# Patient Record
Sex: MALE | URBAN - METROPOLITAN AREA
[De-identification: names, ages, dates, MRNs, and addresses within clinical notes are randomized per-mention and may not be internally consistent; named-entity substitution may affect disease eponyms.]

---

## 2019-03-08 ENCOUNTER — APPOINTMENT (RX ONLY)
Dept: URBAN - METROPOLITAN AREA CLINIC 31 | Facility: CLINIC | Age: 69
Setting detail: DERMATOLOGY
End: 2019-03-08

## 2019-03-08 DIAGNOSIS — L87.2 ELASTOSIS PERFORANS SERPIGINOSA: ICD-10-CM

## 2019-03-08 PROBLEM — I10 ESSENTIAL (PRIMARY) HYPERTENSION: Status: ACTIVE | Noted: 2019-03-08

## 2019-03-08 PROBLEM — L30.9 DERMATITIS, UNSPECIFIED: Status: ACTIVE | Noted: 2019-03-08

## 2019-03-08 PROBLEM — E78.5 HYPERLIPIDEMIA, UNSPECIFIED: Status: ACTIVE | Noted: 2019-03-08

## 2019-03-08 PROBLEM — L20.84 INTRINSIC (ALLERGIC) ECZEMA: Status: ACTIVE | Noted: 2019-03-08

## 2019-03-08 PROCEDURE — 11103 TANGNTL BX SKIN EA SEP/ADDL: CPT

## 2019-03-08 PROCEDURE — ? BIOPSY BY SHAVE METHOD

## 2019-03-08 PROCEDURE — 11102 TANGNTL BX SKIN SINGLE LES: CPT

## 2019-03-08 PROCEDURE — ? TREATMENT REGIMEN

## 2019-03-08 ASSESSMENT — LOCATION DETAILED DESCRIPTION DERM
LOCATION DETAILED: PERIUMBILICAL SKIN
LOCATION DETAILED: LEFT PROXIMAL PRETIBIAL REGION

## 2019-03-08 ASSESSMENT — LOCATION SIMPLE DESCRIPTION DERM
LOCATION SIMPLE: LEFT PRETIBIAL REGION
LOCATION SIMPLE: ABDOMEN

## 2019-03-08 ASSESSMENT — LOCATION ZONE DERM
LOCATION ZONE: LEG
LOCATION ZONE: TRUNK

## 2019-03-08 NOTE — PROCEDURE: BIOPSY BY SHAVE METHOD
Biopsy Type: H and E
X Size Of Lesion In Cm: 0
Depth Of Biopsy: dermis
Electrodesiccation Text: The wound bed was treated with electrodesiccation after the biopsy was performed.
Render Post-Care Instructions In Note?: no
Wound Care: Vaseline
Lab Facility: 3
Cryotherapy Text: The wound bed was treated with cryotherapy after the biopsy was performed.
Anesthesia Type: 1% lidocaine with epinephrine
Consent: Written consent was obtained and risks were reviewed including but not limited to scarring, infection, bleeding, scabbing, incomplete removal, nerve damage and allergy to anesthesia.
Lab: -20
Curettage Text: The wound bed was treated with curettage after the biopsy was performed.
Detail Level: Detailed
Biopsy Method: Dermablade
Notification Instructions: Patient will be notified of biopsy results. However, patient instructed to call the office if not contacted within 2 weeks.
Was A Bandage Applied: Yes
Hemostasis: Aluminum Chloride
Silver Nitrate Text: The wound bed was treated with silver nitrate after the biopsy was performed.
Post-Care Instructions: I reviewed with the patient in detail post-care instructions. Patient is to keep the biopsy site dry overnight, and then apply bacitracin twice daily until healed. Patient may apply hydrogen peroxide soaks to remove any crusting.
Dressing: bandage
Electrodesiccation And Curettage Text: The wound bed was treated with electrodesiccation and curettage after the biopsy was performed.
Billing Type: Third-Party Bill
Anesthesia Volume In Cc (Will Not Render If 0): 0.5
Type Of Destruction Used: Curettage

## 2019-03-08 NOTE — PROCEDURE: TREATMENT REGIMEN
Detail Level: Zone
Continue Regimen: May continue one of his topical steroid creams bid x 1-2 weeks in the meantime until results come in

## 2019-03-08 NOTE — HPI: RASH
What Type Of Note Output Would You Prefer (Optional)?: Bullet Format
Is This A New Presentation, Or A Follow-Up?: Rash
Additional History: Pt states he has seen multiple dermatologists for this rash. One biopsy was done on the back but he was told it didnt show anything. He has been given multiple topicals and orals but the rash keeps returning and no one has been able to tell him what the diagnosis is so far

## 2019-03-26 ENCOUNTER — APPOINTMENT (RX ONLY)
Dept: URBAN - METROPOLITAN AREA CLINIC 31 | Facility: CLINIC | Age: 69
Setting detail: DERMATOLOGY
End: 2019-03-26

## 2019-03-26 ENCOUNTER — APPOINTMENT (RX ONLY)
Dept: URBAN - METROPOLITAN AREA CLINIC 26 | Facility: CLINIC | Age: 69
Setting detail: DERMATOLOGY
End: 2019-03-26

## 2019-03-26 DIAGNOSIS — L87.2 ELASTOSIS PERFORANS SERPIGINOSA: ICD-10-CM

## 2019-03-26 DIAGNOSIS — L259 CONTACT DERMATITIS AND OTHER ECZEMA, UNSPECIFIED CAUSE: ICD-10-CM

## 2019-03-26 DIAGNOSIS — L82.1 OTHER SEBORRHEIC KERATOSIS: ICD-10-CM

## 2019-03-26 PROBLEM — D48.5 NEOPLASM OF UNCERTAIN BEHAVIOR OF SKIN: Status: ACTIVE | Noted: 2019-03-26

## 2019-03-26 PROBLEM — L30.9 DERMATITIS, UNSPECIFIED: Status: ACTIVE | Noted: 2019-03-26

## 2019-03-26 PROCEDURE — ? BIOPSY BY SHAVE METHOD

## 2019-03-26 PROCEDURE — ? COUNSELING

## 2019-03-26 PROCEDURE — 11104 PUNCH BX SKIN SINGLE LESION: CPT

## 2019-03-26 PROCEDURE — 99213 OFFICE O/P EST LOW 20 MIN: CPT | Mod: 25

## 2019-03-26 PROCEDURE — 11103 TANGNTL BX SKIN EA SEP/ADDL: CPT

## 2019-03-26 PROCEDURE — ? ADDITIONAL NOTES

## 2019-03-26 PROCEDURE — ? PRESCRIPTION

## 2019-03-26 PROCEDURE — ? BIOPSY BY PUNCH METHOD

## 2019-03-26 PROCEDURE — ? ORDER TESTS

## 2019-03-26 RX ORDER — CLOBETASOL PROPIONATE 0.5 MG/G
CREAM TOPICAL
Qty: 1 | Refills: 2 | Status: ERX

## 2019-03-26 RX ORDER — TRETINOIN 1 MG/G
CREAM TOPICAL
Qty: 1 | Refills: 6 | Status: ERX | COMMUNITY
Start: 2019-03-26

## 2019-03-26 RX ADMIN — TRETINOIN: 1 CREAM TOPICAL at 19:03

## 2019-03-26 ASSESSMENT — LOCATION SIMPLE DESCRIPTION DERM
LOCATION SIMPLE: CHEST
LOCATION SIMPLE: ABDOMEN

## 2019-03-26 ASSESSMENT — LOCATION ZONE DERM: LOCATION ZONE: TRUNK

## 2019-03-26 ASSESSMENT — LOCATION DETAILED DESCRIPTION DERM
LOCATION DETAILED: XIPHOID
LOCATION DETAILED: RIGHT LATERAL SUPERIOR CHEST
LOCATION DETAILED: RIGHT LATERAL ABDOMEN

## 2019-03-26 NOTE — PROCEDURE: ADDITIONAL NOTES
Additional Notes: Discussed treatment options to include retinoids, TCS, uvb, LN2, acetretin. Recommended UVB
Detail Level: Simple
Additional Notes: Labs entered into a non visit order

## 2019-03-26 NOTE — PROCEDURE: BIOPSY BY SHAVE METHOD
Additional Anesthesia Volume In Cc (Will Not Render If 0): 0
Destruction After The Procedure: No
Lab: -17
Biopsy Type: H and E
Wound Care: Vaseline
Billing Type: Third-Party Bill
Detail Level: Detailed
Silver Nitrate Text: The wound bed was treated with silver nitrate after the biopsy was performed.
Post-Care Instructions: I reviewed with the patient in detail post-care instructions. Patient is to keep the biopsy site dry overnight, and then apply bacitracin twice daily until healed. Patient may apply hydrogen peroxide soaks to remove any crusting.
Lab Facility: 3
Was A Bandage Applied: Yes
Notification Instructions: Patient will be notified of biopsy results. However, patient instructed to call the office if not contacted within 2 weeks.
Cryotherapy Text: The wound bed was treated with cryotherapy after the biopsy was performed.
Biopsy Method: Dermablade
Consent: Written consent was obtained and risks were reviewed including but not limited to scarring, infection, bleeding, scabbing, incomplete removal, nerve damage and allergy to anesthesia.
Anesthesia Volume In Cc (Will Not Render If 0): 0.5
Dressing: bandage
Electrodesiccation And Curettage Text: The wound bed was treated with electrodesiccation and curettage after the biopsy was performed.
Hemostasis: Aluminum Chloride
Electrodesiccation Text: The wound bed was treated with electrodesiccation after the biopsy was performed.
Type Of Destruction Used: Curettage
Depth Of Biopsy: dermis
Curettage Text: The wound bed was treated with curettage after the biopsy was performed.
Anesthesia Type: 1% lidocaine with epinephrine

## 2019-03-26 NOTE — PROCEDURE: BIOPSY BY PUNCH METHOD
Post-Care Instructions: I reviewed with the patient in detail post-care instructions. Patient is to keep the biopsy site dry overnight, and then apply bacitracin twice daily until healed. Patient may apply hydrogen peroxide soaks to remove any crusting.
Hemostasis: None
Was A Bandage Applied: Yes
X Depth Of Punch In Cm (Optional): 0
Epidermal Sutures: 5-0 Ethilon
Anesthesia Type: 1% lidocaine with epinephrine
Consent: Written consent was obtained and risks were reviewed including but not limited to scarring, infection, bleeding, scabbing, incomplete removal, nerve damage and allergy to anesthesia.
Detail Level: Detailed
Patient Will Remove Sutures At Home?: No
Lab: -17
Biopsy Type: H and E
Dressing: bandage
Home Suture Removal Text: Patient was provided a home suture removal kit and will remove their sutures at home.  If they have any questions or difficulties they will call the office.
Punch Size In Mm: 3
Notification Instructions: Patient will be notified of biopsy results. However, patient instructed to call the office if not contacted within 2 weeks.
Billing Type: Third-Party Bill
Wound Care: Petrolatum
Anesthesia Volume In Cc (Will Not Render If 0): 0.5
Path Notes (To The Dermatopathologist): Only  to read
Suture Removal: 10 days

## 2019-04-08 ENCOUNTER — RX ONLY (OUTPATIENT)
Age: 69
Setting detail: RX ONLY
End: 2019-04-08

## 2019-04-08 RX ORDER — PREDNISONE 10 MG/1
TABLET ORAL
Qty: 30 | Refills: 0 | Status: ERX

## 2019-04-16 ENCOUNTER — APPOINTMENT (RX ONLY)
Dept: URBAN - METROPOLITAN AREA CLINIC 26 | Facility: CLINIC | Age: 69
Setting detail: DERMATOLOGY
End: 2019-04-16

## 2019-04-16 DIAGNOSIS — L259 CONTACT DERMATITIS AND OTHER ECZEMA, UNSPECIFIED CAUSE: ICD-10-CM

## 2019-04-16 PROBLEM — L23.9 ALLERGIC CONTACT DERMATITIS, UNSPECIFIED CAUSE: Status: ACTIVE | Noted: 2019-04-16

## 2019-04-16 PROCEDURE — 95044 PATCH/APPLICATION TESTS: CPT

## 2019-04-16 PROCEDURE — ? PATCH TESTING

## 2019-04-16 ASSESSMENT — LOCATION ZONE DERM: LOCATION ZONE: TRUNK

## 2019-04-16 ASSESSMENT — LOCATION DETAILED DESCRIPTION DERM: LOCATION DETAILED: INFERIOR THORACIC SPINE

## 2019-04-16 ASSESSMENT — LOCATION SIMPLE DESCRIPTION DERM: LOCATION SIMPLE: UPPER BACK

## 2019-04-16 NOTE — PROCEDURE: PATCH TESTING
Post-Care Instructions: I reviewed with the patient in detail post-care instructions. Patient should not sweat, pick at, or get the patches wet for 48 hours.
Number Of Patches (Maximum Allowable Per Dos By Cms Is 90): 3
Detail Level: Simple
Consent: Written consent obtained, risks reviewed including but not limited to rash, itching, allergic reaction, systemic rash, remote possiblity of anaphylaxis to allergen.

## 2019-04-22 ENCOUNTER — APPOINTMENT (RX ONLY)
Dept: URBAN - METROPOLITAN AREA CLINIC 31 | Facility: CLINIC | Age: 69
Setting detail: DERMATOLOGY
End: 2019-04-22

## 2019-04-22 DIAGNOSIS — L259 CONTACT DERMATITIS AND OTHER ECZEMA, UNSPECIFIED CAUSE: ICD-10-CM

## 2019-04-22 PROBLEM — L23.9 ALLERGIC CONTACT DERMATITIS, UNSPECIFIED CAUSE: Status: ACTIVE | Noted: 2019-04-22

## 2019-04-22 PROCEDURE — ? ADDITIONAL NOTES

## 2019-04-22 NOTE — PROCEDURE: ADDITIONAL NOTES
Detail Level: Simple
Additional Notes: 3 TRUE test patches were applied to the back. Pt understands to avoid getting patches wet for 48 hours. He will return in 48 hours

## 2019-04-24 ENCOUNTER — APPOINTMENT (RX ONLY)
Dept: URBAN - METROPOLITAN AREA CLINIC 26 | Facility: CLINIC | Age: 69
Setting detail: DERMATOLOGY
End: 2019-04-24

## 2019-04-24 DIAGNOSIS — L259 CONTACT DERMATITIS AND OTHER ECZEMA, UNSPECIFIED CAUSE: ICD-10-CM

## 2019-04-24 PROBLEM — L23.9 ALLERGIC CONTACT DERMATITIS, UNSPECIFIED CAUSE: Status: ACTIVE | Noted: 2019-04-24

## 2019-04-24 PROCEDURE — ? PATCH TEST REMOVAL

## 2019-04-24 PROCEDURE — ? TRUE TEST READING

## 2019-04-24 ASSESSMENT — LOCATION ZONE DERM: LOCATION ZONE: TRUNK

## 2019-04-24 ASSESSMENT — LOCATION SIMPLE DESCRIPTION DERM: LOCATION SIMPLE: UPPER BACK

## 2019-04-24 ASSESSMENT — LOCATION DETAILED DESCRIPTION DERM: LOCATION DETAILED: INFERIOR THORACIC SPINE

## 2019-04-24 NOTE — PROCEDURE: PATCH TEST REMOVAL
Detail Level: Simple
Patch Test Removal Text: The patch test material was removed from the back today.

## 2019-04-24 NOTE — PROCEDURE: TRUE TEST READING
Imidazolidinyl Urea: no reaction
Show Negative Results In The Note?: No
Number Of Patches Read: 3
Detail Level: Zone
What Reading Time Point?: 48 hour

## 2019-04-26 ENCOUNTER — APPOINTMENT (RX ONLY)
Dept: URBAN - METROPOLITAN AREA CLINIC 31 | Facility: CLINIC | Age: 69
Setting detail: DERMATOLOGY
End: 2019-04-26

## 2019-04-26 DIAGNOSIS — L259 CONTACT DERMATITIS AND OTHER ECZEMA, UNSPECIFIED CAUSE: ICD-10-CM

## 2019-04-26 PROBLEM — L23.9 ALLERGIC CONTACT DERMATITIS, UNSPECIFIED CAUSE: Status: ACTIVE | Noted: 2019-04-26

## 2019-04-26 PROCEDURE — ? TRUE TEST READING

## 2019-04-26 PROCEDURE — 99212 OFFICE O/P EST SF 10 MIN: CPT

## 2019-04-26 PROCEDURE — ? OTHER

## 2019-04-26 ASSESSMENT — LOCATION ZONE DERM: LOCATION ZONE: TRUNK

## 2019-04-26 ASSESSMENT — LOCATION DETAILED DESCRIPTION DERM: LOCATION DETAILED: INFERIOR THORACIC SPINE

## 2019-04-26 ASSESSMENT — LOCATION SIMPLE DESCRIPTION DERM: LOCATION SIMPLE: UPPER BACK

## 2019-04-26 NOTE — PROCEDURE: OTHER
Other (Free Text): Gave pt a hand out of #36
Note Text (......Xxx Chief Complaint.): This diagnosis correlates with the
Detail Level: Zone

## 2019-04-26 NOTE — PROCEDURE: TRUE TEST READING
Colophony: no reaction
Number Of Patches Read: 3
Detail Level: Zone
What Reading Time Point?: 96 hour
Show Allergen Counseling In The Note?: Yes
2-Bromo-2-Nitropropane-1,3-Diol (Bronopol): 1+
Show Negative Results In The Note?: No

## 2020-04-20 DIAGNOSIS — E11.9 DIABETES MELLITUS WITHOUT COMPLICATION (HCC): Primary | ICD-10-CM

## 2020-04-20 RX ORDER — GLIPIZIDE 5 MG/1
TABLET, FILM COATED, EXTENDED RELEASE ORAL
Qty: 90 TABLET | Refills: 2 | Status: SHIPPED | OUTPATIENT
Start: 2020-04-20 | End: 2020-08-26 | Stop reason: SDUPTHER

## 2020-05-08 DIAGNOSIS — E10.9 TYPE 1 DIABETES MELLITUS WITHOUT COMPLICATION (HCC): ICD-10-CM

## 2020-05-08 DIAGNOSIS — I10 HYPERTENSION, UNSPECIFIED TYPE: Primary | ICD-10-CM

## 2020-05-08 DIAGNOSIS — E78.00 PURE HYPERCHOLESTEROLEMIA: ICD-10-CM

## 2020-05-08 PROBLEM — E11.9 DIABETES MELLITUS WITHOUT COMPLICATION (HCC): Status: ACTIVE | Noted: 2020-05-08

## 2020-05-08 PROBLEM — K63.5 COLON POLYPS: Status: ACTIVE | Noted: 2020-05-08

## 2020-05-08 PROBLEM — N52.9 ERECTILE DYSFUNCTION: Status: ACTIVE | Noted: 2020-05-08

## 2020-05-08 PROBLEM — K21.9 GERD (GASTROESOPHAGEAL REFLUX DISEASE): Status: ACTIVE | Noted: 2020-05-08

## 2020-05-08 RX ORDER — ATORVASTATIN CALCIUM 40 MG/1
TABLET, FILM COATED ORAL
COMMUNITY
Start: 2020-03-13 | End: 2020-05-08 | Stop reason: SDUPTHER

## 2020-05-08 RX ORDER — ATORVASTATIN CALCIUM 40 MG/1
40 TABLET, FILM COATED ORAL DAILY
Qty: 90 TABLET | Refills: 2 | Status: SHIPPED | OUTPATIENT
Start: 2020-05-08 | End: 2020-08-26 | Stop reason: SDUPTHER

## 2020-05-08 RX ORDER — FENOFIBRIC ACID 135 MG/1
135 CAPSULE, DELAYED RELEASE ORAL DAILY
Qty: 90 CAPSULE | Refills: 2 | Status: SHIPPED | OUTPATIENT
Start: 2020-05-08 | End: 2020-08-26 | Stop reason: SDUPTHER

## 2020-05-08 RX ORDER — SITAGLIPTIN 100 MG/1
TABLET, FILM COATED ORAL
COMMUNITY
Start: 2020-03-13 | End: 2020-05-08 | Stop reason: SDUPTHER

## 2020-05-08 RX ORDER — FENOFIBRIC ACID 135 MG/1
CAPSULE, DELAYED RELEASE ORAL
COMMUNITY
Start: 2020-03-13 | End: 2020-05-08 | Stop reason: SDUPTHER

## 2020-05-08 RX ORDER — LISINOPRIL 10 MG/1
10 TABLET ORAL DAILY
Qty: 90 TABLET | Refills: 2 | Status: SHIPPED | OUTPATIENT
Start: 2020-05-08 | End: 2020-08-26 | Stop reason: SDUPTHER

## 2020-05-08 RX ORDER — SITAGLIPTIN 100 MG/1
100 TABLET, FILM COATED ORAL DAILY
Qty: 90 TABLET | Refills: 2 | Status: SHIPPED | OUTPATIENT
Start: 2020-05-08 | End: 2020-08-26 | Stop reason: SDUPTHER

## 2020-05-08 RX ORDER — LISINOPRIL 10 MG/1
TABLET ORAL
COMMUNITY
Start: 2020-03-13 | End: 2020-05-08 | Stop reason: SDUPTHER

## 2020-05-15 DIAGNOSIS — E11.9 DIABETES MELLITUS WITHOUT COMPLICATION (HCC): Primary | ICD-10-CM

## 2020-05-15 RX ORDER — LANCETS 33 GAUGE
EACH MISCELLANEOUS 3 TIMES WEEKLY
Qty: 100 EACH | Refills: 3 | Status: SHIPPED | OUTPATIENT
Start: 2020-05-15

## 2020-05-15 RX ORDER — LANCETS 33 GAUGE
EACH MISCELLANEOUS
COMMUNITY
End: 2020-05-15 | Stop reason: SDUPTHER

## 2020-07-13 ENCOUNTER — TELEPHONE (OUTPATIENT)
Dept: FAMILY MEDICINE CLINIC | Facility: CLINIC | Age: 70
End: 2020-07-13

## 2020-07-13 RX ORDER — AMLODIPINE BESYLATE 5 MG/1
5 TABLET ORAL DAILY
COMMUNITY
End: 2020-07-20 | Stop reason: SDUPTHER

## 2020-07-20 DIAGNOSIS — I10 ESSENTIAL HYPERTENSION: ICD-10-CM

## 2020-07-20 DIAGNOSIS — I10 ESSENTIAL HYPERTENSION: Primary | ICD-10-CM

## 2020-07-20 RX ORDER — AMLODIPINE BESYLATE 5 MG/1
5 TABLET ORAL DAILY
Qty: 30 TABLET | Refills: 0 | Status: SHIPPED | OUTPATIENT
Start: 2020-07-20 | End: 2020-07-20

## 2020-07-20 RX ORDER — AMLODIPINE BESYLATE 5 MG/1
TABLET ORAL
Qty: 90 TABLET | Refills: 0 | Status: SHIPPED | OUTPATIENT
Start: 2020-07-20 | End: 2020-08-26 | Stop reason: SDUPTHER

## 2020-07-31 LAB
BUN SERPL-MCNC: 18 MG/DL (ref 7–25)
BUN/CREAT SERPL: 13 (CALC) (ref 6–22)
CALCIUM SERPL-MCNC: 9.7 MG/DL (ref 8.6–10.3)
CHLORIDE SERPL-SCNC: 104 MMOL/L (ref 98–110)
CO2 SERPL-SCNC: 24 MMOL/L (ref 20–32)
CREAT SERPL-MCNC: 1.36 MG/DL (ref 0.7–1.25)
GLUCOSE SERPL-MCNC: 69 MG/DL (ref 65–99)
HBA1C MFR BLD: 6 % OF TOTAL HGB
POTASSIUM SERPL-SCNC: 4.8 MMOL/L (ref 3.5–5.3)
SL AMB EGFR AFRICAN AMERICAN: 62 ML/MIN/1.73M2
SL AMB EGFR NON AFRICAN AMERICAN: 53 ML/MIN/1.73M2
SODIUM SERPL-SCNC: 140 MMOL/L (ref 135–146)

## 2020-07-31 PROCEDURE — 3044F HG A1C LEVEL LT 7.0%: CPT | Performed by: FAMILY MEDICINE

## 2020-08-12 DIAGNOSIS — E11.9 TYPE 2 DIABETES MELLITUS WITHOUT COMPLICATION, WITHOUT LONG-TERM CURRENT USE OF INSULIN (HCC): Primary | ICD-10-CM

## 2020-08-12 DIAGNOSIS — E11.9 TYPE 2 DIABETES MELLITUS WITHOUT COMPLICATION, WITHOUT LONG-TERM CURRENT USE OF INSULIN (HCC): ICD-10-CM

## 2020-08-12 NOTE — TELEPHONE ENCOUNTER
Needs a refill for his Metformin 500mg, 3x's a day with meals (90 day supply)    [id number] [place]    Patients ph # [phone]    Patient said that Dr Hunter Regalado increased his metformin to 3'x a day? ???

## 2020-08-26 ENCOUNTER — OFFICE VISIT (OUTPATIENT)
Dept: FAMILY MEDICINE CLINIC | Facility: CLINIC | Age: 70
End: 2020-08-26
Payer: COMMERCIAL

## 2020-08-26 VITALS
HEIGHT: 67 IN | OXYGEN SATURATION: 98 % | DIASTOLIC BLOOD PRESSURE: 76 MMHG | WEIGHT: 185 LBS | SYSTOLIC BLOOD PRESSURE: 120 MMHG | HEART RATE: 64 BPM | TEMPERATURE: 96.6 F | BODY MASS INDEX: 29.03 KG/M2

## 2020-08-26 DIAGNOSIS — K21.9 GASTROESOPHAGEAL REFLUX DISEASE, ESOPHAGITIS PRESENCE NOT SPECIFIED: ICD-10-CM

## 2020-08-26 DIAGNOSIS — I10 HYPERTENSION, UNSPECIFIED TYPE: ICD-10-CM

## 2020-08-26 DIAGNOSIS — K63.5 POLYP OF COLON, UNSPECIFIED PART OF COLON, UNSPECIFIED TYPE: ICD-10-CM

## 2020-08-26 DIAGNOSIS — E11.9 TYPE 2 DIABETES MELLITUS WITHOUT COMPLICATION, WITHOUT LONG-TERM CURRENT USE OF INSULIN (HCC): ICD-10-CM

## 2020-08-26 DIAGNOSIS — E78.00 PURE HYPERCHOLESTEROLEMIA: ICD-10-CM

## 2020-08-26 DIAGNOSIS — E10.9 TYPE 1 DIABETES MELLITUS WITHOUT COMPLICATION (HCC): ICD-10-CM

## 2020-08-26 DIAGNOSIS — Z23 ENCOUNTER FOR IMMUNIZATION: ICD-10-CM

## 2020-08-26 DIAGNOSIS — E78.00 HYPERCHOLESTEROLEMIA: ICD-10-CM

## 2020-08-26 DIAGNOSIS — I10 ESSENTIAL HYPERTENSION: ICD-10-CM

## 2020-08-26 DIAGNOSIS — M65.332 TRIGGER MIDDLE FINGER OF LEFT HAND: ICD-10-CM

## 2020-08-26 DIAGNOSIS — E11.9 DIABETES MELLITUS WITHOUT COMPLICATION (HCC): Primary | ICD-10-CM

## 2020-08-26 PROCEDURE — 3074F SYST BP LT 130 MM HG: CPT | Performed by: FAMILY MEDICINE

## 2020-08-26 PROCEDURE — 3078F DIAST BP <80 MM HG: CPT | Performed by: FAMILY MEDICINE

## 2020-08-26 PROCEDURE — 4010F ACE/ARB THERAPY RXD/TAKEN: CPT | Performed by: FAMILY MEDICINE

## 2020-08-26 PROCEDURE — 1101F PT FALLS ASSESS-DOCD LE1/YR: CPT | Performed by: FAMILY MEDICINE

## 2020-08-26 PROCEDURE — 1160F RVW MEDS BY RX/DR IN RCRD: CPT | Performed by: FAMILY MEDICINE

## 2020-08-26 PROCEDURE — 3725F SCREEN DEPRESSION PERFORMED: CPT | Performed by: FAMILY MEDICINE

## 2020-08-26 PROCEDURE — 1036F TOBACCO NON-USER: CPT | Performed by: FAMILY MEDICINE

## 2020-08-26 PROCEDURE — 90471 IMMUNIZATION ADMIN: CPT | Performed by: FAMILY MEDICINE

## 2020-08-26 PROCEDURE — 99214 OFFICE O/P EST MOD 30 MIN: CPT | Performed by: FAMILY MEDICINE

## 2020-08-26 PROCEDURE — 3008F BODY MASS INDEX DOCD: CPT | Performed by: FAMILY MEDICINE

## 2020-08-26 PROCEDURE — 3288F FALL RISK ASSESSMENT DOCD: CPT | Performed by: FAMILY MEDICINE

## 2020-08-26 PROCEDURE — 90732 PPSV23 VACC 2 YRS+ SUBQ/IM: CPT | Performed by: FAMILY MEDICINE

## 2020-08-26 PROCEDURE — 4040F PNEUMOC VAC/ADMIN/RCVD: CPT | Performed by: FAMILY MEDICINE

## 2020-08-26 RX ORDER — GLIPIZIDE 5 MG/1
5 TABLET, FILM COATED, EXTENDED RELEASE ORAL DAILY
Qty: 90 TABLET | Refills: 2 | Status: SHIPPED | OUTPATIENT
Start: 2020-08-26 | End: 2021-05-11 | Stop reason: SDUPTHER

## 2020-08-26 RX ORDER — OMEPRAZOLE 40 MG/1
40 CAPSULE, DELAYED RELEASE ORAL
Qty: 90 CAPSULE | Refills: 2 | Status: SHIPPED | OUTPATIENT
Start: 2020-08-26 | End: 2021-05-11 | Stop reason: SDUPTHER

## 2020-08-26 RX ORDER — FENOFIBRIC ACID 135 MG/1
135 CAPSULE, DELAYED RELEASE ORAL DAILY
Qty: 90 CAPSULE | Refills: 2 | Status: SHIPPED | OUTPATIENT
Start: 2020-08-26 | End: 2021-03-28

## 2020-08-26 RX ORDER — AMLODIPINE BESYLATE 5 MG/1
5 TABLET ORAL DAILY
Qty: 30 TABLET | Refills: 0 | Status: SHIPPED | OUTPATIENT
Start: 2020-08-26 | End: 2020-08-26 | Stop reason: SDUPTHER

## 2020-08-26 RX ORDER — ATORVASTATIN CALCIUM 40 MG/1
40 TABLET, FILM COATED ORAL DAILY
Qty: 90 TABLET | Refills: 2 | Status: SHIPPED | OUTPATIENT
Start: 2020-08-26 | End: 2021-03-28

## 2020-08-26 RX ORDER — SITAGLIPTIN 100 MG/1
100 TABLET, FILM COATED ORAL DAILY
Qty: 90 TABLET | Refills: 2 | Status: SHIPPED | OUTPATIENT
Start: 2020-08-26 | End: 2021-03-28

## 2020-08-26 RX ORDER — AMLODIPINE BESYLATE 5 MG/1
TABLET ORAL
Qty: 30 TABLET | Refills: 0 | Status: SHIPPED | OUTPATIENT
Start: 2020-08-26 | End: 2020-10-18

## 2020-08-26 RX ORDER — LISINOPRIL 10 MG/1
10 TABLET ORAL DAILY
Qty: 90 TABLET | Refills: 2 | Status: SHIPPED | OUTPATIENT
Start: 2020-08-26 | End: 2021-03-28

## 2020-08-26 RX ORDER — AMLODIPINE BESYLATE 5 MG/1
5 TABLET ORAL DAILY
Qty: 90 TABLET | Refills: 2 | Status: SHIPPED | OUTPATIENT
Start: 2020-08-26 | End: 2020-08-26

## 2020-08-26 NOTE — ASSESSMENT & PLAN NOTE
A1C down to 6 0 in July 2020  Cont current meds  Had eye exam in Feb 2020  Foot exam done today       Lab Results   Component Value Date    HGBA1C 6 0 (H) 07/30/2020

## 2020-08-26 NOTE — PROGRESS NOTES
BMI Counseling: Body mass index is 28 98 kg/m²  The BMI is above normal  Nutrition recommendations include decreasing portion sizes, encouraging healthy choices of fruits and vegetables, consuming healthier snacks and moderation in carbohydrate intake  Exercise recommendations include exercising 3-5 times per week  No pharmacotherapy was ordered  Assessment/Plan:         Problem List Items Addressed This Visit        Digestive    GERD (gastroesophageal reflux disease)     No recent GERD  Relevant Medications    omeprazole (PriLOSEC) 40 MG capsule    Colon polyps     Pt had colo in Feb 2020 and had 9 polyps  Recheck in 3 yrs  Endocrine    Diabetes mellitus without complication ([facility] [address] ) - Primary     A1C down to 6 0 in July 2020  Cont current meds  Had eye exam in Feb 2020  Foot exam done today  Lab Results   Component Value Date    HGBA1C 6 0 (H) 07/30/2020            Relevant Medications    glipiZIDE (GLUCOTROL XL) 5 mg 24 hr tablet    Januvia 100 MG tablet    metFORMIN (GLUCOPHAGE) 500 mg tablet    Other Relevant Orders    Hemoglobin Z2X    Basic metabolic panel       Cardiovascular and Mediastinum    Essential hypertension     BP great  Cont meds  Relevant Medications    amLODIPine (NORVASC) 5 mg tablet    lisinopril (ZESTRIL) 10 mg tablet       Musculoskeletal and Integument    Trigger middle finger of left hand     Refer to Ortho  Relevant Orders    Ambulatory referral to Orthopedic Surgery       Other    Hypercholesterolemia     LDL 78 in Feb 2020  Cont atorvastatin 40 mg qhs           Relevant Medications    atorvastatin (LIPITOR) 40 mg tablet    Choline Fenofibrate (Fenofibric Acid) 135 MG CPDR    Other Relevant Orders    Lipid panel    Hepatic function panel      Other Visit Diagnoses     Pure hypercholesterolemia        Relevant Medications    atorvastatin (LIPITOR) 40 mg tablet    Choline Fenofibrate (Fenofibric Acid) 135 MG CPDR    Type 1 diabetes mellitus without complication (HCC)        Relevant Medications    glipiZIDE (GLUCOTROL XL) 5 mg 24 hr tablet    Januvia 100 MG tablet    metFORMIN (GLUCOPHAGE) 500 mg tablet    Hypertension, unspecified type        Relevant Medications    amLODIPine (NORVASC) 5 mg tablet    lisinopril (ZESTRIL) 10 mg tablet    Type 2 diabetes mellitus without complication, without long-term current use of insulin (HCC)        Relevant Medications    glipiZIDE (GLUCOTROL XL) 5 mg 24 hr tablet    Januvia 100 MG tablet    metFORMIN (GLUCOPHAGE) 500 mg tablet    Encounter for immunization        Relevant Orders    PNEUMOCOCCAL POLYSACCHARIDE VACCINE 23-VALENT =>3YO SQ IM            Subjective:      Patient ID: Chau Gerard is a 78 y o  male  Pt here for f/u DM, HTN, HL, GERD, Colon Polyps  Doing well  No cp/sob  No headaches  No abd pain  No GERD  Takes omeprazole 40 3 days a week  BP good  Exercising more  Sugars much better  Had eye exam in Feb 2020  No numbness of feet  Had colo in Feb 2020 and had 9 polyps  No other c/o  The following portions of the patient's history were reviewed and updated as appropriate:   He has no past medical history on file  ,  does not have any pertinent problems on file  ,   has a past surgical history that includes Colonoscopy (08/01/2016)  ,  family history includes Asthma in his father  ,   reports that he has never smoked  He has never used smokeless tobacco  He reports that he does not drink alcohol or use drugs  ,  is allergic to compazine [prochlorperazine] and zithromax [azithromycin]     Current Outpatient Medications   Medication Sig Dispense Refill    amLODIPine (NORVASC) 5 mg tablet Take 1 tablet (5 mg total) by mouth daily 90 tablet 2    atorvastatin (LIPITOR) 40 mg tablet Take 1 tablet (40 mg total) by mouth daily 90 tablet 2    Choline Fenofibrate (Fenofibric Acid) 135 MG CPDR Take 1 capsule (135 mg total) by mouth daily 90 capsule 2    glipiZIDE (GLUCOTROL XL) 5 mg 24 hr tablet Take 1 tablet (5 mg total) by mouth daily 90 tablet 2    glucose blood test strip 1 each by Other route 3 (three) times a week OneTouch test strips 3 times per week 100 each 3    Januvia 100 MG tablet Take 1 tablet (100 mg total) by mouth daily 90 tablet 2    lisinopril (ZESTRIL) 10 mg tablet Take 1 tablet (10 mg total) by mouth daily 90 tablet 2    metFORMIN (GLUCOPHAGE) 500 mg tablet Take 1 tablet (500 mg total) by mouth 3 (three) times a day with meals 270 tablet 2    OneTouch Delica Lancets 35W MISC by Does not apply route 3 (three) times a week onetouch delica lancets 71D test 3 times per week 100 each 3    omeprazole (PriLOSEC) 40 MG capsule Take 1 capsule (40 mg total) by mouth daily before breakfast 90 capsule 2     No current facility-administered medications for this visit  Review of Systems   Constitutional: Negative for fatigue and unexpected weight change  Eyes: Negative for visual disturbance  Respiratory: Negative for chest tightness and shortness of breath  Cardiovascular: Negative for chest pain and palpitations  Gastrointestinal: Negative for abdominal pain, constipation, diarrhea, nausea and vomiting  Endocrine: Negative for polydipsia, polyphagia and polyuria  Genitourinary: Negative for frequency  Musculoskeletal: Negative for arthralgias  Skin: Negative for rash and wound  Neurological: Negative for numbness  Objective:  Vitals:    08/26/20 0901   BP: 120/76   Pulse: 64   Temp: (!) 96 6 °F (35 9 °C)   SpO2: 98%   Weight: 83 9 kg (185 lb)   Height: 5' 7" (1 702 m)     Body mass index is 28 98 kg/m²  Physical Exam  Vitals signs and nursing note reviewed  Constitutional:       Appearance: He is well-developed  Neck:      Musculoskeletal: Normal range of motion and neck supple  Thyroid: No thyromegaly  Cardiovascular:      Rate and Rhythm: Normal rate and regular rhythm  Pulses: no weak pulses     Heart sounds: Normal heart sounds  No murmur  Pulmonary:      Effort: Pulmonary effort is normal  No respiratory distress  Breath sounds: Normal breath sounds  No wheezing  Feet:      Right foot:      Skin integrity: No ulcer, skin breakdown, erythema, warmth, callus or dry skin  Left foot:      Skin integrity: No ulcer, skin breakdown, erythema, warmth, callus or dry skin  Lymphadenopathy:      Cervical: No cervical adenopathy  Neurological:      Mental Status: He is alert and oriented to person, place, and time  Cranial Nerves: No cranial nerve deficit  Psychiatric:         Behavior: Behavior normal          Thought Content: Thought content normal          Judgment: Judgment normal        Patient's shoes and socks removed  Right Foot/Ankle   Right Foot Inspection  Skin Exam: skin normal and skin intact no dry skin, no warmth, no callus, no erythema, no maceration, no abnormal color, no pre-ulcer, no ulcer and no callus                            Sensory     Proprioception: intact     Vascular  Capillary refills: < 3 seconds      Left Foot/Ankle  Left Foot Inspection  Skin Exam: skin normal and skin intactno dry skin, no warmth, no erythema, no maceration, normal color, no pre-ulcer, no ulcer and no callus                                         Sensory     Proprioception: intact    Vascular  Capillary refills: < 3 seconds    Assign Risk Category:  No deformity present; No loss of protective sensation;  No weak pulses       Risk: 0

## 2020-08-28 DIAGNOSIS — E11.9 DIABETES MELLITUS WITHOUT COMPLICATION (HCC): Primary | ICD-10-CM

## 2020-08-28 RX ORDER — METFORMIN HYDROCHLORIDE 500 MG/1
1000 TABLET, EXTENDED RELEASE ORAL
Qty: 180 TABLET | Refills: 3 | Status: SHIPPED | OUTPATIENT
Start: 2020-08-28 | End: 2020-11-08 | Stop reason: SDUPTHER

## 2020-08-28 RX ORDER — METFORMIN HYDROCHLORIDE 500 MG/1
1000 TABLET, EXTENDED RELEASE ORAL
COMMUNITY
End: 2020-08-28 | Stop reason: SDUPTHER

## 2020-10-18 DIAGNOSIS — I10 ESSENTIAL HYPERTENSION: ICD-10-CM

## 2020-10-18 RX ORDER — AMLODIPINE BESYLATE 5 MG/1
TABLET ORAL
Qty: 30 TABLET | Refills: 0 | Status: SHIPPED | OUTPATIENT
Start: 2020-10-18 | End: 2021-04-28

## 2020-11-07 DIAGNOSIS — E11.9 TYPE 2 DIABETES MELLITUS WITHOUT COMPLICATION, WITHOUT LONG-TERM CURRENT USE OF INSULIN (HCC): ICD-10-CM

## 2020-12-26 LAB — HBA1C MFR BLD HPLC: 6.1 %

## 2020-12-29 ENCOUNTER — TELEPHONE (OUTPATIENT)
Dept: FAMILY MEDICINE CLINIC | Facility: CLINIC | Age: 70
End: 2020-12-29

## 2020-12-30 ENCOUNTER — OFFICE VISIT (OUTPATIENT)
Dept: FAMILY MEDICINE CLINIC | Facility: CLINIC | Age: 70
End: 2020-12-30
Payer: COMMERCIAL

## 2020-12-30 VITALS
OXYGEN SATURATION: 99 % | BODY MASS INDEX: 30.45 KG/M2 | DIASTOLIC BLOOD PRESSURE: 70 MMHG | TEMPERATURE: 97 F | WEIGHT: 194 LBS | HEIGHT: 67 IN | HEART RATE: 72 BPM | SYSTOLIC BLOOD PRESSURE: 126 MMHG

## 2020-12-30 DIAGNOSIS — E11.9 DIABETES MELLITUS WITHOUT COMPLICATION (HCC): ICD-10-CM

## 2020-12-30 DIAGNOSIS — Z12.5 PROSTATE CANCER SCREENING: ICD-10-CM

## 2020-12-30 DIAGNOSIS — K21.9 GASTROESOPHAGEAL REFLUX DISEASE, UNSPECIFIED WHETHER ESOPHAGITIS PRESENT: ICD-10-CM

## 2020-12-30 DIAGNOSIS — K63.5 POLYP OF COLON, UNSPECIFIED PART OF COLON, UNSPECIFIED TYPE: ICD-10-CM

## 2020-12-30 DIAGNOSIS — E78.00 HYPERCHOLESTEROLEMIA: ICD-10-CM

## 2020-12-30 DIAGNOSIS — I10 ESSENTIAL HYPERTENSION: Primary | ICD-10-CM

## 2020-12-30 DIAGNOSIS — Z23 ENCOUNTER FOR IMMUNIZATION: ICD-10-CM

## 2020-12-30 PROCEDURE — 99214 OFFICE O/P EST MOD 30 MIN: CPT | Performed by: FAMILY MEDICINE

## 2020-12-30 PROCEDURE — G0008 ADMIN INFLUENZA VIRUS VAC: HCPCS | Performed by: FAMILY MEDICINE

## 2020-12-30 PROCEDURE — 90662 IIV NO PRSV INCREASED AG IM: CPT | Performed by: FAMILY MEDICINE

## 2020-12-30 RX ORDER — ASPIRIN 81 MG/1
81 TABLET, CHEWABLE ORAL DAILY
COMMUNITY

## 2021-02-07 DIAGNOSIS — E11.9 TYPE 2 DIABETES MELLITUS WITHOUT COMPLICATION, WITHOUT LONG-TERM CURRENT USE OF INSULIN (HCC): ICD-10-CM

## 2021-03-19 LAB
LEFT EYE DIABETIC RETINOPATHY: NORMAL
RIGHT EYE DIABETIC RETINOPATHY: NORMAL

## 2021-03-23 ENCOUNTER — TELEPHONE (OUTPATIENT)
Dept: FAMILY MEDICINE CLINIC | Facility: CLINIC | Age: 71
End: 2021-03-23

## 2021-03-23 DIAGNOSIS — E11.9 TYPE 2 DIABETES MELLITUS WITHOUT COMPLICATION, WITHOUT LONG-TERM CURRENT USE OF INSULIN (HCC): ICD-10-CM

## 2021-03-23 DIAGNOSIS — N52.9 ERECTILE DYSFUNCTION, UNSPECIFIED ERECTILE DYSFUNCTION TYPE: Primary | ICD-10-CM

## 2021-03-23 NOTE — TELEPHONE ENCOUNTER
New script requested for Cialis  Tab add strength, and Metformin tab with  Directions, Qty, refills send to [facility]   ND

## 2021-03-24 RX ORDER — TADALAFIL 20 MG/1
20 TABLET ORAL DAILY PRN
Qty: 30 TABLET | Refills: 2 | Status: SHIPPED | OUTPATIENT
Start: 2021-03-24 | End: 2021-05-11 | Stop reason: SDUPTHER

## 2021-03-24 NOTE — TELEPHONE ENCOUNTER
Charlotte Ortega for metformin 500 mg bid #180 R2 and cialis 20 mg to take as needed   Do not take more than 1 pill in 48 hrs #30 R2

## 2021-03-26 ENCOUNTER — TELEPHONE (OUTPATIENT)
Dept: FAMILY MEDICINE CLINIC | Facility: CLINIC | Age: 71
End: 2021-03-26

## 2021-03-27 DIAGNOSIS — E78.00 PURE HYPERCHOLESTEROLEMIA: ICD-10-CM

## 2021-03-27 DIAGNOSIS — I10 HYPERTENSION, UNSPECIFIED TYPE: ICD-10-CM

## 2021-03-27 DIAGNOSIS — E10.9 TYPE 1 DIABETES MELLITUS WITHOUT COMPLICATION (HCC): ICD-10-CM

## 2021-03-28 RX ORDER — LISINOPRIL 10 MG/1
TABLET ORAL
Qty: 90 TABLET | Refills: 3 | Status: SHIPPED | OUTPATIENT
Start: 2021-03-28 | End: 2021-05-11 | Stop reason: SDUPTHER

## 2021-03-28 RX ORDER — FENOFIBRIC ACID 135 MG/1
CAPSULE, DELAYED RELEASE ORAL
Qty: 90 CAPSULE | Refills: 3 | Status: SHIPPED | OUTPATIENT
Start: 2021-03-28 | End: 2021-05-11 | Stop reason: SDUPTHER

## 2021-03-28 RX ORDER — ATORVASTATIN CALCIUM 40 MG/1
TABLET, FILM COATED ORAL
Qty: 90 TABLET | Refills: 3 | Status: SHIPPED | OUTPATIENT
Start: 2021-03-28 | End: 2021-05-11 | Stop reason: SDUPTHER

## 2021-03-28 RX ORDER — SITAGLIPTIN 100 MG/1
TABLET, FILM COATED ORAL
Qty: 90 TABLET | Refills: 3 | Status: SHIPPED | OUTPATIENT
Start: 2021-03-28 | End: 2021-05-11 | Stop reason: SDUPTHER

## 2021-04-28 DIAGNOSIS — I10 ESSENTIAL HYPERTENSION: ICD-10-CM

## 2021-04-28 RX ORDER — AMLODIPINE BESYLATE 5 MG/1
TABLET ORAL
Qty: 90 TABLET | Refills: 3 | Status: SHIPPED | OUTPATIENT
Start: 2021-04-28 | End: 2021-05-11 | Stop reason: SDUPTHER

## 2021-05-03 LAB — HBA1C MFR BLD HPLC: 6.8 %

## 2021-05-05 ENCOUNTER — RA CDI HCC (OUTPATIENT)
Dept: OTHER | Facility: HOSPITAL | Age: 71
End: 2021-05-05

## 2021-05-05 NOTE — PROGRESS NOTES
[place] [id number]  coding opportunities        DX not used     Chart reviewed, (number of) suggestions sent to provider: 1                  Patients insurance company: [address]  (Medicare Advantage and Commercial)     Visit status: Patient arrived for their scheduled appointment     Provider never responded to [place] [id number]  coding request     [place] [address]  coding opportunities        DX: N18 31 chronic kidney disease, stage 3a --GFR 54       Chart reviewed, (number of) suggestions sent to provider: 1           Patients insurance company: [address]  (Medicare Advantage and Commercial)

## 2021-05-09 PROBLEM — Z00.00 MEDICARE ANNUAL WELLNESS VISIT, SUBSEQUENT: Status: ACTIVE | Noted: 2021-05-09

## 2021-05-09 NOTE — ASSESSMENT & PLAN NOTE
BP good  Continue amlodipine 5 mg qd and lisinopril 10 mg qd  Pt advised to continue low Na diet and to exercise on a regular basis

## 2021-05-09 NOTE — ASSESSMENT & PLAN NOTE
LDL 83 and LFTs normal in Dec 2020  Continue atorvastatin 40 mg qhs  Advised pt to follow a low cholesterol diet and to exercise on a regular basis

## 2021-05-09 NOTE — ASSESSMENT & PLAN NOTE
Wellness exam done  Had flu shot in Dec 2020  Last Tdap was in 2017 and pt had pneumovacc  Recommend shingrix and COVID vaccine  Labs are UTD  Pt had colonoscopy in Feb 2020 and next one is due in Feb 2023  No recent falls  Mood is good  5 Wishes Handout given

## 2021-05-09 NOTE — ASSESSMENT & PLAN NOTE
A1C good at 6 8 in May 2021  Continue glipizide ER 5 mg qd, januvia 100 mg qd, and metformin 500 mg bid  Advised pt to follow a low carb diet and to exercise on a regular basis  Had eye exam in spring 2021  Foot exam done today  Had flu shot in Dec 2020       Lab Results   Component Value Date    HGBA1C 6 8 05/03/2021

## 2021-05-11 ENCOUNTER — OFFICE VISIT (OUTPATIENT)
Dept: FAMILY MEDICINE CLINIC | Facility: CLINIC | Age: 71
End: 2021-05-11
Payer: COMMERCIAL

## 2021-05-11 VITALS
HEART RATE: 84 BPM | BODY MASS INDEX: 29.79 KG/M2 | RESPIRATION RATE: 16 BRPM | DIASTOLIC BLOOD PRESSURE: 68 MMHG | OXYGEN SATURATION: 98 % | TEMPERATURE: 97.3 F | HEIGHT: 67 IN | WEIGHT: 189.8 LBS | SYSTOLIC BLOOD PRESSURE: 110 MMHG

## 2021-05-11 DIAGNOSIS — E11.9 TYPE 2 DIABETES MELLITUS WITHOUT COMPLICATION, WITHOUT LONG-TERM CURRENT USE OF INSULIN (HCC): ICD-10-CM

## 2021-05-11 DIAGNOSIS — E78.00 HYPERCHOLESTEROLEMIA: ICD-10-CM

## 2021-05-11 DIAGNOSIS — K21.9 GASTROESOPHAGEAL REFLUX DISEASE: ICD-10-CM

## 2021-05-11 DIAGNOSIS — E11.9 DIABETES MELLITUS WITHOUT COMPLICATION (HCC): ICD-10-CM

## 2021-05-11 DIAGNOSIS — B35.6 TINEA CRURIS: ICD-10-CM

## 2021-05-11 DIAGNOSIS — Z00.00 MEDICARE ANNUAL WELLNESS VISIT, SUBSEQUENT: ICD-10-CM

## 2021-05-11 DIAGNOSIS — I10 HYPERTENSION, UNSPECIFIED TYPE: ICD-10-CM

## 2021-05-11 DIAGNOSIS — K21.9 GASTROESOPHAGEAL REFLUX DISEASE, UNSPECIFIED WHETHER ESOPHAGITIS PRESENT: ICD-10-CM

## 2021-05-11 DIAGNOSIS — N52.9 ERECTILE DYSFUNCTION, UNSPECIFIED ERECTILE DYSFUNCTION TYPE: ICD-10-CM

## 2021-05-11 DIAGNOSIS — E10.9 TYPE 1 DIABETES MELLITUS WITHOUT COMPLICATION (HCC): ICD-10-CM

## 2021-05-11 DIAGNOSIS — I10 ESSENTIAL HYPERTENSION: Primary | ICD-10-CM

## 2021-05-11 DIAGNOSIS — E78.00 PURE HYPERCHOLESTEROLEMIA: ICD-10-CM

## 2021-05-11 PROCEDURE — 3008F BODY MASS INDEX DOCD: CPT | Performed by: FAMILY MEDICINE

## 2021-05-11 PROCEDURE — 4010F ACE/ARB THERAPY RXD/TAKEN: CPT | Performed by: FAMILY MEDICINE

## 2021-05-11 PROCEDURE — 3078F DIAST BP <80 MM HG: CPT | Performed by: FAMILY MEDICINE

## 2021-05-11 PROCEDURE — 99214 OFFICE O/P EST MOD 30 MIN: CPT | Performed by: FAMILY MEDICINE

## 2021-05-11 PROCEDURE — 3288F FALL RISK ASSESSMENT DOCD: CPT | Performed by: FAMILY MEDICINE

## 2021-05-11 PROCEDURE — 1160F RVW MEDS BY RX/DR IN RCRD: CPT | Performed by: FAMILY MEDICINE

## 2021-05-11 PROCEDURE — 1125F AMNT PAIN NOTED PAIN PRSNT: CPT | Performed by: FAMILY MEDICINE

## 2021-05-11 PROCEDURE — 3074F SYST BP LT 130 MM HG: CPT | Performed by: FAMILY MEDICINE

## 2021-05-11 PROCEDURE — 1170F FXNL STATUS ASSESSED: CPT | Performed by: FAMILY MEDICINE

## 2021-05-11 PROCEDURE — G0439 PPPS, SUBSEQ VISIT: HCPCS | Performed by: FAMILY MEDICINE

## 2021-05-11 PROCEDURE — 1036F TOBACCO NON-USER: CPT | Performed by: FAMILY MEDICINE

## 2021-05-11 RX ORDER — TADALAFIL 20 MG/1
20 TABLET ORAL DAILY PRN
Qty: 30 TABLET | Refills: 2 | Status: SHIPPED | OUTPATIENT
Start: 2021-05-11 | End: 2022-05-11 | Stop reason: SDUPTHER

## 2021-05-11 RX ORDER — CLOTRIMAZOLE AND BETAMETHASONE DIPROPIONATE 10; .64 MG/G; MG/G
CREAM TOPICAL 2 TIMES DAILY
Qty: 45 G | Refills: 1 | Status: SHIPPED | OUTPATIENT
Start: 2021-05-11

## 2021-05-11 RX ORDER — SILDENAFIL 100 MG/1
100 TABLET, FILM COATED ORAL AS NEEDED
COMMUNITY
Start: 2021-03-15 | End: 2021-05-11 | Stop reason: ALTCHOICE

## 2021-05-11 RX ORDER — ATORVASTATIN CALCIUM 40 MG/1
40 TABLET, FILM COATED ORAL DAILY
Qty: 90 TABLET | Refills: 3 | Status: SHIPPED | OUTPATIENT
Start: 2021-05-11 | End: 2022-05-20

## 2021-05-11 RX ORDER — FENOFIBRIC ACID 135 MG/1
1 CAPSULE, DELAYED RELEASE ORAL DAILY
Qty: 90 CAPSULE | Refills: 3 | Status: SHIPPED | OUTPATIENT
Start: 2021-05-11 | End: 2022-05-20

## 2021-05-11 RX ORDER — GLIPIZIDE 5 MG/1
5 TABLET, FILM COATED, EXTENDED RELEASE ORAL DAILY
Qty: 90 TABLET | Refills: 3 | Status: SHIPPED | OUTPATIENT
Start: 2021-05-11 | End: 2022-05-12

## 2021-05-11 RX ORDER — AMLODIPINE BESYLATE 5 MG/1
5 TABLET ORAL DAILY
Qty: 90 TABLET | Refills: 3 | Status: SHIPPED | OUTPATIENT
Start: 2021-05-11 | End: 2022-03-31

## 2021-05-11 RX ORDER — LISINOPRIL 10 MG/1
10 TABLET ORAL DAILY
Qty: 90 TABLET | Refills: 3 | Status: SHIPPED | OUTPATIENT
Start: 2021-05-11 | End: 2022-05-20

## 2021-05-11 RX ORDER — OMEPRAZOLE 40 MG/1
40 CAPSULE, DELAYED RELEASE ORAL
Qty: 90 CAPSULE | Refills: 3 | Status: SHIPPED | OUTPATIENT
Start: 2021-05-11

## 2021-05-11 RX ORDER — SITAGLIPTIN 100 MG/1
100 TABLET, FILM COATED ORAL DAILY
Qty: 90 TABLET | Refills: 3 | Status: SHIPPED | OUTPATIENT
Start: 2021-05-11 | End: 2022-05-20

## 2021-05-11 NOTE — PROGRESS NOTES
Assessment and Plan:     Problem List Items Addressed This Visit        Other    Medicare annual wellness visit, subsequent - Primary     Wellness exam done  Had flu shot in Dec 2020  Last Tdap was in 2017 and pt had pneumovacc  Recommend shingrix and COVID vaccine  Labs are UTD  Pt had colonoscopy in Feb 2020 and next one is due in Feb 2023  No recent falls  Mood is good  5 Wishes Handout given  BMI Counseling: There is no height or weight on file to calculate BMI  The BMI is above normal  Nutrition recommendations include decreasing portion sizes, encouraging healthy choices of fruits and vegetables, consuming healthier snacks and moderation in carbohydrate intake  Exercise recommendations include exercising 3-5 times per week  No pharmacotherapy was ordered  Preventive health issues were discussed with patient, and age appropriate screening tests were ordered as noted in patient's After Visit Summary  Personalized health advice and appropriate referrals for health education or preventive services given if needed, as noted in patient's After Visit Summary  History of Present Illness:     Patient presents for Medicare Annual Wellness visit    Patient Care Team:  Ashley Borges MD as PCP - General (Family Medicine)     Problem List:     Patient Active Problem List   Diagnosis    Essential hypertension    Diabetes mellitus without complication ([address] )    Colon polyps    Hypercholesterolemia    GERD (gastroesophageal reflux disease)    Erectile dysfunction    Trigger middle finger of left hand    Medicare annual wellness visit, subsequent      Past Medical and Surgical History:     No past medical history on file    Past Surgical History:   Procedure Laterality Date    COLONOSCOPY  08/01/2016      Family History:     Family History   Problem Relation Age of Onset    Asthma Father       Social History:        Social History     Socioeconomic History    Marital status: /Civil Union     Spouse name: Not on file    Number of children: Not on file    Years of education: Not on file    Highest education level: Not on file   Occupational History    Not on file   Social Needs    Financial resource strain: Not on file    Food insecurity     Worry: Not on file     Inability: Not on file    Transportation needs     Medical: Not on file     Non-medical: Not on file   Tobacco Use    Smoking status: Never Smoker    Smokeless tobacco: Never Used   Substance and Sexual Activity    Alcohol use: Never     Frequency: Never    Drug use: Never    Sexual activity: Not on file   Lifestyle    Physical activity     Days per week: Not on file     Minutes per session: Not on file    Stress: Not on file   Relationships    Social connections     Talks on phone: Not on file     Gets together: Not on file     Attends [employer] service: Not on file     Active member of club or organization: Not on file     Attends meetings of clubs or organizations: Not on file     Relationship status: Not on file    Intimate partner violence     Fear of current or ex partner: Not on file     Emotionally abused: Not on file     Physically abused: Not on file     Forced sexual activity: Not on file   Other Topics Concern    Not on file   Social History Narrative    Not on file      Medications and Allergies:     Current Outpatient Medications   Medication Sig Dispense Refill    amLODIPine (NORVASC) 5 mg tablet TAKE 1 TABLET BY MOUTH  DAILY 90 tablet 3    Ascorbic Acid (VITAMIN C PO) Take by mouth      aspirin 81 mg chewable tablet Chew 81 mg daily      atorvastatin (LIPITOR) 40 mg tablet TAKE 1 TABLET BY MOUTH  DAILY 90 tablet 3    Choline Fenofibrate (Fenofibric Acid) 135 MG CPDR TAKE 1 CAPSULE BY MOUTH  DAILY 90 capsule 3    Cyanocobalamin (VITAMIN B12 PO) Take by mouth      glipiZIDE (GLUCOTROL XL) 5 mg 24 hr tablet Take 1 tablet (5 mg total) by mouth daily 90 tablet 2    glucose blood test strip 1 each by Other route 3 (three) times a week OneTouch test strips 3 times per week 100 each 3    Januvia 100 MG tablet TAKE 1 TABLET BY MOUTH  DAILY 90 tablet 3    lisinopril (ZESTRIL) 10 mg tablet TAKE 1 TABLET BY MOUTH  DAILY 90 tablet 3    metFORMIN (GLUCOPHAGE) 500 mg tablet Take 1 tablet (500 mg total) by mouth 2 (two) times a day with meals 180 tablet 2    omeprazole (PriLOSEC) 40 MG capsule Take 1 capsule (40 mg total) by mouth daily before breakfast 90 capsule 2    OneTouch Delica Lancets 31K MISC by Does not apply route 3 (three) times a week onetouch delica lancets 40K test 3 times per week 100 each 3    sildenafil (VIAGRA) 100 mg tablet Take 100 mg by mouth as needed      tadalafil (CIALIS) 20 MG tablet Take 1 tablet (20 mg total) by mouth daily as needed for erectile dysfunction Do not take more than 1 pill in a 48 hour period  30 tablet 2     No current facility-administered medications for this visit  Allergies   Allergen Reactions    Compazine [Prochlorperazine] Tongue Swelling    Zithromax [Azithromycin]       Immunizations:     Immunization History   Administered Date(s) Administered    Influenza, high dose seasonal 0 7 mL 12/30/2020    Pneumococcal Conjugate 13-Valent 10/03/2017    Pneumococcal Polysaccharide PPV23 08/26/2020    Tdap 06/08/2017    influenza, trivalent, adjuvanted 10/22/2019      Health Maintenance:         Topic Date Due    Hepatitis C Screening  Never done    Colonoscopy Surveillance  02/21/2023    Colorectal Cancer Screening  02/21/2030         Topic Date Due    COVID-19 Vaccine (1) Never done      Medicare Health Risk Assessment:     /68 (BP Location: Left arm, Patient Position: Sitting, Cuff Size: Adult)   Pulse 84   Temp (!) 97 3 °F (36 3 °C) (Temporal)   Resp 16   Ht 5' 7" (1 702 m)   Wt 86 1 kg (189 lb 12 8 oz)   SpO2 98%   BMI 29 73 kg/m²      Delphine is here for his Subsequent Wellness visit   Last Medicare Wellness visit information reviewed, patient interviewed, no change since last AWV  Health Risk Assessment:   Patient rates overall health as very good  Patient feels that their physical health rating is same  Patient is very satisfied with their life  Eyesight was rated as same  Hearing was rated as same  Patient feels that their emotional and mental health rating is same  Patients states they are never, rarely angry  Patient states they are never, rarely unusually tired/fatigued  Pain experienced in the last 7 days has been none  Patient states that he has experienced no weight loss or gain in last 6 months  Fall Risk Screening: In the past year, patient has experienced: no history of falling in past year      Home Safety:  Patient does not have trouble with stairs inside or outside of their home  Patient has working smoke alarms and has working carbon monoxide detector  Home safety hazards include: none  Nutrition:   Current diet is Diabetic  Medications:   Patient is not currently taking any over-the-counter supplements  Patient is able to manage medications  Activities of Daily Living (ADLs)/Instrumental Activities of Daily Living (IADLs):   Walk and transfer into and out of bed and chair?: Yes  Dress and groom yourself?: Yes    Bathe or shower yourself?: Yes    Feed yourself?  Yes  Do your laundry/housekeeping?: Yes  Manage your money, pay your bills and track your expenses?: Yes  Make your own meals?: Yes    Do your own shopping?: Yes    Previous Hospitalizations:   Any hospitalizations or ED visits within the last 12 months?: No      Advance Care Planning:   Living will: No    Durable POA for healthcare: No    Advanced directive: No    Advanced directive counseling given: Yes    Five wishes given: Yes      Cognitive Screening:   Provider or family/friend/caregiver concerned regarding cognition?: No    PREVENTIVE SCREENINGS      Cardiovascular Screening:    General: Screening Not Indicated and History Lipid Disorder Diabetes Screening:     General: Screening Not Indicated and History Diabetes      Colorectal Cancer Screening:     General: Screening Current      Prostate Cancer Screening:    General: Screening Current      Osteoporosis Screening:    General: Screening Not Indicated      Abdominal Aortic Aneurysm (AAA) Screening:    Risk factors include: age between 73-67 yo        General: Screening Not Indicated      Lung Cancer Screening:     General: Screening Not Indicated      Hepatitis C Screening:    General: Screening Current    Screening, Brief Intervention, and Referral to Treatment (SBIRT)    Screening  Typical number of drinks in a day: 0  Typical number of drinks in a week: 0  Interpretation: Low risk drinking behavior  AUDIT-C Screenin) How often did you have a drink containing alcohol in the past year? never  2) How many drinks did you have on a typical day when you were drinking in the past year? never  3) How often did you have 6 or more drinks on one occasion in the past year? never    AUDIT-C Score: 0  Interpretation: Score 0-3 (male): Negative screen for alcohol misuse    Single Item Drug Screening:  How often have you used an illegal drug (including marijuana) or a prescription medication for non-medical reasons in the past year? never    Single Item Drug Screen Score: 0  Interpretation: Negative screen for possible drug use disorder    Brief Intervention  Alcohol & drug use screenings were reviewed  No concerns regarding substance use disorder identified         Eyal Arnold MD

## 2021-05-11 NOTE — PATIENT INSTRUCTIONS

## 2021-05-11 NOTE — PROGRESS NOTES
BMI Counseling: Body mass index is 29 73 kg/m²  The BMI is above normal  Nutrition recommendations include decreasing portion sizes, encouraging healthy choices of fruits and vegetables, consuming healthier snacks and moderation in carbohydrate intake  Exercise recommendations include exercising 3-5 times per week  No pharmacotherapy was ordered  Assessment/Plan:         Problem List Items Addressed This Visit        Digestive    GERD (gastroesophageal reflux disease)     No recent symptoms  Continue omeprazole 40 mg for 3 days a week and GERD diet  Relevant Medications    omeprazole (PriLOSEC) 40 MG capsule       Endocrine    Diabetes mellitus without complication ([facility] [address] )     L8E good at 6 8 in May 2021  Continue glipizide ER 5 mg qd, januvia 100 mg qd, and metformin 500 mg bid  Advised pt to follow a low carb diet and to exercise on a regular basis  Had eye exam in spring 2021  Foot exam done today  Had flu shot in Dec 2020  Lab Results   Component Value Date    HGBA1C 6 8 05/03/2021            Relevant Medications    metFORMIN (GLUCOPHAGE) 500 mg tablet    glipiZIDE (GLUCOTROL XL) 5 mg 24 hr tablet    Januvia 100 MG tablet    Other Relevant Orders    Hemoglobin A1C    Comprehensive metabolic panel       Cardiovascular and Mediastinum    Essential hypertension - Primary     BP good  Continue amlodipine 5 mg qd and lisinopril 10 mg qd  Pt advised to continue low Na diet and to exercise on a regular basis  Relevant Medications    amLODIPine (NORVASC) 5 mg tablet    lisinopril (ZESTRIL) 10 mg tablet       Musculoskeletal and Integument    Tinea cruris    Relevant Medications    clotrimazole-betamethasone (LOTRISONE) 1-0 05 % cream       Other    Medicare annual wellness visit, subsequent     Wellness exam done  Had flu shot in Dec 2020  Last Tdap was in 2017 and pt had pneumovacc  Recommend shingrix and COVID vaccine  Labs are UTD   Pt had colonoscopy in Feb 2020 and next one is due in Feb 2023  No recent falls  Mood is good  5 Wishes Handout given  Hypercholesterolemia     LDL 83 and LFTs normal in Dec 2020  Continue atorvastatin 40 mg qhs  Advised pt to follow a low cholesterol diet and to exercise on a regular basis  Relevant Medications    atorvastatin (LIPITOR) 40 mg tablet    Choline Fenofibrate (Fenofibric Acid) 135 MG CPDR    Other Relevant Orders    Lipid panel    Comprehensive metabolic panel    Erectile dysfunction    Relevant Medications    tadalafil (CIALIS) 20 MG tablet      Other Visit Diagnoses     Type 2 diabetes mellitus without complication, without long-term current use of insulin (HCC)        Relevant Medications    metFORMIN (GLUCOPHAGE) 500 mg tablet    glipiZIDE (GLUCOTROL XL) 5 mg 24 hr tablet    Januvia 100 MG tablet    Pure hypercholesterolemia        Relevant Medications    atorvastatin (LIPITOR) 40 mg tablet    Choline Fenofibrate (Fenofibric Acid) 135 MG CPDR    Type 1 diabetes mellitus without complication (HCC)        Relevant Medications    metFORMIN (GLUCOPHAGE) 500 mg tablet    glipiZIDE (GLUCOTROL XL) 5 mg 24 hr tablet    Januvia 100 MG tablet    Hypertension, unspecified type        Relevant Medications    amLODIPine (NORVASC) 5 mg tablet    lisinopril (ZESTRIL) 10 mg tablet    Gastroesophageal reflux disease        Relevant Medications    omeprazole (PriLOSEC) 40 MG capsule            Subjective:      Patient ID: Austen Hamlin is a 78 y o  male  Pt here for f/u HTN, HL, DM, GERD  Doing well  No cp/sob  No headaches  No abd pain  BP has been good  Sugars are good at home  Pt exercises and follows low carb diet  Had eye exam in spring  No numbness in feet  No recent GERD  Going for COVID vaccine tomorrow  Due for wellness exam done         The following portions of the patient's history were reviewed and updated as appropriate:   Past Medical History:  He has no past medical history on file ,  _______________________________________________________________________  Medical Problems:  does not have any pertinent problems on file ,  _______________________________________________________________________  Past Surgical History:   has a past surgical history that includes Colonoscopy (08/01/2016)  ,  _______________________________________________________________________  Family History:  family history includes Asthma in his father ,  _______________________________________________________________________  Social History:   reports that he has never smoked  He has never used smokeless tobacco  He reports that he does not drink alcohol or use drugs  ,  _______________________________________________________________________  Allergies:  is allergic to compazine [prochlorperazine] and zithromax [azithromycin]     _______________________________________________________________________  Current Outpatient Medications   Medication Sig Dispense Refill    amLODIPine (NORVASC) 5 mg tablet Take 1 tablet (5 mg total) by mouth daily 90 tablet 3    Ascorbic Acid (VITAMIN C PO) Take by mouth      aspirin 81 mg chewable tablet Chew 81 mg daily      atorvastatin (LIPITOR) 40 mg tablet Take 1 tablet (40 mg total) by mouth daily 90 tablet 3    Choline Fenofibrate (Fenofibric Acid) 135 MG CPDR Take 1 capsule (135 mg total) by mouth daily 90 capsule 3    Cyanocobalamin (VITAMIN B12 PO) Take by mouth      glipiZIDE (GLUCOTROL XL) 5 mg 24 hr tablet Take 1 tablet (5 mg total) by mouth daily 90 tablet 3    glucose blood test strip 1 each by Other route 3 (three) times a week OneTouch test strips 3 times per week 100 each 3    Januvia 100 MG tablet Take 1 tablet (100 mg total) by mouth daily 90 tablet 3    lisinopril (ZESTRIL) 10 mg tablet Take 1 tablet (10 mg total) by mouth daily 90 tablet 3    metFORMIN (GLUCOPHAGE) 500 mg tablet Takes 2 tabs in AM and 1 tab with dinner   270 tablet 3    omeprazole (PriLOSEC) 40 MG capsule Take 1 capsule (40 mg total) by mouth daily before breakfast 90 capsule 3    OneTouch Delica Lancets 82N MISC by Does not apply route 3 (three) times a week onetouch delica lancets 44N test 3 times per week 100 each 3    tadalafil (CIALIS) 20 MG tablet Take 1 tablet (20 mg total) by mouth daily as needed for erectile dysfunction Do not take more than 1 pill in a 48 hour period  30 tablet 2    clotrimazole-betamethasone (LOTRISONE) 1-0 05 % cream Apply topically 2 (two) times a day 45 g 1     No current facility-administered medications for this visit       _______________________________________________________________________  Review of Systems   Constitutional: Negative for fatigue and unexpected weight change  Eyes: Negative for visual disturbance  Respiratory: Negative for chest tightness and shortness of breath  Cardiovascular: Negative for chest pain and palpitations  Gastrointestinal: Negative for abdominal pain, constipation, diarrhea, nausea and vomiting  Endocrine: Negative for polydipsia, polyphagia and polyuria  Genitourinary: Negative for frequency  Musculoskeletal: Negative for arthralgias  Skin: Negative for rash and wound  Neurological: Negative for numbness  Objective:  Vitals:    05/11/21 0840   BP: 110/68   BP Location: Left arm   Patient Position: Sitting   Cuff Size: Adult   Pulse: 84   Resp: 16   Temp: (!) 97 3 °F (36 3 °C)   TempSrc: Temporal   SpO2: 98%   Weight: 86 1 kg (189 lb 12 8 oz)   Height: 5' 7" (1 702 m)     Body mass index is 29 73 kg/m²  Physical Exam  Vitals signs and nursing note reviewed  Constitutional:       Appearance: Normal appearance  He is well-developed and normal weight  Neck:      Musculoskeletal: Normal range of motion and neck supple  Thyroid: No thyromegaly  Cardiovascular:      Rate and Rhythm: Normal rate and regular rhythm  Pulses: no weak pulses     Heart sounds: Normal heart sounds  No murmur  Pulmonary:      Effort: Pulmonary effort is normal  No respiratory distress  Breath sounds: Normal breath sounds  No wheezing  Musculoskeletal:      Right lower leg: No edema  Left lower leg: No edema  Feet:      Right foot:      Skin integrity: Dry skin present  No ulcer, skin breakdown, erythema, warmth or callus  Left foot:      Skin integrity: Dry skin present  No ulcer, skin breakdown, erythema, warmth or callus  Lymphadenopathy:      Cervical: No cervical adenopathy  Neurological:      Mental Status: He is alert and oriented to person, place, and time  Cranial Nerves: No cranial nerve deficit  Psychiatric:         Mood and Affect: Mood normal          Behavior: Behavior normal          Thought Content: Thought content normal          Judgment: Judgment normal        Patient's shoes and socks removed  Right Foot/Ankle   Right Foot Inspection  Skin Exam: skin normal, skin intact and dry skin no warmth, no callus, no erythema, no maceration, no abnormal color, no pre-ulcer, no ulcer and no callus                            Sensory     Proprioception: intact     Vascular  Capillary refills: < 3 seconds      Left Foot/Ankle  Left Foot Inspection  Skin Exam: skin normal, skin intact and dry skinno warmth, no erythema, no maceration, normal color, no pre-ulcer, no ulcer and no callus                                         Sensory     Proprioception: intact    Vascular  Capillary refills: < 3 seconds    Assign Risk Category:  No deformity present; No loss of protective sensation;  No weak pulses       Risk: 0

## 2021-05-12 ENCOUNTER — IMMUNIZATIONS (OUTPATIENT)
Dept: FAMILY MEDICINE CLINIC | Facility: HOSPITAL | Age: 71
End: 2021-05-12

## 2021-05-12 ENCOUNTER — TELEPHONE (OUTPATIENT)
Dept: ADMINISTRATIVE | Facility: OTHER | Age: 71
End: 2021-05-12

## 2021-05-12 DIAGNOSIS — Z23 ENCOUNTER FOR IMMUNIZATION: Primary | ICD-10-CM

## 2021-05-12 PROCEDURE — 0001A SARS-COV-2 / COVID-19 MRNA VACCINE (PFIZER-BIONTECH) 30 MCG: CPT

## 2021-05-12 PROCEDURE — 91300 SARS-COV-2 / COVID-19 MRNA VACCINE (PFIZER-BIONTECH) 30 MCG: CPT

## 2021-05-12 NOTE — TELEPHONE ENCOUNTER
Upon review of the In Basket request and the patient's chart, initial outreach has been made via fax, please see Contacts section for details       Thank you  Colletta Sequin

## 2021-05-12 NOTE — TELEPHONE ENCOUNTER
----- Message from Mark Anthony Hunter sent at 5/11/2021  1:20 PM EDT -----  Regarding: care gap request  05/11/21 1:20 PM    Hello, our patient attached above has had Diabetic Eye Exam completed/performed  Please assist in updating the patient chart by making an External outreach to Dr Jessica Joshi facility located in [place], [place]  The date of service is 1-2 months ago      Thank you,  Mark Anthony Hunter  [facility]

## 2021-05-12 NOTE — LETTER
Diabetic Eye Exam Form    Date Requested: 21  Patient: Yohannes Saba  Patient : 1950   Referring Provider: Buck Forrester MD    Dilated Retinal Exam, Optomap-Iris Exam, or Fundus Photography Done         Yes ([place] one above)         No     Date of Diabetic Eye Exam ______________________________  Left Eye      Exam did show retinopathy    Exam did not show retinopathy         Mild       Moderate       None       Proliferative       Severe     Right Eye     Exam did show retinopathy    Exam did not show retinopathy         Mild       Moderate       None       Proliferative       Severe     Comments __________________________________________________________    Practice Providing Exam ______________________________________________    Exam Performed By (print name) _______________________________________      Provider Signature ___________________________________________________      These reports are needed for  compliance    Please fax this completed form and a copy of the Diabetic Eye Exam report to our office located at [place] [address] as soon as possible to [phone] attention Vianca Lyon: Phone [phone]    We thank you for your assistance in treating our mutual patient

## 2021-05-17 NOTE — TELEPHONE ENCOUNTER
As a follow-up, a second attempt has been made for outreach via fax, please see Contacts section for details      Thank you  Pam Briggs

## 2021-05-24 NOTE — TELEPHONE ENCOUNTER
Upon review of the In Basket request we were able to locate, review, and update the patient chart as requested for Diabetic Eye Exam     Any additional questions or concerns should be emailed to the Practice Liaisons via [email or web address] email, please do not reply via In Basket      Thank you  Pam Briggs

## 2021-05-24 NOTE — TELEPHONE ENCOUNTER
As a final attempt, a third outreach has been made via fax  Please see Contacts section for details  This encounter will be closed and completed by end of day  Should we receive the requested information because of previous outreach attempts, the requested patient's chart will be updated appropriately       Thank you  Josh Downing

## 2021-06-05 ENCOUNTER — IMMUNIZATIONS (OUTPATIENT)
Dept: FAMILY MEDICINE CLINIC | Facility: HOSPITAL | Age: 71
End: 2021-06-05

## 2021-06-05 DIAGNOSIS — Z23 ENCOUNTER FOR IMMUNIZATION: Primary | ICD-10-CM

## 2021-06-05 PROCEDURE — 0002A SARS-COV-2 / COVID-19 MRNA VACCINE (PFIZER-BIONTECH) 30 MCG: CPT

## 2021-06-05 PROCEDURE — 91300 SARS-COV-2 / COVID-19 MRNA VACCINE (PFIZER-BIONTECH) 30 MCG: CPT

## 2021-06-11 ENCOUNTER — VBI (OUTPATIENT)
Dept: ADMINISTRATIVE | Facility: OTHER | Age: 71
End: 2021-06-11

## 2021-10-15 ENCOUNTER — OFFICE VISIT (OUTPATIENT)
Dept: FAMILY MEDICINE CLINIC | Facility: CLINIC | Age: 71
End: 2021-10-15
Payer: COMMERCIAL

## 2021-10-15 VITALS
WEIGHT: 190 LBS | HEART RATE: 72 BPM | TEMPERATURE: 97.6 F | SYSTOLIC BLOOD PRESSURE: 130 MMHG | DIASTOLIC BLOOD PRESSURE: 72 MMHG | RESPIRATION RATE: 98 BRPM | OXYGEN SATURATION: 98 % | BODY MASS INDEX: 29.82 KG/M2 | HEIGHT: 67 IN

## 2021-10-15 DIAGNOSIS — E78.00 HYPERCHOLESTEROLEMIA: ICD-10-CM

## 2021-10-15 DIAGNOSIS — I10 ESSENTIAL HYPERTENSION: Primary | ICD-10-CM

## 2021-10-15 DIAGNOSIS — Z12.5 PROSTATE CANCER SCREENING: ICD-10-CM

## 2021-10-15 DIAGNOSIS — Z23 ENCOUNTER FOR IMMUNIZATION: ICD-10-CM

## 2021-10-15 DIAGNOSIS — E11.9 DIABETES MELLITUS WITHOUT COMPLICATION (HCC): ICD-10-CM

## 2021-10-15 DIAGNOSIS — K21.9 GASTROESOPHAGEAL REFLUX DISEASE, UNSPECIFIED WHETHER ESOPHAGITIS PRESENT: ICD-10-CM

## 2021-10-15 PROCEDURE — 3075F SYST BP GE 130 - 139MM HG: CPT | Performed by: FAMILY MEDICINE

## 2021-10-15 PROCEDURE — 3008F BODY MASS INDEX DOCD: CPT | Performed by: FAMILY MEDICINE

## 2021-10-15 PROCEDURE — G0008 ADMIN INFLUENZA VIRUS VAC: HCPCS | Performed by: FAMILY MEDICINE

## 2021-10-15 PROCEDURE — 3725F SCREEN DEPRESSION PERFORMED: CPT | Performed by: FAMILY MEDICINE

## 2021-10-15 PROCEDURE — 3078F DIAST BP <80 MM HG: CPT | Performed by: FAMILY MEDICINE

## 2021-10-15 PROCEDURE — 1036F TOBACCO NON-USER: CPT | Performed by: FAMILY MEDICINE

## 2021-10-15 PROCEDURE — 99214 OFFICE O/P EST MOD 30 MIN: CPT | Performed by: FAMILY MEDICINE

## 2021-10-15 PROCEDURE — 1160F RVW MEDS BY RX/DR IN RCRD: CPT | Performed by: FAMILY MEDICINE

## 2021-10-15 PROCEDURE — 90662 IIV NO PRSV INCREASED AG IM: CPT | Performed by: FAMILY MEDICINE

## 2021-11-21 DIAGNOSIS — N52.9 ERECTILE DYSFUNCTION, UNSPECIFIED ERECTILE DYSFUNCTION TYPE: Primary | ICD-10-CM

## 2021-11-22 RX ORDER — SILDENAFIL 100 MG/1
TABLET, FILM COATED ORAL
Qty: 30 TABLET | Refills: 3 | Status: SHIPPED | OUTPATIENT
Start: 2021-11-22

## 2022-01-09 ENCOUNTER — APPOINTMENT (OUTPATIENT)
Dept: FAMILY MEDICINE CLINIC | Facility: HOSPITAL | Age: 72
End: 2022-01-09

## 2022-03-30 DIAGNOSIS — I10 ESSENTIAL HYPERTENSION: ICD-10-CM

## 2022-03-31 RX ORDER — AMLODIPINE BESYLATE 5 MG/1
TABLET ORAL
Qty: 90 TABLET | Refills: 3 | Status: SHIPPED | OUTPATIENT
Start: 2022-03-31

## 2022-04-05 DIAGNOSIS — E11.9 DIABETES MELLITUS WITHOUT COMPLICATION (HCC): ICD-10-CM

## 2022-04-06 RX ORDER — BLOOD SUGAR DIAGNOSTIC
1 STRIP MISCELLANEOUS 3 TIMES DAILY
Qty: 100 STRIP | Refills: 1 | Status: SHIPPED | OUTPATIENT
Start: 2022-04-06 | End: 2022-05-11 | Stop reason: SDUPTHER

## 2022-04-29 ENCOUNTER — APPOINTMENT (OUTPATIENT)
Dept: LAB | Facility: AMBULARY SURGERY CENTER | Age: 72
End: 2022-04-29

## 2022-04-29 DIAGNOSIS — I10 ESSENTIAL HYPERTENSION: ICD-10-CM

## 2022-04-29 DIAGNOSIS — Z12.5 PROSTATE CANCER SCREENING: ICD-10-CM

## 2022-04-29 DIAGNOSIS — E11.9 DIABETES MELLITUS WITHOUT COMPLICATION (HCC): ICD-10-CM

## 2022-04-29 DIAGNOSIS — E78.00 HYPERCHOLESTEROLEMIA: ICD-10-CM

## 2022-04-29 LAB
ALBUMIN SERPL BCP-MCNC: 4.3 G/DL (ref 3.5–5)
ALP SERPL-CCNC: 35 U/L (ref 46–116)
ALT SERPL W P-5'-P-CCNC: 36 U/L (ref 12–78)
ANION GAP SERPL CALCULATED.3IONS-SCNC: 1 MMOL/L (ref 4–13)
AST SERPL W P-5'-P-CCNC: 20 U/L (ref 5–45)
BILIRUB SERPL-MCNC: 0.55 MG/DL (ref 0.2–1)
BUN SERPL-MCNC: 12 MG/DL (ref 5–25)
CALCIUM SERPL-MCNC: 9.9 MG/DL (ref 8.3–10.1)
CHLORIDE SERPL-SCNC: 107 MMOL/L (ref 100–108)
CHOLEST SERPL-MCNC: 136 MG/DL
CO2 SERPL-SCNC: 29 MMOL/L (ref 21–32)
CREAT SERPL-MCNC: 1.42 MG/DL (ref 0.6–1.3)
EST. AVERAGE GLUCOSE BLD GHB EST-MCNC: 148 MG/DL
GFR SERPL CREATININE-BSD FRML MDRD: 50 ML/MIN/1.73SQ M
GLUCOSE P FAST SERPL-MCNC: 91 MG/DL (ref 65–99)
HBA1C MFR BLD: 6.8 %
HDLC SERPL-MCNC: 39 MG/DL
LDLC SERPL CALC-MCNC: 78 MG/DL (ref 0–100)
NONHDLC SERPL-MCNC: 97 MG/DL
POTASSIUM SERPL-SCNC: 4.3 MMOL/L (ref 3.5–5.3)
PROT SERPL-MCNC: 7.6 G/DL (ref 6.4–8.2)
PSA SERPL-MCNC: 0.8 NG/ML (ref 0–4)
SODIUM SERPL-SCNC: 137 MMOL/L (ref 136–145)
TRIGL SERPL-MCNC: 96 MG/DL

## 2022-04-29 PROCEDURE — 83036 HEMOGLOBIN GLYCOSYLATED A1C: CPT

## 2022-04-29 PROCEDURE — 80053 COMPREHEN METABOLIC PANEL: CPT

## 2022-04-29 PROCEDURE — 80061 LIPID PANEL: CPT

## 2022-04-29 PROCEDURE — G0103 PSA SCREENING: HCPCS

## 2022-04-29 PROCEDURE — 36415 COLL VENOUS BLD VENIPUNCTURE: CPT

## 2022-05-11 ENCOUNTER — OFFICE VISIT (OUTPATIENT)
Dept: FAMILY MEDICINE CLINIC | Facility: CLINIC | Age: 72
End: 2022-05-11
Payer: COMMERCIAL

## 2022-05-11 VITALS
OXYGEN SATURATION: 99 % | HEART RATE: 75 BPM | SYSTOLIC BLOOD PRESSURE: 122 MMHG | BODY MASS INDEX: 30.13 KG/M2 | RESPIRATION RATE: 16 BRPM | HEIGHT: 67 IN | WEIGHT: 192 LBS | TEMPERATURE: 97.2 F | DIASTOLIC BLOOD PRESSURE: 72 MMHG

## 2022-05-11 DIAGNOSIS — E11.9 DIABETES MELLITUS WITHOUT COMPLICATION (HCC): ICD-10-CM

## 2022-05-11 DIAGNOSIS — I10 ESSENTIAL HYPERTENSION: Primary | ICD-10-CM

## 2022-05-11 DIAGNOSIS — N52.9 ERECTILE DYSFUNCTION, UNSPECIFIED ERECTILE DYSFUNCTION TYPE: ICD-10-CM

## 2022-05-11 DIAGNOSIS — K21.9 GASTROESOPHAGEAL REFLUX DISEASE, UNSPECIFIED WHETHER ESOPHAGITIS PRESENT: ICD-10-CM

## 2022-05-11 DIAGNOSIS — E78.00 HYPERCHOLESTEROLEMIA: ICD-10-CM

## 2022-05-11 PROBLEM — B35.6 TINEA CRURIS: Status: RESOLVED | Noted: 2021-05-11 | Resolved: 2022-05-11

## 2022-05-11 PROCEDURE — 99214 OFFICE O/P EST MOD 30 MIN: CPT | Performed by: FAMILY MEDICINE

## 2022-05-11 PROCEDURE — 3288F FALL RISK ASSESSMENT DOCD: CPT | Performed by: FAMILY MEDICINE

## 2022-05-11 PROCEDURE — 1036F TOBACCO NON-USER: CPT | Performed by: FAMILY MEDICINE

## 2022-05-11 PROCEDURE — 3078F DIAST BP <80 MM HG: CPT | Performed by: FAMILY MEDICINE

## 2022-05-11 PROCEDURE — 3008F BODY MASS INDEX DOCD: CPT | Performed by: FAMILY MEDICINE

## 2022-05-11 PROCEDURE — 3074F SYST BP LT 130 MM HG: CPT | Performed by: FAMILY MEDICINE

## 2022-05-11 PROCEDURE — 1160F RVW MEDS BY RX/DR IN RCRD: CPT | Performed by: FAMILY MEDICINE

## 2022-05-11 PROCEDURE — 1003F LEVEL OF ACTIVITY ASSESS: CPT | Performed by: FAMILY MEDICINE

## 2022-05-11 PROCEDURE — 3725F SCREEN DEPRESSION PERFORMED: CPT | Performed by: FAMILY MEDICINE

## 2022-05-11 PROCEDURE — 1101F PT FALLS ASSESS-DOCD LE1/YR: CPT | Performed by: FAMILY MEDICINE

## 2022-05-11 RX ORDER — BLOOD SUGAR DIAGNOSTIC
1 STRIP MISCELLANEOUS 3 TIMES DAILY
Qty: 100 STRIP | Refills: 1 | Status: SHIPPED | OUTPATIENT
Start: 2022-05-11

## 2022-05-11 RX ORDER — TADALAFIL 20 MG/1
20 TABLET ORAL DAILY PRN
Qty: 30 TABLET | Refills: 2 | Status: SHIPPED | OUTPATIENT
Start: 2022-05-11 | End: 2022-05-12 | Stop reason: SDUPTHER

## 2022-05-11 NOTE — PROGRESS NOTES
BMI Counseling: Body mass index is 30 07 kg/m²  The BMI is above normal  Nutrition recommendations include decreasing portion sizes, encouraging healthy choices of fruits and vegetables, consuming healthier snacks and moderation in carbohydrate intake  Exercise recommendations include exercising 3-5 times per week  No pharmacotherapy was ordered  Rationale for BMI follow-up plan is due to patient being overweight or obese  Depression Screening and Follow-up Plan: Patient was screened for depression during today's encounter  They screened negative with a PHQ-2 score of 0  Assessment/Plan:         Problem List Items Addressed This Visit        Digestive    GERD (gastroesophageal reflux disease)     Pt has not had any recent symptoms  Continue omeprazole 40 mg 3 days a week and GERD diet  Endocrine    Diabetes mellitus without complication ([facility] )     P3J 6 8 in April 2022  Continue glipizide ER 5 mg qd, januvia 100 mg qd, and metformin 500 mg bid  Advised pt to follow a low carb diet and to exercise on a regular basis  Had eye exam in April 2022  Foot exam done today  Had COVID booster in Jan 2022 and had flu shot in Oct 2021  Lab Results   Component Value Date    HGBA1C 6 8 (H) 04/29/2022              Relevant Medications    metFORMIN (GLUCOPHAGE) 500 mg tablet    glucose blood (OneTouch Ultra) test strip       Cardiovascular and Mediastinum    Essential hypertension - Primary     BP has been good  Continue amlodipine 5 mg qd and lisinopril 10 mg qd  Pt advised to continue low Na diet and to exercise on a regular basis  Other    Hypercholesterolemia     LDL 78 and LFTs normal in April 2022  Continue atorvastatin 40 mg qhs  Advised pt to follow a low cholesterol diet and to exercise on a regular basis  Erectile dysfunction    Relevant Medications    tadalafil (CIALIS) 20 MG tablet            Subjective:      Patient ID: Sam Graves is a 73 y o  male      Pt here for f/u HTN, HL, DM  Pt doing well  No cp/sob  No headaches  Pt exercises and follows low carb diet  BP has been good  Had eye exam in April 2022 and was ok  Sugars are good at home  Had COVID booster and flu shot  The following portions of the patient's history were reviewed and updated as appropriate:   Past Medical History:  He has a past medical history of Diabetes mellitus ([address] ), ED (erectile dysfunction), GERD (gastroesophageal reflux disease), and Hypertension  ,  _______________________________________________________________________  Medical Problems:  does not have any pertinent problems on file ,  _______________________________________________________________________  Past Surgical History:   has a past surgical history that includes Colonoscopy (08/01/2016) and No past surgeries  ,  _______________________________________________________________________  Family History:  family history includes Asthma in his father ,  _______________________________________________________________________  Social History:   reports that he has never smoked  He has never used smokeless tobacco  He reports that he does not drink alcohol and does not use drugs  ,  _______________________________________________________________________  Allergies:  is allergic to compazine [prochlorperazine] and zithromax [azithromycin]     _______________________________________________________________________  Current Outpatient Medications   Medication Sig Dispense Refill    amLODIPine (NORVASC) 5 mg tablet TAKE 1 TABLET BY MOUTH  DAILY 90 tablet 3    Ascorbic Acid (VITAMIN C PO) Take by mouth      aspirin 81 mg chewable tablet Chew 81 mg daily      atorvastatin (LIPITOR) 40 mg tablet Take 1 tablet (40 mg total) by mouth daily 90 tablet 3    Choline Fenofibrate (Fenofibric Acid) 135 MG CPDR Take 1 capsule (135 mg total) by mouth daily 90 capsule 3    clotrimazole-betamethasone (LOTRISONE) 1-0 05 % cream Apply topically 2 (two) times a day 45 g 1    Cyanocobalamin (VITAMIN B12 PO) Take by mouth      glipiZIDE (GLUCOTROL XL) 5 mg 24 hr tablet Take 1 tablet (5 mg total) by mouth daily 90 tablet 3    glucose blood (OneTouch Ultra) test strip Use 1 each  in the morning and 1 each in the evening and 1 each before bedtime  100 strip 1    Januvia 100 MG tablet Take 1 tablet (100 mg total) by mouth daily 90 tablet 3    lisinopril (ZESTRIL) 10 mg tablet Take 1 tablet (10 mg total) by mouth daily 90 tablet 3    metFORMIN (GLUCOPHAGE) 500 mg tablet Takes 2 tabs in AM and 1 tab with dinner  270 tablet 3    omeprazole (PriLOSEC) 40 MG capsule Take 1 capsule (40 mg total) by mouth daily before breakfast 90 capsule 3    OneTouch Delica Lancets 49W MISC by Does not apply route 3 (three) times a week onetouch delica lancets 70S test 3 times per week 100 each 3    tadalafil (CIALIS) 20 MG tablet Take 1 tablet (20 mg total) by mouth as needed in the morning for erectile dysfunction  Do not take more than 1 pill in a 48 hour period    30 tablet 2    sildenafil (VIAGRA) 100 mg tablet TAKE 1 TABLET BY MOUTH AS NEEDED  (Patient not taking: Reported on 5/11/2022) 30 tablet 3     No current facility-administered medications for this visit      _______________________________________________________________________  Review of Systems   Constitutional: Negative for fatigue and unexpected weight change  Eyes: Negative for visual disturbance  Respiratory: Negative for cough, chest tightness and shortness of breath  Cardiovascular: Negative for chest pain and palpitations  Gastrointestinal: Negative for abdominal pain, constipation, diarrhea, nausea and vomiting  Endocrine: Negative for polydipsia, polyphagia and polyuria  Genitourinary: Negative for frequency  Musculoskeletal: Negative for arthralgias  Skin: Negative for rash and wound  Neurological: Negative for dizziness, numbness and headaches     Psychiatric/Behavioral: Negative for dysphoric mood  The patient is not nervous/anxious  Objective:  Vitals:    05/11/22 0930   BP: 122/72   BP Location: Left arm   Patient Position: Sitting   Cuff Size: Standard   Pulse: 75   Resp: 16   Temp: (!) 97 2 °F (36 2 °C)   TempSrc: Temporal   SpO2: 99%   Weight: 87 1 kg (192 lb)   Height: 5' 7" (1 702 m)     Body mass index is 30 07 kg/m²  Physical Exam  Vitals and nursing note reviewed  Constitutional:       Appearance: Normal appearance  He is well-developed and normal weight  Neck:      Thyroid: No thyromegaly  Cardiovascular:      Rate and Rhythm: Normal rate and regular rhythm  Pulses: no weak pulses     Heart sounds: Normal heart sounds  No murmur heard  Pulmonary:      Effort: Pulmonary effort is normal  No respiratory distress  Breath sounds: Normal breath sounds  No wheezing  Musculoskeletal:      Cervical back: Normal range of motion and neck supple  Right lower leg: No edema  Left lower leg: No edema  Feet:      Right foot:      Skin integrity: Callus and dry skin present  No ulcer, skin breakdown, erythema or warmth  Left foot:      Skin integrity: Callus and dry skin present  No ulcer, skin breakdown, erythema or warmth  Lymphadenopathy:      Cervical: No cervical adenopathy  Neurological:      Mental Status: He is alert and oriented to person, place, and time  Cranial Nerves: No cranial nerve deficit  Psychiatric:         Mood and Affect: Mood normal          Behavior: Behavior normal          Thought Content: Thought content normal          Judgment: Judgment normal        Patient's shoes and socks removed  Right Foot/Ankle   Right Foot Inspection  Skin Exam: skin normal, skin intact, dry skin, callus and callus  No warmth, no erythema, no maceration, no abnormal color, no pre-ulcer and no ulcer       Sensory   Proprioception: intact      Vascular  Capillary refills: < 3 seconds          Left Foot/Ankle  Left Foot Inspection  Skin Exam: skin normal, skin intact, dry skin and callus  No warmth, no erythema, no maceration, normal color, no pre-ulcer and no ulcer       Sensory   Proprioception: intact      Vascular  Capillary refills: < 3 seconds        Assign Risk Category  No deformity present  No loss of protective sensation  No weak pulses  Risk: 0

## 2022-05-11 NOTE — ASSESSMENT & PLAN NOTE
BP has been good  Continue amlodipine 5 mg qd and lisinopril 10 mg qd  Pt advised to continue low Na diet and to exercise on a regular basis

## 2022-05-11 NOTE — ASSESSMENT & PLAN NOTE
LDL 78 and LFTs normal in April 2022  Continue atorvastatin 40 mg qhs  Advised pt to follow a low cholesterol diet and to exercise on a regular basis

## 2022-05-11 NOTE — ASSESSMENT & PLAN NOTE
A1C 6 8 in April 2022  Continue glipizide ER 5 mg qd, januvia 100 mg qd, and metformin 500 mg bid  Advised pt to follow a low carb diet and to exercise on a regular basis  Had eye exam in April 2022  Foot exam done today  Had COVID booster in Jan 2022 and had flu shot in Oct 2021        Lab Results   Component Value Date    HGBA1C 6 8 (H) 04/29/2022

## 2022-05-12 DIAGNOSIS — N52.9 ERECTILE DYSFUNCTION, UNSPECIFIED ERECTILE DYSFUNCTION TYPE: ICD-10-CM

## 2022-05-12 RX ORDER — GLIPIZIDE 5 MG/1
TABLET, FILM COATED, EXTENDED RELEASE ORAL
Qty: 90 TABLET | Refills: 3 | Status: SHIPPED | OUTPATIENT
Start: 2022-05-12

## 2022-05-12 RX ORDER — TADALAFIL 20 MG/1
20 TABLET ORAL DAILY PRN
Qty: 30 TABLET | Refills: 2 | Status: SHIPPED | OUTPATIENT
Start: 2022-05-12

## 2022-05-19 DIAGNOSIS — E78.00 PURE HYPERCHOLESTEROLEMIA: ICD-10-CM

## 2022-05-19 DIAGNOSIS — E10.9 TYPE 1 DIABETES MELLITUS WITHOUT COMPLICATION (HCC): ICD-10-CM

## 2022-05-19 DIAGNOSIS — I10 HYPERTENSION, UNSPECIFIED TYPE: ICD-10-CM

## 2022-05-20 PROCEDURE — 4010F ACE/ARB THERAPY RXD/TAKEN: CPT | Performed by: FAMILY MEDICINE

## 2022-05-20 RX ORDER — SITAGLIPTIN 100 MG/1
TABLET, FILM COATED ORAL
Qty: 90 TABLET | Refills: 3 | Status: SHIPPED | OUTPATIENT
Start: 2022-05-20

## 2022-05-20 RX ORDER — ATORVASTATIN CALCIUM 40 MG/1
TABLET, FILM COATED ORAL
Qty: 90 TABLET | Refills: 3 | Status: SHIPPED | OUTPATIENT
Start: 2022-05-20

## 2022-05-20 RX ORDER — LISINOPRIL 10 MG/1
TABLET ORAL
Qty: 90 TABLET | Refills: 3 | Status: SHIPPED | OUTPATIENT
Start: 2022-05-20

## 2022-05-20 RX ORDER — FENOFIBRIC ACID 135 MG/1
CAPSULE, DELAYED RELEASE ORAL
Qty: 90 CAPSULE | Refills: 3 | Status: SHIPPED | OUTPATIENT
Start: 2022-05-20

## 2022-09-09 ENCOUNTER — RA CDI HCC (OUTPATIENT)
Dept: OTHER | Facility: HOSPITAL | Age: 72
End: 2022-09-09

## 2022-09-09 DIAGNOSIS — E11.9 DIABETES MELLITUS WITHOUT COMPLICATION (HCC): ICD-10-CM

## 2022-09-09 NOTE — PROGRESS NOTES
[facility]  coding opportunities          Chart Reviewed number of suggestions sent to Provider: 1   [id number], [id number]      Patients Insurance     Medicare Insurance: [employer]

## 2022-09-14 ENCOUNTER — OFFICE VISIT (OUTPATIENT)
Dept: FAMILY MEDICINE CLINIC | Facility: CLINIC | Age: 72
End: 2022-09-14
Payer: COMMERCIAL

## 2022-09-14 VITALS
SYSTOLIC BLOOD PRESSURE: 130 MMHG | OXYGEN SATURATION: 97 % | HEART RATE: 86 BPM | TEMPERATURE: 97.2 F | WEIGHT: 199 LBS | BODY MASS INDEX: 31.23 KG/M2 | RESPIRATION RATE: 16 BRPM | DIASTOLIC BLOOD PRESSURE: 60 MMHG | HEIGHT: 67 IN

## 2022-09-14 DIAGNOSIS — E78.00 HYPERCHOLESTEROLEMIA: ICD-10-CM

## 2022-09-14 DIAGNOSIS — K21.9 GASTROESOPHAGEAL REFLUX DISEASE, UNSPECIFIED WHETHER ESOPHAGITIS PRESENT: ICD-10-CM

## 2022-09-14 DIAGNOSIS — I10 ESSENTIAL HYPERTENSION: ICD-10-CM

## 2022-09-14 DIAGNOSIS — B35.6 TINEA CRURIS: ICD-10-CM

## 2022-09-14 DIAGNOSIS — Z23 ENCOUNTER FOR IMMUNIZATION: ICD-10-CM

## 2022-09-14 DIAGNOSIS — K63.5 POLYP OF COLON, UNSPECIFIED PART OF COLON, UNSPECIFIED TYPE: ICD-10-CM

## 2022-09-14 DIAGNOSIS — E11.9 DIABETES MELLITUS WITHOUT COMPLICATION (HCC): ICD-10-CM

## 2022-09-14 DIAGNOSIS — N18.30 STAGE 3 CHRONIC KIDNEY DISEASE, UNSPECIFIED WHETHER STAGE 3A OR 3B CKD (HCC): ICD-10-CM

## 2022-09-14 DIAGNOSIS — Z00.00 MEDICARE ANNUAL WELLNESS VISIT, SUBSEQUENT: Primary | ICD-10-CM

## 2022-09-14 LAB — SL AMB POCT HEMOGLOBIN AIC: 6.4 (ref ?–6.5)

## 2022-09-14 PROCEDURE — 1160F RVW MEDS BY RX/DR IN RCRD: CPT | Performed by: FAMILY MEDICINE

## 2022-09-14 PROCEDURE — 3288F FALL RISK ASSESSMENT DOCD: CPT | Performed by: FAMILY MEDICINE

## 2022-09-14 PROCEDURE — 3044F HG A1C LEVEL LT 7.0%: CPT | Performed by: FAMILY MEDICINE

## 2022-09-14 PROCEDURE — 3078F DIAST BP <80 MM HG: CPT | Performed by: FAMILY MEDICINE

## 2022-09-14 PROCEDURE — G0008 ADMIN INFLUENZA VIRUS VAC: HCPCS | Performed by: FAMILY MEDICINE

## 2022-09-14 PROCEDURE — 1125F AMNT PAIN NOTED PAIN PRSNT: CPT | Performed by: FAMILY MEDICINE

## 2022-09-14 PROCEDURE — 0054A PR IMM ADMN SARSCOV2 30MCG/0.3ML TRIS-SUCROSE BST: CPT

## 2022-09-14 PROCEDURE — 3725F SCREEN DEPRESSION PERFORMED: CPT | Performed by: FAMILY MEDICINE

## 2022-09-14 PROCEDURE — 3075F SYST BP GE 130 - 139MM HG: CPT | Performed by: FAMILY MEDICINE

## 2022-09-14 PROCEDURE — G0439 PPPS, SUBSEQ VISIT: HCPCS | Performed by: FAMILY MEDICINE

## 2022-09-14 PROCEDURE — 83036 HEMOGLOBIN GLYCOSYLATED A1C: CPT | Performed by: FAMILY MEDICINE

## 2022-09-14 PROCEDURE — 91312 SARSCOV2 VAC BVL 30MCG/0.3ML: CPT

## 2022-09-14 PROCEDURE — 1170F FXNL STATUS ASSESSED: CPT | Performed by: FAMILY MEDICINE

## 2022-09-14 PROCEDURE — 99214 OFFICE O/P EST MOD 30 MIN: CPT | Performed by: FAMILY MEDICINE

## 2022-09-14 PROCEDURE — 90662 IIV NO PRSV INCREASED AG IM: CPT | Performed by: FAMILY MEDICINE

## 2022-09-14 RX ORDER — BLOOD SUGAR DIAGNOSTIC
1 STRIP MISCELLANEOUS 3 TIMES DAILY
Qty: 100 STRIP | Refills: 3 | Status: SHIPPED | OUTPATIENT
Start: 2022-09-14

## 2022-09-14 RX ORDER — CLOTRIMAZOLE AND BETAMETHASONE DIPROPIONATE 10; .64 MG/G; MG/G
CREAM TOPICAL 2 TIMES DAILY
Qty: 45 G | Refills: 5 | Status: SHIPPED | OUTPATIENT
Start: 2022-09-14

## 2022-09-14 NOTE — ASSESSMENT & PLAN NOTE
A1C done today and was 6 4  Continue glipizide ER 5 mg qd, januvia 100 mg qd, and metformin 500 mg bid  Advised pt to follow a low carb diet and to exercise on a regular basis  Had eye exam in April 2022  Foot exam done in May 2022       Lab Results   Component Value Date    HGBA1C 6 8 (H) 04/29/2022

## 2022-09-14 NOTE — ASSESSMENT & PLAN NOTE
Wellness exam done  Flu shot given  Last Tdap was in 2017  Pt had pneumovacc 23, prevnar 13, shingrix, and COVID vaccines  Labs are UTD  Pt had colonoscopy in Feb 2020 and next one is due in Feb 2023  No recent falls  Mood is good

## 2022-09-14 NOTE — PATIENT INSTRUCTIONS
Medicare Preventive Visit Patient Instructions  Thank you for completing your Welcome to Medicare Visit or Medicare Annual Wellness Visit today  Your next wellness visit will be due in one year (9/15/2023)  The screening/preventive services that you may require over the next 5-10 years are detailed below  Some tests may not apply to you based off risk factors and/or age  Screening tests ordered at today's visit but not completed yet may show as past due  Also, please note that scanned in results may not display below  Preventive Screenings:  Service Recommendations Previous Testing/Comments   Colorectal Cancer Screening  · Colonoscopy    · Fecal Occult Blood Test (FOBT)/Fecal Immunochemical Test (FIT)  · Fecal DNA/Cologuard Test  · Flexible Sigmoidoscopy Age: 39-70 years old   Colonoscopy: every 10 years (May be performed more frequently if at higher risk)  OR  FOBT/FIT: every 1 year  OR  Cologuard: every 3 years  OR  Sigmoidoscopy: every 5 years  Screening may be recommended earlier than age 39 if at higher risk for colorectal cancer  Also, an individualized decision between you and your healthcare provider will decide whether screening between the ages of 74-80 would be appropriate   Colonoscopy: 02/21/2020  FOBT/FIT: Not on file  Cologuard: Not on file  Sigmoidoscopy: Not on file    Screening Current     Prostate Cancer Screening Individualized decision between patient and health care provider in men between ages of 53-78   Medicare will cover every 12 months beginning on the day after your 50th birthday PSA: 0 8 ng/mL     Screening Current     Hepatitis C Screening Once for adults born between 1945 and 1965  More frequently in patients at high risk for Hepatitis C Hep C Antibody: Not on file        Diabetes Screening 1-2 times per year if you're at risk for diabetes or have pre-diabetes Fasting glucose: 91 mg/dL (4/29/2022)  A1C: 6 8 % (4/29/2022)  Screening Not Indicated  History Diabetes   Cholesterol Screening Once every 5 years if you don't have a lipid disorder  May order more often based on risk factors  Lipid panel: 04/29/2022  Screening Not Indicated  History Lipid Disorder      Other Preventive Screenings Covered by Medicare:  1  Abdominal Aortic Aneurysm (AAA) Screening: covered once if your at risk  You're considered to be at risk if you have a family history of AAA or a male between the age of 73-68 who smoking at least 100 cigarettes in your lifetime  2  Lung Cancer Screening: covers low dose CT scan once per year if you meet all of the following conditions: (1) Age 50-69; (2) No signs or symptoms of lung cancer; (3) Current smoker or have quit smoking within the last 15 years; (4) You have a tobacco smoking history of at least 20 pack years (packs per day x number of years you smoked); (5) You get a written order from a healthcare provider  3  Glaucoma Screening: covered annually if you're considered high risk: (1) You have diabetes OR (2) Family history of glaucoma OR (3)  aged 48 and older OR (3)  American aged 72 and older  3  Osteoporosis Screening: covered every 2 years if you meet one of the following conditions: (1) Have a vertebral abnormality; (2) On glucocorticoid therapy for more than 3 months; (3) Have primary hyperparathyroidism; (4) On osteoporosis medications and need to assess response to drug therapy  5  HIV Screening: covered annually if you're between the age of 12-76  Also covered annually if you are younger than 13 and older than 72 with risk factors for HIV infection  For pregnant patients, it is covered up to 3 times per pregnancy      Immunizations:  Immunization Recommendations   Influenza Vaccine Annual influenza vaccination during flu season is recommended for all persons aged >= 6 months who do not have contraindications   Pneumococcal Vaccine   * Pneumococcal conjugate vaccine = PCV13 (Prevnar 13), PCV15 (Vaxneuvance), PCV20 (Prevnar 20)  * Pneumococcal polysaccharide vaccine = PPSV23 (Pneumovax) Adults 2364 years old: 1-3 doses may be recommended based on certain risk factors  Adults 72 years old: 1-2 doses may be recommended based off what pneumonia vaccine you previously received   Hepatitis B Vaccine 3 dose series if at intermediate or high risk (ex: diabetes, end stage renal disease, liver disease)   Tetanus (Td) Vaccine - COST NOT COVERED BY MEDICARE PART B Following completion of primary series, a booster dose should be given every 10 years to maintain immunity against tetanus  Td may also be given as tetanus wound prophylaxis  Tdap Vaccine - COST NOT COVERED BY MEDICARE PART B Recommended at least once for all adults  For pregnant patients, recommended with each pregnancy  Shingles Vaccine (Shingrix) - COST NOT COVERED BY MEDICARE PART B  2 shot series recommended in those aged 48 and above     Health Maintenance Due:      Topic Date Due    Hepatitis C Screening  Never done    Colorectal Cancer Screening  02/21/2023     Immunizations Due:      Topic Date Due    COVID-19 Vaccine (4 - Booster for Pfizer series) 05/09/2022    Influenza Vaccine (1) 09/01/2022     Advance Directives   What are advance directives? Advance directives are legal documents that state your wishes and plans for medical care  These plans are made ahead of time in case you lose your ability to make decisions for yourself  Advance directives can apply to any medical decision, such as the treatments you want, and if you want to donate organs  What are the types of advance directives? There are many types of advance directives, and each state has rules about how to use them  You may choose a combination of any of the following:  · Living will: This is a written record of the treatment you want  You can also choose which treatments you do not want, which to limit, and which to stop at a certain time  This includes surgery, medicine, IV fluid, and tube feedings  · Durable power of  for healthcare [facility]): This is a written record that states who you want to make healthcare choices for you when you are unable to make them for yourself  This person, called a proxy, is usually a family member or a friend  You may choose more than 1 proxy  · Do not resuscitate (DNR) order:  A DNR order is used in case your heart stops beating or you stop breathing  It is a request not to have certain forms of treatment, such as CPR  A DNR order may be included in other types of advance directives  · Medical directive: This covers the care that you want if you are in a coma, near death, or unable to make decisions for yourself  You can list the treatments you want for each condition  Treatment may include pain medicine, surgery, blood transfusions, dialysis, IV or tube feedings, and a ventilator (breathing machine)  · Values history: This document has questions about your views, beliefs, and how you feel and think about life  This information can help others choose the care that you would choose  Why are advance directives important? An advance directive helps you control your care  Although spoken wishes may be used, it is better to have your wishes written down  Spoken wishes can be misunderstood, or not followed  Treatments may be given even if you do not want them  An advance directive may make it easier for your family to make difficult choices about your care  Fall Prevention    Fall prevention  includes ways to make your home and other areas safer  It also includes ways you can move more carefully to prevent a fall  Health conditions that cause changes in your blood pressure, vision, or muscle strength and coordination may increase your risk for falls  Medicines may also increase your risk for falls if they make you dizzy, weak, or sleepy  Fall prevention tips:   · Stand or sit up slowly  · Use assistive devices as directed      · Wear shoes that fit well and have soles that   · Wear a personal alarm  · Stay active  · Manage your medical conditions  Home Safety Tips:  · Add items to prevent falls in the bathroom  · Keep paths clear  · Install bright lights in your home  · Keep items you use often on shelves within reach  · Paint or place reflective tape on the edges of your stairs  Weight Management   Why it is important to manage your weight:  Being overweight increases your risk of health conditions such as heart disease, high blood pressure, type 2 diabetes, and certain types of cancer  It can also increase your risk for osteoarthritis, sleep apnea, and other respiratory problems  Aim for a slow, steady weight loss  Even a small amount of weight loss can lower your risk of health problems  How to lose weight safely:  A safe and healthy way to lose weight is to eat fewer calories and get regular exercise  You can lose up about 1 pound a week by decreasing the number of calories you eat by 500 calories each day  Healthy meal plan for weight management:  A healthy meal plan includes a variety of foods, contains fewer calories, and helps you stay healthy  A healthy meal plan includes the following:  · Eat whole-grain foods more often  A healthy meal plan should contain fiber  Fiber is the part of grains, fruits, and vegetables that is not broken down by your body  Whole-grain foods are healthy and provide extra fiber in your diet  Some examples of whole-grain foods are whole-wheat breads and pastas, oatmeal, brown rice, and bulgur  · Eat a variety of vegetables every day  Include dark, leafy greens such as spinach, kale, ambrosio greens, and mustard greens  Eat yellow and orange vegetables such as carrots, sweet potatoes, and winter squash  · Eat a variety of fruits every day  Choose fresh or canned fruit (canned in its own juice or light syrup) instead of juice  Fruit juice has very little or no fiber  · Eat low-fat dairy foods    Drink fat-free (skim) milk or 1% milk  Eat fat-free yogurt and low-fat cottage cheese  Try low-fat cheeses such as mozzarella and other reduced-fat cheeses  · Choose meat and other protein foods that are low in fat  Choose beans or other legumes such as split peas or lentils  Choose fish, skinless poultry (chicken or turkey), or lean cuts of red meat (beef or pork)  Before you cook meat or poultry, cut off any visible fat  · Use less fat and oil  Try baking foods instead of frying them  Add less fat, such as margarine, sour cream, regular salad dressing and mayonnaise to foods  Eat fewer high-fat foods  Some examples of high-fat foods include french fries, doughnuts, ice cream, and cakes  · Eat fewer sweets  Limit foods and drinks that are high in sugar  This includes candy, cookies, regular soda, and sweetened drinks  Exercise:  Exercise at least 30 minutes per day on most days of the week  Some examples of exercise include walking, biking, dancing, and swimming  You can also fit in more physical activity by taking the stairs instead of the elevator or parking farther away from stores  Ask your healthcare provider about the best exercise plan for you  © Copyright [employer] 2018 Information is for End User's use only and may not be sold, redistributed or otherwise used for commercial purposes   All illustrations and images included in CareNotes® are the copyrighted property of [employer]  or [address] [place] [address]

## 2022-09-14 NOTE — PROGRESS NOTES
Assessment and Plan:     Problem List Items Addressed This Visit        Digestive    GERD (gastroesophageal reflux disease)     No recent symptoms  Continue omeprazole 40 mg 3 days a week and GERD diet  Colon polyps     Had colonoscopy in Feb 2020 by Dr Marcos Nieves and pt had 9 polyps  Next one is due in Feb 2023  Endocrine    Diabetes mellitus without complication (HCC)     Z9O done today and was 6 4  Continue glipizide ER 5 mg qd, januvia 100 mg qd, and metformin 500 mg bid  Advised pt to follow a low carb diet and to exercise on a regular basis  Had eye exam in April 2022  Foot exam done in May 2022  Lab Results   Component Value Date    HGBA1C 6 8 (H) 04/29/2022            Relevant Medications    glucose blood (OneTouch Ultra) test strip    Other Relevant Orders    POCT hemoglobin A1c    Comprehensive metabolic panel    Hemoglobin A1C       Cardiovascular and Mediastinum    Essential hypertension     BP has been good  Continue amlodipine 5 mg qd and lisinopril 10 mg qd  Pt advised to continue low Na diet and to exercise on a regular basis  Relevant Orders    Comprehensive metabolic panel    CBC and differential    UA (URINE) with reflex to Scope       Genitourinary    Stage 3 chronic kidney disease, unspecified whether stage 3a or 3b CKD ([facility] [address] )       Other    Medicare annual wellness visit, subsequent - Primary     Wellness exam done  Flu shot given  Last Tdap was in 2017  Pt had pneumovacc 23, prevnar 13, shingrix, and COVID vaccines  Labs are UTD  Pt had colonoscopy in Feb 2020 and next one is due in Feb 2023  No recent falls  Mood is good  Hypercholesterolemia     LDL 78 and LFTs normal in April 2022  Continue atorvastatin 40 mg qhs  Advised pt to follow a low cholesterol diet and to exercise on a regular basis            Relevant Orders    Comprehensive metabolic panel    Lipid panel      Other Visit Diagnoses     Tinea cruris        Relevant Medications clotrimazole-betamethasone (LOTRISONE) 1-0 05 % cream    Encounter for immunization        Relevant Orders    influenza vaccine, high-dose, PF 0 7 mL (FLUZONE HIGH-DOSE)    Age 15 y+, BOOSTER (BIVALENT): [employer] Colón vac bivalent so-sucr          Depression Screening and Follow-up Plan: Patient was screened for depression during today's encounter  They screened negative with a PHQ-2 score of 0  Preventive health issues were discussed with patient, and age appropriate screening tests were ordered as noted in patient's After Visit Summary  Personalized health advice and appropriate referrals for health education or preventive services given if needed, as noted in patient's After Visit Summary  History of Present Illness:     Patient presents for a Medicare Wellness Visit    Pt here for f/u HTN, HL, DM, GERD, Colon Polyps  Doing well  No cp/sob  No headaches  No recent GERD  Pt exercises and follows low carb diet  Wants flu shot  Also due for wellness exam  BP has been good  No new c/o  Patient Care Team:  Catrina Liang MD as PCP - General (Family Medicine)     Review of Systems:     Review of Systems   Constitutional: Negative for fatigue and unexpected weight change  Eyes: Negative for visual disturbance  Respiratory: Negative for chest tightness and shortness of breath  Cardiovascular: Negative for chest pain and palpitations  Gastrointestinal: Negative for abdominal pain, constipation, diarrhea, nausea and vomiting  Endocrine: Negative for polydipsia, polyphagia and polyuria  Genitourinary: Negative for frequency  Musculoskeletal: Negative for arthralgias  Skin: Negative for rash and wound  Neurological: Negative for numbness          Problem List:     Patient Active Problem List   Diagnosis    Essential hypertension    Diabetes mellitus without complication ([facility] [address] )    Colon polyps    Hypercholesterolemia    GERD (gastroesophageal reflux disease)    Erectile dysfunction    Trigger middle finger of left hand    Medicare annual wellness visit, subsequent    Stage 3 chronic kidney disease, unspecified whether stage 3a or 3b CKD ([place] [address] )      Past Medical and Surgical History:     Past Medical History:   Diagnosis Date    Diabetes mellitus ([place] [address] )     ED (erectile dysfunction)     GERD (gastroesophageal reflux disease)     Hypertension      Past Surgical History:   Procedure Laterality Date    COLONOSCOPY  08/01/2016    NO PAST SURGERIES        Family History:     Family History   Problem Relation Age of Onset    Asthma Father       Social History:     Social History     Socioeconomic History    Marital status: /Civil Union     Spouse name: None    Number of children: None    Years of education: None    Highest education level: None   Occupational History    None   Tobacco Use    Smoking status: Never Smoker    Smokeless tobacco: Never Used   Vaping Use    Vaping Use: Never used   Substance and Sexual Activity    Alcohol use: Never    Drug use: Never    Sexual activity: Not Currently   Other Topics Concern    None   Social History Narrative    None     Social Determinants of Health     Financial Resource Strain: Low Risk     Difficulty of Paying Living Expenses: Not hard at all   Food Insecurity: Not on file   Transportation Needs: No Transportation Needs    Lack of Transportation (Medical): No    Lack of Transportation (Non-Medical):  No   Physical Activity: Not on file   Stress: Not on file   Social Connections: Not on file   Intimate Partner Violence: Not on file   Housing Stability: Not on file      Medications and Allergies:     Current Outpatient Medications   Medication Sig Dispense Refill    amLODIPine (NORVASC) 5 mg tablet TAKE 1 TABLET BY MOUTH  DAILY 90 tablet 3    Ascorbic Acid (VITAMIN C PO) Take by mouth      aspirin 81 mg chewable tablet Chew 81 mg daily      atorvastatin (LIPITOR) 40 mg tablet TAKE 1 TABLET BY MOUTH  DAILY 90 tablet 3    Choline Fenofibrate (Fenofibric Acid) 135 MG CPDR TAKE 1 CAPSULE BY MOUTH  DAILY 90 capsule 3    clotrimazole-betamethasone (LOTRISONE) 1-0 05 % cream Apply topically 2 (two) times a day 45 g 5    Cyanocobalamin (VITAMIN B12 PO) Take by mouth      glipiZIDE (GLUCOTROL XL) 5 mg 24 hr tablet TAKE 1 TABLET BY MOUTH  DAILY 90 tablet 3    glucose blood (OneTouch Ultra) test strip Use 1 each 3 (three) times a day 100 strip 3    Januvia 100 MG tablet TAKE 1 TABLET BY MOUTH  DAILY 90 tablet 3    lisinopril (ZESTRIL) 10 mg tablet TAKE 1 TABLET BY MOUTH  DAILY 90 tablet 3    metFORMIN (GLUCOPHAGE) 500 mg tablet Takes 2 tabs in AM and 1 tab with dinner  270 tablet 3    omeprazole (PriLOSEC) 40 MG capsule Take 1 capsule (40 mg total) by mouth daily before breakfast 90 capsule 3    OneTouch Delica Lancets 14V MISC by Does not apply route 3 (three) times a week onetouch delica lancets 12H test 3 times per week 100 each 3    sildenafil (VIAGRA) 100 mg tablet TAKE 1 TABLET BY MOUTH AS NEEDED  30 tablet 3    tadalafil (CIALIS) 20 MG tablet Take 1 tablet (20 mg total) by mouth as needed in the morning for erectile dysfunction  Do not take more than 1 pill in a 48 hour period    (Patient not taking: Reported on 9/14/2022) 30 tablet 2     No current facility-administered medications for this visit  Allergies   Allergen Reactions    Compazine [Prochlorperazine] Tongue Swelling    Zithromax [Azithromycin] Other (See Comments)             Immunizations:     Immunization History   Administered Date(s) Administered    COVID-19 PFIZER VACCINE 0 3 ML IM 05/12/2021, 06/05/2021, 01/09/2022    Influenza, high dose seasonal 0 7 mL 12/30/2020, 10/15/2021    Pneumococcal Conjugate 13-Valent 10/03/2017    Pneumococcal Polysaccharide PPV23 08/26/2020    Tdap 06/08/2017    influenza, trivalent, adjuvanted 10/22/2019      Health Maintenance:         Topic Date Due    Hepatitis C Screening  Never done    Colorectal Cancer Screening  02/21/2023         Topic Date Due    COVID-19 Vaccine (4 - Booster for Pfizer series) 05/09/2022    Influenza Vaccine (1) 09/01/2022      Medicare Screening Tests and Risk Assessments:     Nitin Kirk is here for his Subsequent Wellness visit  Health Risk Assessment:   Patient rates overall health as very good  Patient feels that their physical health rating is same  Patient is satisfied with their life  Eyesight was rated as same  Hearing was rated as same  Patient feels that their emotional and mental health rating is same  Patients states they are never, rarely angry  Patient states they are never, rarely unusually tired/fatigued  Pain experienced in the last 7 days has been some  Patient's pain rating has been 3/10  Patient states that he has experienced no weight loss or gain in last 6 months  Depression Screening:   PHQ-2 Score: 0      Fall Risk Screening: In the past year, patient has experienced: no history of falling in past year      Home Safety:  Patient does not have trouble with stairs inside or outside of their home  Patient has working smoke alarms and has working carbon monoxide detector  Home safety hazards include: none  Nutrition:   Current diet is Regular  Medications:   Patient is currently taking over-the-counter supplements  OTC medications include: see medication list  Patient is able to manage medications  Activities of Daily Living (ADLs)/Instrumental Activities of Daily Living (IADLs):   Walk and transfer into and out of bed and chair?: Yes  Dress and groom yourself?: Yes    Bathe or shower yourself?: Yes    Feed yourself?  Yes  Do your laundry/housekeeping?: Yes  Manage your money, pay your bills and track your expenses?: Yes  Make your own meals?: Yes    Do your own shopping?: Yes    Previous Hospitalizations:   Any hospitalizations or ED visits within the last 12 months?: No      Advance Care Planning:   Living will: No      Cognitive Screening:   Provider or family/friend/caregiver concerned regarding cognition?: No    PREVENTIVE SCREENINGS      Cardiovascular Screening:    General: Screening Not Indicated and History Lipid Disorder      Diabetes Screening:     General: Screening Not Indicated and History Diabetes      Colorectal Cancer Screening:     General: Screening Current      Prostate Cancer Screening:    General: Screening Current      Osteoporosis Screening:    General: Screening Not Indicated      Abdominal Aortic Aneurysm (AAA) Screening:    Risk factors include: age between 73-67 yo        General: Screening Not Indicated      Lung Cancer Screening:     General: Screening Not Indicated    Screening, Brief Intervention, and Referral to Treatment (SBIRT)    Screening  Typical number of drinks in a day: 0  Typical number of drinks in a week: 0  Interpretation: Low risk drinking behavior  Single Item Drug Screening:  How often have you used an illegal drug (including marijuana) or a prescription medication for non-medical reasons in the past year? never    Single Item Drug Screen Score: 0  Interpretation: Negative screen for possible drug use disorder    Brief Intervention  Alcohol & drug use screenings were reviewed  No concerns regarding substance use disorder identified  No exam data present     Physical Exam:     /60 (BP Location: Left arm, Patient Position: Sitting, Cuff Size: Standard)   Pulse 86   Temp (!) 97 2 °F (36 2 °C) (Temporal)   Resp 16   Ht 5' 7" (1 702 m)   Wt 90 3 kg (199 lb)   SpO2 97%   BMI 31 17 kg/m²     Physical Exam  Vitals and nursing note reviewed  Constitutional:       Appearance: Normal appearance  Neck:      Vascular: No carotid bruit  Cardiovascular:      Rate and Rhythm: Normal rate and regular rhythm  Heart sounds: Normal heart sounds  No murmur heard  Pulmonary:      Effort: Pulmonary effort is normal       Breath sounds: Normal breath sounds  No wheezing     Musculoskeletal:      Cervical back: Normal range of motion and neck supple  No muscular tenderness  Right lower leg: No edema  Left lower leg: No edema  Lymphadenopathy:      Cervical: No cervical adenopathy  Neurological:      Mental Status: He is alert  Psychiatric:         Mood and Affect: Mood normal          Behavior: Behavior normal          Thought Content:  Thought content normal          Judgment: Judgment normal           Simona Floyd MD

## 2022-10-12 PROBLEM — Z00.00 MEDICARE ANNUAL WELLNESS VISIT, SUBSEQUENT: Status: RESOLVED | Noted: 2021-05-09 | Resolved: 2022-10-12

## 2022-10-31 ENCOUNTER — VBI (OUTPATIENT)
Dept: ADMINISTRATIVE | Facility: OTHER | Age: 72
End: 2022-10-31

## 2022-11-14 ENCOUNTER — TELEPHONE (OUTPATIENT)
Dept: FAMILY MEDICINE CLINIC | Facility: CLINIC | Age: 72
End: 2022-11-14

## 2023-01-06 ENCOUNTER — RA CDI HCC (OUTPATIENT)
Dept: OTHER | Facility: HOSPITAL | Age: 73
End: 2023-01-06

## 2023-01-06 NOTE — PROGRESS NOTES
[facility]  coding opportunities       Chart reviewed, no opportunity found:   [address]        Patients Insurance     Medicare Insurance: [employer] Advantage

## 2023-03-04 ENCOUNTER — APPOINTMENT (OUTPATIENT)
Dept: LAB | Facility: CLINIC | Age: 73
End: 2023-03-04

## 2023-03-04 DIAGNOSIS — E11.9 DIABETES MELLITUS WITHOUT COMPLICATION (HCC): ICD-10-CM

## 2023-03-04 DIAGNOSIS — E78.00 HYPERCHOLESTEROLEMIA: ICD-10-CM

## 2023-03-04 DIAGNOSIS — I10 ESSENTIAL HYPERTENSION: ICD-10-CM

## 2023-03-04 LAB
ALBUMIN SERPL BCP-MCNC: 4.4 G/DL (ref 3.5–5)
ALP SERPL-CCNC: 33 U/L (ref 34–104)
ALT SERPL W P-5'-P-CCNC: 18 U/L (ref 7–52)
ANION GAP SERPL CALCULATED.3IONS-SCNC: 8 MMOL/L (ref 4–13)
AST SERPL W P-5'-P-CCNC: 17 U/L (ref 13–39)
BACTERIA UR QL AUTO: NORMAL /HPF
BASOPHILS # BLD AUTO: 0.11 THOUSANDS/ÂΜL (ref 0–0.1)
BASOPHILS NFR BLD AUTO: 2 % (ref 0–1)
BILIRUB SERPL-MCNC: 0.65 MG/DL (ref 0.2–1)
BILIRUB UR QL STRIP: NEGATIVE
BUN SERPL-MCNC: 13 MG/DL (ref 5–25)
CALCIUM SERPL-MCNC: 9.7 MG/DL (ref 8.4–10.2)
CHLORIDE SERPL-SCNC: 102 MMOL/L (ref 96–108)
CHOLEST SERPL-MCNC: 153 MG/DL
CLARITY UR: CLEAR
CO2 SERPL-SCNC: 29 MMOL/L (ref 21–32)
COLOR UR: YELLOW
CREAT SERPL-MCNC: 1.38 MG/DL (ref 0.6–1.3)
EOSINOPHIL # BLD AUTO: 0.24 THOUSAND/ÂΜL (ref 0–0.61)
EOSINOPHIL NFR BLD AUTO: 4 % (ref 0–6)
ERYTHROCYTE [DISTWIDTH] IN BLOOD BY AUTOMATED COUNT: 14 % (ref 11.6–15.1)
EST. AVERAGE GLUCOSE BLD GHB EST-MCNC: 140 MG/DL
GFR SERPL CREATININE-BSD FRML MDRD: 51 ML/MIN/1.73SQ M
GLUCOSE P FAST SERPL-MCNC: 93 MG/DL (ref 65–99)
GLUCOSE UR STRIP-MCNC: NEGATIVE MG/DL
HBA1C MFR BLD: 6.5 %
HCT VFR BLD AUTO: 46 % (ref 36.5–49.3)
HDLC SERPL-MCNC: 47 MG/DL
HGB BLD-MCNC: 14.3 G/DL (ref 12–17)
HGB UR QL STRIP.AUTO: NEGATIVE
IMM GRANULOCYTES # BLD AUTO: 0.02 THOUSAND/UL (ref 0–0.2)
IMM GRANULOCYTES NFR BLD AUTO: 0 % (ref 0–2)
KETONES UR STRIP-MCNC: NEGATIVE MG/DL
LDLC SERPL CALC-MCNC: 89 MG/DL (ref 0–100)
LEUKOCYTE ESTERASE UR QL STRIP: NEGATIVE
LYMPHOCYTES # BLD AUTO: 1.48 THOUSANDS/ÂΜL (ref 0.6–4.47)
LYMPHOCYTES NFR BLD AUTO: 25 % (ref 14–44)
MCH RBC QN AUTO: 27.4 PG (ref 26.8–34.3)
MCHC RBC AUTO-ENTMCNC: 31.1 G/DL (ref 31.4–37.4)
MCV RBC AUTO: 88 FL (ref 82–98)
MONOCYTES # BLD AUTO: 0.47 THOUSAND/ÂΜL (ref 0.17–1.22)
MONOCYTES NFR BLD AUTO: 8 % (ref 4–12)
NEUTROPHILS # BLD AUTO: 3.66 THOUSANDS/ÂΜL (ref 1.85–7.62)
NEUTS SEG NFR BLD AUTO: 61 % (ref 43–75)
NITRITE UR QL STRIP: NEGATIVE
NON-SQ EPI CELLS URNS QL MICRO: NORMAL /HPF
NONHDLC SERPL-MCNC: 106 MG/DL
NRBC BLD AUTO-RTO: 0 /100 WBCS
PH UR STRIP.AUTO: 6 [PH]
PLATELET # BLD AUTO: 337 THOUSANDS/UL (ref 149–390)
PMV BLD AUTO: 9.8 FL (ref 8.9–12.7)
POTASSIUM SERPL-SCNC: 4.6 MMOL/L (ref 3.5–5.3)
PROT SERPL-MCNC: 6.9 G/DL (ref 6.4–8.4)
PROT UR STRIP-MCNC: ABNORMAL MG/DL
RBC # BLD AUTO: 5.21 MILLION/UL (ref 3.88–5.62)
RBC #/AREA URNS AUTO: NORMAL /HPF
SODIUM SERPL-SCNC: 139 MMOL/L (ref 135–147)
SP GR UR STRIP.AUTO: 1.02 (ref 1–1.03)
TRIGL SERPL-MCNC: 86 MG/DL
UROBILINOGEN UR STRIP-ACNC: <2 MG/DL
WBC # BLD AUTO: 5.98 THOUSAND/UL (ref 4.31–10.16)
WBC #/AREA URNS AUTO: NORMAL /HPF

## 2023-03-07 ENCOUNTER — TELEPHONE (OUTPATIENT)
Dept: GASTROENTEROLOGY | Facility: CLINIC | Age: 73
End: 2023-03-07

## 2023-03-08 ENCOUNTER — OFFICE VISIT (OUTPATIENT)
Dept: FAMILY MEDICINE CLINIC | Facility: CLINIC | Age: 73
End: 2023-03-08

## 2023-03-08 VITALS
WEIGHT: 198 LBS | SYSTOLIC BLOOD PRESSURE: 112 MMHG | HEART RATE: 82 BPM | OXYGEN SATURATION: 98 % | HEIGHT: 67 IN | TEMPERATURE: 97.5 F | BODY MASS INDEX: 31.08 KG/M2 | DIASTOLIC BLOOD PRESSURE: 70 MMHG | RESPIRATION RATE: 16 BRPM

## 2023-03-08 DIAGNOSIS — K63.5 POLYP OF COLON, UNSPECIFIED PART OF COLON, UNSPECIFIED TYPE: ICD-10-CM

## 2023-03-08 DIAGNOSIS — E11.9 DIABETES MELLITUS WITHOUT COMPLICATION (HCC): ICD-10-CM

## 2023-03-08 DIAGNOSIS — Z12.11 SCREENING FOR COLON CANCER: ICD-10-CM

## 2023-03-08 DIAGNOSIS — N18.31 STAGE 3A CHRONIC KIDNEY DISEASE (HCC): ICD-10-CM

## 2023-03-08 DIAGNOSIS — E11.22 TYPE 2 DIABETES MELLITUS WITH STAGE 3A CHRONIC KIDNEY DISEASE, WITHOUT LONG-TERM CURRENT USE OF INSULIN (HCC): ICD-10-CM

## 2023-03-08 DIAGNOSIS — I10 ESSENTIAL HYPERTENSION: Primary | ICD-10-CM

## 2023-03-08 DIAGNOSIS — E78.00 HYPERCHOLESTEROLEMIA: ICD-10-CM

## 2023-03-08 DIAGNOSIS — K21.9 GASTROESOPHAGEAL REFLUX DISEASE, UNSPECIFIED WHETHER ESOPHAGITIS PRESENT: ICD-10-CM

## 2023-03-08 DIAGNOSIS — Z12.5 PROSTATE CANCER SCREENING: ICD-10-CM

## 2023-03-08 DIAGNOSIS — N18.31 TYPE 2 DIABETES MELLITUS WITH STAGE 3A CHRONIC KIDNEY DISEASE, WITHOUT LONG-TERM CURRENT USE OF INSULIN (HCC): ICD-10-CM

## 2023-03-08 PROBLEM — N18.9 CKD (CHRONIC KIDNEY DISEASE): Status: ACTIVE | Noted: 2022-09-14

## 2023-03-08 PROBLEM — M65.332 TRIGGER MIDDLE FINGER OF LEFT HAND: Status: RESOLVED | Noted: 2020-08-26 | Resolved: 2023-03-08

## 2023-03-08 LAB
CREAT UR-MCNC: 187 MG/DL
MICROALBUMIN UR-MCNC: 30.9 MG/L (ref 0–20)
MICROALBUMIN/CREAT 24H UR: 17 MG/G CREATININE (ref 0–30)

## 2023-03-08 NOTE — ASSESSMENT & PLAN NOTE
Had colonoscopy in Feb 2020 by Dr Arianne Briscoe and pt had 9 polyps  Next one is due now  Pt advised to schedule and order placed

## 2023-03-08 NOTE — ASSESSMENT & PLAN NOTE
LDL 89 and LFTs normal in March 2023  Continue atorvastatin 40 mg qhs  Advised pt to follow a low cholesterol diet and to exercise on a regular basis

## 2023-03-08 NOTE — PROGRESS NOTES
BMI Counseling: There is no height or weight on file to calculate BMI  The BMI is above normal  Nutrition recommendations include decreasing portion sizes, encouraging healthy choices of fruits and vegetables, consuming healthier snacks and moderation in carbohydrate intake  Exercise recommendations include exercising 3-5 times per week  No pharmacotherapy was ordered  Rationale for BMI follow-up plan is due to patient being overweight or obese  Depression Screening and Follow-up Plan: Patient was screened for depression during today's encounter  They screened negative with a PHQ-2 score of 0  Assessment/Plan:         Problem List Items Addressed This Visit        Digestive    GERD (gastroesophageal reflux disease)     Stable  Continue omeprazole 40 mg 3 days a week and GERD diet  Colon polyps     Had colonoscopy in Feb 2020 by Dr Reta Silva and pt had 9 polyps  Next one is due now  Pt advised to schedule and order placed  Relevant Orders    Ambulatory referral for colonoscopy       Endocrine    Type 2 diabetes mellitus with stage 3a chronic kidney disease, without long-term current use of insulin (HCC)     Stable  Lab Results   Component Value Date    HGBA1C 6 5 (H) 03/04/2023            Diabetes mellitus without complication (HCC)     S7U stable at 6 5 in March 2023  Continue glipizide ER 5 mg qd, januvia 100 mg qd, and metformin 500 mg bid  Advised pt to follow a low carb diet and to exercise on a regular basis  Had eye exam in April 2022  Foot exam done today  Lab Results   Component Value Date    HGBA1C 6 5 (H) 03/04/2023            Relevant Orders    Microalbumin / creatinine urine ratio    Comprehensive metabolic panel    Hemoglobin A1C       Cardiovascular and Mediastinum    Essential hypertension - Primary     BP good  Continue amlodipine 5 mg qd and lisinopril 10 mg qd  Pt advised to continue low Na diet and to exercise on a regular basis            Relevant Orders Comprehensive metabolic panel       Genitourinary    CKD (chronic kidney disease)     Lab Results   Component Value Date    EGFR 51 03/04/2023    EGFR 50 04/29/2022    CREATININE 1 38 (H) 03/04/2023    CREATININE 1 42 (H) 04/29/2022    CREATININE 1 36 (H) 07/30/2020     GFR stable at 51 in March 2023  Other    Hypercholesterolemia     LDL 89 and LFTs normal in March 2023  Continue atorvastatin 40 mg qhs  Advised pt to follow a low cholesterol diet and to exercise on a regular basis  Relevant Orders    Lipid panel    Comprehensive metabolic panel   Other Visit Diagnoses     Screening for colon cancer        Prostate cancer screening        Relevant Orders    PSA, Total Screen            Subjective:      Patient ID: Carmen Montenegro is a 81 y o  male  Pt here for f/u HTN, HL, DM, CAD, Colon Polyps, GERD  Doing well  No cp/sob  No HA  Pt exercises  Follows low Na and low carb diet  Had labs done recently  BP ok at home  No recent GERD  Needs to schedule colonoscopy  No new c/o  The following portions of the patient's history were reviewed and updated as appropriate:   Past Medical History:  He has a past medical history of Diabetes mellitus ([address] ), ED (erectile dysfunction), GERD (gastroesophageal reflux disease), and Hypertension  ,  _______________________________________________________________________  Medical Problems:  does not have any pertinent problems on file ,  _______________________________________________________________________  Past Surgical History:   has a past surgical history that includes Colonoscopy (08/01/2016) and No past surgeries  ,  _______________________________________________________________________  Family History:  family history includes Asthma in his father ,  _______________________________________________________________________  Social History:   reports that he has never smoked   He has never used smokeless tobacco  He reports that he does not drink alcohol and does not use drugs  ,  _______________________________________________________________________  Allergies:  is allergic to compazine [prochlorperazine] and zithromax [azithromycin]     _______________________________________________________________________  Current Outpatient Medications   Medication Sig Dispense Refill   • amLODIPine (NORVASC) 5 mg tablet TAKE 1 TABLET BY MOUTH  DAILY 90 tablet 3   • Ascorbic Acid (VITAMIN C PO) Take by mouth     • aspirin 81 mg chewable tablet Chew 81 mg daily     • atorvastatin (LIPITOR) 40 mg tablet TAKE 1 TABLET BY MOUTH  DAILY 90 tablet 3   • Choline Fenofibrate (Fenofibric Acid) 135 MG CPDR TAKE 1 CAPSULE BY MOUTH  DAILY 90 capsule 3   • clotrimazole-betamethasone (LOTRISONE) 1-0 05 % cream Apply topically 2 (two) times a day 45 g 5   • Cyanocobalamin (VITAMIN B12 PO) Take by mouth     • glipiZIDE (GLUCOTROL XL) 5 mg 24 hr tablet TAKE 1 TABLET BY MOUTH  DAILY 90 tablet 3   • glucose blood (OneTouch Ultra) test strip Use 1 each 3 (three) times a day 100 strip 3   • Januvia 100 MG tablet TAKE 1 TABLET BY MOUTH  DAILY 90 tablet 3   • lisinopril (ZESTRIL) 10 mg tablet TAKE 1 TABLET BY MOUTH  DAILY 90 tablet 3   • metFORMIN (GLUCOPHAGE) 500 mg tablet Takes 2 tabs in AM and 1 tab with dinner  270 tablet 3   • omeprazole (PriLOSEC) 40 MG capsule Take 1 capsule (40 mg total) by mouth daily before breakfast 90 capsule 3   • OneTouch Delica Lancets 39J MISC by Does not apply route 3 (three) times a week onetouch delica lancets 52J test 3 times per week 100 each 3   • sildenafil (VIAGRA) 100 mg tablet TAKE 1 TABLET BY MOUTH AS NEEDED  30 tablet 3   • tadalafil (CIALIS) 20 MG tablet Take 1 tablet (20 mg total) by mouth as needed in the morning for erectile dysfunction  Do not take more than 1 pill in a 48 hour period    (Patient not taking: Reported on 9/14/2022) 30 tablet 2     No current facility-administered medications for this visit  _______________________________________________________________________  Review of Systems   Constitutional: Negative for fatigue and unexpected weight change  Respiratory: Negative for cough and shortness of breath  Cardiovascular: Negative for chest pain  Gastrointestinal: Negative for abdominal pain, constipation, diarrhea and vomiting  Musculoskeletal: Negative for arthralgias  Neurological: Negative for dizziness and headaches  Psychiatric/Behavioral: Negative for dysphoric mood  The patient is not nervous/anxious  Objective:  Vitals:    03/08/23 0831   BP: 112/70   BP Location: Left arm   Patient Position: Sitting   Cuff Size: Standard   Pulse: 82   Resp: 16   Temp: 97 5 °F (36 4 °C)   TempSrc: Tympanic   SpO2: 98%   Weight: 89 8 kg (198 lb)   Height: 5' 7" (1 702 m)     Body mass index is 31 01 kg/m²  Physical Exam  Vitals and nursing note reviewed  Constitutional:       Appearance: Normal appearance  He is well-developed  Neck:      Thyroid: No thyromegaly  Cardiovascular:      Rate and Rhythm: Normal rate and regular rhythm  Pulses: no weak pulses     Heart sounds: Normal heart sounds  No murmur heard  Pulmonary:      Effort: Pulmonary effort is normal  No respiratory distress  Breath sounds: Normal breath sounds  No wheezing  Musculoskeletal:      Cervical back: Normal range of motion and neck supple  Right lower leg: No edema  Left lower leg: No edema  Feet:      Right foot:      Skin integrity: Dry skin present  No ulcer, skin breakdown, erythema, warmth or callus  Left foot:      Skin integrity: Dry skin present  No ulcer, skin breakdown, erythema, warmth or callus  Lymphadenopathy:      Cervical: No cervical adenopathy  Neurological:      Mental Status: He is alert and oriented to person, place, and time  Cranial Nerves: No cranial nerve deficit     Psychiatric:         Mood and Affect: Mood normal          Behavior: Behavior normal          Thought Content: Thought content normal          Judgment: Judgment normal        Patient's shoes and socks removed  Right Foot/Ankle   Right Foot Inspection  Skin Exam: skin normal, skin intact and dry skin  No warmth, no callus, no erythema, no maceration, no abnormal color, no pre-ulcer, no ulcer and no callus  Sensory   Proprioception: intact      Vascular  Capillary refills: < 3 seconds          Left Foot/Ankle  Left Foot Inspection  Skin Exam: skin normal, skin intact and dry skin  No warmth, no erythema, no maceration, normal color, no pre-ulcer, no ulcer and no callus       Sensory   Proprioception: intact      Vascular  Capillary refills: < 3 seconds        Assign Risk Category  No deformity present  No loss of protective sensation  No weak pulses  Risk: 0

## 2023-03-08 NOTE — ASSESSMENT & PLAN NOTE
A1C stable at 6 5 in March 2023  Continue glipizide ER 5 mg qd, januvia 100 mg qd, and metformin 500 mg bid  Advised pt to follow a low carb diet and to exercise on a regular basis  Had eye exam in April 2022  Foot exam done today       Lab Results   Component Value Date    HGBA1C 6 5 (H) 03/04/2023

## 2023-03-08 NOTE — ASSESSMENT & PLAN NOTE
Lab Results   Component Value Date    EGFR 51 03/04/2023    EGFR 50 04/29/2022    CREATININE 1 38 (H) 03/04/2023    CREATININE 1 42 (H) 04/29/2022    CREATININE 1 36 (H) 07/30/2020     GFR stable at 51 in March 2023

## 2023-04-25 ENCOUNTER — TELEPHONE (OUTPATIENT)
Dept: FAMILY MEDICINE CLINIC | Facility: CLINIC | Age: 73
End: 2023-04-25

## 2023-04-25 DIAGNOSIS — I10 ESSENTIAL HYPERTENSION: ICD-10-CM

## 2023-04-25 RX ORDER — AMLODIPINE BESYLATE 5 MG/1
5 TABLET ORAL DAILY
Qty: 90 TABLET | Refills: 3 | Status: SHIPPED | OUTPATIENT
Start: 2023-04-25

## 2023-04-25 NOTE — TELEPHONE ENCOUNTER
amLODIPine (NORVASC) 5 mg tablet TAKE 1 TABLET BY MOUTH DAILY     [facility]

## 2023-06-21 ENCOUNTER — TELEPHONE (OUTPATIENT)
Dept: FAMILY MEDICINE CLINIC | Facility: CLINIC | Age: 73
End: 2023-06-21

## 2023-06-21 DIAGNOSIS — E10.9 TYPE 1 DIABETES MELLITUS WITHOUT COMPLICATION (HCC): ICD-10-CM

## 2023-06-21 DIAGNOSIS — E11.9 DIABETES MELLITUS WITHOUT COMPLICATION (HCC): ICD-10-CM

## 2023-06-21 RX ORDER — SITAGLIPTIN 100 MG/1
100 TABLET, FILM COATED ORAL DAILY
Qty: 90 TABLET | Refills: 3 | Status: SHIPPED | OUTPATIENT
Start: 2023-06-21 | End: 2023-06-26

## 2023-06-21 NOTE — TELEPHONE ENCOUNTER
metFORMIN (GLUCOPHAGE) 500 mg tablet Takes 2 tabs in AM and 1 tab with dinner       Januvia 100 MG tablet TAKE 1 TABLET BY MOUTH DAILY     [facility] [address]

## 2023-06-26 ENCOUNTER — TELEPHONE (OUTPATIENT)
Dept: FAMILY MEDICINE CLINIC | Facility: CLINIC | Age: 73
End: 2023-06-26

## 2023-06-26 DIAGNOSIS — E11.9 DIABETES MELLITUS WITHOUT COMPLICATION (HCC): ICD-10-CM

## 2023-06-26 RX ORDER — GLIPIZIDE 5 MG/1
5 TABLET, FILM COATED, EXTENDED RELEASE ORAL DAILY
Qty: 90 TABLET | Refills: 3 | Status: SHIPPED | OUTPATIENT
Start: 2023-06-26 | End: 2023-06-26 | Stop reason: SDUPTHER

## 2023-06-26 RX ORDER — GLIPIZIDE 5 MG/1
5 TABLET, FILM COATED, EXTENDED RELEASE ORAL DAILY
Qty: 90 TABLET | Refills: 3 | Status: SHIPPED | OUTPATIENT
Start: 2023-06-26

## 2023-06-26 NOTE — TELEPHONE ENCOUNTER
Mailbox not set up could not leave a message   Will keep trying to get on touch with him to give medication instructions

## 2023-06-26 NOTE — TELEPHONE ENCOUNTER
Patient can stop it and increase metformin to 2 tabs twice a day  Will recheck at next office visit in Sept 2023

## 2023-06-26 NOTE — TELEPHONE ENCOUNTER
Chema Dia walked in the office this am, and wants to know what to do about medication  The Nicholasuvia 100mg #90 is $141  Said he can't afford this, is there an alternative?    [facility]

## 2023-07-17 ENCOUNTER — TELEPHONE (OUTPATIENT)
Dept: FAMILY MEDICINE CLINIC | Facility: CLINIC | Age: 73
End: 2023-07-17

## 2023-07-17 DIAGNOSIS — E78.00 PURE HYPERCHOLESTEROLEMIA: ICD-10-CM

## 2023-07-17 DIAGNOSIS — I10 HYPERTENSION, UNSPECIFIED TYPE: ICD-10-CM

## 2023-07-17 DIAGNOSIS — E10.9 TYPE 1 DIABETES MELLITUS WITHOUT COMPLICATION (HCC): Primary | ICD-10-CM

## 2023-07-17 RX ORDER — LISINOPRIL 10 MG/1
10 TABLET ORAL DAILY
Qty: 90 TABLET | Refills: 1 | Status: SHIPPED | OUTPATIENT
Start: 2023-07-17

## 2023-07-17 RX ORDER — FENOFIBRIC ACID 135 MG/1
1 CAPSULE, DELAYED RELEASE ORAL DAILY
Qty: 90 CAPSULE | Refills: 1 | Status: SHIPPED | OUTPATIENT
Start: 2023-07-17

## 2023-07-17 RX ORDER — ATORVASTATIN CALCIUM 40 MG/1
40 TABLET, FILM COATED ORAL DAILY
Qty: 90 TABLET | Refills: 1 | Status: SHIPPED | OUTPATIENT
Start: 2023-07-17

## 2023-07-17 NOTE — TELEPHONE ENCOUNTER
Patient needs refills on   Choline Fenofibrate (Fenofibric Acid) 135 MG CPDR  atorvastatin (LIPITOR) 40 mg tablet  lisinopril (ZESTRIL) 10 mg tablet  Januvia 100 mg    (he brought bottles in)    [address]

## 2023-08-28 LAB
LEFT EYE DIABETIC RETINOPATHY: NORMAL
RIGHT EYE DIABETIC RETINOPATHY: NORMAL

## 2023-09-07 ENCOUNTER — APPOINTMENT (OUTPATIENT)
Dept: LAB | Facility: CLINIC | Age: 73
End: 2023-09-07
Payer: COMMERCIAL

## 2023-09-07 DIAGNOSIS — E11.9 DIABETES MELLITUS WITHOUT COMPLICATION (HCC): ICD-10-CM

## 2023-09-07 DIAGNOSIS — E78.00 HYPERCHOLESTEROLEMIA: ICD-10-CM

## 2023-09-07 DIAGNOSIS — Z12.5 PROSTATE CANCER SCREENING: ICD-10-CM

## 2023-09-07 DIAGNOSIS — I10 ESSENTIAL HYPERTENSION: ICD-10-CM

## 2023-09-07 LAB
ALBUMIN SERPL BCP-MCNC: 4.5 G/DL (ref 3.5–5)
ALP SERPL-CCNC: 37 U/L (ref 34–104)
ALT SERPL W P-5'-P-CCNC: 20 U/L (ref 7–52)
ANION GAP SERPL CALCULATED.3IONS-SCNC: 6 MMOL/L
AST SERPL W P-5'-P-CCNC: 16 U/L (ref 13–39)
BILIRUB SERPL-MCNC: 0.56 MG/DL (ref 0.2–1)
BUN SERPL-MCNC: 18 MG/DL (ref 5–25)
CALCIUM SERPL-MCNC: 9.9 MG/DL (ref 8.4–10.2)
CHLORIDE SERPL-SCNC: 104 MMOL/L (ref 96–108)
CHOLEST SERPL-MCNC: 145 MG/DL
CO2 SERPL-SCNC: 29 MMOL/L (ref 21–32)
CREAT SERPL-MCNC: 1.39 MG/DL (ref 0.6–1.3)
EST. AVERAGE GLUCOSE BLD GHB EST-MCNC: 174 MG/DL
GFR SERPL CREATININE-BSD FRML MDRD: 50 ML/MIN/1.73SQ M
GLUCOSE P FAST SERPL-MCNC: 119 MG/DL (ref 65–99)
HBA1C MFR BLD: 7.7 %
HDLC SERPL-MCNC: 43 MG/DL
LDLC SERPL CALC-MCNC: 82 MG/DL (ref 0–100)
NONHDLC SERPL-MCNC: 102 MG/DL
POTASSIUM SERPL-SCNC: 4.7 MMOL/L (ref 3.5–5.3)
PROT SERPL-MCNC: 7.1 G/DL (ref 6.4–8.4)
PSA SERPL-MCNC: 0.51 NG/ML (ref 0–4)
SODIUM SERPL-SCNC: 139 MMOL/L (ref 135–147)
TRIGL SERPL-MCNC: 98 MG/DL

## 2023-09-07 PROCEDURE — 80061 LIPID PANEL: CPT

## 2023-09-07 PROCEDURE — 83036 HEMOGLOBIN GLYCOSYLATED A1C: CPT

## 2023-09-07 PROCEDURE — G0103 PSA SCREENING: HCPCS

## 2023-09-07 PROCEDURE — 36415 COLL VENOUS BLD VENIPUNCTURE: CPT

## 2023-09-07 PROCEDURE — 80053 COMPREHEN METABOLIC PANEL: CPT

## 2023-09-08 ENCOUNTER — RA CDI HCC (OUTPATIENT)
Dept: OTHER | Facility: HOSPITAL | Age: 73
End: 2023-09-08

## 2023-09-08 NOTE — PROGRESS NOTES
[address] [facility] coding opportunities          Chart Reviewed number of suggestions sent to Provider: 1   E11.65    Patients Insurance     Medicare Insurance: [employer]

## 2023-09-13 PROBLEM — E11.9 DIABETES MELLITUS WITHOUT COMPLICATION (HCC): Status: RESOLVED | Noted: 2020-05-08 | Resolved: 2023-09-13

## 2023-09-25 ENCOUNTER — TELEPHONE (OUTPATIENT)
Age: 73
End: 2023-09-25

## 2023-09-25 DIAGNOSIS — E11.9 DIABETES MELLITUS WITHOUT COMPLICATION (HCC): ICD-10-CM

## 2023-09-25 DIAGNOSIS — E78.00 PURE HYPERCHOLESTEROLEMIA: ICD-10-CM

## 2023-09-25 DIAGNOSIS — E10.9 TYPE 1 DIABETES MELLITUS WITHOUT COMPLICATION (HCC): ICD-10-CM

## 2023-09-25 DIAGNOSIS — I10 HYPERTENSION, UNSPECIFIED TYPE: ICD-10-CM

## 2023-09-25 DIAGNOSIS — N52.9 ERECTILE DYSFUNCTION, UNSPECIFIED ERECTILE DYSFUNCTION TYPE: ICD-10-CM

## 2023-09-25 DIAGNOSIS — I10 ESSENTIAL HYPERTENSION: ICD-10-CM

## 2023-09-25 DIAGNOSIS — K21.9 GASTROESOPHAGEAL REFLUX DISEASE: ICD-10-CM

## 2023-09-25 RX ORDER — OMEPRAZOLE 40 MG/1
40 CAPSULE, DELAYED RELEASE ORAL
Qty: 90 CAPSULE | Refills: 3 | Status: SHIPPED | OUTPATIENT
Start: 2023-09-25

## 2023-09-25 RX ORDER — LISINOPRIL 10 MG/1
10 TABLET ORAL DAILY
Qty: 90 TABLET | Refills: 3 | Status: SHIPPED | OUTPATIENT
Start: 2023-09-25

## 2023-09-25 RX ORDER — GLIPIZIDE 5 MG/1
5 TABLET, FILM COATED, EXTENDED RELEASE ORAL DAILY
Qty: 90 TABLET | Refills: 3 | Status: SHIPPED | OUTPATIENT
Start: 2023-09-25

## 2023-09-25 RX ORDER — FENOFIBRIC ACID 135 MG/1
1 CAPSULE, DELAYED RELEASE ORAL DAILY
Qty: 90 CAPSULE | Refills: 3 | Status: SHIPPED | OUTPATIENT
Start: 2023-09-25

## 2023-09-25 RX ORDER — AMLODIPINE BESYLATE 5 MG/1
5 TABLET ORAL DAILY
Qty: 90 TABLET | Refills: 3 | Status: SHIPPED | OUTPATIENT
Start: 2023-09-25

## 2023-09-25 RX ORDER — SILDENAFIL 100 MG/1
100 TABLET, FILM COATED ORAL AS NEEDED
Qty: 30 TABLET | Refills: 3 | OUTPATIENT
Start: 2023-09-25

## 2023-09-28 NOTE — ASSESSMENT & PLAN NOTE
A1C up to 7.7 in Sept 2023. Patient stopped [place] and [place] due to $$. Continue glipizide ER 5 mg daily and metformin 500 mg bid. Advised pt to follow a low carb diet and to exercise on a regular basis. Had eye exam in April 2022. Foot exam done in March 2023. Urine albumin/creatinine ratio done in March 2023.     Lab Results   Component Value Date    HGBA1C 7.7 (H) 09/07/2023

## 2023-09-28 NOTE — ASSESSMENT & PLAN NOTE
Lab Results   Component Value Date    EGFR 50 09/07/2023    EGFR 51 03/04/2023    EGFR 50 04/29/2022    CREATININE 1.39 (H) 09/07/2023    CREATININE 1.38 (H) 03/04/2023    CREATININE 1.42 (H) 04/29/2022     GFR Stable at 50 in Sept 2023.

## 2023-09-28 NOTE — ASSESSMENT & PLAN NOTE
Wellness exam done. Flu shot given. Last Tdap was in 2017. Pt had pneumovacc 23, prevnar 13, shingrix, and COVID vaccines. Labs are UTD. Pt had colonoscopy in Feb 2020 and next one is due now. Patient advised to schedule. No recent falls. Mood is good.

## 2023-09-28 NOTE — ASSESSMENT & PLAN NOTE
LDL 82 and LFTs normal in Sept 2023. Continue atorvastatin 40 mg qhs. Advised pt to follow a low cholesterol diet and to exercise on a regular basis.

## 2023-09-28 NOTE — ASSESSMENT & PLAN NOTE
Had colonoscopy in Feb 2020 by Dr Fredy Clifton and pt had 9 polyps. Next one is due now. Pt advised again to schedule.

## 2023-09-28 NOTE — ASSESSMENT & PLAN NOTE
Blood pressure remains good. Continue amlodipine 5 mg qd and lisinopril 10 mg qd. Pt advised to continue low Na diet and to exercise on a regular basis.

## 2023-09-29 ENCOUNTER — OFFICE VISIT (OUTPATIENT)
Dept: FAMILY MEDICINE CLINIC | Facility: CLINIC | Age: 73
End: 2023-09-29
Payer: COMMERCIAL

## 2023-09-29 VITALS
OXYGEN SATURATION: 97 % | DIASTOLIC BLOOD PRESSURE: 80 MMHG | HEART RATE: 82 BPM | RESPIRATION RATE: 16 BRPM | HEIGHT: 67 IN | WEIGHT: 203 LBS | BODY MASS INDEX: 31.86 KG/M2 | SYSTOLIC BLOOD PRESSURE: 126 MMHG

## 2023-09-29 DIAGNOSIS — E11.22 TYPE 2 DIABETES MELLITUS WITH STAGE 3A CHRONIC KIDNEY DISEASE, WITHOUT LONG-TERM CURRENT USE OF INSULIN (HCC): ICD-10-CM

## 2023-09-29 DIAGNOSIS — E78.00 HYPERCHOLESTEROLEMIA: ICD-10-CM

## 2023-09-29 DIAGNOSIS — K63.5 POLYP OF COLON, UNSPECIFIED PART OF COLON, UNSPECIFIED TYPE: ICD-10-CM

## 2023-09-29 DIAGNOSIS — Z00.00 MEDICARE ANNUAL WELLNESS VISIT, SUBSEQUENT: Primary | ICD-10-CM

## 2023-09-29 DIAGNOSIS — Z23 ENCOUNTER FOR IMMUNIZATION: ICD-10-CM

## 2023-09-29 DIAGNOSIS — K21.9 GASTROESOPHAGEAL REFLUX DISEASE, UNSPECIFIED WHETHER ESOPHAGITIS PRESENT: ICD-10-CM

## 2023-09-29 DIAGNOSIS — N18.31 STAGE 3A CHRONIC KIDNEY DISEASE (HCC): ICD-10-CM

## 2023-09-29 DIAGNOSIS — N18.31 TYPE 2 DIABETES MELLITUS WITH STAGE 3A CHRONIC KIDNEY DISEASE, WITHOUT LONG-TERM CURRENT USE OF INSULIN (HCC): ICD-10-CM

## 2023-09-29 DIAGNOSIS — I10 ESSENTIAL HYPERTENSION: ICD-10-CM

## 2023-09-29 PROCEDURE — G0008 ADMIN INFLUENZA VIRUS VAC: HCPCS | Performed by: FAMILY MEDICINE

## 2023-09-29 PROCEDURE — 90662 IIV NO PRSV INCREASED AG IM: CPT | Performed by: FAMILY MEDICINE

## 2023-09-29 PROCEDURE — G0439 PPPS, SUBSEQ VISIT: HCPCS | Performed by: FAMILY MEDICINE

## 2023-09-29 PROCEDURE — 99214 OFFICE O/P EST MOD 30 MIN: CPT | Performed by: FAMILY MEDICINE

## 2023-09-29 NOTE — PROGRESS NOTES
Assessment and Plan:     Problem List Items Addressed This Visit        Digestive    GERD (gastroesophageal reflux disease)     No recent symptoms. Continue omeprazole 40 mg 3 days a week and GERD diet. Colon polyps     Had colonoscopy in Feb 2020 by Dr David Hyde and pt had 9 polyps. Next one is due now. Pt advised again to schedule. Endocrine    Type 2 diabetes mellitus with stage 3a chronic kidney disease, without long-term current use of insulin ([facility])     A1C up to 7.7 in Sept 2023. Patient stopped [place] [facility] [place] due to $$. Continue glipizide ER 5 mg daily and metformin 500 mg bid. Advised pt to follow a low carb diet and to exercise on a regular basis. Had eye exam in April 2022. Foot exam done in March 2023. Urine albumin/creatinine ratio done in March 2023. Lab Results   Component Value Date    HGBA1C 7.7 (H) 09/07/2023               Cardiovascular and Mediastinum    Essential hypertension     Blood pressure remains good. Continue amlodipine 5 mg qd and lisinopril 10 mg qd. Pt advised to continue low Na diet and to exercise on a regular basis. Genitourinary    CKD (chronic kidney disease)     Lab Results   Component Value Date    EGFR 50 09/07/2023    EGFR 51 03/04/2023    EGFR 50 04/29/2022    CREATININE 1.39 (H) 09/07/2023    CREATININE 1.38 (H) 03/04/2023    CREATININE 1.42 (H) 04/29/2022     GFR Stable at 50 in Sept 2023. Other    Medicare annual wellness visit, subsequent - Primary     Wellness exam done. Flu shot given. Last Tdap was in 2017. Pt had pneumovacc 23, prevnar 13, shingrix, and COVID vaccines. Labs are UTD. Pt had colonoscopy in Feb 2020 and next one is due now. Patient advised to schedule. No recent falls. Mood is good. Hypercholesterolemia     LDL 82 and LFTs normal in Sept 2023. Continue atorvastatin 40 mg qhs. Advised pt to follow a low cholesterol diet and to exercise on a regular basis.          Other Visit Diagnoses     Encounter for immunization        Relevant Orders    influenza vaccine, high-dose, PF 0.7 mL (FLUZONE HIGH-DOSE)          Depression Screening and Follow-up Plan: Patient was screened for depression during today's encounter. They screened negative with a PHQ-2 score of 0. Preventive health issues were discussed with patient, and age appropriate screening tests were ordered as noted in patient's After Visit Summary. Personalized health advice and appropriate referrals for health education or preventive services given if needed, as noted in patient's After Visit Summary. History of Present Illness:     Patient presents for a Medicare Wellness Visit    Patient here for follow-up Hypertension, Hyperlipidemia, DM, GERD, Colon Polyps, Chronic Kidney Disease. Patient doing well. No chest pain or shortness of breath. No headaches. No abdominal pain. Patient had labs done recently. No new complaints. Due for wellness exam. Wants flu shot. Patient Care Team:  Salma Thornton MD as PCP - General (Family Medicine)     Review of Systems:     Review of Systems   Constitutional: Negative for fatigue and unexpected weight change. Respiratory: Negative for cough and shortness of breath. Cardiovascular: Negative for chest pain. Gastrointestinal: Negative for abdominal pain, constipation, diarrhea and vomiting. Musculoskeletal: Negative for arthralgias. Neurological: Negative for dizziness and headaches. Psychiatric/Behavioral: Negative for dysphoric mood. The patient is not nervous/anxious.          Problem List:     Patient Active Problem List   Diagnosis   • Essential hypertension   • Colon polyps   • Hypercholesterolemia   • GERD (gastroesophageal reflux disease)   • Erectile dysfunction   • Medicare annual wellness visit, subsequent   • CKD (chronic kidney disease)   • Type 2 diabetes mellitus with stage 3a chronic kidney disease, without long-term current use of insulin ([address])      Past Medical and Surgical History: Past Medical History:   Diagnosis Date   • Diabetes mellitus ([address])    • ED (erectile dysfunction)    • GERD (gastroesophageal reflux disease)    • Hypertension      Past Surgical History:   Procedure Laterality Date   • COLONOSCOPY  08/01/2016   • NO PAST SURGERIES        Family History:     Family History   Problem Relation Age of Onset   • Asthma Father       Social History:     Social History     Socioeconomic History   • Marital status: /Civil Union     Spouse name: None   • Number of children: None   • Years of education: None   • Highest education level: None   Occupational History   • None   Tobacco Use   • Smoking status: Never   • Smokeless tobacco: Never   Vaping Use   • Vaping Use: Never used   Substance and Sexual Activity   • Alcohol use: Never   • Drug use: Never   • Sexual activity: Not Currently   Other Topics Concern   • None   Social History Narrative   • None     Social Determinants of Health     Financial Resource Strain: Low Risk  (9/29/2023)    Overall Financial Resource Strain (CARDIA)    • Difficulty of Paying Living Expenses: Not hard at all   Food Insecurity: Not on file   Transportation Needs: No Transportation Needs (9/29/2023)    PRAPARE - Transportation    • Lack of Transportation (Medical): No    • Lack of Transportation (Non-Medical):  No   Physical Activity: Not on file   Stress: Not on file   Social Connections: Not on file   Intimate Partner Violence: Not on file   Housing Stability: Not on file      Medications and Allergies:     Current Outpatient Medications   Medication Sig Dispense Refill   • amLODIPine (NORVASC) 5 mg tablet Take 1 tablet (5 mg total) by mouth daily 90 tablet 3   • Ascorbic Acid (VITAMIN C PO) Take by mouth     • aspirin 81 mg chewable tablet Chew 81 mg daily     • atorvastatin (LIPITOR) 40 mg tablet Take 1 tablet (40 mg total) by mouth daily 90 tablet 1   • Choline Fenofibrate (Fenofibric Acid) 135 MG CPDR Take 1 capsule (135 mg total) by mouth daily 90 capsule 3   • clotrimazole-betamethasone (LOTRISONE) 1-0.05 % cream Apply topically 2 (two) times a day 45 g 5   • Cyanocobalamin (VITAMIN B12 PO) Take by mouth     • glipiZIDE (GLUCOTROL XL) 5 mg 24 hr tablet Take 1 tablet (5 mg total) by mouth daily 90 tablet 3   • glucose blood (OneTouch Ultra) test strip Use 1 each 3 (three) times a day 100 strip 3   • lisinopril (ZESTRIL) 10 mg tablet Take 1 tablet (10 mg total) by mouth daily 90 tablet 3   • metFORMIN (GLUCOPHAGE) 500 mg tablet Take 2 tablets (1,000 mg total) by mouth 2 (two) times a day with meals 360 tablet 3   • omeprazole (PriLOSEC) 40 MG capsule Take 1 capsule (40 mg total) by mouth daily before breakfast 90 capsule 3   • OneTouch Delica Lancets 11D MISC by Does not apply route 3 (three) times a week onetouch delica lancets 24I test 3 times per week 100 each 3   • sildenafil (VIAGRA) 100 mg tablet TAKE 1 TABLET BY MOUTH AS NEEDED. 30 tablet 3   • sitaGLIPtin (JANUVIA) 100 mg tablet Take 1 tablet (100 mg total) by mouth daily (Patient not taking: Reported on 9/29/2023) 90 tablet 3     No current facility-administered medications for this visit. Allergies   Allergen Reactions   • Compazine [Prochlorperazine] Tongue Swelling   • Zithromax [Azithromycin] Other (See Comments)     .       Immunizations:     Immunization History   Administered Date(s) Administered   • COVID-19 PFIZER VACCINE 0.3 ML IM 05/12/2021, 06/05/2021, 01/09/2022   • COVID-19 Pfizer Vac BIVALENT Cedric-sucrose 12 Yr+ IM 09/14/2022   • Influenza, high dose seasonal 0.7 mL 12/30/2020, 10/15/2021, 09/14/2022   • Pneumococcal Conjugate 13-Valent 10/03/2017   • Pneumococcal Polysaccharide PPV23 08/26/2020   • Tdap 06/08/2017   • influenza, trivalent, adjuvanted 10/22/2019      Health Maintenance:         Topic Date Due   • Hepatitis C Screening  Never done   • Colorectal Cancer Screening  02/21/2023         Topic Date Due   • COVID-19 Vaccine (5 - Pfizer series) 01/14/2023   • Influenza Vaccine (1) 09/01/2023      Medicare Screening Tests and Risk Assessments:     Casandra Dang is here for his Subsequent Wellness visit. Health Risk Assessment:   Patient rates overall health as excellent. Patient feels that their physical health rating is same. Patient is satisfied with their life. Eyesight was rated as same. Hearing was rated as same. Patient feels that their emotional and mental health rating is same. Patients states they are never, rarely angry. Patient states they are sometimes unusually tired/fatigued. Pain experienced in the last 7 days has been none. Patient states that he has experienced no weight loss or gain in last 6 months. Depression Screening:   PHQ-2 Score: 0      Fall Risk Screening: In the past year, patient has experienced: no history of falling in past year      Home Safety:  Patient does not have trouble with stairs inside or outside of their home. Patient has working smoke alarms and has working carbon monoxide detector. Home safety hazards include: none. Nutrition:   Current diet is Regular. Medications:   Patient is currently taking over-the-counter supplements. OTC medications include: see medication list. Patient is able to manage medications. Activities of Daily Living (ADLs)/Instrumental Activities of Daily Living (IADLs):   Walk and transfer into and out of bed and chair?: Yes  Dress and groom yourself?: Yes    Bathe or shower yourself?: Yes    Feed yourself?  Yes  Do your laundry/housekeeping?: Yes  Manage your money, pay your bills and track your expenses?: Yes  Make your own meals?: Yes    Do your own shopping?: Yes    Previous Hospitalizations:   Any hospitalizations or ED visits within the last 12 months?: No      Advance Care Planning:   Living will: No    Durable POA for healthcare: No    Advanced directive: No    Advanced directive counseling given: Yes    Five wishes given: Yes    Patient declined ACP directive: No      Cognitive Screening:   Provider or family/friend/caregiver concerned regarding cognition?: No    PREVENTIVE SCREENINGS      Cardiovascular Screening:    General: Screening Not Indicated and History Lipid Disorder      Diabetes Screening:     General: Screening Not Indicated and History Diabetes      Colorectal Cancer Screening:     General: Screening Current      Prostate Cancer Screening:    General: Screening Current      Osteoporosis Screening:    General: Screening Not Indicated      Abdominal Aortic Aneurysm (AAA) Screening:    Risk factors include: age between 70-75 yo        General: Screening Not Indicated      Lung Cancer Screening:     General: Screening Not Indicated    Screening, Brief Intervention, and Referral to Treatment (SBIRT)    Screening  Typical number of drinks in a day: 0  Typical number of drinks in a week: 0  Interpretation: Low risk drinking behavior. AUDIT-C Screenin) How often did you have a drink containing alcohol in the past year? never  2) How many drinks did you have on a typical day when you were drinking in the past year? 0  3) How often did you have 6 or more drinks on one occasion in the past year? never    AUDIT-C Score: 0  Interpretation: Score 0-3 (male): Negative screen for alcohol misuse    Single Item Drug Screening:  How often have you used an illegal drug (including marijuana) or a prescription medication for non-medical reasons in the past year? never    Single Item Drug Screen Score: 0  Interpretation: Negative screen for possible drug use disorder    Brief Intervention  Alcohol & drug use screenings were reviewed. No concerns regarding substance use disorder identified. No results found. Physical Exam:     /80 (BP Location: Left arm, Patient Position: Sitting, Cuff Size: Standard)   Pulse 82   Resp 16   Ht 5' 7" (1.702 m)   Wt 92.1 kg (203 lb)   SpO2 97%   BMI 31.79 kg/m²     Physical Exam  Vitals and nursing note reviewed.    Constitutional: Appearance: Normal appearance. Neck:      Vascular: No carotid bruit. Cardiovascular:      Rate and Rhythm: Normal rate and regular rhythm. Heart sounds: Normal heart sounds. No murmur heard. Pulmonary:      Effort: Pulmonary effort is normal.      Breath sounds: Normal breath sounds. No wheezing. Musculoskeletal:      Cervical back: Normal range of motion and neck supple. No muscular tenderness. Right lower leg: No edema. Left lower leg: No edema. Lymphadenopathy:      Cervical: No cervical adenopathy. Neurological:      Mental Status: He is alert. Psychiatric:         Mood and Affect: Mood normal.         Behavior: Behavior normal.         Thought Content:  Thought content normal.         Judgment: Judgment normal.          Jaspreet Luevano MD

## 2023-09-29 NOTE — PATIENT INSTRUCTIONS
Medicare Preventive Visit Patient Instructions  Thank you for completing your Welcome to Medicare Visit or Medicare Annual Wellness Visit today. Your next wellness visit will be due in one year (9/29/2024). The screening/preventive services that you may require over the next 5-10 years are detailed below. Some tests may not apply to you based off risk factors and/or age. Screening tests ordered at today's visit but not completed yet may show as past due. Also, please note that scanned in results may not display below. Preventive Screenings:  Service Recommendations Previous Testing/Comments   Colorectal Cancer Screening  · Colonoscopy    · Fecal Occult Blood Test (FOBT)/Fecal Immunochemical Test (FIT)  · Fecal DNA/Cologuard Test  · Flexible Sigmoidoscopy Age: 43-73 years old   Colonoscopy: every 10 years (May be performed more frequently if at higher risk)  OR  FOBT/FIT: every 1 year  OR  Cologuard: every 3 years  OR  Sigmoidoscopy: every 5 years  Screening may be recommended earlier than age 39 if at higher risk for colorectal cancer. Also, an individualized decision between you and your healthcare provider will decide whether screening between the ages of 77-80 would be appropriate.  Colonoscopy: 02/21/2020  FOBT/FIT: Not on file  Cologuard: Not on file  Sigmoidoscopy: Not on file    Screening Current     Prostate Cancer Screening Individualized decision between patient and health care provider in men between ages of 53-66   Medicare will cover every 12 months beginning on the day after your 50th birthday PSA: 0.51 ng/mL     Screening Current     Hepatitis C Screening Once for adults born between 1945 and 1965  More frequently in patients at high risk for Hepatitis C Hep C Antibody: Not on file        Diabetes Screening 1-2 times per year if you're at risk for diabetes or have pre-diabetes Fasting glucose: 119 mg/dL (9/7/2023)  A1C: 7.7 % (9/7/2023)  Screening Not Indicated  History Diabetes   Cholesterol Screening Once every 5 years if you don't have a lipid disorder. May order more often based on risk factors. Lipid panel: 09/07/2023  Screening Not Indicated  History Lipid Disorder      Other Preventive Screenings Covered by Medicare:  1. Abdominal Aortic Aneurysm (AAA) Screening: covered once if your at risk. You're considered to be at risk if you have a family history of AAA or a male between the age of 70-76 who smoking at least 100 cigarettes in your lifetime. 2. Lung Cancer Screening: covers low dose CT scan once per year if you meet all of the following conditions: (1) Age 48-67; (2) No signs or symptoms of lung cancer; (3) Current smoker or have quit smoking within the last 15 years; (4) You have a tobacco smoking history of at least 20 pack years (packs per day x number of years you smoked); (5) You get a written order from a healthcare provider. 3. Glaucoma Screening: covered annually if you're considered high risk: (1) You have diabetes OR (2) Family history of glaucoma OR (3)  aged 48 and older OR (3)  American aged 72 and older  3. Osteoporosis Screening: covered every 2 years if you meet one of the following conditions: (1) Have a vertebral abnormality; (2) On glucocorticoid therapy for more than 3 months; (3) Have primary hyperparathyroidism; (4) On osteoporosis medications and need to assess response to drug therapy. 5. HIV Screening: covered annually if you're between the age of 14-79. Also covered annually if you are younger than 13 and older than 72 with risk factors for HIV infection. For pregnant patients, it is covered up to 3 times per pregnancy.     Immunizations:  Immunization Recommendations   Influenza Vaccine Annual influenza vaccination during flu season is recommended for all persons aged >= 6 months who do not have contraindications   Pneumococcal Vaccine   * Pneumococcal conjugate vaccine = PCV13 (Prevnar 13), PCV15 (Vaxneuvance), PCV20 (Prevnar 20)  * Pneumococcal polysaccharide vaccine = PPSV23 (Pneumovax) Adults 2364 years old: 1-3 doses may be recommended based on certain risk factors  Adults 72 years old: 1-2 doses may be recommended based off what pneumonia vaccine you previously received   Hepatitis B Vaccine 3 dose series if at intermediate or high risk (ex: diabetes, end stage renal disease, liver disease)   Tetanus (Td) Vaccine - COST NOT COVERED BY MEDICARE PART B Following completion of primary series, a booster dose should be given every 10 years to maintain immunity against tetanus. Td may also be given as tetanus wound prophylaxis. Tdap Vaccine - COST NOT COVERED BY MEDICARE PART B Recommended at least once for all adults. For pregnant patients, recommended with each pregnancy. Shingles Vaccine (Shingrix) - COST NOT COVERED BY MEDICARE PART B  2 shot series recommended in those aged 48 and above     Health Maintenance Due:      Topic Date Due   • Hepatitis C Screening  Never done   • Colorectal Cancer Screening  02/21/2023     Immunizations Due:      Topic Date Due   • COVID-19 Vaccine (5 - Pfizer series) 01/14/2023   • Influenza Vaccine (1) 09/01/2023     Advance Directives   What are advance directives? Advance directives are legal documents that state your wishes and plans for medical care. These plans are made ahead of time in case you lose your ability to make decisions for yourself. Advance directives can apply to any medical decision, such as the treatments you want, and if you want to donate organs. What are the types of advance directives? There are many types of advance directives, and each state has rules about how to use them. You may choose a combination of any of the following:  · Living will: This is a written record of the treatment you want. You can also choose which treatments you do not want, which to limit, and which to stop at a certain time. This includes surgery, medicine, IV fluid, and tube feedings.    · Durable power of  for [facility]): This is a written record that states who you want to make healthcare choices for you when you are unable to make them for yourself. This person, called a proxy, is usually a family member or a friend. You may choose more than 1 proxy. · Do not resuscitate (DNR) order:  A DNR order is used in case your heart stops beating or you stop breathing. It is a request not to have certain forms of treatment, such as CPR. A DNR order may be included in other types of advance directives. · Medical directive: This covers the care that you want if you are in a coma, near death, or unable to make decisions for yourself. You can list the treatments you want for each condition. Treatment may include pain medicine, surgery, blood transfusions, dialysis, IV or tube feedings, and a ventilator (breathing machine). · Values history: This document has questions about your views, beliefs, and how you feel and think about life. This information can help others choose the care that you would choose. Why are advance directives important? An advance directive helps you control your care. Although spoken wishes may be used, it is better to have your wishes written down. Spoken wishes can be misunderstood, or not followed. Treatments may be given even if you do not want them. An advance directive may make it easier for your family to make difficult choices about your care. Weight Management   Why it is important to manage your weight:  Being overweight increases your risk of health conditions such as heart disease, high blood pressure, type 2 diabetes, and certain types of cancer. It can also increase your risk for osteoarthritis, sleep apnea, and other respiratory problems. Aim for a slow, steady weight loss. Even a small amount of weight loss can lower your risk of health problems.   How to lose weight safely:  A safe and healthy way to lose weight is to eat fewer calories and get regular exercise. You can lose up about 1 pound a week by decreasing the number of calories you eat by 500 calories each day. Healthy meal plan for weight management:  A healthy meal plan includes a variety of foods, contains fewer calories, and helps you stay healthy. A healthy meal plan includes the following:  · Eat whole-grain foods more often. A healthy meal plan should contain fiber. Fiber is the part of grains, fruits, and vegetables that is not broken down by your body. Whole-grain foods are healthy and provide extra fiber in your diet. Some examples of whole-grain foods are whole-wheat breads and pastas, oatmeal, brown rice, and bulgur. · Eat a variety of vegetables every day. Include dark, leafy greens such as spinach, kale, ambrosio greens, and mustard greens. Eat yellow and orange vegetables such as carrots, sweet potatoes, and winter squash. · Eat a variety of fruits every day. Choose fresh or canned fruit (canned in its own juice or light syrup) instead of juice. Fruit juice has very little or no fiber. · Eat low-fat dairy foods. Drink fat-free (skim) milk or 1% milk. Eat fat-free yogurt and low-fat cottage cheese. Try low-fat cheeses such as mozzarella and other reduced-fat cheeses. · Choose meat and other protein foods that are low in fat. Choose beans or other legumes such as split peas or lentils. Choose fish, skinless poultry (chicken or turkey), or lean cuts of red meat (beef or pork). Before you cook meat or poultry, cut off any visible fat. · Use less fat and oil. Try baking foods instead of frying them. Add less fat, such as margarine, sour cream, regular salad dressing and mayonnaise to foods. Eat fewer high-fat foods. Some examples of high-fat foods include french fries, doughnuts, ice cream, and cakes. · Eat fewer sweets. Limit foods and drinks that are high in sugar. This includes candy, cookies, regular soda, and sweetened drinks.   Exercise:  Exercise at least 30 minutes per day on most days of the week. Some examples of exercise include walking, biking, dancing, and swimming. You can also fit in more physical activity by taking the stairs instead of the elevator or parking farther away from stores. Ask your healthcare provider about the best exercise plan for you. © Copyright [employer] 2018 Information is for End User's use only and may not be sold, redistributed or otherwise used for commercial purposes.  All illustrations and images included in CareNotes® are the copyrighted property of [employer].A[employer]M., Inc. or  [address]

## 2023-09-30 DIAGNOSIS — Z76.0 ENCOUNTER FOR MEDICATION REFILL: Primary | ICD-10-CM

## 2023-10-02 RX ORDER — TADALAFIL 20 MG/1
TABLET ORAL
Qty: 30 TABLET | Refills: 1 | Status: SHIPPED | OUTPATIENT
Start: 2023-10-02

## 2023-11-19 PROBLEM — Z00.00 MEDICARE ANNUAL WELLNESS VISIT, SUBSEQUENT: Status: RESOLVED | Noted: 2021-05-09 | Resolved: 2023-11-19

## 2024-02-08 ENCOUNTER — APPOINTMENT (EMERGENCY)
Dept: CT IMAGING | Facility: HOSPITAL | Age: 74
DRG: 070 | End: 2024-02-08
Payer: COMMERCIAL

## 2024-02-08 ENCOUNTER — HOSPITAL ENCOUNTER (INPATIENT)
Facility: HOSPITAL | Age: 74
LOS: 1 days | Discharge: ANOTHER HOSPITAL | DRG: 070 | End: 2024-02-09
Attending: INTERNAL MEDICINE | Admitting: INTERNAL MEDICINE
Payer: COMMERCIAL

## 2024-02-08 ENCOUNTER — APPOINTMENT (EMERGENCY)
Dept: MRI IMAGING | Facility: HOSPITAL | Age: 74
DRG: 070 | End: 2024-02-08
Payer: COMMERCIAL

## 2024-02-08 ENCOUNTER — APPOINTMENT (EMERGENCY)
Dept: RADIOLOGY | Facility: HOSPITAL | Age: 74
DRG: 070 | End: 2024-02-08
Payer: COMMERCIAL

## 2024-02-08 DIAGNOSIS — R56.9 NEW ONSET SEIZURE (HCC): Primary | ICD-10-CM

## 2024-02-08 DIAGNOSIS — I61.8 SILENT MICRO-HEMORRHAGE OF BRAIN (HCC): ICD-10-CM

## 2024-02-08 PROBLEM — N18.31 STAGE 3A CHRONIC KIDNEY DISEASE (HCC): Status: ACTIVE | Noted: 2022-09-14

## 2024-02-08 LAB
ALBUMIN SERPL BCP-MCNC: 4.2 G/DL (ref 3.5–5)
ALP SERPL-CCNC: 59 U/L (ref 34–104)
ALT SERPL W P-5'-P-CCNC: 28 U/L (ref 7–52)
AMPHETAMINES SERPL QL SCN: NEGATIVE
ANION GAP SERPL CALCULATED.3IONS-SCNC: 19 MMOL/L
AST SERPL W P-5'-P-CCNC: 24 U/L (ref 13–39)
ATRIAL RATE: 101 BPM
BACTERIA UR QL AUTO: ABNORMAL /HPF
BARBITURATES UR QL: NEGATIVE
BASOPHILS # BLD AUTO: 0.13 THOUSANDS/ÂΜL (ref 0–0.1)
BASOPHILS NFR BLD AUTO: 1 % (ref 0–1)
BENZODIAZ UR QL: POSITIVE
BILIRUB SERPL-MCNC: 0.48 MG/DL (ref 0.2–1)
BILIRUB UR QL STRIP: NEGATIVE
BUN SERPL-MCNC: 14 MG/DL (ref 5–25)
CALCIUM SERPL-MCNC: 9.2 MG/DL (ref 8.4–10.2)
CHLORIDE SERPL-SCNC: 98 MMOL/L (ref 96–108)
CLARITY UR: CLEAR
CO2 SERPL-SCNC: 20 MMOL/L (ref 21–32)
COCAINE UR QL: NEGATIVE
COLOR UR: YELLOW
CREAT SERPL-MCNC: 1.27 MG/DL (ref 0.6–1.3)
EOSINOPHIL # BLD AUTO: 0.61 THOUSAND/ÂΜL (ref 0–0.61)
EOSINOPHIL NFR BLD AUTO: 6 % (ref 0–6)
ERYTHROCYTE [DISTWIDTH] IN BLOOD BY AUTOMATED COUNT: 13.4 % (ref 11.6–15.1)
GFR SERPL CREATININE-BSD FRML MDRD: 56 ML/MIN/1.73SQ M
GLUCOSE SERPL-MCNC: 249 MG/DL (ref 65–140)
GLUCOSE SERPL-MCNC: 270 MG/DL (ref 65–140)
GLUCOSE UR STRIP-MCNC: ABNORMAL MG/DL
HCT VFR BLD AUTO: 46.9 % (ref 36.5–49.3)
HGB BLD-MCNC: 14.7 G/DL (ref 12–17)
HGB UR QL STRIP.AUTO: ABNORMAL
HYALINE CASTS #/AREA URNS LPF: ABNORMAL /LPF
IMM GRANULOCYTES # BLD AUTO: 0.1 THOUSAND/UL (ref 0–0.2)
IMM GRANULOCYTES NFR BLD AUTO: 1 % (ref 0–2)
KETONES UR STRIP-MCNC: ABNORMAL MG/DL
LEUKOCYTE ESTERASE UR QL STRIP: NEGATIVE
LYMPHOCYTES # BLD AUTO: 3.82 THOUSANDS/ÂΜL (ref 0.6–4.47)
LYMPHOCYTES NFR BLD AUTO: 40 % (ref 14–44)
MAGNESIUM SERPL-MCNC: 2.1 MG/DL (ref 1.9–2.7)
MCH RBC QN AUTO: 28.1 PG (ref 26.8–34.3)
MCHC RBC AUTO-ENTMCNC: 31.3 G/DL (ref 31.4–37.4)
MCV RBC AUTO: 90 FL (ref 82–98)
METHADONE UR QL: NEGATIVE
MONOCYTES # BLD AUTO: 0.8 THOUSAND/ÂΜL (ref 0.17–1.22)
MONOCYTES NFR BLD AUTO: 8 % (ref 4–12)
NEUTROPHILS # BLD AUTO: 4.16 THOUSANDS/ÂΜL (ref 1.85–7.62)
NEUTS SEG NFR BLD AUTO: 44 % (ref 43–75)
NITRITE UR QL STRIP: NEGATIVE
NON-SQ EPI CELLS URNS QL MICRO: ABNORMAL /HPF
NRBC BLD AUTO-RTO: 0 /100 WBCS
OPIATES UR QL SCN: NEGATIVE
OXYCODONE+OXYMORPHONE UR QL SCN: NEGATIVE
P AXIS: 46 DEGREES
PCP UR QL: NEGATIVE
PH UR STRIP.AUTO: 6 [PH]
PLATELET # BLD AUTO: 362 THOUSANDS/UL (ref 149–390)
PMV BLD AUTO: 9.5 FL (ref 8.9–12.7)
POTASSIUM SERPL-SCNC: 3.7 MMOL/L (ref 3.5–5.3)
PR INTERVAL: 140 MS
PROT SERPL-MCNC: 7 G/DL (ref 6.4–8.4)
PROT UR STRIP-MCNC: ABNORMAL MG/DL
QRS AXIS: 39 DEGREES
QRSD INTERVAL: 94 MS
QT INTERVAL: 378 MS
QTC INTERVAL: 490 MS
RBC # BLD AUTO: 5.23 MILLION/UL (ref 3.88–5.62)
RBC #/AREA URNS AUTO: ABNORMAL /HPF
SODIUM SERPL-SCNC: 137 MMOL/L (ref 135–147)
SP GR UR STRIP.AUTO: >=1.03 (ref 1–1.03)
T WAVE AXIS: 2 DEGREES
THC UR QL: NEGATIVE
TSH SERPL DL<=0.05 MIU/L-ACNC: 4.19 UIU/ML (ref 0.45–4.5)
UROBILINOGEN UR QL STRIP.AUTO: 0.2 E.U./DL
VENTRICULAR RATE: 101 BPM
WBC # BLD AUTO: 9.62 THOUSAND/UL (ref 4.31–10.16)
WBC #/AREA URNS AUTO: ABNORMAL /HPF

## 2024-02-08 PROCEDURE — 81001 URINALYSIS AUTO W/SCOPE: CPT | Performed by: INTERNAL MEDICINE

## 2024-02-08 PROCEDURE — A9585 GADOBUTROL INJECTION: HCPCS | Performed by: FAMILY MEDICINE

## 2024-02-08 PROCEDURE — 99222 1ST HOSP IP/OBS MODERATE 55: CPT | Performed by: INTERNAL MEDICINE

## 2024-02-08 PROCEDURE — 82948 REAGENT STRIP/BLOOD GLUCOSE: CPT

## 2024-02-08 PROCEDURE — G1004 CDSM NDSC: HCPCS

## 2024-02-08 PROCEDURE — 80307 DRUG TEST PRSMV CHEM ANLYZR: CPT | Performed by: INTERNAL MEDICINE

## 2024-02-08 PROCEDURE — 96374 THER/PROPH/DIAG INJ IV PUSH: CPT

## 2024-02-08 PROCEDURE — 84443 ASSAY THYROID STIM HORMONE: CPT | Performed by: INTERNAL MEDICINE

## 2024-02-08 PROCEDURE — 99285 EMERGENCY DEPT VISIT HI MDM: CPT

## 2024-02-08 PROCEDURE — 71045 X-RAY EXAM CHEST 1 VIEW: CPT

## 2024-02-08 PROCEDURE — 70553 MRI BRAIN STEM W/O & W/DYE: CPT

## 2024-02-08 PROCEDURE — 93010 ELECTROCARDIOGRAM REPORT: CPT | Performed by: INTERNAL MEDICINE

## 2024-02-08 PROCEDURE — 83735 ASSAY OF MAGNESIUM: CPT | Performed by: INTERNAL MEDICINE

## 2024-02-08 PROCEDURE — 93005 ELECTROCARDIOGRAM TRACING: CPT

## 2024-02-08 PROCEDURE — 70450 CT HEAD/BRAIN W/O DYE: CPT

## 2024-02-08 PROCEDURE — 85025 COMPLETE CBC W/AUTO DIFF WBC: CPT | Performed by: INTERNAL MEDICINE

## 2024-02-08 PROCEDURE — 36415 COLL VENOUS BLD VENIPUNCTURE: CPT | Performed by: INTERNAL MEDICINE

## 2024-02-08 PROCEDURE — 80053 COMPREHEN METABOLIC PANEL: CPT | Performed by: INTERNAL MEDICINE

## 2024-02-08 PROCEDURE — 99291 CRITICAL CARE FIRST HOUR: CPT | Performed by: INTERNAL MEDICINE

## 2024-02-08 PROCEDURE — 96361 HYDRATE IV INFUSION ADD-ON: CPT

## 2024-02-08 RX ORDER — ASPIRIN 81 MG/1
81 TABLET, CHEWABLE ORAL DAILY
Status: DISCONTINUED | OUTPATIENT
Start: 2024-02-08 | End: 2024-02-08

## 2024-02-08 RX ORDER — ATORVASTATIN CALCIUM 40 MG/1
40 TABLET, FILM COATED ORAL
Status: DISCONTINUED | OUTPATIENT
Start: 2024-02-09 | End: 2024-02-10 | Stop reason: HOSPADM

## 2024-02-08 RX ORDER — PANTOPRAZOLE SODIUM 40 MG/1
40 TABLET, DELAYED RELEASE ORAL
Status: DISCONTINUED | OUTPATIENT
Start: 2024-02-09 | End: 2024-02-10 | Stop reason: HOSPADM

## 2024-02-08 RX ORDER — GADOBUTROL 604.72 MG/ML
9 INJECTION INTRAVENOUS
Status: COMPLETED | OUTPATIENT
Start: 2024-02-08 | End: 2024-02-08

## 2024-02-08 RX ORDER — LEVETIRACETAM 500 MG/5ML
1000 INJECTION, SOLUTION, CONCENTRATE INTRAVENOUS ONCE
Status: COMPLETED | OUTPATIENT
Start: 2024-02-08 | End: 2024-02-08

## 2024-02-08 RX ORDER — SODIUM CHLORIDE 9 MG/ML
125 INJECTION, SOLUTION INTRAVENOUS CONTINUOUS
Status: DISCONTINUED | OUTPATIENT
Start: 2024-02-08 | End: 2024-02-10 | Stop reason: HOSPADM

## 2024-02-08 RX ORDER — LISINOPRIL 10 MG/1
10 TABLET ORAL DAILY
Status: DISCONTINUED | OUTPATIENT
Start: 2024-02-09 | End: 2024-02-09

## 2024-02-08 RX ORDER — AMLODIPINE BESYLATE 5 MG/1
5 TABLET ORAL DAILY
Status: DISCONTINUED | OUTPATIENT
Start: 2024-02-09 | End: 2024-02-10 | Stop reason: HOSPADM

## 2024-02-08 RX ORDER — ONDANSETRON 2 MG/ML
4 INJECTION INTRAMUSCULAR; INTRAVENOUS EVERY 6 HOURS PRN
Status: DISCONTINUED | OUTPATIENT
Start: 2024-02-08 | End: 2024-02-10 | Stop reason: HOSPADM

## 2024-02-08 RX ORDER — INSULIN LISPRO 100 [IU]/ML
1-5 INJECTION, SOLUTION INTRAVENOUS; SUBCUTANEOUS
Status: DISCONTINUED | OUTPATIENT
Start: 2024-02-09 | End: 2024-02-10 | Stop reason: HOSPADM

## 2024-02-08 RX ORDER — MIDAZOLAM HYDROCHLORIDE 1 MG/ML
2 INJECTION INTRAMUSCULAR; INTRAVENOUS ONCE
Status: COMPLETED | OUTPATIENT
Start: 2024-02-08 | End: 2024-02-08

## 2024-02-08 RX ORDER — LEVETIRACETAM 500 MG/5ML
1000 INJECTION, SOLUTION, CONCENTRATE INTRAVENOUS EVERY 12 HOURS SCHEDULED
Status: DISCONTINUED | OUTPATIENT
Start: 2024-02-08 | End: 2024-02-10 | Stop reason: HOSPADM

## 2024-02-08 RX ORDER — ACETAMINOPHEN 325 MG/1
650 TABLET ORAL EVERY 6 HOURS PRN
Status: DISCONTINUED | OUTPATIENT
Start: 2024-02-08 | End: 2024-02-09

## 2024-02-08 RX ADMIN — GADOBUTROL 9 ML: 604.72 INJECTION INTRAVENOUS at 12:58

## 2024-02-08 RX ADMIN — LEVETIRACETAM 1000 MG: 100 INJECTION, SOLUTION INTRAVENOUS at 21:20

## 2024-02-08 RX ADMIN — LEVETIRACETAM 1000 MG: 100 INJECTION, SOLUTION INTRAVENOUS at 09:44

## 2024-02-08 RX ADMIN — SODIUM CHLORIDE 125 ML/HR: 0.9 INJECTION, SOLUTION INTRAVENOUS at 08:37

## 2024-02-08 NOTE — ED NOTES
Family at bedside, requesting to speak to provider; attending made aware of request.      Shante Bedoya RN  02/08/24 1539

## 2024-02-08 NOTE — ED PROVIDER NOTES
History  Chief Complaint   Patient presents with    Seizure - Actively Seizing on Arrival     Witness seizure at [facility].  Combative with ems.  4mg versed given.  Pt sleeping upon arrival.  Ems contacting family     This is a 73 years old brought by EMS, as patient was at "[employer] and he went into seizure which was witnessed by the bystander did there.  Called 911 and when the arrived with the son patient was posturing and patient received Versed 4 mg IM.  Patient brought to the ER and he is postictal.  Patient has no history of seizure.  Patient is diabetic and he is on Glucotrol and metformin.  Please BS was 243.  No other available history on the patient at this time as no family members.  Patient has pulse ox 98% on 2 L/min oxygen.  And he is afebrile.  BP is 135/63.  No known history of drug abuse.pt is unresponsive at er.        Prior to Admission Medications   Prescriptions Last Dose Informant Patient Reported? Taking?   Ascorbic Acid (VITAMIN C PO)   Yes No   Sig: Take by mouth   Choline Fenofibrate (Fenofibric Acid) 135 MG CPDR   No No   Sig: Take 1 capsule (135 mg total) by mouth daily   Cyanocobalamin (VITAMIN B12 PO)   Yes No   Sig: Take by mouth   OneTouch Delica Lancets 33G MISC   No No   Sig: by Does not apply route 3 (three) times a week onetouch delica lancets 33G test 3 times per week   amLODIPine (NORVASC) 5 mg tablet   No No   Sig: Take 1 tablet (5 mg total) by mouth daily   aspirin 81 mg chewable tablet   Yes No   Sig: Chew 81 mg daily   atorvastatin (LIPITOR) 40 mg tablet   No No   Sig: Take 1 tablet (40 mg total) by mouth daily   clotrimazole-betamethasone (LOTRISONE) 1-0.05 % cream   No No   Sig: Apply topically 2 (two) times a day   glipiZIDE (GLUCOTROL XL) 5 mg 24 hr tablet   No No   Sig: Take 1 tablet (5 mg total) by mouth daily   glucose blood (OneTouch Ultra) test strip   No No   Sig: Use 1 each 3 (three) times a day   lisinopril (ZESTRIL) 10 mg tablet   No No   Sig: Take 1 tablet (10 mg  total) by mouth daily   metFORMIN (GLUCOPHAGE) 500 mg tablet   No No   Sig: Take 2 tablets (1,000 mg total) by mouth 2 (two) times a day with meals   omeprazole (PriLOSEC) 40 MG capsule   No No   Sig: Take 1 capsule (40 mg total) by mouth daily before breakfast   sitaGLIPtin (JANUVIA) 100 mg tablet   No No   Sig: Take 1 tablet (100 mg total) by mouth daily   Patient not taking: Reported on 9/29/2023   tadalafil (CIALIS) 20 MG tablet   No No   Sig: TAKE ONE TABLET BY MOUTH EVERY DAY AS NEEDED IN THE MORNING - NO MORE THAN 1 TABLET PER 24 HOURS      Facility-Administered Medications: None       Past Medical History:   Diagnosis Date    Diabetes mellitus (HCC)     ED (erectile dysfunction)     GERD (gastroesophageal reflux disease)     Hypertension        Past Surgical History:   Procedure Laterality Date    COLONOSCOPY  08/01/2016    NO PAST SURGERIES         Family History   Problem Relation Age of Onset    Asthma Father      I have reviewed and agree with the history as documented.    E-Cigarette/Vaping    E-Cigarette Use Never User      E-Cigarette/Vaping Substances    Nicotine No     THC No     CBD No     Flavoring No     Other No     Unknown No      Social History     Tobacco Use    Smoking status: Never    Smokeless tobacco: Never   Vaping Use    Vaping status: Never Used   Substance Use Topics    Alcohol use: Never    Drug use: Never       Review of Systems   Unable to perform ROS: Other   Patient is postictal unresponsive.    Physical Exam  Physical Exam  Vitals and nursing note reviewed.   Constitutional:       General: He is not in acute distress.     Appearance: He is well-developed. He is obese.   HENT:      Head: Normocephalic and atraumatic.      Right Ear: Tympanic membrane and ear canal normal.      Left Ear: Tympanic membrane and ear canal normal.      Nose: Nose normal. No congestion or rhinorrhea.      Mouth/Throat:      Mouth: Mucous membranes are moist.      Pharynx: No posterior oropharyngeal  erythema.   Eyes:      Extraocular Movements: Extraocular movements intact.      Conjunctiva/sclera: Conjunctivae normal.      Pupils: Pupils are equal, round, and reactive to light.   Neck:      Vascular: No carotid bruit.   Cardiovascular:      Rate and Rhythm: Normal rate and regular rhythm.      Heart sounds: No murmur heard.     No friction rub. No gallop.   Pulmonary:      Effort: Pulmonary effort is normal. No respiratory distress.      Breath sounds: Normal breath sounds. No stridor. No wheezing, rhonchi or rales.   Chest:      Chest wall: No tenderness.   Abdominal:      General: There is no distension.      Palpations: Abdomen is soft. There is no mass.      Tenderness: There is no abdominal tenderness. There is no right CVA tenderness, left CVA tenderness, guarding or rebound.      Hernia: No hernia is present.   Musculoskeletal:         General: No swelling or tenderness.      Cervical back: Normal range of motion and neck supple. No rigidity or tenderness.   Lymphadenopathy:      Cervical: No cervical adenopathy.   Skin:     General: Skin is warm and dry.      Capillary Refill: Capillary refill takes less than 2 seconds.      Coloration: Skin is not jaundiced or pale.      Findings: No bruising, erythema, lesion or rash.   Neurological:      Mental Status: He is alert.      Comments: Patient is postictal and unresponsive.   Psychiatric:         Mood and Affect: Mood normal.         Vital Signs  ED Triage Vitals   Temperature Pulse Respirations Blood Pressure SpO2   02/08/24 0838 02/08/24 0826 02/08/24 0826 02/08/24 0826 02/08/24 0826   97.7 °F (36.5 °C) 101 (!) 24 134/65 95 %      Temp Source Heart Rate Source Patient Position - Orthostatic VS BP Location FiO2 (%)   02/08/24 0838 02/08/24 0826 02/08/24 0826 02/08/24 0826 --   Oral Monitor Lying Left arm       Pain Score       02/08/24 1845       No Pain           Vitals:    02/08/24 1345 02/08/24 1845 02/08/24 2206 02/09/24 0801   BP: 104/57 (!) 174/57  162/97 157/82   Pulse: 87 89 87 78   Patient Position - Orthostatic VS:  Lying Sitting Lying         Visual Acuity  Visual Acuity      Flowsheet Row Most Recent Value   L Pupil Size (mm) 3   R Pupil Size (mm) 3            ED Medications  Medications   sodium chloride 0.9 % infusion (125 mL/hr Intravenous New Bag 2/8/24 0837)   amLODIPine (NORVASC) tablet 5 mg (has no administration in time range)   atorvastatin (LIPITOR) tablet 40 mg (has no administration in time range)   pantoprazole (PROTONIX) EC tablet 40 mg (40 mg Oral Given 2/9/24 0557)   insulin lispro (HumaLOG) 100 units/mL subcutaneous injection 1-5 Units (has no administration in time range)   levETIRAcetam (KEPPRA) injection 1,000 mg (1,000 mg Intravenous Given 2/8/24 2120)   ondansetron (ZOFRAN) injection 4 mg (has no administration in time range)   acetaminophen (TYLENOL) tablet 650 mg (650 mg Oral Given 2/9/24 0000)   lisinopril (ZESTRIL) tablet 20 mg (has no administration in time range)   midazolam (FOR EMS ONLY) (VERSED) 2 mg/2 mL injection 4 mg (0 mg Does not apply Given to EMS 2/8/24 0833)   levETIRAcetam (KEPPRA) injection 1,000 mg (1,000 mg Intravenous Given 2/8/24 0944)   Gadobutrol injection (SINGLE-DOSE) SOLN 9 mL (9 mL Intravenous Given 2/8/24 1258)       Diagnostic Studies  Results Reviewed       Procedure Component Value Units Date/Time    Comprehensive metabolic panel [[id number]]  (Abnormal) Collected: 02/09/24 0549    Lab Status: Final result Specimen: Blood from Hand, Left Updated: 02/09/24 0750     Sodium 139 mmol/L      Potassium 3.5 mmol/L      Chloride 103 mmol/L      CO2 28 mmol/L      ANION GAP 8 mmol/L      BUN 11 mg/dL      Creatinine 1.01 mg/dL      Glucose 132 mg/dL      Calcium 8.8 mg/dL      AST 21 U/L      ALT 24 U/L      Alkaline Phosphatase 53 U/L      Total Protein 6.1 g/dL      Albumin 3.8 g/dL      Total Bilirubin 0.66 mg/dL      eGFR 74 ml/min/1.73sq m     Narrative:      National Kidney Disease Foundation guidelines  for Chronic Kidney Disease (CKD):     Stage 1 with normal or high GFR (GFR > 90 mL/min/1.73 square meters)    Stage 2 Mild CKD (GFR = 60-89 mL/min/1.73 square meters)    Stage 3A Moderate CKD (GFR = 45-59 mL/min/1.73 square meters)    Stage 3B Moderate CKD (GFR = 30-44 mL/min/1.73 square meters)    Stage 4 Severe CKD (GFR = 15-29 mL/min/1.73 square meters)    Stage 5 End Stage CKD (GFR <15 mL/min/1.73 square meters)  Note: GFR calculation is accurate only with a steady state creatinine    CBC and differential [[id number]] Collected: 02/09/24 0549    Lab Status: Final result Specimen: Blood from Hand, Left Updated: 02/09/24 0721     WBC 8.16 Thousand/uL      RBC 5.00 Million/uL      Hemoglobin 14.3 g/dL      Hematocrit 43.4 %      MCV 87 fL      MCH 28.6 pg      MCHC 32.9 g/dL      RDW 13.4 %      MPV 9.9 fL      Platelets 328 Thousands/uL      nRBC 0 /100 WBCs      Neutrophils Relative 66 %      Immat GRANS % 1 %      Lymphocytes Relative 19 %      Monocytes Relative 9 %      Eosinophils Relative 4 %      Basophils Relative 1 %      Neutrophils Absolute 5.50 Thousands/µL      Immature Grans Absolute 0.04 Thousand/uL      Lymphocytes Absolute 1.54 Thousands/µL      Monocytes Absolute 0.69 Thousand/µL      Eosinophils Absolute 0.31 Thousand/µL      Basophils Absolute 0.08 Thousands/µL     TSH, 3rd generation [[id number]]  (Normal) Collected: 02/08/24 0836    Lab Status: Final result Specimen: Blood from Arm, Right Updated: 02/08/24 2315     TSH 3RD GENERATON 4.187 uIU/mL     Rapid drug screen, urine [[id number]]  (Abnormal) Collected: 02/08/24 1138    Lab Status: Final result Specimen: Urine, Clean Catch Updated: 02/08/24 1350     Amph/Meth UR Negative     Barbiturate Ur Negative     Benzodiazepine Urine Positive     Cocaine Urine Negative     Methadone Urine Negative     Opiate Urine Negative     PCP Ur Negative     THC Urine Negative     Oxycodone Urine Negative    Narrative:      Presumptive report. If requested,  specimen will be sent to reference lab for confirmation.  FOR MEDICAL PURPOSES ONLY.   IF CONFIRMATION NEEDED PLEASE CONTACT THE LAB WITHIN 5 DAYS.    Drug Screen Cutoff Levels:  AMPHETAMINE/METHAMPHETAMINES  1000 ng/mL  BARBITURATES     200 ng/mL  BENZODIAZEPINES     200 ng/mL  COCAINE      300 ng/mL  METHADONE      300 ng/mL  OPIATES      300 ng/mL  PHENCYCLIDINE     25 ng/mL  THC       50 ng/mL  OXYCODONE      100 ng/mL    Urine Microscopic [[id number]]  (Abnormal) Collected: 02/08/24 1138    Lab Status: Final result Specimen: Urine, Clean Catch Updated: 02/08/24 1222     RBC, UA 0-1 /hpf      WBC, UA 0-5 /hpf      Epithelial Cells Occasional /hpf      Bacteria, UA Occasional /hpf      Hyaline Casts, UA 1-2 /lpf     UA w Reflex to Microscopic w Reflex to Culture [[id number]]  (Abnormal) Collected: 02/08/24 1138    Lab Status: Final result Specimen: Urine, Clean Catch Updated: 02/08/24 1206     Color, UA Yellow     Clarity, UA Clear     Specific Gravity, UA >=1.030     pH, UA 6.0     Leukocytes, UA Negative     Nitrite, UA Negative     Protein, UA 2+ mg/dl      Glucose, UA 2+ mg/dl      Ketones, UA Trace mg/dl      Urobilinogen, UA 0.2 E.U./dl      Bilirubin, UA Negative     Occult Blood, UA Trace-Intact    Magnesium [[id number]]  (Normal) Collected: 02/08/24 0836    Lab Status: Final result Specimen: Blood from Arm, Right Updated: 02/08/24 0904     Magnesium 2.1 mg/dL     Comprehensive metabolic panel [[id number]]  (Abnormal) Collected: 02/08/24 0836    Lab Status: Final result Specimen: Blood from Arm, Right Updated: 02/08/24 0904     Sodium 137 mmol/L      Potassium 3.7 mmol/L      Chloride 98 mmol/L      CO2 20 mmol/L      ANION GAP 19 mmol/L      BUN 14 mg/dL      Creatinine 1.27 mg/dL      Glucose 270 mg/dL      Calcium 9.2 mg/dL      AST 24 U/L      ALT 28 U/L      Alkaline Phosphatase 59 U/L      Total Protein 7.0 g/dL      Albumin 4.2 g/dL      Total Bilirubin 0.48 mg/dL      eGFR 56 ml/min/1.73sq m      Narrative:      National Kidney Disease Foundation guidelines for Chronic Kidney Disease (CKD):     Stage 1 with normal or high GFR (GFR > 90 mL/min/1.73 square meters)    Stage 2 Mild CKD (GFR = 60-89 mL/min/1.73 square meters)    Stage 3A Moderate CKD (GFR = 45-59 mL/min/1.73 square meters)    Stage 3B Moderate CKD (GFR = 30-44 mL/min/1.73 square meters)    Stage 4 Severe CKD (GFR = 15-29 mL/min/1.73 square meters)    Stage 5 End Stage CKD (GFR <15 mL/min/1.73 square meters)  Note: GFR calculation is accurate only with a steady state creatinine    CBC and differential [[id number]]  (Abnormal) Collected: 02/08/24 0836    Lab Status: Final result Specimen: Blood from Arm, Right Updated: 02/08/24 0846     WBC 9.62 Thousand/uL      RBC 5.23 Million/uL      Hemoglobin 14.7 g/dL      Hematocrit 46.9 %      MCV 90 fL      MCH 28.1 pg      MCHC 31.3 g/dL      RDW 13.4 %      MPV 9.5 fL      Platelets 362 Thousands/uL      nRBC 0 /100 WBCs      Neutrophils Relative 44 %      Immat GRANS % 1 %      Lymphocytes Relative 40 %      Monocytes Relative 8 %      Eosinophils Relative 6 %      Basophils Relative 1 %      Neutrophils Absolute 4.16 Thousands/µL      Immature Grans Absolute 0.10 Thousand/uL      Lymphocytes Absolute 3.82 Thousands/µL      Monocytes Absolute 0.80 Thousand/µL      Eosinophils Absolute 0.61 Thousand/µL      Basophils Absolute 0.13 Thousands/µL     Fingerstick Glucose (POCT) [[id number]]  (Abnormal) Collected: 02/08/24 0832    Lab Status: Final result Updated: 02/08/24 0838     POC Glucose 249 mg/dl                    MRI brain w wo contrast   Final Result by Richardson Mccray MD (02/08 1342)      1.  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 Recommend 6-8-week follow-up MRI after treatment to assess interval change.   2.  Mild chronic microangiopathy.                  The study was marked in EPIC for immediate notification.       Workstation performed: [id number]         CT head wo contrast   Final Result by Macario Lomeli MD (02/08 0921)      Hypodensity within the left frontal and right parietal occipital lobes, contrast-enhanced MRI of the brain recommended for further characterization to exclude underlying mass lesions with associated vasogenic edema.                  Workstation performed: [id number]         XR chest 1 view portable   Final Result by Sabrina Mueller MD (02/08 1020)      Low lung volumes with vascular crowding.      Mild opacity at the medial left base, likely atelectasis, but pneumonia/aspiration not excluded in the appropriate clinical setting.            Workstation performed: [id number]                    Procedures  ECG 12 Lead Documentation Only    Date/Time: 2/8/2024 8:59 AM    Performed by: Lisa Randhawa MD  Authorized by: Lisa Randhawa MD    Indications / Diagnosis:  SEIZURE  ECG reviewed by me, the ED Provider: yes    Patient location:  ED and bedside  Previous ECG:     Previous ECG:  Unavailable  Interpretation:     Interpretation: abnormal    Rate:     ECG rate:  101    ECG rate assessment: tachycardic    Rhythm:     Rhythm: sinus tachycardia    Ectopy:     Ectopy: none    QRS:     QRS axis:  Normal    QRS intervals:  Normal  Conduction:     Conduction: normal    ST segments:     ST segments:  Normal  T waves:     T waves: normal    CriticalCare Time    Date/Time: 2/9/2024 9:11 AM    Performed by: Lisa Randhawa MD  Authorized by: Lisa Randhawa MD    Critical care provider statement:     Critical care time (minutes):  60    Critical care start time:  2/8/2024 8:11 AM    Critical care end time:  2/8/2024 10:11 AM    Critical care time was exclusive of:  Separately billable procedures and treating other patients and teaching time    Critical care was necessary to treat or prevent imminent or life-threatening deterioration of the following conditions:  CNS failure or  compromise (seizure- microbleed of frontal lobe)    Critical care was time spent personally by me on the following activities:  Blood draw for specimens, obtaining history from patient or surrogate, development of treatment plan with patient or surrogate, discussions with consultants, discussions with primary provider, evaluation of patient's response to treatment, examination of patient, ordering and performing treatments and interventions, ordering and review of laboratory studies, ordering and review of radiographic studies, re-evaluation of patient's condition and review of old charts           ED Course                               SBIRT 22yo+      Flowsheet Row Most Recent Value   SHELLIE: How many times in the past year have you...    Used an illegal drug or used a prescription medication for non-medical reasons? --  [unable to assess] Filed at: 02/08/2024 0831                      Medical Decision Making  This is 73 years old was brought from [facility]. Pt has seizure, and he was convulsing. Man EMS, who gave him versed 4mg IM, and brought to er.  Patient at the ER is postictal.  Vital signs are stable.  Patient has no previous history of seizure.  His BS was 243.  Patient is unresponsive.  His pupils is 2 mm reactive to light.  Physical exam shows no pertinent positive finding except patient in postictal state.  EKG shows NSR rate 101/min no ischemic changes. CT HEAD; PARENCHYMA: Left frontal and right parieto-occipital hypodensity is identified for which contrast-enhanced MRI is recommended to exclude underlying mass lesions with associated vasogenic edema. No significant mass effect is identified. No hemorrhage is seen. Pt still unresposive and in posticatal phase.  Contacted the neurologist on-call Dr. Mark Drummond and he stated; I'd say transfer so he can have access to heme/onc and NSGx.   No steroids, but start him on keppra 1g bid.   Contacted the radiologist on-call Dr.Israel Addison; and he  approved to do MRI of the brain with and without contrast.  Contacted transfer center and to talk to the hospitalist Brad Hernandez and pt accepted at [facility] .  Called pt wife Omar Kearns [phone] no response and left message on her cell phone.    MRI brain w & wo contrast;  There are peripherally distributed foci of old microhemorrhage primarily involving supratentorial brain most pronounced in the left frontal and right temporo-occipital lobes. There is a tiny subacute (T1 hypointense) cortical microbleed in the left   frontal lobe seen on series 8 image 18. There is vasogenic edema associated with these foci most pronounced in the left frontal and right temporo-occipital lobes lobes. Lesser degree of vasogenic edema is seen in the posterior left temporal lobe and left   parietal lobe. There is associated mild leptomeningeal enhancement in the left frontal lobe. There is tiny focus of restricted diffusion in the left frontal cortex most likely related to described subacute microbleed (series 4 image 23).  Case discussed with his family about condition and the transfer to [facility] .    Amount and/or Complexity of Data Reviewed  Labs: ordered.     Details: CBC WNL, CMP significant for Glucose 270.   Radiology: ordered.     Details: Ct head; RENCHYMA: Left frontal and right parieto-occipital hypodensity is identified for which contrast-enhanced MRI is recommended to exclude underlying mass lesions with associated vasogenic edema. No significant mass effect is identified. No hemorrhage is seen.    CXR; Low lung volumes with vascular crowding.   Mild opacity at the medial left base, likely atelectasis, but pneumonia/aspiration not excluded in the appropriate clinical setting.   MRI Brain w & wo contrast; There are peripherally distributed foci of old microhemorrhage primarily involving supratentorial brain most pronounced in the left frontal and right temporo-occipital lobes. There is a tiny subacute (T1  hypointense) cortical microbleed in the left   frontal lobe seen on series 8 image 18. There is vasogenic edema associated with these foci most pronounced in the left frontal and right temporo-occipital lobes lobes. Lesser degree of vasogenic edema is seen in the posterior left temporal lobe and left   parietal lobe. There is associated mild leptomeningeal enhancement in the left frontal lobe. There is tiny focus of restricted diffusion in the left frontal cortex most likely related to described subacute microbleed (series 4 image 23).                 ECG/medicine tests: ordered.     Details: EKG NSR rate 101/min no ischemic changes.;  Discussion of management or test interpretation with external provider(s): Contacted the neurologist on-call Dr. Mark Drummond and he stated; I'd say transfer so he can have access to heme/onc and NSGx.   No steroids, but start him on keppra 1g bid.   Contacted the cardiologist on-call Dr.Israel Addison; and he approved to do MRI of the brain with and without contrast.  Contacted transfer center and to talk to the hospitalist Brad Hernandez and pt accepted at [facility] .      Risk  Prescription drug management.  Decision regarding hospitalization.             Disposition  Final diagnoses:   New onset seizure (HCC)   Silent micro-hemorrhage of brain (HCC)     Time reflects when diagnosis was documented in both MDM as applicable and the Disposition within this note       Time User Action Codes Description Comment    2/8/2024  1:21 PM Lisa Randhawa Add [R56.9] New onset seizure (HCC)     2/8/2024  2:20 PM Lisa Randhawa Add [I61.8] Silent micro-hemorrhage of brain (HCC)           ED Disposition       ED Disposition   Admit    Condition   Stable    Date/Time   Thu Feb 8, 2024 2034    Comment   Delphine Nelson should be transferred out to [facility] Documentation      Flowsheet Row Most Recent Value   Patient Condition The patient has been stabilized such that within  reasonable medical probability, no material deterioration of the patient condition or the condition of the unborn child(reggie) is likely to result from the transfer   Reason for Transfer Level of Care needed not available at this facility   Benefits of Transfer Specialized equipment and/or services available at the receiving facility (Include comment)________________________   Risks of Transfer Potential for delay in receiving treatment, Potential deterioration of medical condition, Loss of IV, Increased discomfort during transfer, Possible worsening of condition or death during transfer   Accepting Physician Nicholas Salinas   Accepting Facility Name, [facility]   Sending MD Randhawa   Provider Certification General risk, such as traffic hazards, adverse weather conditions, rough terrain or turbulence, possible failure of equipment (including vehicle or aircraft), or consequences of actions of persons outside the control of the transport personnel, Unanticipated needs of medical equipment and personnel during transport, Risk of worsening condition, The possibility of a transport vehicle being unavailable          RN Documentation      Flowsheet Row Most Recent Value   Accepting Facility Name, [facility]          Follow-up Information    None         Current Discharge Medication List        CONTINUE these medications which have NOT CHANGED    Details   amLODIPine (NORVASC) 5 mg tablet Take 1 tablet (5 mg total) by mouth daily  Qty: 90 tablet, Refills: 3    Comments: Requesting 1 year supply  Associated Diagnoses: Essential hypertension      Ascorbic Acid (VITAMIN C PO) Take by mouth      aspirin 81 mg chewable tablet Chew 81 mg daily      atorvastatin (LIPITOR) 40 mg tablet Take 1 tablet (40 mg total) by mouth daily  Qty: 90 tablet, Refills: 1    Comments: Requesting 1 year supply  Associated Diagnoses: Pure hypercholesterolemia      Choline Fenofibrate (Fenofibric Acid) 135 MG CPDR Take 1 capsule  (135 mg total) by mouth daily  Qty: 90 capsule, Refills: 3    Comments: Requesting 1 year supply  Associated Diagnoses: Pure hypercholesterolemia      clotrimazole-betamethasone (LOTRISONE) 1-0.05 % cream Apply topically 2 (two) times a day  Qty: 45 g, Refills: 5    Associated Diagnoses: Tinea cruris      Cyanocobalamin (VITAMIN B12 PO) Take by mouth      glipiZIDE (GLUCOTROL XL) 5 mg 24 hr tablet Take 1 tablet (5 mg total) by mouth daily  Qty: 90 tablet, Refills: 3    Comments: Requesting 1 year supply  Associated Diagnoses: Diabetes mellitus without complication (HCC)      glucose blood (OneTouch Ultra) test strip Use 1 each 3 (three) times a day  Qty: 100 strip, Refills: 3    Associated Diagnoses: Diabetes mellitus without complication (HCC)      lisinopril (ZESTRIL) 10 mg tablet Take 1 tablet (10 mg total) by mouth daily  Qty: 90 tablet, Refills: 3    Comments: Requesting 1 year supply  Associated Diagnoses: Hypertension, unspecified type      metFORMIN (GLUCOPHAGE) 500 mg tablet Take 2 tablets (1,000 mg total) by mouth 2 (two) times a day with meals  Qty: 360 tablet, Refills: 3    Associated Diagnoses: Diabetes mellitus without complication (HCC)      omeprazole (PriLOSEC) 40 MG capsule Take 1 capsule (40 mg total) by mouth daily before breakfast  Qty: 90 capsule, Refills: 3    Associated Diagnoses: Gastroesophageal reflux disease      OneTouch Delica Lancets 33G MISC by Does not apply route 3 (three) times a week onetouch delica lancets 33G test 3 times per week  Qty: 100 each, Refills: 3    Associated Diagnoses: Diabetes mellitus without complication (HCC)      sitaGLIPtin (JANUVIA) 100 mg tablet Take 1 tablet (100 mg total) by mouth daily  Qty: 90 tablet, Refills: 3    Associated Diagnoses: Type 1 diabetes mellitus without complication (HCC)      tadalafil (CIALIS) 20 MG tablet TAKE ONE TABLET BY MOUTH EVERY DAY AS NEEDED IN THE MORNING - NO MORE THAN 1 TABLET PER 24 HOURS  Qty: 30 tablet, Refills: 1     Associated Diagnoses: Encounter for medication refill             No discharge procedures on file.    PDMP Review       None            ED Provider  Electronically Signed by             Lisa Randhawa MD  02/09/24 4933

## 2024-02-08 NOTE — EMTALA/ACUTE CARE TRANSFER
[facility] EMERGENCY DEPARTMENT  [address] [place] [id number]  Dept: [phone]      EMTALA TRANSFER CONSENT    NAME Delphine FINE 1950                              MRN [id number]    I have been informed of my rights regarding examination, treatment, and transfer   by Dr. Lisa Randhawa MD    Benefits: Specialized equipment and/or services available at the receiving facility (Include comment)________________________    Risks: Potential for delay in receiving treatment, Potential deterioration of medical condition, Loss of IV, Increased discomfort during transfer, Possible worsening of condition or death during transfer      Transfer Request   I acknowledge that my medical condition has been evaluated and explained to me by the emergency department physician or other qualified medical person and/or my attending physician who has recommended and offered to me further medical examination and treatment. I understand the Hospital's obligation with respect to the treatment and stabilization of my emergency medical condition. I nevertheless request to be transferred. I release the Hospital, the doctor, and any other persons caring for me from all responsibility or liability for any injury or ill effects that may result from my transfer and agree to accept all responsibility for the consequences of my choice to transfer, rather than receive stabilizing treatment at the Hospital. I understand that because the transfer is my request, my insurance may not provide reimbursement for the services.  The Hospital will assist and direct me and my family in how to make arrangements for transfer, but the hospital is not liable for any fees charged by the transport service.  In spite of this understanding, I refuse to consent to further medical examination and treatment which has been offered to me, and request transfer to Accepting Facility Name, City &  State : [place]. I authorize the performance of emergency medical procedures and treatments upon me in both transit and upon arrival at the receiving facility.  Additionally, I authorize the release of any and all medical records to the receiving facility and request they be transported with me, if possible.    I authorize the performance of emergency medical procedures and treatments upon me in both transit and upon arrival at the receiving facility.  Additionally, I authorize the release of any and all medical records to the receiving facility and request they be transported with me, if possible.  I understand that the safest mode of transportation during a medical emergency is an ambulance and that the Hospital advocates the use of this mode of transport. Risks of traveling to the receiving facility by car, including absence of medical control, life sustaining equipment, such as oxygen, and medical personnel has been explained to me and I fully understand them.    (ANGEL CORRECT BOX BELOW)  [  ]  I consent to the stated transfer and to be transported by ambulance/helicopter.  [  ]  I consent to the stated transfer, but refuse transportation by ambulance and accept full responsibility for my transportation by car.  I understand the risks of non-ambulance transfers and I exonerate the Hospital and its staff from any deterioration in my condition that results from this refusal.    X___________________________________________    DATE  24  TIME________  Signature of patient or legally responsible individual signing on patient behalf           RELATIONSHIP TO PATIENT_________________________          Provider Certification    NAME Delphine Nelson                                        [facility] 1950                              N [id number]    A medical screening exam was performed on the above named patient.  Based on the examination:    Condition Necessitating Transfer The encounter diagnosis was New onset  seizure (HCC).    Patient Condition: The patient has been stabilized such that within reasonable medical probability, no material deterioration of the patient condition or the condition of the unborn child(reggie) is likely to result from the transfer    Reason for Transfer: Level of Care needed not available at this facility    Transfer Requirements: [facility]   Space available and qualified personnel available for treatment as acknowledged by    [facility] to accept transfer and to provide appropriate medical treatment as acknowledged by       Nicholas Salinas  Appropriate medical records of the examination and treatment of the patient are provided at the time of transfer   STAFF INITIAL WHEN COMPLETED _______  Transfer will be performed by qualified personnel from    and appropriate transfer equipment as required, including the use of necessary and appropriate life support measures.    Provider Certification: I have examined the patient and explained the following risks and benefits of being transferred/refusing transfer to the patient/family:  General risk, such as traffic hazards, adverse weather conditions, rough terrain or turbulence, possible failure of equipment (including vehicle or aircraft), or consequences of actions of persons outside the control of the transport personnel, Unanticipated needs of medical equipment and personnel during transport, Risk of worsening condition, The possibility of a transport vehicle being unavailable      Based on these reasonable risks and benefits to the patient and/or the unborn child(reggie), and based upon the information available at the time of the patient’s examination, I certify that the medical benefits reasonably to be expected from the provision of appropriate medical treatments at another medical facility outweigh the increasing risks, if any, to the individual’s medical condition, and in the case of labor to the unborn child, from effecting the  transfer.    X____________________________________________ DATE 02/08/24        TIME_______      ORIGINAL - SEND TO MEDICAL RECORDS   COPY - SEND WITH PATIENT DURING TRANSFER

## 2024-02-08 NOTE — ED NOTES
Explained to family and patient that transfer may not occur today due to high census at [facility].  State understanding.  Pt switched to inpatient bed at this time.  Provider made aware of need for SLIM consult for medication orders     Ondina Alexis RN  02/08/24 2220

## 2024-02-08 NOTE — ED NOTES
Call placed to patients wife by provider at number in chart.  No answer     Ondina Alexis, RN  02/08/24 7575

## 2024-02-09 ENCOUNTER — APPOINTMENT (INPATIENT)
Dept: CT IMAGING | Facility: HOSPITAL | Age: 74
DRG: 070 | End: 2024-02-09
Payer: COMMERCIAL

## 2024-02-09 VITALS
WEIGHT: 193.38 LBS | DIASTOLIC BLOOD PRESSURE: 85 MMHG | RESPIRATION RATE: 16 BRPM | TEMPERATURE: 98.4 F | SYSTOLIC BLOOD PRESSURE: 163 MMHG | OXYGEN SATURATION: 98 % | HEART RATE: 81 BPM | HEIGHT: 68 IN | BODY MASS INDEX: 29.31 KG/M2

## 2024-02-09 PROBLEM — E85.4 CEREBRAL AMYLOID ANGIOPATHY: Status: ACTIVE | Noted: 2024-02-09

## 2024-02-09 PROBLEM — I68.0 CEREBRAL AMYLOID ANGIOPATHY: Status: ACTIVE | Noted: 2024-02-09

## 2024-02-09 LAB
ALBUMIN SERPL BCP-MCNC: 3.8 G/DL (ref 3.5–5)
ALP SERPL-CCNC: 53 U/L (ref 34–104)
ALT SERPL W P-5'-P-CCNC: 24 U/L (ref 7–52)
ANION GAP SERPL CALCULATED.3IONS-SCNC: 8 MMOL/L
AST SERPL W P-5'-P-CCNC: 21 U/L (ref 13–39)
BASOPHILS # BLD AUTO: 0.08 THOUSANDS/ÂΜL (ref 0–0.1)
BASOPHILS NFR BLD AUTO: 1 % (ref 0–1)
BILIRUB SERPL-MCNC: 0.66 MG/DL (ref 0.2–1)
BUN SERPL-MCNC: 11 MG/DL (ref 5–25)
CALCIUM SERPL-MCNC: 8.8 MG/DL (ref 8.4–10.2)
CHLORIDE SERPL-SCNC: 103 MMOL/L (ref 96–108)
CO2 SERPL-SCNC: 28 MMOL/L (ref 21–32)
CREAT SERPL-MCNC: 1.01 MG/DL (ref 0.6–1.3)
EOSINOPHIL # BLD AUTO: 0.31 THOUSAND/ÂΜL (ref 0–0.61)
EOSINOPHIL NFR BLD AUTO: 4 % (ref 0–6)
ERYTHROCYTE [DISTWIDTH] IN BLOOD BY AUTOMATED COUNT: 13.4 % (ref 11.6–15.1)
GFR SERPL CREATININE-BSD FRML MDRD: 74 ML/MIN/1.73SQ M
GLUCOSE SERPL-MCNC: 132 MG/DL (ref 65–140)
GLUCOSE SERPL-MCNC: 155 MG/DL (ref 65–140)
GLUCOSE SERPL-MCNC: 159 MG/DL (ref 65–140)
GLUCOSE SERPL-MCNC: 165 MG/DL (ref 65–140)
GLUCOSE SERPL-MCNC: 196 MG/DL (ref 65–140)
HCT VFR BLD AUTO: 43.4 % (ref 36.5–49.3)
HGB BLD-MCNC: 14.3 G/DL (ref 12–17)
IMM GRANULOCYTES # BLD AUTO: 0.04 THOUSAND/UL (ref 0–0.2)
IMM GRANULOCYTES NFR BLD AUTO: 1 % (ref 0–2)
LYMPHOCYTES # BLD AUTO: 1.54 THOUSANDS/ÂΜL (ref 0.6–4.47)
LYMPHOCYTES NFR BLD AUTO: 19 % (ref 14–44)
MAGNESIUM SERPL-MCNC: 1.9 MG/DL (ref 1.9–2.7)
MCH RBC QN AUTO: 28.6 PG (ref 26.8–34.3)
MCHC RBC AUTO-ENTMCNC: 32.9 G/DL (ref 31.4–37.4)
MCV RBC AUTO: 87 FL (ref 82–98)
MONOCYTES # BLD AUTO: 0.69 THOUSAND/ÂΜL (ref 0.17–1.22)
MONOCYTES NFR BLD AUTO: 9 % (ref 4–12)
NEUTROPHILS # BLD AUTO: 5.5 THOUSANDS/ÂΜL (ref 1.85–7.62)
NEUTS SEG NFR BLD AUTO: 66 % (ref 43–75)
NRBC BLD AUTO-RTO: 0 /100 WBCS
PLATELET # BLD AUTO: 328 THOUSANDS/UL (ref 149–390)
PMV BLD AUTO: 9.9 FL (ref 8.9–12.7)
POTASSIUM SERPL-SCNC: 3.5 MMOL/L (ref 3.5–5.3)
PROT SERPL-MCNC: 6.1 G/DL (ref 6.4–8.4)
RBC # BLD AUTO: 5 MILLION/UL (ref 3.88–5.62)
SODIUM SERPL-SCNC: 139 MMOL/L (ref 135–147)
WBC # BLD AUTO: 8.16 THOUSAND/UL (ref 4.31–10.16)

## 2024-02-09 PROCEDURE — G0427 INPT/ED TELECONSULT70: HCPCS | Performed by: PSYCHIATRY & NEUROLOGY

## 2024-02-09 PROCEDURE — 82948 REAGENT STRIP/BLOOD GLUCOSE: CPT

## 2024-02-09 PROCEDURE — 97167 OT EVAL HIGH COMPLEX 60 MIN: CPT

## 2024-02-09 PROCEDURE — G1004 CDSM NDSC: HCPCS

## 2024-02-09 PROCEDURE — 70496 CT ANGIOGRAPHY HEAD: CPT

## 2024-02-09 PROCEDURE — 80053 COMPREHEN METABOLIC PANEL: CPT | Performed by: INTERNAL MEDICINE

## 2024-02-09 PROCEDURE — 85025 COMPLETE CBC W/AUTO DIFF WBC: CPT | Performed by: INTERNAL MEDICINE

## 2024-02-09 PROCEDURE — 99239 HOSP IP/OBS DSCHRG MGMT >30: CPT | Performed by: INTERNAL MEDICINE

## 2024-02-09 PROCEDURE — 97530 THERAPEUTIC ACTIVITIES: CPT

## 2024-02-09 PROCEDURE — 83735 ASSAY OF MAGNESIUM: CPT | Performed by: INTERNAL MEDICINE

## 2024-02-09 PROCEDURE — 70498 CT ANGIOGRAPHY NECK: CPT

## 2024-02-09 RX ORDER — LEVETIRACETAM 500 MG/5ML
1000 INJECTION, SOLUTION, CONCENTRATE INTRAVENOUS EVERY 12 HOURS SCHEDULED
Status: CANCELLED | OUTPATIENT
Start: 2024-02-10

## 2024-02-09 RX ORDER — LISINOPRIL 20 MG/1
20 TABLET ORAL DAILY
Status: DISCONTINUED | OUTPATIENT
Start: 2024-02-09 | End: 2024-02-10 | Stop reason: HOSPADM

## 2024-02-09 RX ORDER — ACETAMINOPHEN 325 MG/1
975 TABLET ORAL EVERY 6 HOURS PRN
Status: DISCONTINUED | OUTPATIENT
Start: 2024-02-09 | End: 2024-02-10 | Stop reason: HOSPADM

## 2024-02-09 RX ORDER — DIPHENHYDRAMINE HCL 25 MG
25 TABLET ORAL EVERY 6 HOURS PRN
Status: DISCONTINUED | OUTPATIENT
Start: 2024-02-09 | End: 2024-02-10 | Stop reason: HOSPADM

## 2024-02-09 RX ORDER — ATORVASTATIN CALCIUM 40 MG/1
40 TABLET, FILM COATED ORAL
Status: CANCELLED | OUTPATIENT
Start: 2024-02-10

## 2024-02-09 RX ORDER — AMLODIPINE BESYLATE 5 MG/1
5 TABLET ORAL DAILY
Status: CANCELLED | OUTPATIENT
Start: 2024-02-10

## 2024-02-09 RX ORDER — ACETAMINOPHEN 325 MG/1
975 TABLET ORAL EVERY 6 HOURS PRN
Status: CANCELLED | OUTPATIENT
Start: 2024-02-09

## 2024-02-09 RX ORDER — PANTOPRAZOLE SODIUM 40 MG/1
40 TABLET, DELAYED RELEASE ORAL
Status: CANCELLED | OUTPATIENT
Start: 2024-02-10

## 2024-02-09 RX ORDER — LISINOPRIL 20 MG/1
20 TABLET ORAL DAILY
Status: CANCELLED | OUTPATIENT
Start: 2024-02-10

## 2024-02-09 RX ORDER — INSULIN LISPRO 100 [IU]/ML
1-5 INJECTION, SOLUTION INTRAVENOUS; SUBCUTANEOUS
Status: CANCELLED | OUTPATIENT
Start: 2024-02-10

## 2024-02-09 RX ORDER — SODIUM CHLORIDE 9 MG/ML
125 INJECTION, SOLUTION INTRAVENOUS CONTINUOUS
Status: CANCELLED | OUTPATIENT
Start: 2024-02-09

## 2024-02-09 RX ORDER — DIPHENHYDRAMINE HCL 25 MG
25 TABLET ORAL EVERY 6 HOURS PRN
Status: CANCELLED | OUTPATIENT
Start: 2024-02-09

## 2024-02-09 RX ORDER — ONDANSETRON 2 MG/ML
4 INJECTION INTRAMUSCULAR; INTRAVENOUS EVERY 6 HOURS PRN
Status: CANCELLED | OUTPATIENT
Start: 2024-02-09

## 2024-02-09 RX ADMIN — INSULIN LISPRO 1 UNITS: 100 INJECTION, SOLUTION INTRAVENOUS; SUBCUTANEOUS at 16:49

## 2024-02-09 RX ADMIN — LEVETIRACETAM 1000 MG: 100 INJECTION, SOLUTION INTRAVENOUS at 09:51

## 2024-02-09 RX ADMIN — PANTOPRAZOLE SODIUM 40 MG: 40 TABLET, DELAYED RELEASE ORAL at 05:57

## 2024-02-09 RX ADMIN — LISINOPRIL 20 MG: 20 TABLET ORAL at 09:39

## 2024-02-09 RX ADMIN — ACETAMINOPHEN 650 MG: 325 TABLET, FILM COATED ORAL at 00:00

## 2024-02-09 RX ADMIN — LEVETIRACETAM 1000 MG: 100 INJECTION, SOLUTION INTRAVENOUS at 22:04

## 2024-02-09 RX ADMIN — IOHEXOL 60 ML: 350 INJECTION, SOLUTION INTRAVENOUS at 12:10

## 2024-02-09 RX ADMIN — ACETAMINOPHEN 650 MG: 325 TABLET, FILM COATED ORAL at 12:19

## 2024-02-09 RX ADMIN — AMLODIPINE BESYLATE 5 MG: 5 TABLET ORAL at 09:39

## 2024-02-09 RX ADMIN — SODIUM CHLORIDE 125 ML/HR: 0.9 INJECTION, SOLUTION INTRAVENOUS at 23:24

## 2024-02-09 RX ADMIN — INSULIN LISPRO 1 UNITS: 100 INJECTION, SOLUTION INTRAVENOUS; SUBCUTANEOUS at 11:40

## 2024-02-09 RX ADMIN — INSULIN LISPRO 1 UNITS: 100 INJECTION, SOLUTION INTRAVENOUS; SUBCUTANEOUS at 09:40

## 2024-02-09 RX ADMIN — SODIUM CHLORIDE 125 ML/HR: 0.9 INJECTION, SOLUTION INTRAVENOUS at 15:07

## 2024-02-09 RX ADMIN — ATORVASTATIN CALCIUM 40 MG: 40 TABLET, FILM COATED ORAL at 16:48

## 2024-02-09 NOTE — DISCHARGE SUMMARY
[facility]  Discharge- Delphine Nelson 1950, 73 y.o. male MRN: [id number]  Unit/Bed#: [facility] Encounter: [id number]  Primary Care Provider: Tristin Ling MD   Date and time admitted to hospital: 2/8/2024  8:22 AM    Cerebral amyloid angiopathy   Assessment & Plan  See plan for new onset seizure    Type 2 diabetes mellitus with stage 3a chronic kidney disease, without long-term current use of insulin (HCC)  Assessment & Plan  Lab Results   Component Value Date    HGBA1C 7.7 (H) 09/07/2023   Hold home oral hypoglycemics.  Continue sliding scale coverage, hypoglycemic protocol, carbohydrate controlled diet.      Stage 3a chronic kidney disease (HCC)  Assessment & Plan  Baseline around 1.4.  Creatinine within normal limits.  Continue to monitor renal function.    GERD (gastroesophageal reflux disease)  Assessment & Plan  Continue Protonix    Essential hypertension  Assessment & Plan  Continue amlodipine, lisinopril    * New onset seizure (HCC)  Assessment & Plan  Patient was at lower when he had first episode of witnessed GTCS followed by postictal state in ED.  No prior episode.  No recent changes in medication.  MRI brain ordered in ED showed findings compatible with inflammatory CAA with subacute cortical microbleed in left frontal lobe which recommend the likely etiology.  Neurology was reached out who recommended IV Keppra.  Patient is back to baseline.  Electrolytes WNL.  Denies any prior history of stroke, A-fib, MI.    Continue to hold antiplatelet and anticoagulation in setting of new CVA  Transfer to [facility] for routine EEG monitoring  Neurology consulted  IV fluid 125 cc/h  Frequent neurochecks  Will need repeat MRI brain in 6 to 8 weeks  Continue IV Keppra as per neurologist recommendation  Seizure precautions  Aspiration precautions        Medical Problems       Resolved Problems  Date Reviewed: 2/9/2024   None       Discharging Physician / Practitioner: Manuel Gomes  DO  PCP: Tristin Ling MD  Admission Date:   Admission Orders (From admission, onward)       Ordered        02/08/24 2049  INPATIENT ADMISSION  Once                          Discharge Date: 02/09/24    Consultations During Hospital Stay:  neurology    Procedures Performed:   mri    Significant Findings / Test Results:   MRI brain w wo contrast   Final Result by Richardson Mccray MD (02/08 1342)      1.  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 Recommend 6-8-week follow-up MRI after treatment to assess interval change.   2.  Mild chronic microangiopathy.                  The study was marked in EPIC for immediate notification.      Workstation performed: [id number]         CT head wo contrast   Final Result by Macario Lomeli MD (02/08 0921)      Hypodensity within the left frontal and right parietal occipital lobes, contrast-enhanced MRI of the brain recommended for further characterization to exclude underlying mass lesions with associated vasogenic edema.                  Workstation performed: [id number]         XR chest 1 view portable   Final Result by Sabrina Mueller MD (02/08 1020)      Low lung volumes with vascular crowding.      Mild opacity at the medial left base, likely atelectasis, but pneumonia/aspiration not excluded in the appropriate clinical setting.            Workstation performed: [id number]              Incidental Findings:      I reviewed the above mentioned incidental findings with the patient and/or family and they expressed understanding.    Test Results Pending at Discharge (will require follow up):        Outpatient Tests Requested:      Complications:      Reason for Admission: seizure    Hospital Course:   Delphine Nelson is a 73 y.o. male patient who originally presented to the hospital on 2/8/2024 due to witnessed seizure, found to have MRI findings concerning for cerebral amyloid  "angiopathy with notable micro hemorrhage, and associated vasogenic edema.  Patient was transferred to to [facility] for neurosurgery and neurology evaluation        Please see above list of diagnoses and related plan for additional information.     Condition at Discharge: stable    Discharge Day Visit / Exam:   Subjective: Patient denies any acute complaints during my evaluation prior to transfer  Vitals: Blood Pressure: 157/82 (02/09/24 0801)  Pulse: 78 (02/09/24 0801)  Temperature: 97.5 °F (36.4 °C) (02/09/24 0801)  Temp Source: Oral (02/09/24 0801)  Respirations: 12 (02/09/24 0801)  Height: 5' 8\" (172.7 cm) (02/08/24 2206)  Weight - Scale: 87.7 kg (193 lb 6 oz) (02/08/24 2206)  SpO2: 97 % (02/09/24 0801)  Exam:   Physical Exam  Vitals and nursing note reviewed.   Constitutional:       General: He is not in acute distress.     Appearance: He is well-developed. He is not toxic-appearing or diaphoretic.   HENT:      Head: Normocephalic and atraumatic.   Eyes:      General: No scleral icterus.     Conjunctiva/sclera: Conjunctivae normal.   Cardiovascular:      Rate and Rhythm: Normal rate and regular rhythm.      Heart sounds: No murmur heard.     No friction rub. No gallop.   Pulmonary:      Effort: Pulmonary effort is normal. No respiratory distress.      Breath sounds: Normal breath sounds. No stridor. No wheezing, rhonchi or rales.   Chest:      Chest wall: No tenderness.   Abdominal:      General: There is no distension.      Palpations: Abdomen is soft. There is no mass.      Tenderness: There is no abdominal tenderness. There is no guarding or rebound.      Hernia: No hernia is present.   Musculoskeletal:         General: No swelling or tenderness.      Cervical back: Neck supple.   Skin:     General: Skin is warm and dry.      Capillary Refill: Capillary refill takes less than 2 seconds.   Neurological:      Mental Status: He is alert and oriented to person, place, and time.   Psychiatric:         " Mood and Affect: Mood normal.          Discussion with Family: Updated  (wife) at bedside.    Discharge instructions/Information to patient and family:   See after visit summary for information provided to patient and family.      Provisions for Follow-Up Care:  See after visit summary for information related to follow-up care and any pertinent home health orders.      Mobility at time of Discharge:   Basic Mobility Inpatient Raw Score: 16  JH-HLM Goal: 5: Stand one or more mins  JH-HLM Achieved: 3: Sit at edge of bed  HLM Goal achieved. Continue to encourage appropriate mobility.     Disposition:   Other: pending course    Planned Readmission: no     Discharge Statement:  I spent  minutes discharging the patient. This time was spent on the day of discharge. I had direct contact with the patient on the day of discharge. Greater than 50% of the total time was spent examining patient, answering all patient questions, arranging and discussing plan of care with patient as well as directly providing post-discharge instructions.  Additional time then spent on discharge activities.    Discharge Medications:  See after visit summary for reconciled discharge medications provided to patient and/or family.      **Please Note: This note may have been constructed using a voice recognition system**

## 2024-02-09 NOTE — H&P
ADMISSION NOTE   [facility]       Name: Delphine Nelson   Age & Sex: 73 y.o. male   MRN: [id number]  Unit/Bed#: -Arun   Encounter: [id number]  Primary Care Provider: Tristin Ling MD      * New onset seizure (HCC)  Assessment & Plan  Patient was at lower when he had first episode of witnessed GTCS followed by postictal state in ED.  No prior episode.  No recent changes in medication.  MRI brain ordered in ED showed findings compatible with inflammatory CAA with subacute cortical microbleed in left frontal lobe which recommend the likely etiology.  Neurology was reached out who recommended IV Keppra.  Patient is back to baseline.  Electrolytes WNL.  Denies any prior history of stroke, A-fib, MI.    Continue to hold antiplatelet and anticoagulation in setting of new CVA  Transfer to [place] for routine EEG monitoring  Neurology consulted  IV fluid 125 cc/h  Frequent neurochecks  Will need repeat MRI brain in 6 to 8 weeks  Continue IV Keppra as per neurologist recommendation  Seizure precautions  Aspiration precautions    Cerebral amyloid angiopathy   Assessment & Plan  See plan for new onset seizure    Type 2 diabetes mellitus with stage 3a chronic kidney disease, without long-term current use of insulin (HCC)  Assessment & Plan  Lab Results   Component Value Date    HGBA1C 7.7 (H) 09/07/2023   Hold home oral hypoglycemics.  Continue sliding scale coverage, hypoglycemic protocol, carbohydrate controlled diet.      Stage 3a chronic kidney disease (HCC)  Assessment & Plan  Baseline around 1.4.  Creatinine within normal limits.  Continue to monitor renal function.    GERD (gastroesophageal reflux disease)  Assessment & Plan  Continue Protonix    Essential hypertension  Assessment & Plan  Continue amlodipine, lisinopril        VTE Prophylaxis: Pharmacologic VTE Prophylaxis contraindicated due to CAA   / sequential compression device and foot pump applied   Code Status: Level 1   POLST:  Unknown  Discussion with family: Significant other at bedside    Anticipated Length of Stay:  Patient will be admitted on an Inpatient basis with an anticipated length of stay of  < 2 midnights.   Justification for Hospital Stay: CAA    Total Time for Visit, including Counseling / Coordination of Care: 45 minutes.  Greater than 50% of this total time spent on direct patient counseling and coordination of care.    Chief Complaint:   New onset seizure    History of Present Illness:    Delphine Nelson is a 73 y.o. male with past medical history of diabetes, hypertension, GERD who presents with 1 episode of witnessed seizure while at grocery store which was witnessed by nearby audience.  No prior history of seizure.  No recent changes in medication.  No complaints otherwise.  No prior MI, stroke, CHF, A-fib.    Review of Systems:    Review of Systems   Constitutional:  Negative for activity change, appetite change, chills, diaphoresis, fatigue, fever and unexpected weight change.   HENT:  Negative for rhinorrhea and sore throat.    Eyes:  Negative for visual disturbance.   Respiratory:  Negative for cough, chest tightness and shortness of breath.    Cardiovascular:  Negative for chest pain, palpitations and leg swelling.   Gastrointestinal:  Negative for abdominal distention, abdominal pain, blood in stool, constipation, diarrhea, nausea and vomiting.   Genitourinary:  Negative for difficulty urinating.   Musculoskeletal:  Negative for arthralgias.   Neurological:  Negative for headaches.   Psychiatric/Behavioral:  Negative for behavioral problems and sleep disturbance.        Past Medical and Surgical History:     Past Medical History:   Diagnosis Date    Diabetes mellitus (HCC)     ED (erectile dysfunction)     GERD (gastroesophageal reflux disease)     Hypertension        Past Surgical History:   Procedure Laterality Date    COLONOSCOPY  08/01/2016    NO PAST SURGERIES         Meds/Allergies:    Prior to Admission  medications    Medication Sig Start Date End Date Taking? Authorizing Provider   amLODIPine (NORVASC) 5 mg tablet Take 1 tablet (5 mg total) by mouth daily 9/25/23   Tristin Ling MD   Ascorbic Acid (VITAMIN C PO) Take by mouth    Historical Provider, MD   aspirin 81 mg chewable tablet Chew 81 mg daily    Historical Provider, MD   atorvastatin (LIPITOR) 40 mg tablet Take 1 tablet (40 mg total) by mouth daily 7/17/23   Tristin Ling MD   Choline Fenofibrate (Fenofibric Acid) 135 MG CPDR Take 1 capsule (135 mg total) by mouth daily 9/25/23   Tristin Ling MD   clotrimazole-betamethasone (LOTRISONE) 1-0.05 % cream Apply topically 2 (two) times a day 9/14/22   Tristin Ling MD   Cyanocobalamin (VITAMIN B12 PO) Take by mouth    Historical Provider, MD   glipiZIDE (GLUCOTROL XL) 5 mg 24 hr tablet Take 1 tablet (5 mg total) by mouth daily 9/25/23   Tristin Ling MD   glucose blood (OneTouch Ultra) test strip Use 1 each 3 (three) times a day 9/14/22   Tristin Ling MD   lisinopril (ZESTRIL) 10 mg tablet Take 1 tablet (10 mg total) by mouth daily 9/25/23   Tristin Ling MD   metFORMIN (GLUCOPHAGE) 500 mg tablet Take 2 tablets (1,000 mg total) by mouth 2 (two) times a day with meals 9/25/23 12/24/23  Tristin Ling MD   omeprazole (PriLOSEC) 40 MG capsule Take 1 capsule (40 mg total) by mouth daily before breakfast 9/25/23   Tristin Ling MD   OneTouch Delica Lancets 33G MISC by Does not apply route 3 (three) times a week onetouch delica lancets 33G test 3 times per week 5/15/20   Tristin Ling MD   sitaGLIPtin (JANUVIA) 100 mg tablet Take 1 tablet (100 mg total) by mouth daily  Patient not taking: Reported on 9/29/2023 9/25/23   Tristin Ling MD   tadalafil (CIALIS) 20 MG tablet TAKE ONE TABLET BY MOUTH EVERY DAY AS NEEDED IN THE MORNING - NO MORE THAN 1 TABLET PER 24 HOURS 10/2/23   Tristin Ling MD     I have reviewed home medications with patient personally.    Allergies:   Allergies   Allergen Reactions    Compazine  "[Prochlorperazine] Tongue Swelling    Zithromax [Azithromycin] Other (See Comments)     .       Social History:     Marital Status: /Civil Union   Substance Use History:   Social History     Substance and Sexual Activity   Alcohol Use Never     Social History     Tobacco Use   Smoking Status Never   Smokeless Tobacco Never     Social History     Substance and Sexual Activity   Drug Use Never       Family History:    Family History   Problem Relation Age of Onset    Asthma Father         Physical Exam:     Vitals:   Blood Pressure: 162/97 (02/08/24 2206)  Pulse: 87 (02/08/24 2206)  Temperature: 99.2 °F (37.3 °C) (02/08/24 2206)  Temp Source: Oral (02/08/24 2206)  Respirations: 12 (02/08/24 2206)  Height: 5' 8\" (172.7 cm) (02/08/24 2206)  Weight - Scale: 87.7 kg (193 lb 6 oz) (02/08/24 2206)  SpO2: 97 % (02/08/24 2206)    Physical Exam  Vitals reviewed.   Constitutional:       General: He is not in acute distress.     Appearance: Normal appearance.   HENT:      Head: Normocephalic and atraumatic.   Eyes:      General: No scleral icterus.        Right eye: No discharge.         Left eye: No discharge.   Cardiovascular:      Rate and Rhythm: Normal rate and regular rhythm.      Pulses: Normal pulses.      Heart sounds: Normal heart sounds.   Pulmonary:      Effort: Pulmonary effort is normal. No respiratory distress.      Breath sounds: Normal breath sounds. No wheezing or rales.   Chest:      Chest wall: No tenderness.   Abdominal:      General: Abdomen is flat. Bowel sounds are normal. There is no distension.      Palpations: Abdomen is soft.      Tenderness: There is no abdominal tenderness.   Musculoskeletal:         General: No deformity. Normal range of motion.      Cervical back: Normal range of motion and neck supple.      Right lower leg: No edema.      Left lower leg: No edema.   Skin:     General: Skin is warm.      Coloration: Skin is not jaundiced or pale.      Findings: No rash.   Neurological:      " General: No focal deficit present.      Mental Status: He is alert and oriented to person, place, and time. Mental status is at baseline.      Cranial Nerves: No cranial nerve deficit.      Motor: No weakness.   Psychiatric:         Mood and Affect: Mood normal.         Behavior: Behavior normal.         Additional Data:     Lab Results: I have personally reviewed pertinent reports.      Results from last 7 days   Lab Units 02/08/24  0836   WBC Thousand/uL 9.62   HEMOGLOBIN g/dL 14.7   HEMATOCRIT % 46.9   PLATELETS Thousands/uL 362   NEUTROS PCT % 44   LYMPHS PCT % 40   MONOS PCT % 8   EOS PCT % 6     Results from last 7 days   Lab Units 02/08/24  0836   SODIUM mmol/L 137   POTASSIUM mmol/L 3.7   CHLORIDE mmol/L 98   CO2 mmol/L 20*   BUN mg/dL 14   CREATININE mg/dL 1.27   ANION GAP mmol/L 19   CALCIUM mg/dL 9.2   ALBUMIN g/dL 4.2   TOTAL BILIRUBIN mg/dL 0.48   ALK PHOS U/L 59   ALT U/L 28   AST U/L 24   GLUCOSE RANDOM mg/dL 270*         Results from last 7 days   Lab Units 02/08/24  0832   POC GLUCOSE mg/dl 249*               Imaging: I have personally reviewed pertinent reports.      MRI brain w wo contrast   Final Result by Richardson Mccray MD (02/08 1342)      1.  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 Recommend 6-8-week follow-up MRI after treatment to assess interval change.   2.  Mild chronic microangiopathy.                  The study was marked in EPIC for immediate notification.      Workstation performed: [id number]         CT head wo contrast   Final Result by Macario Lomeli MD (02/08 0921)      Hypodensity within the left frontal and right parietal occipital lobes, contrast-enhanced MRI of the brain recommended for further characterization to exclude underlying mass lesions with associated vasogenic edema.                  Workstation performed: [id number]         XR chest 1 view portable   Final Result by  Sabrina Mueller MD (02/08 1020)      Low lung volumes with vascular crowding.      Mild opacity at the medial left base, likely atelectasis, but pneumonia/aspiration not excluded in the appropriate clinical setting.            Workstation performed: [id number]             EKG, Pathology, and Other Studies Reviewed on Admission:   EKG: NSR    Allscripts / Epic Records Reviewed: Yes     ** Please Note: This note has been constructed using a voice recognition system. **

## 2024-02-09 NOTE — PHYSICAL THERAPY NOTE
Physical Therapy Screen    Patient Name: Delphine Nelson    Today's Date: 2/9/2024     Problem List  Principal Problem:    New onset seizure (HCC)  Active Problems:    Essential hypertension    GERD (gastroesophageal reflux disease)    Stage 3a chronic kidney disease (HCC)    Type 2 diabetes mellitus with stage 3a chronic kidney disease, without long-term current use of insulin (HCC)    Cerebral amyloid angiopathy        Past Medical History  Past Medical History:   Diagnosis Date    Diabetes mellitus (HCC)     ED (erectile dysfunction)     GERD (gastroesophageal reflux disease)     Hypertension         Past Surgical History  Past Surgical History:   Procedure Laterality Date    COLONOSCOPY  08/01/2016    NO PAST SURGERIES             02/09/24 1630   PT Last Visit   PT Visit Date 02/09/24   Note Type   Note type Screen   Additional Comments PT order received and chart reviewed.  Pt observed and has been reported (adam and OT) to be independent with all transfers/ambulatory mobility without AD.  Pt denies any therapy needs at this time.  Will discharge PT order without evaluation.

## 2024-02-09 NOTE — UTILIZATION REVIEW
Initial Clinical Review    Admission: Date/Time/Statement:   Admission Orders (From admission, onward)       Ordered        02/08/24 2049  INPATIENT ADMISSION  Once                          Orders Placed This Encounter   Procedures    INPATIENT ADMISSION     Standing Status:   Standing     Number of Occurrences:   1     Order Specific Question:   Level of Care     Answer:   Med Surg [16]     Order Specific Question:   Estimated length of stay     Answer:   More than 2 Midnights     Order Specific Question:   Certification     Answer:   I certify that inpatient services are medically necessary for this patient for a duration of greater than two midnights. See H&P and MD Progress Notes for additional information about the patient's course of treatment.     ED Arrival Information       Expected   -    Arrival   2/8/2024 08:21    Acuity   Emergent              Means of arrival   Ambulance    Escorted by   [facility]    Service   Hospitalist    Admission type   Emergency              Arrival complaint   Seizures             Chief Complaint   Patient presents with    Seizure - Actively Seizing on Arrival     Witness seizure at [facility].  Combative with ems.  4mg versed given.  Pt sleeping upon arrival.  Ems contacting family       Initial Presentation: 73 y.o. male with PMHx of DM, HTN, GERD who presents to ED by ems s/p witnessed GTCS followed by postictal state in ED.  No prior episode.  No recent changes in medication.  MRI brain ordered in ED showed findings compatible with inflammatory CAA with subacute cortical microbleed in left frontal lobe which recommend the likely etiology.  Neurology recommended IV Keppra.  Patient is back to baseline.  Electrolytes WNL.  Denies any prior history of stroke, A-fib, MI.    Admitted inpatient to MS unit with New Onset Seizure -- Continue to hold antiplatelet and anticoagulation in setting of new CVA. Neurology consulted. IV fluid 125 ml/hr. Neuro checks Q1h x4, q2h x4, then Q4h.  Will need repeat MRI brain in 6-8 week. Continue IV Keppra as per neurologist recs. Seizure precautions. Aspiration precautions. Accu-checks w/ ssi. Continue pta po meds.    Date: 2/9   Day 2:   Neuro consult -- Unclear diagnosis yet. Possibilities include PRESS, CNS vasculitis, and certain CNS infections. Inflammatory amyloid angiopathy was suggested by radiology and is a possibility, but with the lack of significant burden of amyloid angiopathy in the rest of brain parenchyme, and of any cognitive deficit, a definitive diagnosis of IAA cannot be made as per [place] criteria.  Plan:  Obtain stat CTA head to ensure there is no evidence of vasculitis  Obtain LP for CSF analysis including protein, glucose, cells, and pathogen panel  Start Keppra 1 g BID. EEG may be helpful but unlikely to    If CTA and LP are negative, then MRI can be repeated in 4 weeks to ensure resolution    Plan to transfer to [facility] for neurosurgery and neurology evaluation and further treatment       ED Triage Vitals   Temperature Pulse Respirations Blood Pressure SpO2   02/08/24 0838 02/08/24 0826 02/08/24 0826 02/08/24 0826 02/08/24 0826   97.7 °F (36.5 °C) 101 (!) 24 134/65 95 %      Temp Source Heart Rate Source Patient Position - Orthostatic VS BP Location FiO2 (%)   02/08/24 0838 02/08/24 0826 02/08/24 0826 02/08/24 0826 --   Oral Monitor Lying Left arm       Pain Score       02/08/24 1845       No Pain          Wt Readings from Last 1 Encounters:   02/08/24 87.7 kg (193 lb 6 oz)     Additional Vital Signs:   Date/Time Temp Pulse Resp BP MAP (mmHg) SpO2 O2 Device Patient Position - Orthostatic VS   02/09/24 1439 97.8 °F (36.6 °C) 78 16 142/64 97 98 % None (Room air) Lying   02/09/24 1148 98.3 °F (36.8 °C) 71 15 154/87 -- 98 % None (Room air) Lying   02/09/24 0801 97.5 °F (36.4 °C) 78 12 157/82 124 97 % None (Room air) Lying   02/08/24 2206 99.2 °F (37.3 °C) 87 12 162/97 -- 97 % -- Sitting   02/08/24 3155 -- 89  12 174/57 Abnormal  -- 95 % None (Room air) Lying   02/08/24 1345 -- 87 16 104/57 77 97 % -- --   02/08/24 1315 -- 90 -- 160/86 117 95 % -- --   02/08/24 1200 -- 92 16 147/76 103 96 % -- --   02/08/24 1145 -- 93 18 144/71 102 97 % -- --   02/08/24 1115 -- 96 20 143/83 105 100 % -- --   02/08/24 1100 -- 93 19 143/85 108 100 % -- --   02/08/24 1045 -- 91 20 134/75 97 100 % -- --   02/08/24 1030 -- 91  21  145/75  104  100 %  -- --   02/08/24 1015 -- 91 19 143/75 102 100 % -- --   02/08/24 1000 -- 90 22 162/82 114 99 % -- --   02/08/24 0945 -- 92 19 147/80 107 100 % -- --   02/08/24 0930 -- 91 21 122/66 88 99 % -- --   02/08/24 0915 -- 92 22 132/74 97 98 % None (Room air) --   02/08/24 0851 -- -- -- -- -- -- None (Room air)  --   O2 Device: continous pulse ox at 02/08/24 0851   02/08/24 0845 -- 98 22 135/63 91 98 % -- --   02/08/24 0838 97.7 °F (36.5 °C) -- -- -- -- -- -- --   02/08/24 0826 -- 101 24 Abnormal  134/65 -- 95 % None (Room air) Lying     Pertinent Labs/Diagnostic Test Results:   CTA head and neck w wo contrast   Final Result by Macario Marino MD (02/09 1315)         1.  No hemodynamically significant stenosis in the major arteries of the neck.   2. No irregularity of the M1 segment of the left middle cerebral artery possibly representing angiitis/vasculitis or other vasculopathy.   3. Focal areas of vasogenic edema better characterized on the recent brain MRI, ascribed to represent acute amyloid angiitis/acute inflammatory cerebral amyloid angiopathy. Other etiologies not excluded..                  Workstation performed: [id number]         MRI brain w wo contrast   Final Result by Richardson Mccray MD (02/08 1342)      1.  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 Recommend 6-8-week follow-up MRI after treatment to assess interval change.   2.  Mild chronic microangiopathy.                  The study was marked in  EPIC for immediate notification.      Workstation performed: [id number]         CT head wo contrast   Final Result by Macario Lomeli MD (02/08 0921)      Hypodensity within the left frontal and right parietal occipital lobes, contrast-enhanced MRI of the brain recommended for further characterization to exclude underlying mass lesions with associated vasogenic edema.                  Workstation performed: [id number]         XR chest 1 view portable   Final Result by Sabrina Mueller MD (02/08 1020)      Low lung volumes with vascular crowding.      Mild opacity at the medial left base, likely atelectasis, but pneumonia/aspiration not excluded in the appropriate clinical setting.            Workstation performed: [id number]               Results from last 7 days   Lab Units 02/09/24  0549 02/08/24  0836   WBC Thousand/uL 8.16 9.62   HEMOGLOBIN g/dL 14.3 14.7   HEMATOCRIT % 43.4 46.9   PLATELETS Thousands/uL 328 362   NEUTROS ABS Thousands/µL 5.50 4.16     Results from last 7 days   Lab Units 02/09/24  0549 02/08/24  0836   SODIUM mmol/L 139 137   POTASSIUM mmol/L 3.5 3.7   CHLORIDE mmol/L 103 98   CO2 mmol/L 28 20*   ANION GAP mmol/L 8 19   BUN mg/dL 11 14   CREATININE mg/dL 1.01 1.27   EGFR ml/min/1.73sq m 74 56   CALCIUM mg/dL 8.8 9.2   MAGNESIUM mg/dL 1.9 2.1     Results from last 7 days   Lab Units 02/09/24  0549 02/08/24  0836   AST U/L 21 24   ALT U/L 24 28   ALK PHOS U/L 53 59   TOTAL PROTEIN g/dL 6.1* 7.0   ALBUMIN g/dL 3.8 4.2   TOTAL BILIRUBIN mg/dL 0.66 0.48     Results from last 7 days   Lab Units 02/09/24  1105 02/09/24  0703 02/08/24  0832   POC GLUCOSE mg/dl 196* 155* 249*     Results from last 7 days   Lab Units 02/09/24  0549 02/08/24  0836   GLUCOSE RANDOM mg/dL 132 270*     Results from last 7 days   Lab Units 02/08/24  0836   TSH 3RD GENERATON uIU/mL 4.187     Results from last 7 days   Lab Units 02/08/24  1138   CLARITY UA  Clear   COLOR UA  Yellow   SPEC GRAV UA  >=1.030   PH UA  6.0    GLUCOSE UA mg/dl 2+*   KETONES UA mg/dl Trace*   BLOOD UA  Trace-Intact*   PROTEIN UA mg/dl 2+*   NITRITE UA  Negative   BILIRUBIN UA  Negative   UROBILINOGEN UA E.U./dl 0.2   LEUKOCYTES UA  Negative   WBC UA /hpf 0-5   RBC UA /hpf 0-1   BACTERIA UA /hpf Occasional   EPITHELIAL CELLS WET PREP /hpf Occasional     Results from last 7 days   Lab Units 02/08/24  1138   AMPH/METH  Negative   BARBITURATE UR  Negative   BENZODIAZEPINE UR  Positive*   COCAINE UR  Negative   METHADONE URINE  Negative   OPIATE UR  Negative   PCP UR  Negative   THC UR  Negative     ED Treatment:   Medication Administration from 02/08/2024 0821 to 02/08/2024 2205         Date/Time Order Dose Route Action     02/08/2024 0837 EST sodium chloride 0.9 % infusion 125 mL/hr Intravenous New Bag     02/08/2024 0944 EST levETIRAcetam (KEPPRA) injection 1,000 mg 1,000 mg Intravenous Given     02/08/2024 2120 EST levETIRAcetam (KEPPRA) injection 1,000 mg 1,000 mg Intravenous Given       Past Medical History:   Diagnosis Date    Diabetes mellitus (HCC)     ED (erectile dysfunction)     GERD (gastroesophageal reflux disease)     Hypertension      Present on Admission:   Stage 3a chronic kidney disease (HCC)   Essential hypertension   GERD (gastroesophageal reflux disease)   Type 2 diabetes mellitus with stage 3a chronic kidney disease, without long-term current use of insulin (HCC)   New onset seizure (HCC)   Cerebral amyloid angiopathy       Admitting Diagnosis: Seizure (HCC) [R56.9]  New onset seizure (HCC) [R56.9]  Silent micro-hemorrhage of brain (HCC) [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 male  Admission Orders:  Scheduled Medications:  amLODIPine, 5 mg, Oral, Daily  atorvastatin, 40 mg, Oral, Daily With Dinner  insulin lispro, 1-5 Units, Subcutaneous, TID AC  levETIRAcetam, 1,000 mg, Intravenous, Q12H ARIANNA  lisinopril, 20 mg, Oral, Daily  pantoprazole, 40 mg, Oral, Early Morning    Continuous IV Infusions:  sodium chloride, 125 mL/hr, Intravenous,  Continuous    PRN Meds:  acetaminophen, 975 mg, Oral, Q6H PRN  diphenhydrAMINE, 25 mg, Oral, Q6H PRN  ondansetron, 4 mg, Intravenous, Q6H PRN        IP CONSULT TO INTERNAL MEDICINE  IP CONSULT TO NEUROLOGY    Network Utilization Review Department  ATTENTION: Please call with any questions or concerns to [phone] and carefully listen to the prompts so that you are directed to the right person. All voicemails are confidential.   For Discharge needs, contact Care Management DC Support Team at [phone] opt. 2  Send all requests for admission clinical reviews, approved or denied determinations and any other requests to dedicated fax number below belonging to the campus where the patient is receiving treatment. List of dedicated fax numbers for the Facilities:  FACILITY NAME UR FAX NUMBER   ADMISSION DENIALS (Administrative/Medical Necessity) [phone]   DISCHARGE SUPPORT TEAM (NETWORK) [phone]   PARENT CHILD HEALTH (Maternity/NICU/Pediatrics) [phone]   [facility] [phone]   [facility] [phone]   [facility] [phone]   [facility] [phone]   Replaced by [facility] [phone]   [facility] [phone]   [facility] [phone]   [facility] [phone]   [facility] [phone]   [facility] [phone]   [facility] [phone]   [facility] [phone]

## 2024-02-09 NOTE — PLAN OF CARE
Problem: SAFETY ADULT  Goal: Patient will remain free of falls  Description: INTERVENTIONS:  - Educate patient/family on patient safety including physical limitations  - Instruct patient to call for assistance with activity   - Consult OT/PT to assist with strengthening/mobility   - Keep Call bell within reach  - Keep bed low and locked with side rails adjusted as appropriate  - Keep care items and personal belongings within reach  - Initiate and maintain comfort rounds  - Make Fall Risk Sign visible to staff  - Offer Toileting every prn  Hours, in advance of need  - Initiate/Maintain  bed alarm  - Obtain necessary fall risk management equipment: n/a   - Apply yellow socks and bracelet for high fall risk patients  - Consider moving patient to room near nurses station  Outcome: Not Progressing  Goal: Maintain or return to baseline ADL function  Description: INTERVENTIONS:  -  Assess patient's ability to carry out ADLs; assess patient's baseline for ADL function and identify physical deficits which impact ability to perform ADLs (bathing, care of mouth/teeth, toileting, grooming, dressing, etc.)  - Assess/evaluate cause of self-care deficits   - Assess range of motion  - Assess patient's mobility; develop plan if impaired  - Assess patient's need for assistive devices and provide as appropriate  - Encourage maximum independence but intervene and supervise when necessary  - Involve family in performance of ADLs  - Assess for home care needs following discharge   - Consider OT consult to assist with ADL evaluation and planning for discharge  - Provide patient education as appropriate  Outcome: Not Progressing  Goal: Maintains/Returns to pre admission functional level  Description: INTERVENTIONS:  - Perform AM-PAC 6 Click Basic Mobility/ Daily Activity assessment daily.  - Set and communicate daily mobility goal to care team and patient/family/caregiver.   - Collaborate with rehabilitation services on mobility goals if  consulted  - Perform Range of Motion prn times a day.  - Reposition patient every self hours.  - Dangle patient prn times a day  - Stand patient prn times a day  - Ambulate patient prn times a day  - Out of bed to chair prn times a day   - Out of bed for meals prn times a day  - Out of bed for toileting  - Record patient progress and toleration of activity level   Outcome: Not Progressing     Problem: DISCHARGE PLANNING  Goal: Discharge to home or other facility with appropriate resources  Description: INTERVENTIONS:  - Identify barriers to discharge w/patient and caregiver  - Arrange for needed discharge resources and transportation as appropriate  - Identify discharge learning needs (meds, wound care, etc.)  - Arrange for interpretive services to assist at discharge as needed  - Refer to Case Management Department for coordinating discharge planning if the patient needs post-hospital services based on physician/advanced practitioner order or complex needs related to functional status, cognitive ability, or social support system  Outcome: Not Progressing     Problem: Knowledge Deficit  Goal: Patient/family/caregiver demonstrates understanding of disease process, treatment plan, medications, and discharge instructions  Description: Complete learning assessment and assess knowledge base.  Interventions:  - Provide teaching at level of understanding  - Provide teaching via preferred learning methods  Outcome: Not Progressing

## 2024-02-09 NOTE — ASSESSMENT & PLAN NOTE
Patient was at lower when he had first episode of witnessed GTCS followed by postictal state in ED.  No prior episode.  No recent changes in medication.  MRI brain ordered in ED showed findings compatible with inflammatory CAA with subacute cortical microbleed in left frontal lobe which recommend the likely etiology.  Neurology was reached out who recommended IV Keppra.  Patient is back to baseline.  Electrolytes WNL.  Denies any prior history of stroke, A-fib, MI.    Continue to hold antiplatelet and anticoagulation in setting of new CVA  Transfer to [facility] for routine EEG monitoring  Neurology consulted  IV fluid 125 cc/h  Frequent neurochecks  Will need repeat MRI brain in 6 to 8 weeks  Continue IV Keppra as per neurologist recommendation  Seizure precautions  Aspiration precautions

## 2024-02-09 NOTE — OCCUPATIONAL THERAPY NOTE
Occupational Therapy Evaluation & Treat     Patient Name: Delphine Nelson  Today's Date: 2/9/2024  Problem List  Principal Problem:    New onset seizure (HCC)  Active Problems:    Essential hypertension    GERD (gastroesophageal reflux disease)    Stage 3a chronic kidney disease (HCC)    Type 2 diabetes mellitus with stage 3a chronic kidney disease, without long-term current use of insulin (HCC)    Cerebral amyloid angiopathy     Past Medical History  Past Medical History:   Diagnosis Date    Diabetes mellitus (HCC)     ED (erectile dysfunction)     GERD (gastroesophageal reflux disease)     Hypertension      Past Surgical History  Past Surgical History:   Procedure Laterality Date    COLONOSCOPY  08/01/2016    NO PAST SURGERIES             02/09/24 1621   OT Last Visit   OT Visit Date 02/09/24   Note Type   Note type Evaluation  (& Treat)   Additional Comments **Up and OOB as tolerated orders**   Pain Assessment   Pain Assessment Tool 0-10   Pain Score No Pain   Restrictions/Precautions   Other Precautions   (Seizure preacautions)   Home Living   Type of Home House   Home Layout Two level;Stairs to enter with rails   Bathroom Shower/Tub Tub/shower unit   Bathroom Toilet Standard   Home Equipment   (no AD at baseline)   Prior Function   Level of [place] Independent with ADLs;Independent with functional mobility;Independent with IADLS   Lives With Spouse   IADLs (S)  Independent with driving  (Wife does not drive)   Falls in the last 6 months 1 to 4  (s/p generalized tonic clonic seizure at [facility])   Vocational Retired  ([employer])   Subjective   Subjective Pt received in supine. Pt's mood appears somber-improved by end of session.   ADL   Eating Assistance 7  Independent   Grooming Assistance 7  Independent   UB Bathing Assistance 7  Independent   LB Bathing Assistance 7  Independent   UB Dressing Assistance 7  Independent   LB Dressing Assistance 7  Independent   Bed Mobility   Supine to  Sit 7  Independent   Additional Comments Pt remained OOB by end of session.   Transfers   Sit to Stand 7  Independent   Stand to Sit 7  Independent   Functional Mobility   Functional Mobility 7  Independent   Additional Comments no AD   Balance   Static Sitting Normal   Dynamic Sitting Normal   Static Standing Normal   Dynamic Standing Good   Activity Tolerance   Activity Tolerance Patient tolerated treatment well   Medical Staff Made Aware JACKLYN Karolina   RUE Assessment   RUE Assessment WFL   LUE Assessment   LUE Assessment WFL   Hand Function   Gross Motor Coordination Functional   Fine Motor Coordination Functional   Sensation   Light Touch No apparent deficits   Vision-Basic Assessment   Current Vision No visual deficits   Patient Visual Report   (Pt able to read clock accurately across room.)   Cognition   Overall Cognitive Status WFL   Arousal/Participation Alert   Attention Within functional limits   Orientation Level Oriented X4   Memory Within functional limits   Following Commands Follows all commands and directions without difficulty   Assessment   Assessment Pt is a 73 y.o. male seen for OT evaluation at [facility], admitted 2/8/2024 w/ New onset seizure (HCC).  Comorbidities affecting pt's functional performance at time of assessment include: essential HTN, type II DM, and stage 3 CKD.  Prior to admission, pt was living with wife in a house with steps to manage.  Pt was I w/  ADLS and IADLS, (+) drove, & required no AD PTA.   Upon evaluation, pt presents at/close to functional baseline, but may be limited due to symptomology caused by medical issues.    This evaluation required an extensive review of medical and/or therapy records and additional review of physical, cognitive and psychosocial history related to functional performance. Based upon functional performance deficits and assessments, this evaluation has been identified as a high complexity evaluation.The patient's raw score on the  AM-PAC Daily Activity inpatient short form is 24, standardized score is 57.54, greater than 39.4. Patients at this level are likely to benefit from DC to home. Please refer to the recommendation of the Occupational Therapist for safe DC planning. At this time, OT recommendations at time of discharge are home with no OT needs. No further acute OT needs indicated at this time - Recommend pt continue to be OOB for meals, ambulation to/from BR, perform self care tasks, and mobility in hallway with nursing. D/C from OT caseload with above recommendations.   Goals   Patient Goals Pt wishes to get home   Plan   OT Frequency Eval only   Discharge Recommendation   Rehab Resource Intensity Level, OT No post-acute rehabilitation needs   AM-PAC Daily Activity Inpatient   Lower Body Dressing 4   Bathing 4   Toileting 4   Upper Body Dressing 4   Grooming 4   Eating 4   Daily Activity Raw Score 24   Daily Activity Standardized Score (Calc for Raw Score >=11) 57.54   AM-PAC Applied Cognition Inpatient   Following a Speech/Presentation 4   Understanding Ordinary Conversation 4   Taking Medications 4   Remembering Where Things Are Placed or Put Away 4   Remembering List of 4-5 Errands 4   Taking Care of Complicated Tasks 4   Applied Cognition Raw Score 24   Applied Cognition Standardized Score 62.21   Additional Treatment Session   Start Time 1611   End Time 1621   Treatment Assessment Pt seated in recliner. Donned shoes independently. Performed sit>stand transfer with independence. Performed functional mobility into hallway x 300 feet with independence. Pt demonstrated no LOB or issues with balance. Returned to seated position with independence. JACKLYN Turcios aware.   End of Consult   Education Provided Yes   Patient Position at End of Consult Bedside chair;Bed/Chair alarm activated;All needs within reach   Nurse Communication Nurse aware of consult            Hope Damon OTR/L

## 2024-02-09 NOTE — CONSULTS
TeleConsultation - Neurology   Delphine Nelson 73 y.o. male MRN: [id number]   Encounter: [id number]      REQUIRED DOCUMENTATION:     1. This service was provided via Telemedicine.  2. Provider located in [place].  3. TeleMed provider: Sergio Flores MD.  4. Identify all parties in room with patient during tele consult:  Wife, and son  5. After connecting through televideo, patient was identified by name and date of birth and assistant checked wristband.  Patient was then informed that this was a Telemedicine visit and that the exam was being conducted confidentially over secure lines. My office door was closed. No one else was in the room.  Patient acknowledged consent and understanding of privacy and security of the Telemedicine visit, and gave us permission to have the assistant stay in the room in order to assist with the history and to conduct the exam.  I informed the patient that I have reviewed their record in Epic and presented the opportunity for them to ask any questions regarding the visit today.  The patient agreed to participate.       Assessment/Plan   Assessment:  Seizure:  MRI brain shows multiple areas of vasogenic edema on FLAIR, specifically left frontal and R>L occipital areas. SWI shows microbleeding in same area. Contrast shows no enhancing lesions, aside from mild leptomeningeal enhancement spot in left frontal area    Ddx:  Unclear diagnosis yet. Possibilities include PRESS, CNS vasculitis, and certain CNS infections. Inflammatory amyloid angiopathy was suggested by radiology and is a possibility, but with the lack of significant burden of amyloid angiopathy in the rest of brain parenchyme, and of any cognitive deficit, a definitive diagnosis of IAA cannot be made as per [place] criteria.    Plan:  Obtain stat CTA head to ensure there is no evidence of vasculitis  Obtain LP for CSF analysis including protein, glucose, cells, and pathogen panel  Start Keppra 1 g BID. EEG may be helpful but unlikely to      If CTA and LP are negative, then MRI can be repeated in 4 weeks to ensure resolution    We counseled on seizure precautions, including no driving as per PA recommendations.        History of Present Illness     Reason for Consult / Principal Problem: Seizure  Hx and PE limited by: poor internet quality  HPI: Delphine Nelson is a 73 y.o. male who presents after having a seizure while in a gas station. He has no recollection of the event. He is currently back to baseline. He feels no weakness, numbness, blurriness of vision or any other findings.  He has history of chronic daily migraine, for which he uses Tylenol pm. He denied taking any other OTC medication or drug.  He is compliant with his regimen. He never had a seizure.  Family stated that he has no memory complaints aside from the occasional forgetfulness which they believed was age appropriate. He never showed confusion or disorientation as per them.    Inpatient consult to Neurology  Consult performed by: Sergio Flores MD  Consult ordered by: Gab Sevilla MD          Review of Systems  Complete ROS was done and is negative other than what is mentioned in HPI    Historical Information   Past Medical History:   Diagnosis Date    Diabetes mellitus (HCC)     ED (erectile dysfunction)     GERD (gastroesophageal reflux disease)     Hypertension      Past Surgical History:   Procedure Laterality Date    COLONOSCOPY  08/01/2016    NO PAST SURGERIES       Social History   Social History     Substance and Sexual Activity   Alcohol Use Never     Social History     Substance and Sexual Activity   Drug Use Never     Social History     Tobacco Use   Smoking Status Never   Smokeless Tobacco Never     Family History: non-contributory    Review of previous medical records was completed.     Meds/Allergies   PTA meds:   Prior to Admission Medications   Prescriptions Last Dose Informant Patient Reported? Taking?   Ascorbic Acid (VITAMIN C PO)   Yes No  "  Sig: Take by mouth   Choline Fenofibrate (Fenofibric Acid) 135 MG CPDR   No No   Sig: Take 1 capsule (135 mg total) by mouth daily   Cyanocobalamin (VITAMIN B12 PO)   Yes No   Sig: Take by mouth   OneTouch Delica Lancets 33G MISC   No No   Sig: by Does not apply route 3 (three) times a week onetouch delica lancets 33G test 3 times per week   amLODIPine (NORVASC) 5 mg tablet   No No   Sig: Take 1 tablet (5 mg total) by mouth daily   aspirin 81 mg chewable tablet   Yes No   Sig: Chew 81 mg daily   atorvastatin (LIPITOR) 40 mg tablet   No No   Sig: Take 1 tablet (40 mg total) by mouth daily   clotrimazole-betamethasone (LOTRISONE) 1-0.05 % cream   No No   Sig: Apply topically 2 (two) times a day   glipiZIDE (GLUCOTROL XL) 5 mg 24 hr tablet   No No   Sig: Take 1 tablet (5 mg total) by mouth daily   glucose blood (OneTouch Ultra) test strip   No No   Sig: Use 1 each 3 (three) times a day   lisinopril (ZESTRIL) 10 mg tablet   No No   Sig: Take 1 tablet (10 mg total) by mouth daily   metFORMIN (GLUCOPHAGE) 500 mg tablet   No No   Sig: Take 2 tablets (1,000 mg total) by mouth 2 (two) times a day with meals   omeprazole (PriLOSEC) 40 MG capsule   No No   Sig: Take 1 capsule (40 mg total) by mouth daily before breakfast   sitaGLIPtin (JANUVIA) 100 mg tablet   No No   Sig: Take 1 tablet (100 mg total) by mouth daily   Patient not taking: Reported on 9/29/2023   tadalafil (CIALIS) 20 MG tablet   No No   Sig: TAKE ONE TABLET BY MOUTH EVERY DAY AS NEEDED IN THE MORNING - NO MORE THAN 1 TABLET PER 24 HOURS      Facility-Administered Medications: None       Allergies   Allergen Reactions    Compazine [Prochlorperazine] Tongue Swelling    Zithromax [Azithromycin] Other (See Comments)     .       Objective   Vitals:Blood pressure 154/87, pulse 71, temperature 98.3 °F (36.8 °C), temperature source Oral, resp. rate 15, height 5' 8\" (1.727 m), weight 87.7 kg (193 lb 6 oz), SpO2 98%.,Body mass index is 29.4 kg/m².  No intake or output " data in the 24 hours ending 02/09/24 1151    Invasive Devices:   Invasive Devices       Peripheral Intravenous Line  Duration             Peripheral IV 02/08/24 Right Antecubital 1 day                    Physical Exam NAD  Skin: no seen lesions  HEENT: ATNC  Chest: breathing comfortably on room air  GI: no apparent distension.    Neurologic Exam Limited by poor quality of connection  Alert and oriented for self, place and time. Memory is intact to recent and remote events. Language is intact to comprehension and expression. No neglect. Speech is normal.  Face is symmetric. Able to move all extremities against gravity. No dysmetria noted.      Lab Results: CBC:   Results from last 7 days   Lab Units 02/09/24  0549 02/08/24  0836   WBC Thousand/uL 8.16 9.62   RBC Million/uL 5.00 5.23   HEMOGLOBIN g/dL 14.3 14.7   HEMATOCRIT % 43.4 46.9   MCV fL 87 90   PLATELETS Thousands/uL 328 362   , BMP/CMP:   Results from last 7 days   Lab Units 02/09/24  0549 02/08/24  0836   SODIUM mmol/L 139 137   POTASSIUM mmol/L 3.5 3.7   CHLORIDE mmol/L 103 98   CO2 mmol/L 28 20*   BUN mg/dL 11 14   CREATININE mg/dL 1.01 1.27   CALCIUM mg/dL 8.8 9.2   AST U/L 21 24   ALT U/L 24 28   ALK PHOS U/L 53 59   EGFR ml/min/1.73sq m 74 56   , Vitamin B12:   , HgBA1C:   , TSH:   Results from last 7 days   Lab Units 02/08/24  0836   TSH 3RD GENERATON uIU/mL 4.187   , Coagulation:   , Lipid Profile:     Imaging Studies: I have personally reviewed pertinent films in PACS  EKG, Pathology, and Other Studies: I have personally reviewed pertinent reports.    VTE Prophylaxis: Sequential compression device (Venodyne)     Code Status: Level 1 - Full Code  Advance Directive and Living Will:      Power of :    POLST:      Counseling / Coordination of Care  N/A

## 2024-02-09 NOTE — PLAN OF CARE
Problem: INFECTION - ADULT  Goal: Absence or prevention of progression during hospitalization  Description: INTERVENTIONS:  - Assess and monitor for signs and symptoms of infection  - Monitor lab/diagnostic results  - Monitor all insertion sites, i.e. indwelling lines, tubes, and drains  - Monitor endotracheal if appropriate and nasal secretions for changes in amount and color  - [place] appropriate cooling/warming therapies per order  - Administer medications as ordered  - Instruct and encourage patient and family to use good hand hygiene technique  - Identify and instruct in appropriate isolation precautions for identified infection/condition  Outcome: Progressing     Problem: SAFETY ADULT  Goal: Patient will remain free of falls  Description: INTERVENTIONS:  - Educate patient/family on patient safety including physical limitations  - Instruct patient to call for assistance with activity   - Consult OT/PT to assist with strengthening/mobility   - Keep Call bell within reach  - Keep bed low and locked with side rails adjusted as appropriate  - Keep care items and personal belongings within reach  - Initiate and maintain comfort rounds  - Offer Toileting every 2 Hours, in advance of need  - Initiate/Maintain bed alarm  - Apply yellow socks and bracelet for high fall risk patients  - Consider moving patient to room near nurses station  Outcome: Progressing

## 2024-02-09 NOTE — ASSESSMENT & PLAN NOTE
Lab Results   Component Value Date    HGBA1C 7.7 (H) 09/07/2023   Hold home oral hypoglycemics.  Continue sliding scale coverage, hypoglycemic protocol, carbohydrate controlled diet.

## 2024-02-10 ENCOUNTER — APPOINTMENT (INPATIENT)
Dept: RADIOLOGY | Facility: HOSPITAL | Age: 74
DRG: 545 | End: 2024-02-10
Payer: COMMERCIAL

## 2024-02-10 ENCOUNTER — HOSPITAL ENCOUNTER (INPATIENT)
Facility: HOSPITAL | Age: 74
LOS: 12 days | Discharge: HOME | DRG: 545 | End: 2024-02-22
Attending: STUDENT IN AN ORGANIZED HEALTH CARE EDUCATION/TRAINING PROGRAM | Admitting: INTERNAL MEDICINE
Payer: COMMERCIAL

## 2024-02-10 ENCOUNTER — APPOINTMENT (INPATIENT)
Dept: NON INVASIVE DIAGNOSTICS | Facility: HOSPITAL | Age: 74
DRG: 545 | End: 2024-02-10
Payer: COMMERCIAL

## 2024-02-10 DIAGNOSIS — N17.9 AKI (ACUTE KIDNEY INJURY) (HCC): ICD-10-CM

## 2024-02-10 DIAGNOSIS — E85.4 CEREBRAL AMYLOID ANGIOPATHY: Primary | ICD-10-CM

## 2024-02-10 DIAGNOSIS — I68.0 CEREBRAL AMYLOID ANGIOPATHY: Primary | ICD-10-CM

## 2024-02-10 DIAGNOSIS — R21 RASH: ICD-10-CM

## 2024-02-10 DIAGNOSIS — I61.8 SILENT MICRO-HEMORRHAGE OF BRAIN (HCC): ICD-10-CM

## 2024-02-10 DIAGNOSIS — R56.9 NEW ONSET SEIZURE (HCC): ICD-10-CM

## 2024-02-10 PROBLEM — J98.9 RESPIRATORY DISEASE: Status: ACTIVE | Noted: 2024-02-10

## 2024-02-10 PROBLEM — G93.6 BRAIN EDEMA (HCC): Status: ACTIVE | Noted: 2024-02-10

## 2024-02-10 LAB
ALBUMIN SERPL BCP-MCNC: 4 G/DL (ref 3.5–5)
ALP SERPL-CCNC: 53 U/L (ref 34–104)
ALT SERPL W P-5'-P-CCNC: 23 U/L (ref 7–52)
ANA SER QL IA: NEGATIVE
ANION GAP SERPL CALCULATED.3IONS-SCNC: 9 MMOL/L
AORTIC ROOT: 3.7 CM
AORTIC VALVE MEAN VELOCITY: 14.3 M/S
APICAL FOUR CHAMBER EJECTION FRACTION: 69 %
APTT PPP: 28 SECONDS (ref 23–37)
ASCENDING AORTA: 3.3 CM
AST SERPL W P-5'-P-CCNC: 26 U/L (ref 13–39)
AV LVOT MEAN GRADIENT: 2 MMHG
AV LVOT PEAK GRADIENT: 5 MMHG
AV MEAN GRADIENT: 10 MMHG
AV PEAK GRADIENT: 19 MMHG
AV VELOCITY RATIO: 0.5
BACTERIA UR QL AUTO: NORMAL /HPF
BASOPHILS # BLD AUTO: 0.04 THOUSANDS/ÂΜL (ref 0–0.1)
BASOPHILS NFR BLD AUTO: 0 % (ref 0–1)
BILIRUB SERPL-MCNC: 0.72 MG/DL (ref 0.2–1)
BILIRUB UR QL STRIP: NEGATIVE
BSA FOR ECHO PROCEDURE: 2.04 M2
BUN SERPL-MCNC: 8 MG/DL (ref 5–25)
C3 SERPL-MCNC: 137 MG/DL (ref 87–200)
C4 SERPL-MCNC: 37 MG/DL (ref 19–52)
CALCIUM SERPL-MCNC: 8.9 MG/DL (ref 8.4–10.2)
CHLORIDE SERPL-SCNC: 101 MMOL/L (ref 96–108)
CLARITY UR: CLEAR
CO2 SERPL-SCNC: 28 MMOL/L (ref 21–32)
COLOR UR: ABNORMAL
CREAT SERPL-MCNC: 1.05 MG/DL (ref 0.6–1.3)
CRP SERPL QL: 7.6 MG/L
DOP CALC AO PEAK VEL: 2.16 M/S
DOP CALC AO VTI: 33.2 CM
DOP CALC LVOT PEAK VEL VTI: 18.99 CM
DOP CALC LVOT PEAK VEL: 1.07 M/S
E WAVE DECELERATION TIME: 179 MS
E/A RATIO: 0.77
EOSINOPHIL # BLD AUTO: 0.28 THOUSAND/ÂΜL (ref 0–0.61)
EOSINOPHIL NFR BLD AUTO: 3 % (ref 0–6)
ERYTHROCYTE [DISTWIDTH] IN BLOOD BY AUTOMATED COUNT: 13.5 % (ref 11.6–15.1)
ERYTHROCYTE [SEDIMENTATION RATE] IN BLOOD: 16 MM/HOUR (ref 0–19)
FLUAV RNA RESP QL NAA+PROBE: NEGATIVE
FLUBV RNA RESP QL NAA+PROBE: NEGATIVE
FRACTIONAL SHORTENING: 27 (ref 28–44)
GFR SERPL CREATININE-BSD FRML MDRD: 71 ML/MIN/1.73SQ M
GLUCOSE SERPL-MCNC: 129 MG/DL (ref 65–140)
GLUCOSE SERPL-MCNC: 141 MG/DL (ref 65–140)
GLUCOSE SERPL-MCNC: 152 MG/DL (ref 65–140)
GLUCOSE SERPL-MCNC: 156 MG/DL (ref 65–140)
GLUCOSE SERPL-MCNC: 156 MG/DL (ref 65–140)
GLUCOSE UR STRIP-MCNC: NEGATIVE MG/DL
HCT VFR BLD AUTO: 46.9 % (ref 36.5–49.3)
HGB BLD-MCNC: 15.2 G/DL (ref 12–17)
HGB UR QL STRIP.AUTO: NEGATIVE
IMM GRANULOCYTES # BLD AUTO: 0.05 THOUSAND/UL (ref 0–0.2)
IMM GRANULOCYTES NFR BLD AUTO: 0 % (ref 0–2)
INR PPP: 0.94 (ref 0.84–1.19)
INTERVENTRICULAR SEPTUM IN DIASTOLE (PARASTERNAL SHORT AXIS VIEW): 1.3 CM
INTERVENTRICULAR SEPTUM: 1.3 CM (ref 0.6–1.1)
KETONES UR STRIP-MCNC: ABNORMAL MG/DL
LAAS-AP2: 18.2 CM2
LAAS-AP4: 12.8 CM2
LEFT ATRIUM SIZE: 3.6 CM
LEFT ATRIUM VOLUME (MOD BIPLANE): 46 ML
LEFT ATRIUM VOLUME INDEX (MOD BIPLANE): 22.5 ML/M2
LEFT INTERNAL DIMENSION IN SYSTOLE: 3.7 CM (ref 2.1–4)
LEFT VENTRICULAR INTERNAL DIMENSION IN DIASTOLE: 5.1 CM (ref 3.5–6)
LEFT VENTRICULAR POSTERIOR WALL IN END DIASTOLE: 1.1 CM
LEFT VENTRICULAR STROKE VOLUME: 68 ML
LEUKOCYTE ESTERASE UR QL STRIP: NEGATIVE
LVSV (TEICH): 68 ML
LYMPHOCYTES # BLD AUTO: 0.75 THOUSANDS/ÂΜL (ref 0.6–4.47)
LYMPHOCYTES NFR BLD AUTO: 7 % (ref 14–44)
MAGNESIUM SERPL-MCNC: 1.6 MG/DL (ref 1.9–2.7)
MCH RBC QN AUTO: 27.9 PG (ref 26.8–34.3)
MCHC RBC AUTO-ENTMCNC: 32.4 G/DL (ref 31.4–37.4)
MCV RBC AUTO: 86 FL (ref 82–98)
MONOCYTES # BLD AUTO: 0.58 THOUSAND/ÂΜL (ref 0.17–1.22)
MONOCYTES NFR BLD AUTO: 5 % (ref 4–12)
MV E'TISSUE VEL-SEP: 8 CM/S
MV PEAK A VEL: 0.94 M/S
MV PEAK E VEL: 72 CM/S
MV STENOSIS PRESSURE HALF TIME: 52 MS
MV VALVE AREA P 1/2 METHOD: 4.23
NEUTROPHILS # BLD AUTO: 9.59 THOUSANDS/ÂΜL (ref 1.85–7.62)
NEUTS SEG NFR BLD AUTO: 85 % (ref 43–75)
NITRITE UR QL STRIP: NEGATIVE
NON-SQ EPI CELLS URNS QL MICRO: NORMAL /HPF
NRBC BLD AUTO-RTO: 0 /100 WBCS
PH UR STRIP.AUTO: 6.5 [PH]
PLATELET # BLD AUTO: 334 THOUSANDS/UL (ref 149–390)
PMV BLD AUTO: 9.3 FL (ref 8.9–12.7)
POTASSIUM SERPL-SCNC: 4.1 MMOL/L (ref 3.5–5.3)
PROT SERPL-MCNC: 6.9 G/DL (ref 6.4–8.4)
PROT UR STRIP-MCNC: ABNORMAL MG/DL
PROTHROMBIN TIME: 12.4 SECONDS (ref 11.6–14.5)
RA PRESSURE ESTIMATED: 3 MMHG
RBC # BLD AUTO: 5.45 MILLION/UL (ref 3.88–5.62)
RBC #/AREA URNS AUTO: NORMAL /HPF
RIGHT VENTRICLE ID DIMENSION: 3.6 CM
RSV RNA RESP QL NAA+PROBE: NEGATIVE
SARS-COV-2 RNA RESP QL NAA+PROBE: NEGATIVE
SL CV LEFT ATRIUM LENGTH A2C: 5.2 CM
SL CV LV EF: 60
SL CV PED ECHO LEFT VENTRICLE DIASTOLIC VOLUME (MOD BIPLANE) 2D: 125 ML
SL CV PED ECHO LEFT VENTRICLE SYSTOLIC VOLUME (MOD BIPLANE) 2D: 57 ML
SODIUM SERPL-SCNC: 138 MMOL/L (ref 135–147)
SP GR UR STRIP.AUTO: 1.02 (ref 1–1.03)
TRICUSPID ANNULAR PLANE SYSTOLIC EXCURSION: 2.4 CM
TSH SERPL DL<=0.05 MIU/L-ACNC: 1.07 UIU/ML (ref 0.45–4.5)
UROBILINOGEN UR STRIP-ACNC: <2 MG/DL
WBC # BLD AUTO: 11.29 THOUSAND/UL (ref 4.31–10.16)
WBC #/AREA URNS AUTO: NORMAL /HPF

## 2024-02-10 PROCEDURE — 88350 IMFLUOR EA ADDL 1ANTB STN PX: CPT | Performed by: STUDENT IN AN ORGANIZED HEALTH CARE EDUCATION/TRAINING PROGRAM

## 2024-02-10 PROCEDURE — 009U3ZX DRAINAGE OF SPINAL CANAL, PERCUTANEOUS APPROACH, DIAGNOSTIC: ICD-10-PCS | Performed by: RADIOLOGY

## 2024-02-10 PROCEDURE — 99223 1ST HOSP IP/OBS HIGH 75: CPT | Performed by: INTERNAL MEDICINE

## 2024-02-10 PROCEDURE — 93306 TTE W/DOPPLER COMPLETE: CPT

## 2024-02-10 PROCEDURE — 74177 CT ABD & PELVIS W/CONTRAST: CPT

## 2024-02-10 PROCEDURE — 99223 1ST HOSP IP/OBS HIGH 75: CPT | Performed by: PHYSICIAN ASSISTANT

## 2024-02-10 PROCEDURE — 81001 URINALYSIS AUTO W/SCOPE: CPT | Performed by: INTERNAL MEDICINE

## 2024-02-10 PROCEDURE — 0241U HB NFCT DS VIR RESP RNA 4 TRGT: CPT | Performed by: STUDENT IN AN ORGANIZED HEALTH CARE EDUCATION/TRAINING PROGRAM

## 2024-02-10 PROCEDURE — 88307 TISSUE EXAM BY PATHOLOGIST: CPT | Performed by: STUDENT IN AN ORGANIZED HEALTH CARE EDUCATION/TRAINING PROGRAM

## 2024-02-10 PROCEDURE — 86160 COMPLEMENT ANTIGEN: CPT | Performed by: STUDENT IN AN ORGANIZED HEALTH CARE EDUCATION/TRAINING PROGRAM

## 2024-02-10 PROCEDURE — 84145 PROCALCITONIN (PCT): CPT | Performed by: STUDENT IN AN ORGANIZED HEALTH CARE EDUCATION/TRAINING PROGRAM

## 2024-02-10 PROCEDURE — 85652 RBC SED RATE AUTOMATED: CPT | Performed by: STUDENT IN AN ORGANIZED HEALTH CARE EDUCATION/TRAINING PROGRAM

## 2024-02-10 PROCEDURE — 87040 BLOOD CULTURE FOR BACTERIA: CPT | Performed by: STUDENT IN AN ORGANIZED HEALTH CARE EDUCATION/TRAINING PROGRAM

## 2024-02-10 PROCEDURE — 86334 IMMUNOFIX E-PHORESIS SERUM: CPT | Performed by: STUDENT IN AN ORGANIZED HEALTH CARE EDUCATION/TRAINING PROGRAM

## 2024-02-10 PROCEDURE — 97162 PT EVAL MOD COMPLEX 30 MIN: CPT

## 2024-02-10 PROCEDURE — 84443 ASSAY THYROID STIM HORMONE: CPT | Performed by: PHYSICIAN ASSISTANT

## 2024-02-10 PROCEDURE — 83735 ASSAY OF MAGNESIUM: CPT | Performed by: STUDENT IN AN ORGANIZED HEALTH CARE EDUCATION/TRAINING PROGRAM

## 2024-02-10 PROCEDURE — 99233 SBSQ HOSP IP/OBS HIGH 50: CPT | Performed by: STUDENT IN AN ORGANIZED HEALTH CARE EDUCATION/TRAINING PROGRAM

## 2024-02-10 PROCEDURE — 88305 TISSUE EXAM BY PATHOLOGIST: CPT | Performed by: STUDENT IN AN ORGANIZED HEALTH CARE EDUCATION/TRAINING PROGRAM

## 2024-02-10 PROCEDURE — 85610 PROTHROMBIN TIME: CPT | Performed by: STUDENT IN AN ORGANIZED HEALTH CARE EDUCATION/TRAINING PROGRAM

## 2024-02-10 PROCEDURE — 0HBHXZX EXCISION OF RIGHT UPPER LEG SKIN, EXTERNAL APPROACH, DIAGNOSTIC: ICD-10-PCS | Performed by: DERMATOLOGY

## 2024-02-10 PROCEDURE — 97166 OT EVAL MOD COMPLEX 45 MIN: CPT

## 2024-02-10 PROCEDURE — 85730 THROMBOPLASTIN TIME PARTIAL: CPT | Performed by: STUDENT IN AN ORGANIZED HEALTH CARE EDUCATION/TRAINING PROGRAM

## 2024-02-10 PROCEDURE — 86140 C-REACTIVE PROTEIN: CPT | Performed by: STUDENT IN AN ORGANIZED HEALTH CARE EDUCATION/TRAINING PROGRAM

## 2024-02-10 PROCEDURE — NC001 PR NO CHARGE: Performed by: DERMATOLOGY

## 2024-02-10 PROCEDURE — NC001 PR NO CHARGE: Performed by: STUDENT IN AN ORGANIZED HEALTH CARE EDUCATION/TRAINING PROGRAM

## 2024-02-10 PROCEDURE — 86037 ANCA TITER EACH ANTIBODY: CPT | Performed by: STUDENT IN AN ORGANIZED HEALTH CARE EDUCATION/TRAINING PROGRAM

## 2024-02-10 PROCEDURE — 82595 ASSAY OF CRYOGLOBULIN: CPT | Performed by: STUDENT IN AN ORGANIZED HEALTH CARE EDUCATION/TRAINING PROGRAM

## 2024-02-10 PROCEDURE — 93306 TTE W/DOPPLER COMPLETE: CPT | Performed by: INTERNAL MEDICINE

## 2024-02-10 PROCEDURE — 86038 ANTINUCLEAR ANTIBODIES: CPT | Performed by: STUDENT IN AN ORGANIZED HEALTH CARE EDUCATION/TRAINING PROGRAM

## 2024-02-10 PROCEDURE — G1004 CDSM NDSC: HCPCS

## 2024-02-10 PROCEDURE — 83520 IMMUNOASSAY QUANT NOS NONAB: CPT | Performed by: STUDENT IN AN ORGANIZED HEALTH CARE EDUCATION/TRAINING PROGRAM

## 2024-02-10 PROCEDURE — 85025 COMPLETE CBC W/AUTO DIFF WBC: CPT | Performed by: STUDENT IN AN ORGANIZED HEALTH CARE EDUCATION/TRAINING PROGRAM

## 2024-02-10 PROCEDURE — 80053 COMPREHEN METABOLIC PANEL: CPT | Performed by: STUDENT IN AN ORGANIZED HEALTH CARE EDUCATION/TRAINING PROGRAM

## 2024-02-10 PROCEDURE — 88313 SPECIAL STAINS GROUP 2: CPT | Performed by: STUDENT IN AN ORGANIZED HEALTH CARE EDUCATION/TRAINING PROGRAM

## 2024-02-10 PROCEDURE — 82948 REAGENT STRIP/BLOOD GLUCOSE: CPT

## 2024-02-10 PROCEDURE — 71260 CT THORAX DX C+: CPT

## 2024-02-10 PROCEDURE — 88346 IMFLUOR 1ST 1ANTB STAIN PX: CPT | Performed by: STUDENT IN AN ORGANIZED HEALTH CARE EDUCATION/TRAINING PROGRAM

## 2024-02-10 PROCEDURE — 84165 PROTEIN E-PHORESIS SERUM: CPT | Performed by: STUDENT IN AN ORGANIZED HEALTH CARE EDUCATION/TRAINING PROGRAM

## 2024-02-10 PROCEDURE — 84166 PROTEIN E-PHORESIS/URINE/CSF: CPT | Performed by: INTERNAL MEDICINE

## 2024-02-10 RX ORDER — LISINOPRIL 20 MG/1
20 TABLET ORAL DAILY
Status: DISCONTINUED | OUTPATIENT
Start: 2024-02-10 | End: 2024-02-11

## 2024-02-10 RX ORDER — LORAZEPAM 2 MG/ML
1 INJECTION INTRAMUSCULAR EVERY 6 HOURS PRN
Status: DISCONTINUED | OUTPATIENT
Start: 2024-02-10 | End: 2024-02-22 | Stop reason: HOSPADM

## 2024-02-10 RX ORDER — LEVETIRACETAM 500 MG/5ML
1000 INJECTION, SOLUTION, CONCENTRATE INTRAVENOUS EVERY 12 HOURS SCHEDULED
Status: DISCONTINUED | OUTPATIENT
Start: 2024-02-10 | End: 2024-02-17

## 2024-02-10 RX ORDER — ATORVASTATIN CALCIUM 40 MG/1
40 TABLET, FILM COATED ORAL
Status: DISCONTINUED | OUTPATIENT
Start: 2024-02-10 | End: 2024-02-12

## 2024-02-10 RX ORDER — AMLODIPINE BESYLATE 5 MG/1
5 TABLET ORAL DAILY
Status: DISCONTINUED | OUTPATIENT
Start: 2024-02-10 | End: 2024-02-12

## 2024-02-10 RX ORDER — INSULIN LISPRO 100 [IU]/ML
1-5 INJECTION, SOLUTION INTRAVENOUS; SUBCUTANEOUS
Status: DISCONTINUED | OUTPATIENT
Start: 2024-02-10 | End: 2024-02-12

## 2024-02-10 RX ORDER — SODIUM CHLORIDE 9 MG/ML
75 INJECTION, SOLUTION INTRAVENOUS CONTINUOUS
Status: DISCONTINUED | OUTPATIENT
Start: 2024-02-10 | End: 2024-02-19

## 2024-02-10 RX ORDER — PANTOPRAZOLE SODIUM 40 MG/1
40 TABLET, DELAYED RELEASE ORAL
Status: DISCONTINUED | OUTPATIENT
Start: 2024-02-10 | End: 2024-02-22 | Stop reason: HOSPADM

## 2024-02-10 RX ORDER — ACETAMINOPHEN 325 MG/1
650 TABLET ORAL EVERY 6 HOURS PRN
Status: DISCONTINUED | OUTPATIENT
Start: 2024-02-10 | End: 2024-02-11

## 2024-02-10 RX ORDER — ACETAMINOPHEN 325 MG/1
650 TABLET ORAL EVERY 6 HOURS PRN
Status: DISCONTINUED | OUTPATIENT
Start: 2024-02-10 | End: 2024-02-10

## 2024-02-10 RX ORDER — ACETAMINOPHEN 325 MG/1
975 TABLET ORAL EVERY 6 HOURS PRN
Status: DISCONTINUED | OUTPATIENT
Start: 2024-02-10 | End: 2024-02-10

## 2024-02-10 RX ORDER — ONDANSETRON 2 MG/ML
4 INJECTION INTRAMUSCULAR; INTRAVENOUS EVERY 6 HOURS PRN
Status: DISCONTINUED | OUTPATIENT
Start: 2024-02-10 | End: 2024-02-22 | Stop reason: HOSPADM

## 2024-02-10 RX ORDER — DIPHENHYDRAMINE HCL 25 MG
25 TABLET ORAL EVERY 6 HOURS PRN
Status: DISCONTINUED | OUTPATIENT
Start: 2024-02-10 | End: 2024-02-10

## 2024-02-10 RX ORDER — LORAZEPAM 2 MG/ML
1 INJECTION INTRAMUSCULAR EVERY 6 HOURS PRN
Status: DISCONTINUED | OUTPATIENT
Start: 2024-02-10 | End: 2024-02-10

## 2024-02-10 RX ORDER — MAGNESIUM SULFATE HEPTAHYDRATE 40 MG/ML
2 INJECTION, SOLUTION INTRAVENOUS ONCE
Status: COMPLETED | OUTPATIENT
Start: 2024-02-10 | End: 2024-02-10

## 2024-02-10 RX ORDER — DIPHENHYDRAMINE HCL 25 MG
50 TABLET ORAL DAILY
Status: DISCONTINUED | OUTPATIENT
Start: 2024-02-10 | End: 2024-02-22 | Stop reason: HOSPADM

## 2024-02-10 RX ORDER — ACETAMINOPHEN 325 MG/1
975 TABLET ORAL DAILY
Status: DISCONTINUED | OUTPATIENT
Start: 2024-02-10 | End: 2024-02-10

## 2024-02-10 RX ADMIN — LISINOPRIL 20 MG: 20 TABLET ORAL at 08:17

## 2024-02-10 RX ADMIN — ACETAMINOPHEN 650 MG: 325 TABLET, FILM COATED ORAL at 22:22

## 2024-02-10 RX ADMIN — IOHEXOL 100 ML: 350 INJECTION, SOLUTION INTRAVENOUS at 13:29

## 2024-02-10 RX ADMIN — PANTOPRAZOLE SODIUM 40 MG: 40 TABLET, DELAYED RELEASE ORAL at 05:39

## 2024-02-10 RX ADMIN — ATORVASTATIN CALCIUM 40 MG: 40 TABLET, FILM COATED ORAL at 17:05

## 2024-02-10 RX ADMIN — AMLODIPINE BESYLATE 5 MG: 5 TABLET ORAL at 08:16

## 2024-02-10 RX ADMIN — INSULIN LISPRO 1 UNITS: 100 INJECTION, SOLUTION INTRAVENOUS; SUBCUTANEOUS at 08:16

## 2024-02-10 RX ADMIN — MAGNESIUM SULFATE HEPTAHYDRATE 2 G: 40 INJECTION, SOLUTION INTRAVENOUS at 13:47

## 2024-02-10 RX ADMIN — LEVETIRACETAM 1000 MG: 100 INJECTION, SOLUTION INTRAVENOUS at 21:13

## 2024-02-10 RX ADMIN — LEVETIRACETAM 1000 MG: 100 INJECTION, SOLUTION INTRAVENOUS at 08:17

## 2024-02-10 RX ADMIN — INSULIN LISPRO 1 UNITS: 100 INJECTION, SOLUTION INTRAVENOUS; SUBCUTANEOUS at 11:16

## 2024-02-10 RX ADMIN — DIPHENHYDRAMINE HCL 50 MG: 25 TABLET ORAL at 11:14

## 2024-02-10 RX ADMIN — ACETAMINOPHEN 975 MG: 325 TABLET ORAL at 11:14

## 2024-02-10 RX ADMIN — SODIUM CHLORIDE 125 ML/HR: 0.9 INJECTION, SOLUTION INTRAVENOUS at 05:30

## 2024-02-10 NOTE — ASSESSMENT & PLAN NOTE
Lab Results   Component Value Date    HGBA1C 7.7 (H) 09/07/2023       Recent Labs     02/09/24  1553 02/09/24  2046 02/10/24  0646 02/10/24  1053   POCGLU 159* 165* 156* 156*       Blood Sugar Average: Last 72 hrs:  (P) 156    Management per primary team  SSI ordered

## 2024-02-10 NOTE — ASSESSMENT & PLAN NOTE
"Reported history of \"asthma\" although not on any PTA inhalers  Denies ever having had PFTs  No significant wheezing on exam today  Portable chest x-ray in the ER at [place] with low lung volumes with vascular crowding as well as mild opacity at the medial left base, likely atelectasis, but pneumonia/aspiration not excluded in the appropriate clinical setting.  Given workup with areas of vasogenic edema on MRI as well as new onset seizure and wide differential including vasculitides as well as amyloid angiopathy, we will order a CT chest/abdomen/pelvis with contrast to further evaluate  "

## 2024-02-10 NOTE — CONSULTS
DERMATOLOGY:  CONSULTATION   Delphine Nelson 73 y.o. male MRN: [id number]  Unit/Bed#: [facility] 721-01 Encounter: [id number]          Inpatient consult to Dermatology     Date/Time  2/10/2024 8:36 PM     Performed by  Kourtney Chavez MD   Authorized by  Giovanny Arzola DO         In person consult  Assessment/Recommendations   Based on a thorough discussion of this condition and the management approach to it (including a comprehensive discussion of the known risks, side effects and potential benefits of treatment), the patient (family) agrees to implement the following specific plan:    74 y/o M w/ h/o HTN, HLD, DM who presented 2/8 after 1x episode of of seizure activity, fever, and pruritic papular lichenoid eruption of unclear chronicity. Rash if diffuse over extremities and trunk. Differential is broad and includes infectious etiology or cutaneous depositional disorder including papular mucinosis or cutaneous amyloid. Leading differential is scleromyxedema, a rare and chronic idiopathic disorder of cutaneous mucin deposition which affects males and females equally between the ages of 30-80 years. More than 80% of patients have an associated paraproteinemia, typically IgG-lambda type.     Recommendations:  - Follow up LP results  - Ordered CMV, HIV, EBV  - Follow up workup, especially SPEP/UPEP to evaluate for gammopathy  - Punch biopsies x2 taken from the right inner thigh.  - Topical triamcinolone ointment 0.1% recommended twice daily for two weeks as needed for itching. Do not apply to face, groin, or axilla.    PROCEDURE NOTE:  PUNCH BIOPSY      Performing Physician:     Anatomic Location; Clinical Description with size (cm); Pre-Op Diagnosis:    A) Right inner thigh; for H&E; 4 mm punch :fever, new onset seizure, and chronic papular lichenoid rash. Ddx is broad and includes amyloid, mucinosis, and infectious etiologies.    B) Right inner thigh; for DIF in formalin; 4 mm punch; fever, new onset seizure, and  chronic papular lichenoid rash. Ddx is broad and includes amyloid, mucinosis, and infectious etiologies.       Anesthesia: 1% xylocaine with epi       Topical anesthesia: None       Indications: To indicate diagnosis and management plan.    Procedure Details     Patient informed of the risks (including bleeding,scaring and infection) and benefits of the procedure explained. Verbal and written informed consent obtained. The area was prepped and draped in the usual fashion. Anesthesia was obtained with 1% lidocaine with epinephrine. The skin was then stretched perpendicular to the skin tension lines and a punch biopsy to an appropriate sampling depth was obtained with a 4 mm punch with a forceps and iris scissors.     Hemostasis was obtained with 4-0 Prolene x 1 sutures in spot A and 1 sutures in spot B.     Complications:  None      Specimen has been sent for review by Dermatopathology.      Plan:  1. Instructed to keep the wound dry and covered for 24-48h and clean thereafter.  2. Warning signs of infection were reviewed.    3. Recommended that the patient use acetaminophen as needed for pain  4. Sutures if any should be removed in 14 days      Standard post-procedure care has been explained and has been included in written form within the patient's copy of Informed Consent.           Please set up discharge follow up by calling our office: [phone]     Thank you for involving me in the care of your patient. Please call with questions, change in clinical status or if tests recommended above are abnormal.     Discussed with the primary service.      History:     HISTORY OF PRESENT ILLNESS:    Reason for Consult: rash  HPI: Delphine Nelson is a 73 y.o. year old male w/ h/o T2DM, HLD, HTN who presented after sudden onset seizure and fever. Also with history of pruritic papular diffuse rash which has been ongoing for the past 7 years. He has tried multiple supplements on it in the past without relief. The pt  is from [place] although has been living in the [place] his [place] for many years. No new meds or supplements. No recent travel history. He is currently retired, but used to work as a supervisor at a health facility. Wife states he works in the backyard with plants and cherry trees. No new animal exposures or exotic plants or bugs. No tick bites. Vaccinations UTD.    Prior to seizure episode on , patient had no preceding infectious symptoms. He and wife report he felt normal prior to seizure and was acting normally.n        PAST MEDICAL HISTORY:  Past Medical History:   Diagnosis Date    Diabetes mellitus (HCC)     ED (erectile dysfunction)     GERD (gastroesophageal reflux disease)     Hypercholesterolemia     Hypertension     Migraines      Past Surgical History:   Procedure Laterality Date    COLONOSCOPY  2016    NO PAST SURGERIES         FAMILY HISTORY:  Non-contributory    SOCIAL HISTORY:  Social History   /Civil Union  Social History     Substance and Sexual Activity   Alcohol Use Never     Social History     Substance and Sexual Activity   Drug Use Never     Social History     Tobacco Use   Smoking Status Never   Smokeless Tobacco Never       ALLERGIES:  Allergies   Allergen Reactions    Compazine [Prochlorperazine] Tongue Swelling    Zithromax [Azithromycin] Other (See Comments)     .       MEDICATIONS:  All current active medications have been reviewed.       Physical exam:     Temp:  [98.4 °F (36.9 °C)-100.5 °F (38.1 °C)] 98.4 °F (36.9 °C)  HR:  [] 105  Resp:  [18] 18  BP: (132-160)/(74-91) 132/74  SpO2:  [97 %-98 %] 98 %  Temp (24hrs), Av.6 °F (37.6 °C), Min:98.4 °F (36.9 °C), Max:100.5 °F (38.1 °C)  Current: Temperature: 98.4 °F (36.9 °C)    EYES: Normal conjunctiva  ENT: Normal lips and oral mucosa  CARDIOVASCULAR: No edema  SKIN: Diffuse lichenoid papular rash                                                                           Labs, Imaging, & Other Studies     Lab  Results:  I have personally reviewed pertinent labs.     Results from last 7 days   Lab Units 02/10/24  0355 02/09/24  0549 02/08/24  0836   WBC Thousand/uL 11.29* 8.16 9.62   HEMOGLOBIN g/dL 15.2 14.3 14.7   PLATELETS Thousands/uL 334 328 362     Results from last 7 days   Lab Units 02/10/24  0403 02/09/24  0549 02/08/24  0836   POTASSIUM mmol/L 4.1 3.5 3.7   CHLORIDE mmol/L 101 103 98   CO2 mmol/L 28 28 20*   BUN mg/dL 8 11 14   CREATININE mg/dL 1.05 1.01 1.27   EGFR ml/min/1.73sq m 71 74 56   CALCIUM mg/dL 8.9 8.8 9.2   AST U/L 26 21 24   ALT U/L 23 24 28   ALK PHOS U/L 53 53 59     Results from last 7 days   Lab Units 02/10/24  0627 02/10/24  0333   BLOOD CULTURE  Received in Microbiology Lab. Culture in Progress. Received in Microbiology Lab. Culture in Progress.           Pathology:   I have personally reviewed pertinent reports.     Kourtney Chavez  PGY3 Dermatology Resident    Please note, all recommendations are not finalized until signed off by an Attending.

## 2024-02-10 NOTE — ASSESSMENT & PLAN NOTE
Lab Results   Component Value Date    HGBA1C 7.7 (H) 09/07/2023       Recent Labs     02/09/24  0703 02/09/24  1105 02/09/24  1553 02/09/24 2046   POCGLU 155* 196* 159* 165*       Blood Sugar Average: Last 72 hrs:    Diabetic diet as well as sliding scale insulin with meals

## 2024-02-10 NOTE — PLAN OF CARE
Problem: SAFETY ADULT  Goal: Patient will remain free of falls  Description: INTERVENTIONS:  - Educate patient/family on patient safety including physical limitations  - Instruct patient to call for assistance with activity   - Consult OT/PT to assist with strengthening/mobility   - Keep Call bell within reach  - Keep bed low and locked with side rails adjusted as appropriate  - Keep care items and personal belongings within reach  - Initiate and maintain comfort rounds  - Make Fall Risk Sign visible to staff  - Offer Toileting every 2 Hours, in advance of need  - Initiate/Maintain bed alarm  - Obtain necessary fall risk management equipment: non skid footwear  - Apply yellow socks and bracelet for high fall risk patients  - Consider moving patient to room near nurses station  Outcome: Progressing     Problem: DISCHARGE PLANNING  Goal: Discharge to home or other facility with appropriate resources  Description: INTERVENTIONS:  - Identify barriers to discharge w/patient and caregiver  - Arrange for needed discharge resources and transportation as appropriate  - Identify discharge learning needs (meds, wound care, etc.)  - Arrange for interpretive services to assist at discharge as needed  - Refer to Case Management Department for coordinating discharge planning if the patient needs post-hospital services based on physician/advanced practitioner order or complex needs related to functional status, cognitive ability, or social support system  Outcome: Progressing     Problem: Knowledge Deficit  Goal: Patient/family/caregiver demonstrates understanding of disease process, treatment plan, medications, and discharge instructions  Description: Complete learning assessment and assess knowledge base.  Interventions:  - Provide teaching at level of understanding  - Provide teaching via preferred learning methods  Outcome: Progressing     Problem: NEUROSENSORY - ADULT  Goal: Remains free of injury related to seizures  activity  Description: INTERVENTIONS  - Maintain airway, patient safety  and administer oxygen as ordered  - Monitor patient for seizure activity, document and report duration and description of seizure to physician/advanced practitioner  - If seizure occurs,  ensure patient safety during seizure  - Reorient patient post seizure  - Seizure pads on all 4 side rails  - Instruct patient/family to notify RN of any seizure activity including if an aura is experienced  - Instruct patient/family to call for assistance with activity based on nursing assessment  - Administer anti-seizure medications if ordered    Outcome: Progressing     Problem: CARDIOVASCULAR - ADULT  Goal: Maintains optimal cardiac output and hemodynamic stability  Description: INTERVENTIONS:  - Monitor I/O, vital signs and rhythm  - Monitor for S/S and trends of decreased cardiac output  - Administer and titrate ordered vasoactive medications to optimize hemodynamic stability  - Assess quality of pulses, skin color and temperature  - Assess for signs of decreased coronary artery perfusion  - Instruct patient to report change in severity of symptoms  Outcome: Progressing  Goal: Absence of cardiac dysrhythmias or at baseline rhythm  Description: INTERVENTIONS:  - Continuous cardiac monitoring, vital signs, obtain 12 lead EKG if ordered  - Administer antiarrhythmic and heart rate control medications as ordered  - Monitor electrolytes and administer replacement therapy as ordered  Outcome: Progressing

## 2024-02-10 NOTE — H&P
"[facility]  H&P  Name: Delphine Nelson 73 y.o. male I MRN: [id number]  Unit/Bed#: [facility] 703-01 I Date of Admission: 2/10/2024   Date of Service: 2/10/2024 I Hospital Day: 0      Assessment/Plan   * New onset seizure (HCC)  Assessment & Plan  Was in [facility] on early Thursday AM and had a tonic-clonic seizure.  Patient without any known history of seizures  Presented to the ER at [place] and initially was combative; had initial CT head with noted hypodensity within the left frontal and right parietal occipital lobes.  ER staff there reached out to specialist on-call and patient was recommended to be transferred to [place] for neurology as well as neurosurgery and further workup.  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 Flores with Neurology was consulted and reviewed MRI brain; although noted cerebral amyloid angiopathy on MRI, patient's lack of significant burden of amyloid angiopathy and the rest of the brain parenchyma as well as the lack of cognitive atypical and does not meet diagnostic criteria.  While cerebral amyloid angiopathy is possibility still, patient with wide range of other possibilities that need to be excluded including press, CNS vasculitis and certain CNS infections.  A repeat stat CTA head was ordered and this CTA head and neck with focal areas of vasogenic edema better characterized on the recent brain MRI  In addition to imaging, neurology recommended Keppra 1 g twice daily as well as an LP for CSF analysis and potential EEG.   Patient returned back to baseline mentation after his seizure and has not had repeat since  Transferred to [place] on the late hours of 2/9  Admit to medicine on telemetry stepdown to with neurochecks every 2 hours.  Consult " "neurology as well as neurosurgery.  Seizure precautions.  Holding any antiplatelet or anticoagulation agents. Will order LP per neurology recommendations.  Continue IV Keppra.  Will additionally order a CT chest/abdomen/pelvis with contrast to evaluate given wide differential for symptoms  Order 2D echo  Order blood cultures, ESR/CRP as well as further rheumatologic studies  In addition to neurology as well as neurosurgery, have consulted dermatology given patient's rash  Pending further studies as well as neurology/neurosurgery and dermatology consults, may additionally need other subspecialists     Rash  Assessment & Plan  Pruritic, nonpainful, erythematous blanching rash on the extremities as well as trunk  Reports he has had this rash that comes and goes intermittently over the last 7 years   In setting of vasogenic edema of brain causing seizure for which differential diagnosis includes malignancies as well as infection and rheumatologic abnormalities, will consult dermatology for their opinion on etiology of rash as well as potential skin biopsy if felt appropriate from Derm point of view    Respiratory disease  Assessment & Plan  Reported history of \"asthma\" although not on any PTA inhalers  Denies ever having had PFTs  No significant wheezing on exam today  Portable chest x-ray in the ER at [place] with low lung volumes with vascular crowding as well as mild opacity at the medial left base, likely atelectasis, but pneumonia/aspiration not excluded in the appropriate clinical setting.  Given workup with areas of vasogenic edema on MRI as well as new onset seizure and wide differential including vasculitides as well as amyloid angiopathy, we will order a CT chest/abdomen/pelvis with contrast to further evaluate    Type 2 diabetes mellitus with stage 3a chronic kidney disease, without long-term current use of insulin (HCC)  Assessment & Plan  Lab Results   Component Value Date    HGBA1C 7.7 (H) 09/07/2023 "       Recent Labs     02/09/24  0703 02/09/24  1105 02/09/24  1553 02/09/24  2046   POCGLU 155* 196* 159* 165*       Blood Sugar Average: Last 72 hrs:    Diabetic diet as well as sliding scale insulin with meals    Hypercholesterolemia  Assessment & Plan  Continue PTA atorvastatin    Essential hypertension  Assessment & Plan  Continue PTA Norvasc as well as lisinopril               VTE Prophylaxis:  sequential compression device and foot pump applied   Code Status: Level 1 - Full Code       Anticipated Length of Stay:  Patient will be admitted on an Inpatient basis with an anticipated length of stay of  > 2 midnights.   Justification for Hospital Stay: Please see detailed plans noted above.    Chief Complaint:     New onset seizure  History of Present Illness:  Delphine Nelson is a 73 y.o. male who is originally from [place] and lived there until his mid 20s with a past medical history of hypertension, diabetes, reported asthma, headaches who presented to [facility] on the evening of 2/9 as transfer from the [facility] after he had a new seizure and was found to have vasogenic edema from unclear etiology on imaging.  Patient was reportedly in his baseline state of health until Thursday morning when he was at [facility] and had a new onset tonic-clonic seizure.  He then went to the [place] ER where an initial CT head showed a cerebral hypodensity and patient was accepted in transfer to [place] for neurology as well as neurosurgery and further subspecialist as needed.  While awaiting transfer, patient underwent an MRI of the brain as well as a subsequent CTA head and neck which showed possible cerebral amyloid angiopathy as etiology and associated mild leptomeningeal enhancement.  Neurology team was able to weigh in on case prior to transfer and although cerebral amyloid angiopathy a possibility, some of patient's other characteristics on imaging and clinically less consistent with that and as such  recommending ruling out of infectious/malignant/rheumatologic abnormalities.  Upon arrival at [facility] on the evening of 2/9, patient with his wife and patient appears back to his baseline which his wife endorses.  He is able to have a conversation and does not have any current focal deficits.  He has a visible pruritic blanching but nonpainful rash on extremities and torso.  He currently denies any shortness of breath or headache.  Denies any unilateral weakness.      Review of Systems:    Constitutional:  Denies fever or chills   Eyes:  Denies change in visual acuity   HENT:  Denies nasal congestion or sore throat   Respiratory:  Denies cough or shortness of breath   Cardiovascular:  Denies chest pain or edema   GI:  Denies abdominal pain or bloody stools  :  Denies dysuria   Musculoskeletal:  Denies back pain or joint pain   Integument:  +rash  Neurologic:  +seizure  Endocrine:  Denies polyuria or polydipsia   Lymphatic:  Denies swollen glands   Psychiatric:  Denies depression or anxiety     Past Medical and Surgical History:   Past Medical History:   Diagnosis Date    Diabetes mellitus (HCC)     ED (erectile dysfunction)     GERD (gastroesophageal reflux disease)     Hypercholesterolemia     Hypertension     Migraines      Past Surgical History:   Procedure Laterality Date    COLONOSCOPY  08/01/2016    NO PAST SURGERIES         Meds/Allergies:  Medications Prior to Admission   Medication Sig Dispense Refill Last Dose    amLODIPine (NORVASC) 5 mg tablet Take 1 tablet (5 mg total) by mouth daily 90 tablet 3     Ascorbic Acid (VITAMIN C PO) Take by mouth       aspirin 81 mg chewable tablet Chew 81 mg daily       atorvastatin (LIPITOR) 40 mg tablet Take 1 tablet (40 mg total) by mouth daily 90 tablet 1     Choline Fenofibrate (Fenofibric Acid) 135 MG CPDR Take 1 capsule (135 mg total) by mouth daily 90 capsule 3     clotrimazole-betamethasone (LOTRISONE) 1-0.05 % cream Apply topically 2 (two) times a day 45 g 5   "   Cyanocobalamin (VITAMIN B12 PO) Take by mouth       glipiZIDE (GLUCOTROL XL) 5 mg 24 hr tablet Take 1 tablet (5 mg total) by mouth daily 90 tablet 3     glucose blood (OneTouch Ultra) test strip Use 1 each 3 (three) times a day 100 strip 3     lisinopril (ZESTRIL) 10 mg tablet Take 1 tablet (10 mg total) by mouth daily 90 tablet 3     metFORMIN (GLUCOPHAGE) 500 mg tablet Take 2 tablets (1,000 mg total) by mouth 2 (two) times a day with meals 360 tablet 3     omeprazole (PriLOSEC) 40 MG capsule Take 1 capsule (40 mg total) by mouth daily before breakfast 90 capsule 3     OneTouch Delica Lancets 33G MISC by Does not apply route 3 (three) times a week onetouch delica lancets 33G test 3 times per week 100 each 3     sitaGLIPtin (JANUVIA) 100 mg tablet Take 1 tablet (100 mg total) by mouth daily (Patient not taking: Reported on 9/29/2023) 90 tablet 3     tadalafil (CIALIS) 20 MG tablet TAKE ONE TABLET BY MOUTH EVERY DAY AS NEEDED IN THE MORNING - NO MORE THAN 1 TABLET PER 24 HOURS 30 tablet 1        Allergies:   Allergies   Allergen Reactions    Compazine [Prochlorperazine] Tongue Swelling    Zithromax [Azithromycin] Other (See Comments)     .       History:  Marital Status: /Civil Union     Substance Use History:   Social History     Substance and Sexual Activity   Alcohol Use Never     Social History     Tobacco Use   Smoking Status Never   Smokeless Tobacco Never     Social History     Substance and Sexual Activity   Drug Use Never       Family History:  Family History   Problem Relation Age of Onset    Heart disease Mother     Asthma Father     Kidney disease Brother     Diabetes Brother        Physical Exam:     Vitals:   Blood Pressure: 154/81 (02/10/24 0039)  Pulse: 94 (02/10/24 0039)  Temperature: 99.6 °F (37.6 °C) (02/10/24 0039)  Temp Source: Oral (02/10/24 0039)  Respirations: 18 (02/10/24 0039)  Height: 5' 8\" (172.7 cm) (02/10/24 0039)  Weight - Scale: 90 kg (198 lb 6.6 oz) (02/10/24 " 0039)    Constitutional:  Awake, Alert, Oriented  Eyes:  EOMI, No scleral icterus   HENT:   oropharynx moist, external ears normal, external nose normal   Respiratory:  No respiratory distress, no wheezing   Cardiovascular:  Normal rate, no murmurs   GI:  Soft, nondistended, no guarding   :  No costovertebral angle tenderness   Musculoskeletal:  no tenderness, no deformities. Back- no tenderness  Integument:  pruritic, non painful, blanching rash on extremities and torso  Neurologic:  Alert &awake, communicative, CN 2-12 normal,  no focal deficits noted   Psychiatric:  Speech and behavior appropriate       Lab Results: I have personally reviewed pertinent reports.  , I have personally reviewed pertinent films in PACS, and I have personally reviewed pertinent films in PACS with a Radiologist.    Results from last 7 days   Lab Units 02/09/24  0549   WBC Thousand/uL 8.16   HEMOGLOBIN g/dL 14.3   HEMATOCRIT % 43.4   PLATELETS Thousands/uL 328   NEUTROS PCT % 66   LYMPHS PCT % 19   MONOS PCT % 9   EOS PCT % 4     Results from last 7 days   Lab Units 02/09/24  0549   POTASSIUM mmol/L 3.5   CHLORIDE mmol/L 103   CO2 mmol/L 28   BUN mg/dL 11   CREATININE mg/dL 1.01   CALCIUM mg/dL 8.8   ALK PHOS U/L 53   ALT U/L 24   AST U/L 21             Imaging: I have personally reviewed pertinent reports.  , I have personally reviewed pertinent films in PACS, and I have personally reviewed pertinent films in PACS with a Radiologist.    CTA head and neck w wo contrast    Result Date: 2/9/2024  Narrative: CTA NECK AND BRAIN WITH AND WITHOUT CONTRAST INDICATION: CNS vasculitis, known or suspected r/o vasculitis COMPARISON:    2/8/2024 TECHNIQUE:  Routine CT imaging of the Brain without contrast.  Post contrast imaging was performed after administration of iodinated contrast through the neck and brain. Post contrast axial 0.625 mm images timed to opacify the arterial system. 3D rendering was performed on an independent workstation.   MIP  reconstructions performed. Coronal reconstructions were performed of the noncontrast portion of the brain. Radiation dose length product (DLP) for this visit:  1554.16 mGy-cm .  This examination, like all CT scans performed in the [facility], was performed utilizing techniques to minimize radiation dose exposure, including the use of iterative reconstruction and automated exposure control. IV Contrast:  60 mL of iohexol (OMNIPAQUE) IMAGE QUALITY:   Diagnostic FINDINGS: NONCONTRAST BRAIN PARENCHYMA: Focal regions of vasogenic edema in the left frontal lobe right occipital lobe to lesser degree left parietal lobe, redemonstrated correlating to findings on the prior brain MRI. No acute intracranial hemorrhage. VENTRICLES AND EXTRA-AXIAL SPACES:  Normal for the patient's age. VISUALIZED ORBITS: Normal visualized orbits. PARANASAL SINUSES: Normal visualized paranasal sinuses. CERVICAL VASCULATURE AORTIC ARCH AND GREAT VESSELS: Mild atherosclerotic disease of the arch, proximal great vessels and visualized subclavian vessels.  No significant stenosis. RIGHT VERTEBRAL ARTERY CERVICAL SEGMENT:  Normal origin. The vessel is normal in caliber throughout the neck. LEFT VERTEBRAL ARTERY CERVICAL SEGMENT: Mild stenosis at the origin. The vessel is normal in caliber throughout the neck. RIGHT EXTRACRANIAL CAROTID SEGMENT:  Normal caliber common carotid artery.  Normal bifurcation and cervical internal carotid artery.  No stenosis or dissection. LEFT EXTRACRANIAL CAROTID SEGMENT: Mild atherosclerotic disease of the distal common carotid artery and proximal cervical internal carotid artery without significant stenosis compared to the more distal ICA. NASCET criteria was used to determine the degree of internal carotid artery diameter stenosis. INTRACRANIAL VASCULATURE INTERNAL CAROTID ARTERIES: Atherosclerotic calcifications of the cavernous segment of the internal carotid artery are mild..  Normal ophthalmic  artery origins.  Normal ICA terminus. ANTERIOR CIRCULATION:  Symmetric A1 segments and anterior cerebral arteries with normal enhancement.  Normal anterior communicating artery. MIDDLE CEREBRAL ARTERY CIRCULATION: There is beaded irregularity of the left middle cerebral artery series 303, image 198, without focal occlusion. This correlates to the finding on series 310, image 8. DISTAL VERTEBRAL ARTERIES:  Normal distal vertebral arteries.  Posterior inferior cerebellar artery origins are normal. Normal vertebral basilar junction. BASILAR ARTERY:  Basilar artery is normal in caliber.  Normal superior cerebellar arteries. POSTERIOR CEREBRAL ARTERIES: Both posterior cerebral arteries arises from the basilar tip.  Both arteries demonstrate normal enhancement.   Normal posterior communicating arteries. VENOUS STRUCTURES:  Normal. NON VASCULAR ANATOMY BONY STRUCTURES: Dorsal of the cervical doses centered at the C4 level with scattered multilevel spondylotic changes noted. SOFT TISSUES OF THE NECK:  Unremarkable. THORACIC INLET:  Normal.     Impression: 1.  No hemodynamically significant stenosis in the major arteries of the neck. 2. No irregularity of the M1 segment of the left middle cerebral artery possibly representing angiitis/vasculitis or other vasculopathy. 3. Focal areas of vasogenic edema better characterized on the recent brain MRI, ascribed to represent acute amyloid angiitis/acute inflammatory cerebral amyloid angiopathy. Other etiologies not excluded.. Workstation performed: [id number]     MRI brain w wo contrast    Result Date: 2/8/2024  Narrative: MRI BRAIN WITH AND WITHOUT CONTRAST INDICATION: mets. COMPARISON: Same day head CT. TECHNIQUE: Multiplanar, multisequence imaging of the brain was performed before and after gadolinium administration. IV Contrast:  9 mL of Gadobutrol injection (SINGLE-DOSE) IMAGE QUALITY:   Diagnostic. FINDINGS: BRAIN PARENCHYMA: There are peripherally distributed foci of old  microhemorrhage primarily involving supratentorial brain most pronounced in the left frontal and right temporo-occipital lobes. There is a tiny subacute (T1 hypointense) cortical microbleed in the left frontal lobe seen on series 8 image 18. There is vasogenic edema associated with these foci most pronounced in the left frontal and right temporo-occipital lobes lobes. Lesser degree of vasogenic edema is seen in the posterior left temporal lobe and left  parietal lobe. There is associated mild leptomeningeal enhancement in the left frontal lobe. There is tiny focus of restricted diffusion in the left frontal cortex most likely related to described subacute microbleed (series 4 image 23). There is no significant mass effect or midline shift. Nonspecific  foci of T2/FLAIR hyperintensities involving periventricular and subcortical white matter, most compatible with mild microangiopathic change. VENTRICLES:  Normal for the patient's age. SELLA AND PITUITARY GLAND:  Normal. ORBITS:  Normal. PARANASAL SINUSES:  Normal. VASCULATURE:  Evaluation of the major intracranial vasculature demonstrates appropriate flow voids. CALVARIUM AND SKULL BASE:  Normal. EXTRACRANIAL SOFT TISSUES:  Normal.     Impression: 1.  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 Recommend 6-8-week follow-up MRI after treatment to assess interval change. 2.  Mild chronic microangiopathy. The study was marked in EPIC for immediate notification. Workstation performed: [id number]     XR chest 1 view portable    Result Date: 2/8/2024  Narrative: XR CHEST PORTABLE INDICATION: SEIZURE. COMPARISON: None FINDINGS: Low lung volumes producing vascular crowding. Mild opacity at the medial left base. No pneumothorax or pleural effusion. Normal cardiomediastinal silhouette. Bones are unremarkable for age. Normal upper abdomen.     Impression: Low lung volumes with vascular  crowding. Mild opacity at the medial left base, likely atelectasis, but pneumonia/aspiration not excluded in the appropriate clinical setting. Workstation performed: [id number]     CT head wo contrast    Result Date: 2/8/2024  Narrative: CT BRAIN - WITHOUT CONTRAST INDICATION:   Seizure. COMPARISON:  None. TECHNIQUE:  CT examination of the brain was performed.  Multiplanar 2D reformatted images were created from the source data. Radiation dose length product (DLP) for this visit:  933.1 mGy-cm .  This examination, like all CT scans performed in the [facility], was performed utilizing techniques to minimize radiation dose exposure, including the use of iterative reconstruction and automated exposure control. IMAGE QUALITY:  Diagnostic. FINDINGS: PARENCHYMA: Left frontal and right parieto-occipital hypodensity is identified for which contrast-enhanced MRI is recommended to exclude underlying mass lesions with associated vasogenic edema. No significant mass effect is identified. No hemorrhage is seen. VENTRICLES AND EXTRA-AXIAL SPACES:  Normal for the patient's age. VISUALIZED ORBITS: Normal visualized orbits. PARANASAL SINUSES: Normal visualized paranasal sinuses. CALVARIUM AND EXTRACRANIAL SOFT TISSUES:  Normal.     Impression: Hypodensity within the left frontal and right parietal occipital lobes, contrast-enhanced MRI of the brain recommended for further characterization to exclude underlying mass lesions with associated vasogenic edema. Workstation performed: [id number]       Total time for visit, including counseling/coordination of care: 100 minutes. Greater than 50% of this total time spent on direct patient counseling and coorination of care.     Epic Records Reviewed as well as Records in Care Everywhere    ** Please Note: Dragon 360 Dictation voice to text software was used in the creation of this document. **

## 2024-02-10 NOTE — CONSULTS
[facility]  Consult  Name: Delphine Nelson 73 y.o. male I MRN: [id number]  Unit/Bed#: [facility] 721-01 I Date of Admission: 2/10/2024   Date of Service: 2/10/2024 I Hospital Day: 0    Inpatient consult to Neurosurgery  Consult performed by: Susan Gibson PA-C  Consult ordered by: Giovanny Arzola DO      Imaging personally reviewed with attending 2/10/2024 at 8:30 AM  Patient examined at bedside 2/10/2024 at 12:15 PM    Assessment/Plan   * New onset seizure (HCC)  Assessment & Plan  Patient presented with new onset tonic - clonic seizure at [facility] on 2/8/24  No significant PMH, no prior seizure like activity  Imaging significant for non specific edema left frontal and right occipital lobes without underlying mass  On exam, patient just had benedryl and is very sleepy. 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 Focal areas of vasogenic edema better characterized on the recent brain MRI, ascribed to represent acute amyloid angiitis/acute inflammatory cerebral amyloid angiopathy. Other etiologies not excluded    Plan:  Continue to monitor neurological exam  Neurology following for management of abnormal findings  Continue Keppra 1 G twice daily, Ativan as neede  Consider vEEG  Plan for LP sometime today or tomorrow, will follow results  CT CAP pending to evaluate for primary disease  Hold all AC/AP medications at this time  SBP less than 160  Medical management per primary team  PT/OT evaluation, okay to mobilize as tolerated  DVT ppx: SCDs, ok for pharm ppx    Neurosurgery will follow from the periphery.  Please contact provider on-call if LP results are negative and  cerebral angiogram or brain biopsy is requested.  Please call questions or concerns.    Brain edema (HCC)  Assessment & Plan  Unclear etiology  See above for plan    Rash  Assessment & Plan  Nonspecific, itchy rash noted on admission  Dermatology consulted for further recommendations    Type 2 diabetes mellitus with stage 3a chronic kidney disease, without long-term current use of insulin (HCC)  Assessment & Plan  Lab Results   Component Value Date    HGBA1C 7.7 (H) 09/07/2023       Recent Labs     02/09/24  1553 02/09/24  2046 02/10/24  0646 02/10/24  1053   POCGLU 159* 165* 156* 156*       Blood Sugar Average: Last 72 hrs:  (P) 156    Management per primary team  SSI ordered           History of Present Illness     HPI: Delphine Nelson is a 73 y.o. year old male with PMH including DM, HTN, HLD, GERD who presents with new onset seizure activity.  He had a tonic-clonic seizure which was witnessed at [facility] on 2/8/2024.  In the ED, imaging was obtained.  There were 2 areas of cerebral edema on his CT head at outside hospital.  MRI brain was ordered for further evaluation.  This showed nonspecific findings of edema and leptomeningeal enhancement within the left frontal lobe and right occipital lobe.  No underlying mass was appreciated.  There is concerns for cerebral amyloid angiopathy, press, CNS vasculitis, infection, malignancy.  He has no specific past medical history of any of these.  He is from [place] and moved to the [place] in his 20s.  His brother is at bedside currently.      Review of Systems   Constitutional:  Negative for chills and fever.   HENT:  Negative for ear pain and sore throat.    Eyes:  Negative for pain and visual disturbance.   Respiratory:  Negative for cough and shortness of breath.    Cardiovascular:  Negative for chest pain and palpitations.   Gastrointestinal:  Negative for abdominal pain and vomiting.   Genitourinary:  Negative for dysuria and hematuria.   Musculoskeletal:  Negative for  arthralgias and back pain.   Skin:  Negative for color change and rash.   Neurological:  Positive for seizures. Negative for syncope.   All other systems reviewed and are negative.      Historical Information   Past Medical History:   Diagnosis Date    Diabetes mellitus (HCC)     ED (erectile dysfunction)     GERD (gastroesophageal reflux disease)     Hypercholesterolemia     Hypertension     Migraines      Past Surgical History:   Procedure Laterality Date    COLONOSCOPY  08/01/2016    NO PAST SURGERIES       Social History     Substance and Sexual Activity   Alcohol Use Never     Social History     Substance and Sexual Activity   Drug Use Never     Social History     Tobacco Use   Smoking Status Never   Smokeless Tobacco Never     Family History   Problem Relation Age of Onset    Heart disease Mother     Asthma Father     Kidney disease Brother     Diabetes Brother        Meds/Allergies   all current active meds have been reviewed, current meds:   Current Facility-Administered Medications   Medication Dose Route Frequency    acetaminophen (TYLENOL) tablet 975 mg  975 mg Oral Daily    amLODIPine (NORVASC) tablet 5 mg  5 mg Oral Daily    atorvastatin (LIPITOR) tablet 40 mg  40 mg Oral Daily With Dinner    diphenhydrAMINE (BENADRYL) tablet 50 mg  50 mg Oral Daily    insulin lispro (HumaLOG) 100 units/mL subcutaneous injection 1-5 Units  1-5 Units Subcutaneous TID AC    levETIRAcetam (KEPPRA) injection 1,000 mg  1,000 mg Intravenous Q12H ARIANNA    lisinopril (ZESTRIL) tablet 20 mg  20 mg Oral Daily    LORazepam (ATIVAN) injection 1 mg  1 mg Intravenous Q6H PRN    magnesium sulfate 2 g/50 mL IVPB (premix) 2 g  2 g Intravenous Once    ondansetron (ZOFRAN) injection 4 mg  4 mg Intravenous Q6H PRN    pantoprazole (PROTONIX) EC tablet 40 mg  40 mg Oral Early Morning    sodium chloride 0.9 % infusion  125 mL/hr Intravenous Continuous   , and PTA meds:   Prior to Admission Medications   Prescriptions Last Dose Informant Patient  Reported? Taking?   Ascorbic Acid (VITAMIN C PO)   Yes No   Sig: Take by mouth   Choline Fenofibrate (Fenofibric Acid) 135 MG CPDR   No No   Sig: Take 1 capsule (135 mg total) by mouth daily   Cyanocobalamin (VITAMIN B12 PO)   Yes No   Sig: Take by mouth   OneTouch Delica Lancets 33G MISC   No No   Sig: by Does not apply route 3 (three) times a week onetouch delica lancets 33G test 3 times per week   amLODIPine (NORVASC) 5 mg tablet   No No   Sig: Take 1 tablet (5 mg total) by mouth daily   aspirin 81 mg chewable tablet   Yes No   Sig: Chew 81 mg daily   atorvastatin (LIPITOR) 40 mg tablet   No No   Sig: Take 1 tablet (40 mg total) by mouth daily   clotrimazole-betamethasone (LOTRISONE) 1-0.05 % cream   No No   Sig: Apply topically 2 (two) times a day   glipiZIDE (GLUCOTROL XL) 5 mg 24 hr tablet   No No   Sig: Take 1 tablet (5 mg total) by mouth daily   glucose blood (OneTouch Ultra) test strip   No No   Sig: Use 1 each 3 (three) times a day   lisinopril (ZESTRIL) 10 mg tablet   No No   Sig: Take 1 tablet (10 mg total) by mouth daily   metFORMIN (GLUCOPHAGE) 500 mg tablet   No No   Sig: Take 2 tablets (1,000 mg total) by mouth 2 (two) times a day with meals   omeprazole (PriLOSEC) 40 MG capsule   No No   Sig: Take 1 capsule (40 mg total) by mouth daily before breakfast   sitaGLIPtin (JANUVIA) 100 mg tablet   No No   Sig: Take 1 tablet (100 mg total) by mouth daily   Patient not taking: Reported on 9/29/2023   tadalafil (CIALIS) 20 MG tablet   No No   Sig: TAKE ONE TABLET BY MOUTH EVERY DAY AS NEEDED IN THE MORNING - NO MORE THAN 1 TABLET PER 24 HOURS      Facility-Administered Medications: None     Allergies   Allergen Reactions    Compazine [Prochlorperazine] Tongue Swelling    Zithromax [Azithromycin] Other (See Comments)     .       Objective   I/O         02/08 0701 02/09 0700 02/09 0701  02/10 0700 02/10 0701 02/11 0700    Urine (mL/kg/hr)  100     Total Output  100     Net  -100                    Physical  Exam  Vitals and nursing note reviewed.   Constitutional:       Appearance: Normal appearance. He is well-developed. He is obese.   HENT:      Head: Normocephalic and atraumatic.   Eyes:      Extraocular Movements: Extraocular movements intact.      Pupils: Pupils are equal, round, and reactive to light.   Cardiovascular:      Rate and Rhythm: Normal rate.   Pulmonary:      Effort: Pulmonary effort is normal. No respiratory distress.   Abdominal:      Palpations: Abdomen is soft.   Musculoskeletal:         General: Normal range of motion.      Cervical back: Normal range of motion.   Skin:     General: Skin is warm and dry.   Neurological:      Mental Status: He is alert and oriented to person, place, and time.      Cranial Nerves: Cranial nerves 2-12 are intact.      Motor: Motor strength is normal.     Coordination: Finger-Nose-Finger Test normal.      Deep Tendon Reflexes:      Reflex Scores:       Brachioradialis reflexes are 2+ on the right side and 2+ on the left side.       Patellar reflexes are 2+ on the right side and 2+ on the left side.  Psychiatric:         Speech: Speech normal.       Neurologic Exam     Mental Status   Oriented to person, place, and time.   Follows 2 step commands.   Attention: decreased. Concentration: decreased.   Speech: speech is normal   Level of consciousness: alert  Knowledge: good. Able to perform simple calculations.   Able to repeat. Normal comprehension.   Just received Benadryl; very sleepy and had to be woken multiple times throughout exam     Cranial Nerves   Cranial nerves II through XII intact.     CN III, IV, VI   Pupils are equal, round, and reactive to light.    Motor Exam   Muscle bulk: normal  Overall muscle tone: normal  Right arm pronator drift: absent  Left arm pronator drift: absent    Strength   Strength 5/5 throughout.     Sensory Exam   Light touch normal.     Gait, Coordination, and Reflexes     Coordination   Finger to nose coordination: normal    Tremor  "  Resting tremor: absent    Reflexes   Right brachioradialis: 2+  Left brachioradialis: 2+  Right patellar: 2+  Left patellar: 2+  Right Sampson: absent  Left Sampson: absent      Vitals:Blood pressure 160/90, pulse 105, temperature 100.5 °F (38.1 °C), resp. rate 18, height 5' 8\" (1.727 m), weight 90.1 kg (198 lb 10.2 oz), SpO2 98%.,Body mass index is 30.2 kg/m².     Lab Results:   Results from last 7 days   Lab Units 02/10/24  0355 02/09/24  0549 02/08/24  0836   WBC Thousand/uL 11.29* 8.16 9.62   HEMOGLOBIN g/dL 15.2 14.3 14.7   HEMATOCRIT % 46.9 43.4 46.9   PLATELETS Thousands/uL 334 328 362   NEUTROS PCT % 85* 66 44   MONOS PCT % 5 9 8   EOS PCT % 3 4 6     Results from last 7 days   Lab Units 02/10/24  0403 02/09/24  0549 02/08/24  0836   POTASSIUM mmol/L 4.1 3.5 3.7   CHLORIDE mmol/L 101 103 98   CO2 mmol/L 28 28 20*   BUN mg/dL 8 11 14   CREATININE mg/dL 1.05 1.01 1.27   CALCIUM mg/dL 8.9 8.8 9.2   ALK PHOS U/L 53 53 59   ALT U/L 23 24 28   AST U/L 26 21 24     Results from last 7 days   Lab Units 02/10/24  0623 02/09/24  0549 02/08/24  0836   MAGNESIUM mg/dL 1.6* 1.9 2.1         Results from last 7 days   Lab Units 02/10/24  0356   INR  0.94   PTT seconds 28     No results found for: \"TROPONINT\"  ABG:No results found for: \"PHART\", \"YRK9BHX\", \"PO2ART\", \"PKN1HFT\", \"C0ADRUVH\", \"BEART\", \"SOURCE\"    Imaging Studies: I have personally reviewed pertinent reports.   and I have personally reviewed pertinent films in PACS    CTA head and neck w wo contrast    Result Date: 2/9/2024  Narrative: CTA NECK AND BRAIN WITH AND WITHOUT CONTRAST INDICATION: CNS vasculitis, known or suspected r/o vasculitis COMPARISON:    2/8/2024 TECHNIQUE:  Routine CT imaging of the Brain without contrast.  Post contrast imaging was performed after administration of iodinated contrast through the neck and brain. Post contrast axial 0.625 mm images timed to opacify the arterial system. 3D rendering was performed on an independent workstation.  "  MIP reconstructions performed. Coronal reconstructions were performed of the noncontrast portion of the brain. Radiation dose length product (DLP) for this visit:  1554.16 mGy-cm .  This examination, like all CT scans performed in the [facility], was performed utilizing techniques to minimize radiation dose exposure, including the use of iterative reconstruction and automated exposure control. IV Contrast:  60 mL of iohexol (OMNIPAQUE) IMAGE QUALITY:   Diagnostic FINDINGS: NONCONTRAST BRAIN PARENCHYMA: Focal regions of vasogenic edema in the left frontal lobe right occipital lobe to lesser degree left parietal lobe, redemonstrated correlating to findings on the prior brain MRI. No acute intracranial hemorrhage. VENTRICLES AND EXTRA-AXIAL SPACES:  Normal for the patient's age. VISUALIZED ORBITS: Normal visualized orbits. PARANASAL SINUSES: Normal visualized paranasal sinuses. CERVICAL VASCULATURE AORTIC ARCH AND GREAT VESSELS: Mild atherosclerotic disease of the arch, proximal great vessels and visualized subclavian vessels.  No significant stenosis. RIGHT VERTEBRAL ARTERY CERVICAL SEGMENT:  Normal origin. The vessel is normal in caliber throughout the neck. LEFT VERTEBRAL ARTERY CERVICAL SEGMENT: Mild stenosis at the origin. The vessel is normal in caliber throughout the neck. RIGHT EXTRACRANIAL CAROTID SEGMENT:  Normal caliber common carotid artery.  Normal bifurcation and cervical internal carotid artery.  No stenosis or dissection. LEFT EXTRACRANIAL CAROTID SEGMENT: Mild atherosclerotic disease of the distal common carotid artery and proximal cervical internal carotid artery without significant stenosis compared to the more distal ICA. NASCET criteria was used to determine the degree of internal carotid artery diameter stenosis. INTRACRANIAL VASCULATURE INTERNAL CAROTID ARTERIES: Atherosclerotic calcifications of the cavernous segment of the internal carotid artery are mild..  Normal ophthalmic  artery origins.  Normal ICA terminus. ANTERIOR CIRCULATION:  Symmetric A1 segments and anterior cerebral arteries with normal enhancement.  Normal anterior communicating artery. MIDDLE CEREBRAL ARTERY CIRCULATION: There is beaded irregularity of the left middle cerebral artery series 303, image 198, without focal occlusion. This correlates to the finding on series 310, image 8. DISTAL VERTEBRAL ARTERIES:  Normal distal vertebral arteries.  Posterior inferior cerebellar artery origins are normal. Normal vertebral basilar junction. BASILAR ARTERY:  Basilar artery is normal in caliber.  Normal superior cerebellar arteries. POSTERIOR CEREBRAL ARTERIES: Both posterior cerebral arteries arises from the basilar tip.  Both arteries demonstrate normal enhancement.   Normal posterior communicating arteries. VENOUS STRUCTURES:  Normal. NON VASCULAR ANATOMY BONY STRUCTURES: Dorsal of the cervical doses centered at the C4 level with scattered multilevel spondylotic changes noted. SOFT TISSUES OF THE NECK:  Unremarkable. THORACIC INLET:  Normal.     Impression: 1.  No hemodynamically significant stenosis in the major arteries of the neck. 2. No irregularity of the M1 segment of the left middle cerebral artery possibly representing angiitis/vasculitis or other vasculopathy. 3. Focal areas of vasogenic edema better characterized on the recent brain MRI, ascribed to represent acute amyloid angiitis/acute inflammatory cerebral amyloid angiopathy. Other etiologies not excluded.. Workstation performed: [id number]     MRI brain w wo contrast    Result Date: 2/8/2024  Narrative: MRI BRAIN WITH AND WITHOUT CONTRAST INDICATION: mets. COMPARISON: Same day head CT. TECHNIQUE: Multiplanar, multisequence imaging of the brain was performed before and after gadolinium administration. IV Contrast:  9 mL of Gadobutrol injection (SINGLE-DOSE) IMAGE QUALITY:   Diagnostic. FINDINGS: BRAIN PARENCHYMA: There are peripherally distributed foci of old  microhemorrhage primarily involving supratentorial brain most pronounced in the left frontal and right temporo-occipital lobes. There is a tiny subacute (T1 hypointense) cortical microbleed in the left frontal lobe seen on series 8 image 18. There is vasogenic edema associated with these foci most pronounced in the left frontal and right temporo-occipital lobes lobes. Lesser degree of vasogenic edema is seen in the posterior left temporal lobe and left  parietal lobe. There is associated mild leptomeningeal enhancement in the left frontal lobe. There is tiny focus of restricted diffusion in the left frontal cortex most likely related to described subacute microbleed (series 4 image 23). There is no significant mass effect or midline shift. Nonspecific  foci of T2/FLAIR hyperintensities involving periventricular and subcortical white matter, most compatible with mild microangiopathic change. VENTRICLES:  Normal for the patient's age. SELLA AND PITUITARY GLAND:  Normal. ORBITS:  Normal. PARANASAL SINUSES:  Normal. VASCULATURE:  Evaluation of the major intracranial vasculature demonstrates appropriate flow voids. CALVARIUM AND SKULL BASE:  Normal. EXTRACRANIAL SOFT TISSUES:  Normal.     Impression: 1.  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 Recommend 6-8-week follow-up MRI after treatment to assess interval change. 2.  Mild chronic microangiopathy. The study was marked in EPIC for immediate notification. Workstation performed: [id number]     XR chest 1 view portable    Result Date: 2/8/2024  Narrative: XR CHEST PORTABLE INDICATION: SEIZURE. COMPARISON: None FINDINGS: Low lung volumes producing vascular crowding. Mild opacity at the medial left base. No pneumothorax or pleural effusion. Normal cardiomediastinal silhouette. Bones are unremarkable for age. Normal upper abdomen.     Impression: Low lung volumes with vascular  crowding. Mild opacity at the medial left base, likely atelectasis, but pneumonia/aspiration not excluded in the appropriate clinical setting. Workstation performed: [id number]     CT head wo contrast    Result Date: 2/8/2024  Narrative: CT BRAIN - WITHOUT CONTRAST INDICATION:   Seizure. COMPARISON:  None. TECHNIQUE:  CT examination of the brain was performed.  Multiplanar 2D reformatted images were created from the source data. Radiation dose length product (DLP) for this visit:  933.1 mGy-cm .  This examination, like all CT scans performed in the [facility], was performed utilizing techniques to minimize radiation dose exposure, including the use of iterative reconstruction and automated exposure control. IMAGE QUALITY:  Diagnostic. FINDINGS: PARENCHYMA: Left frontal and right parieto-occipital hypodensity is identified for which contrast-enhanced MRI is recommended to exclude underlying mass lesions with associated vasogenic edema. No significant mass effect is identified. No hemorrhage is seen. VENTRICLES AND EXTRA-AXIAL SPACES:  Normal for the patient's age. VISUALIZED ORBITS: Normal visualized orbits. PARANASAL SINUSES: Normal visualized paranasal sinuses. CALVARIUM AND EXTRACRANIAL SOFT TISSUES:  Normal.     Impression: Hypodensity within the left frontal and right parietal occipital lobes, contrast-enhanced MRI of the brain recommended for further characterization to exclude underlying mass lesions with associated vasogenic edema. Workstation performed: [id number]     EKG, Pathology, and Other Studies: I have personally reviewed pertinent reports.      VTE Prophylaxis: Sequential compression device (Venodyne)     Code Status: Level 1 - Full Code  Advance Directive and Living Will:      Power of :    POLST:      Counseling / Coordination of Care  I spent 30 minutes with the patient.

## 2024-02-10 NOTE — OCCUPATIONAL THERAPY NOTE
Occupational Therapy Evaluation     Patient Name: Delphine Nelson  Today's Date: 2/10/2024  Problem List  Principal Problem:    New onset seizure (HCC)  Active Problems:    Essential hypertension    Hypercholesterolemia    Type 2 diabetes mellitus with stage 3a chronic kidney disease, without long-term current use of insulin (HCC)    Cerebral amyloid angiopathy     Respiratory disease    Rash    Brain edema (HCC)    Past Medical History  Past Medical History:   Diagnosis Date    Diabetes mellitus (HCC)     ED (erectile dysfunction)     GERD (gastroesophageal reflux disease)     Hypercholesterolemia     Hypertension     Migraines      Past Surgical History  Past Surgical History:   Procedure Laterality Date    COLONOSCOPY  08/01/2016    NO PAST SURGERIES           02/10/24 0915   OT Last Visit   OT Visit Date 02/10/24   Note Type   Note type Evaluation   Pain Assessment   Pain Assessment Tool 0-10   Pain Score No Pain   Restrictions/Precautions   Weight Bearing Precautions Per Order No   Other Precautions Chair Alarm;Bed Alarm;Fall Risk   Home Living   Type of Home House   Home Layout Two level;Bed/bath upstairs;Stairs to enter with rails   Prior Function   Level of [place] Independent with ADLs;Independent with functional mobility;Independent with IADLS   Lives With Spouse   Receives Help From Family   IADLs Independent with driving;Independent with meal prep;Independent with medication management   Falls in the last 6 months 1 to 4   Vocational Retired   Lifestyle   Autonomy I adls and mobility w/o ad- denies additional falls - i iadls- shares homemaking with spouse   Reciprocal Relationships supportive family   Service to Others retired   Intrinsic Gratification active pta   Subjective   Subjective offers no c/o   ADL   Eating Assistance 7  Independent   Grooming Assistance 5  Supervision/Setup   UB Bathing Assistance 5  Supervision/Setup   LB Bathing Assistance 5  Supervision/Setup   UB Dressing  Assistance 5  Supervision/Setup   LB Dressing Assistance 5  Supervision/Setup   Toileting Assistance  5  Supervision/Setup   Bed Mobility   Supine to Sit 5  Supervision   Sit to Supine 5  Supervision   Transfers   Sit to Stand 5  Supervision   Stand to Sit 5  Supervision   Functional Mobility   Functional Mobility 5  Supervision   Balance   Static Sitting Good   Dynamic Sitting Fair +   Static Standing Fair +   Dynamic Standing Fair   Ambulatory Fair   Activity Tolerance   Activity Tolerance Patient tolerated treatment well   RUE Assessment   RUE Assessment WFL   LUE Assessment   LUE Assessment WFL   Cognition   Arousal/Participation Arousable;Cooperative   Attention Attends with cues to redirect   Orientation Level Oriented X4   Memory Within functional limits   Following Commands Follows one step commands with increased time or repetition   Comments flat affect - slow to respond at times - spoke to wife who reports this is baseline for pt   Assessment   Limitation Decreased endurance;Decreased self-care trans;Decreased high-level ADLs   Prognosis Good   Assessment Pt is a 73 y.o. male who was admitted to [facility] on 2/10/2024 with New onset seizure (HCC) . Patient  has a past medical history of Diabetes mellitus (HCC), ED (erectile dysfunction), GERD (gastroesophageal reflux disease), Hypercholesterolemia, Hypertension, and Migraines. At baseline pt was completing adls and mobility-  I iadls - shares homemaking with spouse. Pt lives with wife in 2 story home with 2nd floor bed/bath. Currently pt requires sba for overall ADLS and sba for functional mobility/transfers. Pt currently presents with impairments in the following categories -difficulty performing IADLS  and environment endurance and standing balance/tolerance. These impairments, as well as pt's fatigue, decreased caregiver support, risk for falls, and home environment  limit pt's ability to safely engage in all baseline areas of occupation,  includingfunctional mobility/transfers, community mobility, laundry , driving, house maintenance, medication management, meal prep, cleaning, social participation , and leisure activities  however has supportive family who are able to provide assist prn -From OT standpoint, recommend Level IV resources upon D/C. No immediate acute OT needs indicated at this time- d/c from caseload   Goals   Patient Goals go home   Plan   OT Frequency Eval only   Discharge Recommendation   Rehab Resource Intensity Level, OT No post-acute rehabilitation needs   AM-PAC Daily Activity Inpatient   Lower Body Dressing 4   Bathing 4   Toileting 4   Upper Body Dressing 4   Grooming 4   Eating 4   Daily Activity Raw Score 24   Daily Activity Standardized Score (Calc for Raw Score >=11) 57.54   AM-PAC Applied Cognition Inpatient   Following a Speech/Presentation 4   Understanding Ordinary Conversation 4   Taking Medications 4   Remembering Where Things Are Placed or Put Away 4   Remembering List of 4-5 Errands 3   Taking Care of Complicated Tasks 3   Applied Cognition Raw Score 22   Applied Cognition Standardized Score 47.83   End of Consult   Education Provided Yes;Family or social support of family present for education by provider   Patient Position at End of Consult Supine;Bed/Chair alarm activated;All needs within reach   Nurse Communication Nurse aware of consult       The patient's raw score on the AM-PAC Daily Activity Inpatient Short Form is 24. A raw score of greater than or equal to 19 suggests the patient may benefit from discharge to home. Please refer to the recommendation of the Occupational Therapist for safe discharge planning.      Documentation Completed By:    ROSALIA Zelaya/L  [employer] Certified - [id number]

## 2024-02-10 NOTE — CONSULTS
Please see tele consult completed by Dr. Sergio Flores on 2/9/24 and my progress note from today. Will continue to follow.

## 2024-02-10 NOTE — PROGRESS NOTES
"Progress Note - Neurology   Delphine Nelson 73 y.o. male [id number]  Unit/Bed#: [facility] 721/[facility] 721-01    Assessment/Plan:  * New onset seizure (HCC)  Assessment & Plan  73 y.o. male with HTN, HLD, DM2, [id number], Erectile dysfunction, Migraines, Asthma, GERD and intermittent extremity and trunk pruritic rash who presented following a witnessed seizure. Upon EMS arrival patient was reportedly \"posturing\" and was given Versed 4mg. Aside from elevated BS, labs were unremarkable.   Imaging concern for Inflammatory cerebral amyloid angiopathy with a subacute cortical microbleed in the left frontal lobe with vasogenic edema as as detailed below.    Work-up/Imaging:  UA negative  Labs unremarkable aside from WBC 8-->11, Mg 1.6, CRP 7.6, ESR 16  UDS negative (aside from positive for benzos as expected)  CTH revealed:   Hypodensity within the left frontal and right parietal occipital lobes, contrast-enhanced MRI of the brain recommended for further characterization to exclude underlying mass lesions with associated vasogenic edema.   CTA H/N:   No hemodynamically significant stenosis in the major arteries of the neck.  No irregularity of the M1 segment of the left middle cerebral artery possibly representing angiitis/vasculitis or other vasculopathy.  Focal areas of vasogenic edema better characterized on the recent brain MRI, ascribed to represent acute amyloid angiitis/acute inflammatory cerebral amyloid angiopathy. Other etiologies not excluded.  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 Patient was transferred to \[facility]\"" for neurology and neurosurgery evaluation.     Plan:  Continue Keppra 1g bid  Ativan prn for motor seizure activity > 2 minutes  Plan for LP:  Protein, Glucose, WBC, RBC,, Gram stain, culture, ME panel, oligoclonal bands, Cytology, Flow cytometry  CT CAP w pending  Echo pending  Routine EEG   Pending work-up " "will likely need repeat MRI brain in 4-6 weeks  Tele  TSH  Seizure precautions  [employer] form completed and faxed. Patient aware that they are not to drive.   Neurosurgery consults  Dermatology consulted for rash    Cerebral amyloid angiopathy   Assessment & Plan  As noted on imaging.   Work-up as detailed above.    Brain edema (HCC)  Assessment & Plan  As noted on imaging.  Imaging and planned work-up as detailed above    Rash  Assessment & Plan  Dermatology consulted      Recommendations for outpatient neurological follow up have yet to be determined.    Subjective:   Delphine Nelson is a 73 y.o. right handed male with HTN, HLD, DM2, Ugctg2q CKD, Erectile dysfunction, Migraines, Asthma, GERD who originally presented to [facility] on 2/8/24 following a witnessed seizure at [facility]. On EMS arrival patient was reportedly \"posturing\" and was given Versed 4mg. In the ED, patient was postictal and combative. . No prior history of seizure.  Initial /63. ED exam was nonfocal.   Keppra 1g bid initiated.  Aside from elevated BS, labs were unremarkable. UDS negative (aside from positive for benzos as expected).  Of note patient also with pruritic, nonpainful, erythematous blanching rash on extremities as well as trunk which he reports is intermittent over the last 7 years.  Dermatology has been consulted for this.    Imaging revealing findings concerning for Inflammatory cerebral amyloid angiopathy with a subacute cortical microbleed in the left frontal lobe with vasogenic edema as as detailed above.    Teleneurology consult was completed. As per Dr. Flores: \"Possibilities include PRESS, CNS vasculitis, and certain CNS infections. Inflammatory amyloid angiopathy was suggested by radiology and is a possibility, but with the lack of significant burden of amyloid angiopathy in the rest of brain parenchyme, and of any cognitive deficit, a definitive diagnosis of IAA cannot be made as per Pipestem criteria.\"    This " morning tmax of 100.5F. WBC 8-->11. Mg 1.6. CRP 7.6.    Patient and wife feel that he is at baseline.  After neurology evaluation, wife does report occasional forgetfulness that she feels is appropriate for age.  She also did disclose to me that the patient has a longstanding chronic history of migraines with associated nausea, typically in the bifrontal region.  Patient was apparently trialed on a couple different migraine medications remotely that were unsuccessful.  For many years now he has been taking 2 Tylenol PM daily in the evenings which has significantly helped his migrainous symptoms.        Past Medical History:   Diagnosis Date    Diabetes mellitus (HCC)     ED (erectile dysfunction)     GERD (gastroesophageal reflux disease)     Hypercholesterolemia     Hypertension     Migraines      Past Surgical History:   Procedure Laterality Date    COLONOSCOPY  08/01/2016    NO PAST SURGERIES       Family History   Problem Relation Age of Onset    Heart disease Mother     Asthma Father     Kidney disease Brother     Diabetes Brother      Social History     Socioeconomic History    Marital status: /Civil Union     Spouse name: None    Number of children: None    Years of education: None    Highest education level: None   Occupational History    None   Tobacco Use    Smoking status: Never    Smokeless tobacco: Never   Vaping Use    Vaping status: Never Used   Substance and Sexual Activity    Alcohol use: Never    Drug use: Never    Sexual activity: Not Currently     Partners: Female   Other Topics Concern    None   Social History Narrative    None     Social Determinants of Health     Financial Resource Strain: Low Risk  (9/29/2023)    Overall Financial Resource Strain (CARDIA)     Difficulty of Paying Living Expenses: Not hard at all   Food Insecurity: No Food Insecurity (2/10/2024)    Hunger Vital Sign     Worried About Running Out of Food in the Last Year: Never true     Ran Out of Food in the Last Year:  "Never true   Transportation Needs: Patient Unable To Answer (2/10/2024)    PRAPARE - Transportation     Lack of Transportation (Medical): Patient unable to answer     Lack of Transportation (Non-Medical): Patient unable to answer   Physical Activity: Not on file   Stress: Not on file   Social Connections: Not on file   Intimate Partner Violence: Not on file   Housing Stability: Patient Unable To Answer (2/10/2024)    Housing Stability Vital Sign     Unable to Pay for Housing in the Last Year: Patient unable to answer     Number of Places Lived in the Last Year: Not on file     Unstable Housing in the Last Year: Patient unable to answer       Medications: Reviewed in detail by me.    ROS: Review of Systems   Neurological:  Positive for seizures.     A 12 point ROS was completed and other than the above mentioned symptoms, all remaining systems were negative.       Vitals: /74   Pulse 105   Temp 98.4 °F (36.9 °C)   Resp 18   Ht 5' 8\" (1.727 m)   Wt 89.8 kg (198 lb)   SpO2 98%   BMI 30.11 kg/m²     Physical Exam:   Physical Exam  Constitutional:       General: He is not in acute distress.     Appearance: Normal appearance. He is well-developed. He is not ill-appearing, toxic-appearing or diaphoretic.   HENT:      Head: Normocephalic and atraumatic.   Eyes:      General: No scleral icterus.        Right eye: No discharge.         Left eye: No discharge.      Extraocular Movements: Extraocular movements intact and EOM normal.      Conjunctiva/sclera: Conjunctivae normal.   Cardiovascular:      Rate and Rhythm: Normal rate and regular rhythm.   Pulmonary:      Effort: Pulmonary effort is normal. No respiratory distress.      Breath sounds: No stridor.   Musculoskeletal:         General: No tenderness or deformity. Normal range of motion.      Cervical back: Normal range of motion and neck supple.   Skin:     General: Skin is warm and dry.      Findings: No erythema or rash.   Neurological:      Mental Status: " He is alert and oriented to person, place, and time.      Motor: Motor strength is normal.     Coordination: Finger-Nose-Finger Test and Heel to Shin Test normal.   Psychiatric:         Speech: Speech normal.       Neurologic Exam     Mental Status   Oriented to person, place, and time.   Follows 2 step commands.   Attention: normal. Concentration: normal.   Speech: speech is normal (No dysarthria or aphasia)  Level of consciousness: alert  Knowledge: good.   Normal comprehension.   Able to spell WORLD forwards but not backwards. Unable to calculate total of 3 coins. Knows current president. Able to follow multistep commands though sometimes delayed and on occasion required repetition. Unable to correctly complete 2 step sequential commands.      Cranial Nerves     CN II   Visual acuity: normal (grossly)  Right visual field deficit: none  Left visual field deficit: none     CN III, IV, VI   Extraocular motions are normal.   Nystagmus: none   Conjugate gaze: present    CN V   Facial sensation intact.     CN VII   Facial expression full, symmetric.     CN VIII   Hearing: intact    CN XII   Tongue deviation: none    Motor Exam   Muscle bulk: normal  Overall muscle tone: normal  Right arm pronator drift: absent  Left arm pronator drift: absent    Strength   Strength 5/5 throughout.     Sensory Exam   Light touch normal.     Gait, Coordination, and Reflexes     Coordination   Finger to nose coordination: normal  Heel to shin coordination: normal  DTRs: BUEs 2+ throughout, BLEs 1+ throughout       Labs: Reviewed in detail by me.     Imaging: I have personally reviewed pertinent imaging and PACS reports.     VTE Prophylaxis: Sequential compression device (Venodyne)     Total time spent today 45 minutes. Greater than 50% of total time was spent with the patient and / or family counseling and / or coordination of care. A description of the counseling / coordination of care: Speaking with wife, patient, primary team about  imaging findings, differential, next lab/pending workup and overall plan of care.

## 2024-02-10 NOTE — ASSESSMENT & PLAN NOTE
Pruritic, nonpainful, erythematous blanching rash on the extremities as well as trunk  Reports he has had this rash that comes and goes intermittently over the last 7 years   In setting of vasogenic edema of brain causing seizure for which differential diagnosis includes malignancies as well as infection and rheumatologic abnormalities, will consult dermatology for their opinion on etiology of rash as well as potential skin biopsy if felt appropriate from Derm point of view

## 2024-02-10 NOTE — ASSESSMENT & PLAN NOTE
"73 y.o. male with HTN, HLD, DM2, [id number], Erectile dysfunction, Migraines, Asthma, GERD and intermittent extremity and trunk pruritic rash who presented following a witnessed seizure. Upon EMS arrival patient was reportedly \"posturing\" and was given Versed 4mg. Aside from elevated BS, labs were unremarkable. Imaging concern for Inflammatory cerebral amyloid angiopathy with a subacute cortical microbleed in the left frontal lobe with vasogenic edema as as detailed below.    Work-up/Imaging:  UA negative  Labs unremarkable aside from WBC 8-->11, Mg 1.6, CRP 7.6, ESR 16  UDS negative (aside from positive for benzos as expected)  CTH revealed:   Hypodensity within the left frontal and right parietal occipital lobes, contrast-enhanced MRI of the brain recommended for further characterization to exclude underlying mass lesions with associated vasogenic edema.   CTA H/N:   No hemodynamically significant stenosis in the major arteries of the neck.  No irregularity of the M1 segment of the left middle cerebral artery possibly representing angiitis/vasculitis or other vasculopathy.  Focal areas of vasogenic edema better characterized on the recent brain MRI, ascribed to represent acute amyloid angiitis/acute inflammatory cerebral amyloid angiopathy. Other etiologies not excluded.  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   Patient was transferred to [facility] for neurology and neurosurgery evaluation.   Echo: EF 60-65%, mild AV stenosis  CTA CAP w: No acute abnormality or suspicious mass Cholelithiasis. Hepatic steatosis.  Routine EEG: Intermittent left fronto-temporal delta activities suggest a focal area of neuronal dysfunction in that region. Background activities are overall too slow suggesting mild diffuse cerebral dysfunction of nonspecific etiology.   MRI wall vessel imaging:  Findings again seen most consistent with " "inflammatory amyloid angiopathy with vasogenic edema and petechial hemorrhages most pronounced in the left frontal and right posterior temporal/occipital regions.  1 or 2 new tiny foci of restricted diffusion in the left parietal lobe may be related to microhemorrhages versus tiny infarcts. Otherwise no change.  Stable mild to moderate stenosis in the left M1 segment.  Vessel wall imaging limited due to motion and venous contamination.  IR angiography 2/15/24:  \"Left MCA irregularity with areas of narrowing. Findings are nonspecific and may reflect either an atherosclerotic or inflammatory vasculopathy. \"  CSF studies:  WNL: WBC, protein, RBC, gram stain, ME panel  Abnormal: Glucose 101    Plan:  Continue Keppra 1g BID  Ativan PRN for motor seizure activity > 2 minutes  S/p LP via IR on 2/12  CSF studies: culture, oligoclonal bands, Cytology, Flow cytometry  Cerebral angiogram completed 2/15/24  Recommend brain bx. Workup thus far has not been able to differentiate between CAA-RI versus vasculitis.   Pending work-up will likely need repeat MRI brain in 4-6 weeks  Telemetry  Seizure precautions  [employer] form completed and faxed by previous neurology AP. Patient aware that they are not to drive.   Neurosurgery following; appreciate recommendations  ID following; appreciate recommendations  Monitor neuro exam; notify with any changes  Medical management and supportive care per primary team. Correction of any metabolic or infectious disturbances.   "

## 2024-02-10 NOTE — QUICK NOTE
Postadmission assessment, patient seen and examined independently of admitting provider.  Patient resting comfortably in bed, states his rash is a little itchy but this is par for course for his typical flareups.  He has no new complaints at present.  Reviewed imaging, labs, and neurology recommendations, workup pending including LP with further neurologic recs to follow.  CT chest abdomen pelvis ordered to rule out any signs of possible primary malignancy, from which patient's rash could potentially represent paraneoplastic syndrome?  Appreciate comanagement and expertise of consulting teams including neurology, neurosurgery, and dermatology.  Low-grade temps bordering on fever although we have not repeated a temp greater than 100.4 since last measurement.  This may be somewhat masked in the outpatient setting as patient and wife state that he takes scheduled Tylenol in the outpatient setting

## 2024-02-10 NOTE — ASSESSMENT & PLAN NOTE
Patient presented with new onset tonic - clonic seizure at [facility] on 2/8/24  No significant PMH, no prior seizure like activity  Imaging significant for non specific edema left frontal and right occipital lobes without underlying mass  On exam, patient just had benedryl and is very sleepy. 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 Focal areas of vasogenic edema better characterized on the recent brain MRI, ascribed to represent acute amyloid angiitis/acute inflammatory cerebral amyloid angiopathy. Other etiologies not excluded    Plan:  Continue to monitor neurological exam  Neurology following for management of abnormal findings  Continue Keppra 1 G twice daily, Ativan as neede  Consider vEEG  Plan for LP sometime today or tomorrow, will follow results  CT CAP pending to evaluate for primary disease  Hold all AC/AP medications at this time  SBP less than 160  Medical management per primary team  PT/OT evaluation, okay to mobilize as tolerated  DVT ppx: SCDs, ok for pharm ppx    Neurosurgery will follow from the periphery.  Please contact provider on-call if LP results are negative and cerebral angiogram or brain biopsy is requested.  Please call questions or concerns.

## 2024-02-10 NOTE — PHYSICAL THERAPY NOTE
Physical Therapy Evaluation     Patient's Name: Delphine Nelson    Admitting Diagnosis  Seizure (HCC) [R56.9]    Problem List  Patient Active Problem List   Diagnosis    Essential hypertension    Colon polyps    Hypercholesterolemia    GERD (gastroesophageal reflux disease)    Erectile dysfunction    Stage 3a chronic kidney disease (HCC)    Type 2 diabetes mellitus with stage 3a chronic kidney disease, without long-term current use of insulin (HCC)    New onset seizure (HCC)    Cerebral amyloid angiopathy     Respiratory disease    Rash    Brain edema (HCC)       Past Medical History  Past Medical History:   Diagnosis Date    Diabetes mellitus (HCC)     ED (erectile dysfunction)     GERD (gastroesophageal reflux disease)     Hypercholesterolemia     Hypertension     Migraines        Past Surgical History  Past Surgical History:   Procedure Laterality Date    COLONOSCOPY  08/01/2016    NO PAST SURGERIES          02/10/24 0916   PT Last Visit   PT Visit Date 02/10/24   Note Type   Note type Evaluation   Pain Assessment   Pain Assessment Tool 0-10   Pain Score No Pain   Restrictions/Precautions   Weight Bearing Precautions Per Order No   Braces or Orthoses   (none)   Other Precautions Fall Risk;Multiple lines;Bed Alarm   Home Living   Type of Home House   Home Layout Two level;Bed/bath upstairs;Stairs to enter with rails  (6 keon)   Home Equipment   (denies)   Additional Comments Prior to this admission patient resided in a 2 story home (6 KEON) with his spouse (works during day). At his baseline he is I with mobility (no use of AD), ADLS, and iADLS. + . Retired.   Prior Function   Level of [place] Independent with ADLs;Independent with functional mobility;Independent with IADLS   Lives With Spouse   Receives Help From Family   IADLs Independent with driving;Independent with meal prep;Independent with medication management   Falls in the last 6 months 1 to 4  (1- fall at [facility])   Vocational Retired   General  "  Additional Pertinent History 73 y.o. male admitted to [facility] as transfer from [facility] where he presented after having a witnessed seizure while at [facility] on 2/10/2024. Patient's MRI of brain identified the following: inflammatory cerebral amyloid angiopathy with a subacute cortical microbleed in the left frontal lobe. He is pending lumbar puncture.   Family/Caregiver Present Yes  (spouse)   Cognition   Arousal/Participation Responsive   Attention Attends with cues to redirect   Orientation Level Oriented X4   Memory Within functional limits   Following Commands Follows one step commands with increased time or repetition   Comments flat/withdrawn affect, spouse reports patient's mentation and personality at this time are at baseline   Subjective   Subjective \"it is because you are walking close to me\"   RUE Assessment   RUE Assessment WFL   LUE Assessment   LUE Assessment WFL   RLE Assessment   RLE Assessment WFL   LLE Assessment   LLE Assessment WFL   Coordination   Rapid Alternating Movements Intact  (foot tapping)   Light Touch   RLE Light Touch Grossly intact   LLE Light Touch Grossly intact   Bed Mobility   Supine to Sit 5  Supervision   Sit to Supine 5  Supervision   Additional Comments post evaluation patient in bed with alarm active   Transfers   Sit to Stand 5  Supervision   Stand to Sit 5  Supervision   Additional Comments no AD   Ambulation/Elevation   Gait pattern WNL   Gait Assistance 5  Supervision   Additional items Assist x 1  (managing IV pole)   Assistive Device None   Distance 80 feet x 2   Stair Management Assistance 5  Supervision   Additional items Verbal cues   Stair Management Technique One rail R;Foreward;Nonreciprocal   Number of Stairs 4  (limited by IV pole)   Balance   Static Sitting Good   Static Standing Fair +   Ambulatory Fair   Activity Tolerance   Activity Tolerance Patient tolerated treatment well   Medical Staff Made Aware This moderate complexity evaluation was " performed with an occupational therapist due to the patient's co-morbidities, emerging status, and present impairments   Nurse Made Aware alvin to see per RN Nicolasa and f/u post   Assessment   Prognosis Good   Problem List Impaired balance   Assessment PT completed evaluation of 73 y.o. male admitted to [facility] as transfer from [facility] where he presented after having a witnessed seizure while at [facility] on 2/10/2024. Patient's MRI of brain identified the following: inflammatory cerebral amyloid angiopathy with a subacute cortical microbleed in the left frontal lobe. He is pending lumbar puncture.     Patient's emerging status instabilities include use of IV fluids, falls risk, bed/chair alarm, and continuous O2/HR monitoring.  PMH is significant for DM and asthma. Prior to this admission patient resided in a 2 story home (6 KEON) with his spouse (works during day). At his baseline he is I with mobility (no use of AD), ADLS, and iADLS. + . Retired.     During PT evaluation patient presented with flat and withdrawn affect. As per spouse- his mentation and personality are currently at baseline. He presented with normal strength, sensation, and coordination of b/l LE. Patient mildly impulsive while ambulating and between IV pole, obstacles in hallway and therapist guarding patient his gait was mildly unsteady. Patient verbalized this to be due to therapist being too close. Educated to maintain consistent and reduced gait speed to improve safety. Initially while performing stairs, patient presented with b/l arms crossed and required VC to utilized HR and perform in non-reciprocal manner. He walked a total distance of 80 feet x 2 with close supervision and therapist managing IV pole and ascended/descended 4 steps without assist. PT educated patient and his wife to continue to monitor patient for any observed changes in mentation or balance. Encouraged reduced speed of mobility and S of spouse during  initial performance of stairs upon d/c home.     Patient presents without further inpt PT needs at this time. Recommend continued ambulation with S of RN staff/restorative therapist. D/C inpt PT.   Goals   Patient Goals to go home   PT Treatment Day 0   Plan   PT Frequency Other (Comment)  (d/c inpt PT)   Discharge Recommendation   Rehab Resource Intensity Level, PT No post-acute rehabilitation needs   Equipment Recommended   (none)   AM-PAC Basic Mobility Inpatient   Turning in Flat Bed Without Bedrails 4   Lying on Back to Sitting on Edge of Flat Bed Without Bedrails 4   Moving Bed to Chair 4   Standing Up From Chair Using Arms 4   Walk in Room 4   Climb 3-5 Stairs With Railing 4   Basic Mobility Inpatient Raw Score 24   Basic Mobility Standardized Score 57.68   Highest Level Of Mobility   JH-HLM Goal 8: Walk 250 feet or more   JH-HLM Achieved 7: Walk 25 feet or more     The patient's AM-PAC Basic Mobility Inpatient Standardized Score is greater than 42.9, suggesting this patient may benefit from discharge to home. Please also refer to the recommendation of the Physical Therapist for safe discharge planning.        Samantha Ozuna, PT, DPT

## 2024-02-11 PROBLEM — R50.9 FEVER: Status: ACTIVE | Noted: 2024-02-11

## 2024-02-11 LAB
ALBUMIN SERPL BCP-MCNC: 3.9 G/DL (ref 3.5–5)
ALP SERPL-CCNC: 54 U/L (ref 34–104)
ALT SERPL W P-5'-P-CCNC: 23 U/L (ref 7–52)
ANION GAP SERPL CALCULATED.3IONS-SCNC: 13 MMOL/L
AST SERPL W P-5'-P-CCNC: 22 U/L (ref 13–39)
BILIRUB DIRECT SERPL-MCNC: 0.22 MG/DL (ref 0–0.2)
BILIRUB SERPL-MCNC: 0.91 MG/DL (ref 0.2–1)
BUN SERPL-MCNC: 14 MG/DL (ref 5–25)
CALCIUM SERPL-MCNC: 9 MG/DL (ref 8.4–10.2)
CHLORIDE SERPL-SCNC: 100 MMOL/L (ref 96–108)
CO2 SERPL-SCNC: 24 MMOL/L (ref 21–32)
CREAT SERPL-MCNC: 1.45 MG/DL (ref 0.6–1.3)
ERYTHROCYTE [DISTWIDTH] IN BLOOD BY AUTOMATED COUNT: 13.9 % (ref 11.6–15.1)
GFR SERPL CREATININE-BSD FRML MDRD: 48 ML/MIN/1.73SQ M
GLUCOSE SERPL-MCNC: 148 MG/DL (ref 65–140)
GLUCOSE SERPL-MCNC: 154 MG/DL (ref 65–140)
GLUCOSE SERPL-MCNC: 191 MG/DL (ref 65–140)
GLUCOSE SERPL-MCNC: 204 MG/DL (ref 65–140)
GLUCOSE SERPL-MCNC: 223 MG/DL (ref 65–140)
HCT VFR BLD AUTO: 46 % (ref 36.5–49.3)
HGB BLD-MCNC: 15.2 G/DL (ref 12–17)
MAGNESIUM SERPL-MCNC: 2 MG/DL (ref 1.9–2.7)
MCH RBC QN AUTO: 28.4 PG (ref 26.8–34.3)
MCHC RBC AUTO-ENTMCNC: 33 G/DL (ref 31.4–37.4)
MCV RBC AUTO: 86 FL (ref 82–98)
PLATELET # BLD AUTO: 319 THOUSANDS/UL (ref 149–390)
PMV BLD AUTO: 9.2 FL (ref 8.9–12.7)
POTASSIUM SERPL-SCNC: 4.2 MMOL/L (ref 3.5–5.3)
PROCALCITONIN SERPL-MCNC: 0.3 NG/ML
PROT SERPL-MCNC: 6.8 G/DL (ref 6.4–8.4)
RBC # BLD AUTO: 5.35 MILLION/UL (ref 3.88–5.62)
SODIUM SERPL-SCNC: 137 MMOL/L (ref 135–147)
WBC # BLD AUTO: 14.83 THOUSAND/UL (ref 4.31–10.16)

## 2024-02-11 PROCEDURE — 83735 ASSAY OF MAGNESIUM: CPT | Performed by: STUDENT IN AN ORGANIZED HEALTH CARE EDUCATION/TRAINING PROGRAM

## 2024-02-11 PROCEDURE — 80076 HEPATIC FUNCTION PANEL: CPT | Performed by: STUDENT IN AN ORGANIZED HEALTH CARE EDUCATION/TRAINING PROGRAM

## 2024-02-11 PROCEDURE — 82948 REAGENT STRIP/BLOOD GLUCOSE: CPT

## 2024-02-11 PROCEDURE — 80048 BASIC METABOLIC PNL TOTAL CA: CPT | Performed by: STUDENT IN AN ORGANIZED HEALTH CARE EDUCATION/TRAINING PROGRAM

## 2024-02-11 PROCEDURE — 85027 COMPLETE CBC AUTOMATED: CPT | Performed by: STUDENT IN AN ORGANIZED HEALTH CARE EDUCATION/TRAINING PROGRAM

## 2024-02-11 PROCEDURE — 99223 1ST HOSP IP/OBS HIGH 75: CPT | Performed by: INTERNAL MEDICINE

## 2024-02-11 PROCEDURE — 99232 SBSQ HOSP IP/OBS MODERATE 35: CPT | Performed by: STUDENT IN AN ORGANIZED HEALTH CARE EDUCATION/TRAINING PROGRAM

## 2024-02-11 RX ORDER — ACETAMINOPHEN 325 MG/1
650 TABLET ORAL EVERY 6 HOURS PRN
Status: DISCONTINUED | OUTPATIENT
Start: 2024-02-11 | End: 2024-02-22 | Stop reason: HOSPADM

## 2024-02-11 RX ORDER — LISINOPRIL 10 MG/1
10 TABLET ORAL DAILY
Status: DISCONTINUED | OUTPATIENT
Start: 2024-02-12 | End: 2024-02-11

## 2024-02-11 RX ORDER — ACETAMINOPHEN 325 MG/1
975 TABLET ORAL EVERY 8 HOURS SCHEDULED
Status: DISCONTINUED | OUTPATIENT
Start: 2024-02-11 | End: 2024-02-11

## 2024-02-11 RX ADMIN — LEVETIRACETAM 1000 MG: 100 INJECTION, SOLUTION INTRAVENOUS at 08:33

## 2024-02-11 RX ADMIN — INSULIN LISPRO 1 UNITS: 100 INJECTION, SOLUTION INTRAVENOUS; SUBCUTANEOUS at 17:32

## 2024-02-11 RX ADMIN — ATORVASTATIN CALCIUM 40 MG: 40 TABLET, FILM COATED ORAL at 17:31

## 2024-02-11 RX ADMIN — ACETAMINOPHEN 650 MG: 325 TABLET, FILM COATED ORAL at 05:10

## 2024-02-11 RX ADMIN — DIPHENHYDRAMINE HCL 50 MG: 25 TABLET ORAL at 08:30

## 2024-02-11 RX ADMIN — ACETAMINOPHEN 650 MG: 325 TABLET, FILM COATED ORAL at 11:21

## 2024-02-11 RX ADMIN — AMLODIPINE BESYLATE 5 MG: 5 TABLET ORAL at 08:33

## 2024-02-11 RX ADMIN — ACETAMINOPHEN 650 MG: 325 TABLET, FILM COATED ORAL at 17:31

## 2024-02-11 RX ADMIN — LEVETIRACETAM 1000 MG: 100 INJECTION, SOLUTION INTRAVENOUS at 20:53

## 2024-02-11 RX ADMIN — INSULIN LISPRO 1 UNITS: 100 INJECTION, SOLUTION INTRAVENOUS; SUBCUTANEOUS at 11:21

## 2024-02-11 RX ADMIN — SODIUM CHLORIDE 75 ML/HR: 0.9 INJECTION, SOLUTION INTRAVENOUS at 17:37

## 2024-02-11 RX ADMIN — PANTOPRAZOLE SODIUM 40 MG: 40 TABLET, DELAYED RELEASE ORAL at 05:10

## 2024-02-11 RX ADMIN — LISINOPRIL 20 MG: 20 TABLET ORAL at 08:33

## 2024-02-11 NOTE — CONSULTS
Consultation - Infectious Disease   Faustomashaheed Nelson 73 y.o. male MRN: [id number]  Unit/Bed#: [facility] 721-01 Encounter: [id number]      IMPRESSION & RECOMMENDATIONS:   Impression/Recommendations:  This is a 73 y.o. male, with a few stable medical problems outlined below, has a chronic intermittent rash, admitted on 2/9 with new onset witnessed tonic-clonic seizure.  Head CT/MRI showed focal vasogenic edema in left frontal and right parietal occipital region.  Patient has fever post seizure.    Fever, with mild leukocytosis.  Etiology of this is unclear.  Consider postictal fever.  Consider fever secondary to underlying inflammatory process.  Patient does not have chills or symptoms of fever.  He likely has chronic fever at home.  There is no obvious active infection.  He remains clinically and systemically well despite his fever.  Admission blood cultures have no growth thus far.  With patient clinically and systemically well, likely prolonged fever and no obvious active infection, given need for extensive workup of underlying inflammatory process, will keep patient off antibiotic so that it does not affect workup.  Continue to observe off antibiotic.  Monitor temperature/WBC.    Follow-up on admission blood cultures.    Focal vasogenic edema and, with new onset seizure.  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  However, dermatologist is considering scleromyxedema.  Regardless, this appears to be an inflammatory process rather than infectious.  Patient scheduled to get lumbar puncture tomorrow.  He may need brain biopsy if results from skin biopsy and lumbar puncture are unrevealing.  Observe off antibiotic, as seen above.  Follow-up on CSF findings at LP tomorrow.    Diffuse skin rash, intermittent for the last many years.  Patient is status post punch biopsy of rash on right side.  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  No evidence of cellulitis or acute infection of  skin.  Follow-up on pathology from punch biopsy.  Follow-up on EBV and CMV serologies.  Follow-up on HIV.  Will check RPR, to be complete.    Discussed with patient and his son in detail regarding the above plan.  Discussed with Dr. Ibarra from [facility] regarding continuing workup and hold off on antibiotic.  He is in agreement.    Thank you for this consultation.  We will follow along with you.    HISTORY OF PRESENT ILLNESS:  Reason for Consult: Fever, rash, abnormal head MRI.    HPI: Delphine Nelson is a 73 y.o. male, multiple stable medical problems including DM, HTN, hypercholesterolemia, was in usual state of health when he developed a weakness tonic-clonic seizure at the grocery store on 2/8.  He was brought to [facility].  Patient was admitted and started on antiseizure medication, with no further seizure.  Patient had head CT and MRI showing focal vasogenic edema involving left frontal and right parietal occipital lobes, concerning for inflammatory cerebral amyloid angiopathy.  Addition, patient has a diffuse rash.  Therefore, he was transferred to [facility] yesterday.  Patient had punch biopsy of rash and has right thigh yesterday.  Plan for lumbar puncture tomorrow noted.  We are asked to evaluate the patient.    At present, patient feels well.  Although his temperature has been up for the last few days, patient denies any chills or discomfort from the fever.  No headache.  No further seizure.  No prior history of seizure.    With regards to patient's rash, patient states that he has had some form of rash for the last 7 years that came and went.  When the rash appears, it would last for many months at a time.  He would have pruritus but no pain.  No open wound.    REVIEW OF SYSTEMS:  A complete system-based review was done.  Except for what is noted in HPI above, ROS of systems is otherwise negative.    PAST MEDICAL HISTORY:  Past Medical History:   Diagnosis Date    Diabetes mellitus  (HCC)     ED (erectile dysfunction)     GERD (gastroesophageal reflux disease)     Hypercholesterolemia     Hypertension     Migraines      Past Surgical History:   Procedure Laterality Date    COLONOSCOPY  2016    NO PAST SURGERIES       Problem list reviewed.    FAMILY HISTORY:  Non-contributory    SOCIAL HISTORY:  Social History     Substance and Sexual Activity   Alcohol Use Never     Social History     Substance and Sexual Activity   Drug Use Never     Social History     Tobacco Use   Smoking Status Never   Smokeless Tobacco Never       ALLERGIES:  Allergies   Allergen Reactions    Compazine [Prochlorperazine] Tongue Swelling    Zithromax [Azithromycin] Other (See Comments)     .       MEDICATIONS:  All current active medications have been reviewed.  Patient is currently not on an antibiotic.    PHYSICAL EXAM:  Vitals:  Temp:  [98.4 °F (36.9 °C)-101.4 °F (38.6 °C)] 101.2 °F (38.4 °C)  HR:  [100-125] 116  Resp:  [18] 18  BP: ()/(66-96) 134/76  SpO2:  [95 %-98 %] 98 %  Temp (24hrs), Av °F (37.8 °C), Min:98.4 °F (36.9 °C), Max:101.4 °F (38.6 °C)  Current: Temperature: (!) 101.2 °F (38.4 °C)     Physical Exam:  General:  Well-nourished, well-developed, in no acute distress. Awake, alert and oriented x 3.  Eyes:  Conjunctive clear with no hemorrhages or effusions  Oropharynx:  No ulcers, no lesions, pharynx benign, no tonsillitis  Neck:  Supple, no lymphadenopathy, no mass, nontender  Lungs:  Expansion symmetric, no rales, no wheezing, no accessory muscle use  Cardiac:  Regular rate and rhythm, normal S1, normal S2, no murmurs  Abdomen:  Soft, nondistended, non-tender, no HSM  Extremities:  No edema, no erythema, nontender. No ulcers  Skin: Diffuse papular rash with mildly erythematous base.  No vesicles.  No draining wound.  No purulence.  Nontender.  Neurological:  Moves all four extremities spontaneously, sensation grossly intact    LABS, IMAGING, & OTHER STUDIES:  Lab Results:  I have personally  reviewed pertinent labs.  Results from last 7 days   Lab Units 02/11/24  0431 02/10/24  0403 02/09/24  0549   POTASSIUM mmol/L 4.2 4.1 3.5   CHLORIDE mmol/L 100 101 103   CO2 mmol/L 24 28 28   BUN mg/dL 14 8 11   CREATININE mg/dL 1.45* 1.05 1.01   EGFR ml/min/1.73sq m 48 71 74   CALCIUM mg/dL 9.0 8.9 8.8   AST U/L 22 26 21   ALT U/L 23 23 24   ALK PHOS U/L 54 53 53     Results from last 7 days   Lab Units 02/11/24  0647 02/10/24  0355 02/09/24  0549   WBC Thousand/uL 14.83* 11.29* 8.16   HEMOGLOBIN g/dL 15.2 15.2 14.3   PLATELETS Thousands/uL 319 334 328     Results from last 7 days   Lab Units 02/10/24  0627 02/10/24  0333   BLOOD CULTURE  No Growth at 24 hrs. No Growth at 24 hrs.       Imaging Studies:   I have personally reviewed pertinent imaging study reports and images in PACS.  Chest/abdomen/pelvis CT reviewed personally.  No pneumonia.  No intra-abdominal pathology.  Head/neck CT reviewed personally.  Focal area of vasogenic edema on frontal and right occipital area.  Head MRI personally.  Left frontal and right temporal occipital vasogenic edema.    EKG, Pathology, and Other Studies:   I have personally reviewed pertinent reports.

## 2024-02-11 NOTE — ASSESSMENT & PLAN NOTE
"Reported history of \"asthma\" although not on any PTA inhalers  Denies ever having had PFTs  No significant wheezing on exam today  Portable chest x-ray in the ER at [place] with low lung volumes with vascular crowding as well as mild opacity at the medial left base, likely atelectasis, but pneumonia/aspiration not excluded in the appropriate clinical setting.  No significant pathology noted on CT chest abdomen pelvis  "

## 2024-02-11 NOTE — ASSESSMENT & PLAN NOTE
"Was in [facility] on early Thursday AM and had a tonic-clonic seizure.  Patient without any known history of seizures  Presented to the ER at [place] and initially was combative; had initial CT head with noted hypodensity within the left frontal and right parietal occipital lobes.  ER staff there reached out to specialist on-call and patient was recommended to be transferred to [place] for neurology as well as neurosurgery and further workup.  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 Flores with Neurology was consulted and reviewed MRI brain; although noted cerebral amyloid angiopathy on MRI, patient's lack of significant burden of amyloid angiopathy and the rest of the brain parenchyma as well as the lack of cognitive atypical and does not meet diagnostic criteria.  While cerebral amyloid angiopathy is possibility still, patient with wide range of other possibilities that need to be excluded including press, CNS vasculitis and certain CNS infections.  A repeat stat CTA head was ordered and this CTA head and neck with focal areas of vasogenic edema better characterized on the recent brain MRI  In addition to imaging, neurology recommended Keppra 1 g twice daily as well as an LP for CSF analysis and potential EEG.   Patient returned back to baseline mentation after his seizure and has not had repeat since  Transferred to [facility] on the late hours of 2/9  Admit to medicine on telemetry stepdown to with neurochecks every 2 hours.  Consult neurology as well as neurosurgery.  Seizure precautions.  Holding any antiplatelet or anticoagulation agents. Will order LP per neurology recommendations.  Continue IV Keppra.  Will additionally order a CT chest/abdomen/pelvis with contrast to evaluate given wide differential for " symptoms  Echo with preserved LVEF  Order blood cultures, ESR/CRP as well as further rheumatologic studies  Appreciate neurology and neurosurgery recommendations  LP likely planned tomorrow with IR

## 2024-02-11 NOTE — QUICK NOTE
"73 y.o. male with HTN, HLD, DM2, [id number], Erectile dysfunction, Migraines, Asthma, GERD and intermittent extremity and trunk pruritic rash who presented following a witnessed seizure. Upon EMS arrival patient was reportedly \"posturing\" and was given Versed 4mg. Aside from elevated BS, labs were unremarkable.   Imaging concern for Inflammatory cerebral amyloid angiopathy with a subacute cortical microbleed in the left frontal lobe with vasogenic edema as as detailed below.  LP pending. Patient and wife requesting IR completion, not bedside. Will see after LP.     Plan:  Continue Keppra 1g bid  Ativan prn for motor seizure activity > 2 minutes  LP pending by IR, likely to be done 2/12:  Protein, Glucose, WBC, RBC,, Gram stain, culture, ME panel, oligoclonal bands, Cytology, Flow cytometry  Routine EEG   Pending work-up will likely need repeat MRI brain in 4-6 weeks  Tele  TSH  Seizure precautions  [employer] form completed and faxed. Patient aware that they are not to drive.   Neurosurgery consulted   Will discuss LP results with Neurosurgery to determine if additional interventions are warranted.  Dermatology following for rash, s/p biopsy     "

## 2024-02-11 NOTE — ASSESSMENT & PLAN NOTE
Pruritic, nonpainful, erythematous blanching rash on the extremities as well as trunk  Reports he has had this rash that comes and goes intermittently over the last 7 years   Scheduled Tylenol and Benadryl continued, this is what patient takes at home to treat this rash  Derm evaluated patient and punch biopsy pending.  Appreciate their expertise

## 2024-02-11 NOTE — PROGRESS NOTES
"[facility]  Progress Note  Name: Delphine Nelson I  MRN: [id number]  Unit/Bed#: PPHP 721-01 I Date of Admission: 2/10/2024   Date of Service: 2/11/2024 I Hospital Day: 1    Assessment/Plan   Rash  Assessment & Plan  Pruritic, nonpainful, erythematous blanching rash on the extremities as well as trunk  Reports he has had this rash that comes and goes intermittently over the last 7 years   Scheduled Tylenol and Benadryl continued, this is what patient takes at home to treat this rash  Derm evaluated patient and punch biopsy pending.  Appreciate their expertise    Respiratory disease  Assessment & Plan  Reported history of \"asthma\" although not on any PTA inhalers  Denies ever having had PFTs  No significant wheezing on exam today  Portable chest x-ray in the ER at [facility] with low lung volumes with vascular crowding as well as mild opacity at the medial left base, likely atelectasis, but pneumonia/aspiration not excluded in the appropriate clinical setting.  No significant pathology noted on CT chest abdomen pelvis    Type 2 diabetes mellitus with stage 3a chronic kidney disease, without long-term current use of insulin (HCC)  Assessment & Plan  Lab Results   Component Value Date    HGBA1C 7.7 (H) 09/07/2023       Recent Labs     02/10/24  1607 02/10/24  2115 02/11/24  0615 02/11/24  1028   POCGLU 129 152* 148* 204*         Blood Sugar Average: Last 72 hrs:  (P) 157.5  Diabetic diet as well as sliding scale insulin with meals    Hypercholesterolemia  Assessment & Plan  Continue PTA atorvastatin    Essential hypertension  Assessment & Plan  Continue PTA Norvasc as well as lisinopril    * New onset seizure (HCC)  Assessment & Plan  Was in [facility] on early Thursday AM and had a tonic-clonic seizure.  Patient without any known history of seizures  Presented to the ER at [facility] and initially was combative; had initial CT head with noted hypodensity within the left frontal and right " "parietal occipital lobes.  ER staff there reached out to specialist on-call and patient was recommended to be transferred to [place] for neurology as well as neurosurgery and further workup.  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 Flores with Neurology was consulted and reviewed MRI brain; although noted cerebral amyloid angiopathy on MRI, patient's lack of significant burden of amyloid angiopathy and the rest of the brain parenchyma as well as the lack of cognitive atypical and does not meet diagnostic criteria.  While cerebral amyloid angiopathy is possibility still, patient with wide range of other possibilities that need to be excluded including press, CNS vasculitis and certain CNS infections.  A repeat stat CTA head was ordered and this CTA head and neck with focal areas of vasogenic edema better characterized on the recent brain MRI  In addition to imaging, neurology recommended Keppra 1 g twice daily as well as an LP for CSF analysis and potential EEG.   Patient returned back to baseline mentation after his seizure and has not had repeat since  Transferred to [facility] on the late hours of 2/9  Admit to medicine on telemetry stepdown to with neurochecks every 2 hours.  Consult neurology as well as neurosurgery.  Seizure precautions.  Holding any antiplatelet or anticoagulation agents. Will order LP per neurology recommendations.  Continue IV Keppra.  Will additionally order a CT chest/abdomen/pelvis with contrast to evaluate given wide differential for symptoms  Echo with preserved LVEF  Order blood cultures, ESR/CRP as well as further rheumatologic studies  Appreciate neurology and neurosurgery recommendations  LP likely planned tomorrow with IR               VTE Pharmacologic Prophylaxis: VTE Score: " 3 Moderate Risk (Score 3-4) - Pharmacological DVT Prophylaxis Contraindicated. Sequential Compression Devices Ordered.    Mobility:   Basic Mobility Inpatient Raw Score: 24  JH-HLM Goal: 8: Walk 250 feet or more  JH-HLM Achieved: 8: Walk 250 feet ot more  HLM Goal achieved. Continue to encourage appropriate mobility.    Patient Centered Rounds: I performed bedside rounds with nursing staff today.   Discussions with Specialists or Other Care Team Provider: Infectious Disease    Education and Discussions with Family / Patient: Updated  (wife) at bedside.    Total Time Spent on Date of Encounter in care of patient: 45 mins. This time was spent on one or more of the following: performing physical exam; counseling and coordination of care; obtaining or reviewing history; documenting in the medical record; reviewing/ordering tests, medications or procedures; communicating with other healthcare professionals and discussing with patient's family/caregivers.    Current Length of Stay: 1 day(s)  Current Patient Status: Inpatient   Certification Statement: The patient will continue to require additional inpatient hospital stay due to fever workup  Discharge Plan: Anticipate discharge in 48-72 hrs to discharge location to be determined pending rehab evaluations.    Code Status: Level 1 - Full Code    Subjective:   Seen and examined at bedside, patient resting comfortably in bed.  He has no complaints at present.  States rash is in its usual character.  No new concerns from patient or nursing    Objective:     Vitals:   Temp (24hrs), Av.9 °F (37.7 °C), Min:98.4 °F (36.9 °C), Max:101.4 °F (38.6 °C)    Temp:  [98.4 °F (36.9 °C)-101.4 °F (38.6 °C)] 98.6 °F (37 °C)  HR:  [100-125] 115  Resp:  [18] 18  BP: ()/(66-96) 134/76  SpO2:  [95 %-98 %] 95 %  Body mass index is 30.2 kg/m².     Input and Output Summary (last 24 hours):     Intake/Output Summary (Last 24 hours) at 2024 7262  Last data filed at  2/10/2024 2000  Gross per 24 hour   Intake 200 ml   Output 200 ml   Net 0 ml       Physical Exam:   Physical Exam  Vitals and nursing note reviewed.   Constitutional:       General: He is not in acute distress.     Appearance: He is well-developed. He is not toxic-appearing or diaphoretic.   HENT:      Head: Normocephalic and atraumatic.      Mouth/Throat:      Mouth: Mucous membranes are moist.   Eyes:      General: No scleral icterus.     Extraocular Movements: Extraocular movements intact.      Conjunctiva/sclera: Conjunctivae normal.   Cardiovascular:      Rate and Rhythm: Normal rate and regular rhythm.      Pulses: Normal pulses.      Heart sounds: No murmur heard.     No friction rub. No gallop.   Pulmonary:      Effort: Pulmonary effort is normal. No respiratory distress.      Breath sounds: Normal breath sounds. No wheezing, rhonchi or rales.   Abdominal:      General: Abdomen is flat. Bowel sounds are normal. There is no distension.      Palpations: Abdomen is soft. There is no mass.      Tenderness: There is no abdominal tenderness. There is no guarding.   Musculoskeletal:         General: No swelling.      Cervical back: Neck supple.      Right lower leg: No edema.      Left lower leg: No edema.   Lymphadenopathy:      Cervical: No cervical adenopathy.   Skin:     General: Skin is warm and dry.      Capillary Refill: Capillary refill takes less than 2 seconds.      Coloration: Skin is not jaundiced.      Findings: Rash present.      Comments: Diffuse morbilliform rash noted   Neurological:      General: No focal deficit present.      Mental Status: He is alert and oriented to person, place, and time.      Sensory: No sensory deficit.      Motor: No weakness.   Psychiatric:         Mood and Affect: Mood normal.          Additional Data:     Labs:  Results from last 7 days   Lab Units 02/11/24  0647 02/10/24  0355   WBC Thousand/uL 14.83* 11.29*   HEMOGLOBIN g/dL 15.2 15.2   HEMATOCRIT % 46.0 46.9    PLATELETS Thousands/uL 319 334   NEUTROS PCT %  --  85*   LYMPHS PCT %  --  7*   MONOS PCT %  --  5   EOS PCT %  --  3     Results from last 7 days   Lab Units 02/11/24  0431   SODIUM mmol/L 137   POTASSIUM mmol/L 4.2   CHLORIDE mmol/L 100   CO2 mmol/L 24   BUN mg/dL 14   CREATININE mg/dL 1.45*   ANION GAP mmol/L 13   CALCIUM mg/dL 9.0   ALBUMIN g/dL 3.9   TOTAL BILIRUBIN mg/dL 0.91   ALK PHOS U/L 54   ALT U/L 23   AST U/L 22   GLUCOSE RANDOM mg/dL 154*     Results from last 7 days   Lab Units 02/10/24  0356   INR  0.94     Results from last 7 days   Lab Units 02/11/24  1028 02/11/24  0615 02/10/24  2115 02/10/24  1607 02/10/24  1053 02/10/24  0646 02/09/24  2046 02/09/24  1553 02/09/24  1105 02/09/24  0703 02/08/24  0832   POC GLUCOSE mg/dl 204* 148* 152* 129 156* 156* 165* 159* 196* 155* 249*         Results from last 7 days   Lab Units 02/10/24  2316   PROCALCITONIN ng/ml 0.30*       Lines/Drains:  Invasive Devices       Peripheral Intravenous Line  Duration             Peripheral IV 02/10/24 Right Antecubital 1 day                      Telemetry:  Telemetry Orders (From admission, onward)               24 Hour Telemetry Monitoring  Continuous x 24 Hours (Telem)        Expiring   Question:  Reason for 24 Hour Telemetry  Answer:  TIA/Suspected CVA/ Confirmed CVA                     Telemetry Reviewed: Normal Sinus Rhythm  Indication for Continued Telemetry Use: Acute CVA             Imaging: Reviewed radiology reports from this admission including: chest CT scan and abdominal/pelvic CT    Recent Cultures (last 7 days):   Results from last 7 days   Lab Units 02/10/24  0627 02/10/24  0333   BLOOD CULTURE  No Growth at 24 hrs. No Growth at 24 hrs.       Last 24 Hours Medication List:   Current Facility-Administered Medications   Medication Dose Route Frequency Provider Last Rate    acetaminophen  650 mg Oral Q6H PRN José Manuel Sevilla,       amLODIPine  5 mg Oral Daily Giovanny Arzola DO      atorvastatin  40 mg  Oral Daily With Dinner Giovanny Arzola, DO      diphenhydrAMINE  50 mg Oral Daily Andrea Ibarra      insulin lispro  1-5 Units Subcutaneous TID AC Giovanny Arzola, DO      levETIRAcetam  1,000 mg Intravenous Q12H [facility] Giovanny Arzola, DO      lisinopril  20 mg Oral Daily Giovanny Arzola, DO      LORazepam  1 mg Intravenous Q6H PRN Fabiana Cutler, DO      ondansetron  4 mg Intravenous Q6H PRN Giovanny Arzola, DO      pantoprazole  40 mg Oral Early Morning Giovanny Arzola, DO      sodium chloride  75 mL/hr Intravenous Continuous Andrea Ibarra 75 mL/hr (02/10/24 8153)        Today, Patient Was Seen By: Andrea Ibarra    **Please Note: This note may have been constructed using a voice recognition system.**

## 2024-02-11 NOTE — ASSESSMENT & PLAN NOTE
Lab Results   Component Value Date    HGBA1C 7.7 (H) 09/07/2023       Recent Labs     02/10/24  1607 02/10/24  2115 02/11/24  0615 02/11/24  1028   POCGLU 129 152* 148* 204*         Blood Sugar Average: Last 72 hrs:  (P) 157.5  Diabetic diet as well as sliding scale insulin with meals

## 2024-02-11 NOTE — ASSESSMENT & PLAN NOTE
Patient noted to have fevers throughout stay, Tmax one 101.4 over the last 24 hours  No obvious source of infection, suspect this could be due to underlying inflammatory process  Continue supportive measures and would hold off of antibiotics at this time  Continue to monitor microbial cultures, LP planned for tomorrow

## 2024-02-11 NOTE — UTILIZATION REVIEW
Initial Clinical Review    The patient was transferred to [facility] on 2/10/24 from [facility], where care began on 2/8/24. 3 midnights have already been surpassed with active ongoing care.     Admission: Date/Time/Statement:   Admission Orders (From admission, onward)       Ordered        02/10/24 0032  Inpatient Admission  Once                          Orders Placed This Encounter   Procedures    Inpatient Admission     Standing Status:   Standing     Number of Occurrences:   1     Order Specific Question:   Level of Care     Answer:   Med Surg [16]     Order Specific Question:   Estimated length of stay     Answer:   More than 2 Midnights     Order Specific Question:   Certification     Answer:   I certify that inpatient services are medically necessary for this patient for a duration of greater than two midnights. See H&P and MD Progress Notes for additional information about the patient's course of treatment.       Initial Presentation: 73 y.o. male transferred to [facility], admitted Inpatient status dt New onset Seizure.   Presented due to having a new onset tonic-clonic seizure.  At [facility] where an initial CT head showed a cerebral hypodensity and patient was accepted in transfer to [place] for neurology as well as neurosurgery and further subspecialist as needed.  While awaiting transfer, patient underwent an MRI of the brain as well as a subsequent CTA head and neck which showed possible cerebral amyloid angiopathy as etiology and associated mild leptomeningeal enhancement.  Neurology team was able to weigh in on case prior to transfer and although cerebral amyloid angiopathy a possibility, some of patient's other characteristics on imaging and clinically less consistent with that and as such recommending ruling out of infectious/malignant/rheumatologic abnormalities.  Upon arrival at [place] on the evening of 2/9, patient with his wife and  patient appears back to his baseline which his wife endorses.  He is able to have a conversation and does not have any current focal deficits.  He has a visible pruritic blanching but nonpainful rash on extremities and torso. PMHx:  HTN, DM, asthma, HA.  Plan:  Level 2 stepdown unit, Neurology following, Neurosurgery consults, freq neuro checks, hold AP and AC, order LP, continue IV Keppra, order echo, blood cxs, ESR/CRP, CT CAP. Start accuchecks w/ SSI, continue home statin and norvasc. PT OT eval. Dermatology consult.     2/10 Per Neurosurgery:  GCS 15, continue to monitor neuro exam, Keppra, Ativan prn, consider vEEG, LP today or tomorrow, fu on CT CAP, monitor BP, PT OT eval.     2/10 Per Dermatology: appearance suggests a chronic infiltrating deposition like process. Rarely seen in chronic infection such as M. Lepra and tuberuclids. Favor a mucinois such as scleromyxedema. The latter is very rare but does have systemic manifestations including CNS, eencephalopathy etc is described. FU on LP, order CMV, HIV, EBV, punch biopsies taken from right inner thigh, tropical ointment bid.     ED Triage Vitals   Temperature Pulse Respirations Blood Pressure SpO2   02/10/24 0039 02/10/24 0039 02/10/24 0039 02/10/24 0039 02/10/24 0804   99.6 °F (37.6 °C) 94 18 154/81 97 %      Temp Source Heart Rate Source Patient Position - Orthostatic VS BP Location FiO2 (%)   02/10/24 0039 02/10/24 0039 02/10/24 0039 02/10/24 0039 --   Oral Monitor Lying Left arm       Pain Score       02/10/24 0039       No Pain          Wt Readings from Last 1 Encounters:   02/11/24 90.1 kg (198 lb 9.6 oz)     Additional Vital Signs:   Date/Time Temp Pulse Resp BP MAP (mmHg) SpO2 O2 Device Patient Position - Orthostatic VS   02/11/24 11:18:12 101.2 °F (38.4 °C) Abnormal  116 Abnormal  -- 134/76 95 98 % -- --   02/11/24 08:36:16 99.4 °F (37.4 °C) 107 Abnormal  -- 163/90 114 98 % -- --   02/11/24 07:36:50 99 °F (37.2 °C) 107 Abnormal  18 125/66 86 96 %  -- --   02/11/24 05:21:29 99.7 °F (37.6 °C) 118 Abnormal  -- 126/68 87 98 % -- --   02/11/24 05:20:44 101.4 °F (38.6 °C) Abnormal  120 Abnormal  -- 126/68 87 97 % -- --   02/11/24 02:58:33 99.8 °F (37.7 °C) 112 Abnormal  -- -- -- 97 % -- --   02/10/24 21:16:05 101.2 °F (38.4 °C) Abnormal  125 Abnormal  -- 98/68 78 95 % -- --   02/10/24 2000 -- -- -- -- -- 97 % None (Room air) --   02/10/24 17:09:05 100.3 °F (37.9 °C) 120 Abnormal  -- 157/96 -- 97 % -- --   02/10/24 1500 98.4 °F (36.9 °C) 100 18 132/74 93 -- -- --   02/10/24 1220 -- 105 -- 160/90 -- -- -- --   02/10/24 11:16:43 100.5 °F (38.1 °C) 105 -- 160/90 113 98 % -- --   02/10/24 08:04:41 100.1 °F (37.8 °C) 111 Abnormal  -- 159/91 114 97 % -- --   02/10/24 00:39:24 99.6 °F (37.6 °C) 94 18 154/81 105 -- -- Lying   02/10/24 00:39:17 99.6 °F (37.6 °C) -- -- 154/81 105 -- -- --   02/10/24 0039 -- -- -- -- -- -- None (Room air) --     Pertinent Labs/Diagnostic Test Results:   2/10 ECHO:      Left Ventricle: Left ventricular cavity size is normal. Wall thickness is mildly increased. The left ventricular ejection fraction is 60-65%. Systolic function is normal. Wall motion is normal. Diastolic function is normal.    Right Ventricle: Right ventricular cavity size is normal. Systolic function is normal.    Aortic Valve: There is mild stenosis.     2/8 EKG: ST    CT chest abdomen pelvis w contrast   Final Result by Edward Padilla DO (02/11 0515)      No acute abnormality or suspicious mass      Cholelithiasis.      Hepatic steatosis.         Workstation performed: [id number]         FL IN-patient lumbar puncture    (Results Pending)     Results from last 7 days   Lab Units 02/10/24  1542   SARS-COV-2  Negative     Results from last 7 days   Lab Units 02/11/24  0647 02/10/24  0355 02/09/24  0549 02/08/24  0836   WBC Thousand/uL 14.83* 11.29* 8.16 9.62   HEMOGLOBIN g/dL 15.2 15.2 14.3 14.7   HEMATOCRIT % 46.0 46.9 43.4 46.9   PLATELETS Thousands/uL 319 334 328 362   NEUTROS  ABS Thousands/µL  --  9.59* 5.50 4.16         Results from last 7 days   Lab Units 02/11/24  0431 02/10/24  0623 02/10/24  0403 02/09/24  0549 02/08/24  0836   SODIUM mmol/L 137  --  138 139 137   POTASSIUM mmol/L 4.2  --  4.1 3.5 3.7   CHLORIDE mmol/L 100  --  101 103 98   CO2 mmol/L 24  --  28 28 20*   ANION GAP mmol/L 13  --  9 8 19   BUN mg/dL 14  --  8 11 14   CREATININE mg/dL 1.45*  --  1.05 1.01 1.27   EGFR ml/min/1.73sq m 48  --  71 74 56   CALCIUM mg/dL 9.0  --  8.9 8.8 9.2   MAGNESIUM mg/dL 2.0 1.6*  --  1.9 2.1     Results from last 7 days   Lab Units 02/11/24  0431 02/10/24  0403 02/09/24  0549 02/08/24  0836   AST U/L 22 26 21 24   ALT U/L 23 23 24 28   ALK PHOS U/L 54 53 53 59   TOTAL PROTEIN g/dL 6.8 6.9 6.1* 7.0   ALBUMIN g/dL 3.9 4.0 3.8 4.2   TOTAL BILIRUBIN mg/dL 0.91 0.72 0.66 0.48   BILIRUBIN DIRECT mg/dL 0.22*  --   --   --      Results from last 7 days   Lab Units 02/11/24  1028 02/11/24  0615 02/10/24  2115 02/10/24  1607 02/10/24  1053 02/10/24  0646 02/09/24  2046 02/09/24  1553 02/09/24  1105 02/09/24  0703 02/08/24  0832   POC GLUCOSE mg/dl 204* 148* 152* 129 156* 156* 165* 159* 196* 155* 249*     Results from last 7 days   Lab Units 02/11/24  0431 02/10/24  0403 02/09/24  0549 02/08/24  0836   GLUCOSE RANDOM mg/dL 154* 141* 132 270*     Results from last 7 days   Lab Units 02/10/24  0356   PROTIME seconds 12.4   INR  0.94   PTT seconds 28     Results from last 7 days   Lab Units 02/10/24  0623 02/08/24  0836   TSH 3RD GENERATON uIU/mL 1.070 4.187     Results from last 7 days   Lab Units 02/10/24  2316   PROCALCITONIN ng/ml 0.30*     Results from last 7 days   Lab Units 02/10/24  0623   CRP mg/L 7.6*   SED RATE mm/hour 16     Results from last 7 days   Lab Units 02/10/24  0355 02/08/24  1138   CLARITY UA  Clear Clear   COLOR UA  Light Yellow Yellow   SPEC GRAV UA  1.016 >=1.030   PH UA  6.5 6.0   GLUCOSE UA mg/dl Negative 2+*   KETONES UA mg/dl 10 (1+)* Trace*   BLOOD UA  Negative  Trace-Intact*   PROTEIN UA mg/dl Trace* 2+*   NITRITE UA  Negative Negative   BILIRUBIN UA  Negative Negative   UROBILINOGEN UA E.U./dl  --  0.2   UROBILINOGEN UA (BE) mg/dl <2.0  --    LEUKOCYTES UA  Negative Negative   WBC UA /hpf None Seen 0-5   RBC UA /hpf 1-2 0-1   BACTERIA UA /hpf None Seen Occasional   EPITHELIAL CELLS WET PREP /hpf None Seen Occasional     Results from last 7 days   Lab Units 02/10/24  1542   INFLUENZA A PCR  Negative   INFLUENZA B PCR  Negative   RSV PCR  Negative         Results from last 7 days   Lab Units 02/08/24  1138   AMPH/METH  Negative   BARBITURATE UR  Negative   BENZODIAZEPINE UR  Positive*   COCAINE UR  Negative   METHADONE URINE  Negative   OPIATE UR  Negative   PCP UR  Negative   THC UR  Negative     Results from last 7 days   Lab Units 02/10/24  0627 02/10/24  0333   BLOOD CULTURE  No Growth at 24 hrs. No Growth at 24 hrs.       Past Medical History:   Diagnosis Date    Diabetes mellitus ([facility])     ED (erectile dysfunction)     GERD (gastroesophageal reflux disease)     Hypercholesterolemia     Hypertension     Migraines      Present on Admission:   New onset seizure ([facility])   Type 2 diabetes mellitus with stage 3a chronic kidney disease, without long-term current use of insulin ([facility])   Essential hypertension   Hypercholesterolemia   Respiratory disease   Rash   Cerebral amyloid angiopathy    Brain edema ([facility])      Admitting Diagnosis: Seizure ([facility]) [R56.9]  Age/Sex: 73 y.o. male  Admission Orders:  Scheduled Medications:  amLODIPine, 5 mg, Oral, Daily  atorvastatin, 40 mg, Oral, Daily With Dinner  diphenhydrAMINE, 50 mg, Oral, Daily  insulin lispro, 1-5 Units, Subcutaneous, TID AC  levETIRAcetam, 1,000 mg, Intravenous, Q12H ARIANNA  lisinopril, 20 mg, Oral, Daily  pantoprazole, 40 mg, Oral, Early Morning      Continuous IV Infusions:  sodium chloride, 75 mL/hr, Intravenous, Continuous      PRN Meds:  acetaminophen, 650 mg, Oral, Q6H PRN x2 thus far  LORazepam, 1 mg, Intravenous,  Q6H PRN  ondansetron, 4 mg, Intravenous, Q6H PRN    scd    IP CONSULT TO INTERNAL MEDICINE  IP CONSULT TO NEUROSURGERY  IP CONSULT TO NEUROLOGY  IP CONSULT TO DERMATOLOGY  IP CONSULT TO INFECTIOUS DISEASES    Network Utilization Review Department  ATTENTION: Please call with any questions or concerns to [phone] and carefully listen to the prompts so that you are directed to the right person. All voicemails are confidential.   For Discharge needs, contact Care Management DC Support Team at [phone] opt. 2  Send all requests for admission clinical reviews, approved or denied determinations and any other requests to dedicated fax number below belonging to the campus where the patient is receiving treatment. List of dedicated fax numbers for the Facilities:  FACILITY NAME UR FAX NUMBER   ADMISSION DENIALS (Administrative/Medical Necessity) [phone]   DISCHARGE SUPPORT TEAM (NETWORK) [phone]   PARENT CHILD HEALTH (Maternity/NICU/Pediatrics) [phone]   [facility] [phone]   [facility] [phone]   [facility] [phone]   [facility] [phone]   [facility] [phone]   [facility] [phone]   [facility] [phone]   [facility] [phone]   [facility] [phone]   [facility] [phone]   [facility] [phone]   [facility] [phone]

## 2024-02-11 NOTE — UTILIZATION REVIEW
NOTIFICATION OF INPATIENT ADMISSION      AUTHORIZATION REQUEST   SERVICING FACILITY:   [facility]  Address: [address] [place] [address]  Tax ID: [id number]  NPI: [id number] ATTENDING PROVIDER:  Attending Name and NPI#: Andrea Ibarra [[id number]]  Address: [address] [place] [address]  Phone: [phone]   ADMISSION INFORMATION:  Place of Service: Inpatient [facility]  Place of Service Code: 21  Inpatient Admission Date/Time: 2/10/24 12:18 AM  Discharge Date/Time: No discharge date for patient encounter.  Admitting Diagnosis Code/Description:  Seizure (HCC) [R56.9]     UTILIZATION REVIEW CONTACT:  An Cervantes, Utilization   Network Utilization Review Department  Phone: [phone]  Fax: [phone]  Email: [email or web address]  Contact for approvals/pending authorizations, clinical reviews, and discharge.     PHYSICIAN ADVISORY SERVICES:  Medical Necessity Denial & Dbkt-zz-Kpja Review  Phone: [phone]  Fax: [phone]  Email: [email or web address]     DISCHARGE SUPPORT TEAM:  For Patients Discharge Needs & Updates  Phone: [phone] opt. 2 Fax: [phone]  Email: [email or web address]

## 2024-02-12 ENCOUNTER — APPOINTMENT (OUTPATIENT)
Dept: RADIOLOGY | Facility: HOSPITAL | Age: 74
DRG: 545 | End: 2024-02-12
Payer: COMMERCIAL

## 2024-02-12 ENCOUNTER — APPOINTMENT (INPATIENT)
Dept: NEUROLOGY | Facility: CLINIC | Age: 74
DRG: 545 | End: 2024-02-12
Payer: COMMERCIAL

## 2024-02-12 PROBLEM — N17.9 AKI (ACUTE KIDNEY INJURY) (HCC): Status: ACTIVE | Noted: 2024-02-12

## 2024-02-12 LAB
ALBUMIN SERPL ELPH-MCNC: 3.95 G/DL (ref 3.2–5.1)
ALBUMIN SERPL ELPH-MCNC: 59 % (ref 48–70)
ALBUMIN UR ELPH-MCNC: 100 %
ALPHA1 GLOB MFR UR ELPH: 0 %
ALPHA1 GLOB SERPL ELPH-MCNC: 0.25 G/DL (ref 0.15–0.47)
ALPHA1 GLOB SERPL ELPH-MCNC: 3.8 % (ref 1.8–7)
ALPHA2 GLOB MFR UR ELPH: 0 %
ALPHA2 GLOB SERPL ELPH-MCNC: 0.72 G/DL (ref 0.42–1.04)
ALPHA2 GLOB SERPL ELPH-MCNC: 10.7 % (ref 5.9–14.9)
ANION GAP SERPL CALCULATED.3IONS-SCNC: 11 MMOL/L
APPEARANCE CSF: NORMAL
B-GLOBULIN MFR UR ELPH: 0 %
BACTERIA UR QL AUTO: ABNORMAL /HPF
BETA GLOB ABNORMAL SERPL ELPH-MCNC: 0.47 G/DL (ref 0.31–0.57)
BETA1 GLOB SERPL ELPH-MCNC: 7 % (ref 4.7–7.7)
BETA2 GLOB SERPL ELPH-MCNC: 5.9 % (ref 3.1–7.9)
BETA2+GAMMA GLOB SERPL ELPH-MCNC: 0.4 G/DL (ref 0.2–0.58)
BILIRUB UR QL STRIP: NEGATIVE
BUN SERPL-MCNC: 22 MG/DL (ref 5–25)
C GATTII+NEOFOR DNA CSF QL NAA+NON-PROBE: NOT DETECTED
CALCIUM SERPL-MCNC: 8.4 MG/DL (ref 8.4–10.2)
CHLORIDE SERPL-SCNC: 102 MMOL/L (ref 96–108)
CK SERPL-CCNC: 427 U/L (ref 39–308)
CLARITY UR: CLEAR
CMV DNA CSF QL NAA+NON-PROBE: NOT DETECTED
CO2 SERPL-SCNC: 22 MMOL/L (ref 21–32)
COLOR UR: YELLOW
CREAT SERPL-MCNC: 1.8 MG/DL (ref 0.6–1.3)
CREAT UR-MCNC: 248.7 MG/DL
E COLI K1 DNA CSF QL NAA+NON-PROBE: NOT DETECTED
ERYTHROCYTE [DISTWIDTH] IN BLOOD BY AUTOMATED COUNT: 14.1 % (ref 11.6–15.1)
EV RNA CSF QL NAA+NON-PROBE: NOT DETECTED
GAMMA GLOB ABNORMAL SERPL ELPH-MCNC: 0.91 G/DL (ref 0.4–1.66)
GAMMA GLOB MFR UR ELPH: 0 %
GAMMA GLOB SERPL ELPH-MCNC: 13.6 % (ref 6.9–22.3)
GFR SERPL CREATININE-BSD FRML MDRD: 37 ML/MIN/1.73SQ M
GLUCOSE CSF-MCNC: 101 MG/DL (ref 40–70)
GLUCOSE SERPL-MCNC: 127 MG/DL (ref 65–140)
GLUCOSE SERPL-MCNC: 142 MG/DL (ref 65–140)
GLUCOSE SERPL-MCNC: 154 MG/DL (ref 65–140)
GLUCOSE SERPL-MCNC: 194 MG/DL (ref 65–140)
GLUCOSE SERPL-MCNC: 306 MG/DL (ref 65–140)
GLUCOSE UR STRIP-MCNC: NEGATIVE MG/DL
GP B STREP DNA CSF QL NAA+NON-PROBE: NOT DETECTED
GRAM STN SPEC: NORMAL
GRAM STN SPEC: NORMAL
HAEM INFLU DNA CSF QL NAA+NON-PROBE: NOT DETECTED
HCT VFR BLD AUTO: 42.9 % (ref 36.5–49.3)
HGB BLD-MCNC: 14.1 G/DL (ref 12–17)
HGB UR QL STRIP.AUTO: ABNORMAL
HHV6 DNA CSF QL NAA+NON-PROBE: NOT DETECTED
HIV 1+2 AB+HIV1 P24 AG SERPL QL IA: NORMAL
HIV 1+2 AB+HIV1 P24 AG SERPL QL IA: NORMAL
HIV 2 AB SERPL QL IA: NORMAL
HIV1 AB SERPL QL IA: NORMAL
HIV1 P24 AG SER QL: NORMAL
HIV1 P24 AG SERPL QL IA: NORMAL
HSV1 DNA CSF QL NAA+NON-PROBE: NOT DETECTED
HSV2 DNA CSF QL NAA+NON-PROBE: NOT DETECTED
HYALINE CASTS #/AREA URNS LPF: ABNORMAL /LPF
IGG/ALB SER: 1.44 {RATIO} (ref 1.1–1.8)
INR PPP: 1.1 (ref 0.84–1.19)
INTERPRETATION UR IFE-IMP: NORMAL
KETONES UR STRIP-MCNC: NEGATIVE MG/DL
L MONOCYTOG DNA CSF QL NAA+NON-PROBE: NOT DETECTED
LEUKOCYTE ESTERASE UR QL STRIP: NEGATIVE
LYMPHOCYTES NFR CSF MANUAL: 80 %
MCH RBC QN AUTO: 28.3 PG (ref 26.8–34.3)
MCHC RBC AUTO-ENTMCNC: 32.9 G/DL (ref 31.4–37.4)
MCV RBC AUTO: 86 FL (ref 82–98)
MONOS+MACROS CSF MANUAL: 20 %
MUCOUS THREADS UR QL AUTO: ABNORMAL
N MEN DNA CSF QL NAA+NON-PROBE: NOT DETECTED
NITRITE UR QL STRIP: NEGATIVE
NON-SQ EPI CELLS URNS QL MICRO: ABNORMAL /HPF
PARECHOVIRUS A RNA CSF QL NAA+NON-PROBE: NOT DETECTED
PH UR STRIP.AUTO: 5.5 [PH]
PLATELET # BLD AUTO: 286 THOUSANDS/UL (ref 149–390)
PMV BLD AUTO: 9.1 FL (ref 8.9–12.7)
POTASSIUM SERPL-SCNC: 3.7 MMOL/L (ref 3.5–5.3)
PROT CSF-MCNC: 36 MG/DL (ref 15–45)
PROT PATTERN SERPL ELPH-IMP: NORMAL
PROT PATTERN UR ELPH-IMP: NORMAL
PROT SERPL-MCNC: 6.7 G/DL (ref 6.4–8.2)
PROT UR STRIP-MCNC: ABNORMAL MG/DL
PROT UR-MCNC: 31.7 MG/DL
PROTHROMBIN TIME: 14.1 SECONDS (ref 11.6–14.5)
RBC # BLD AUTO: 4.98 MILLION/UL (ref 3.88–5.62)
RBC # CSF MANUAL: 0 UL (ref 0–10)
RBC #/AREA URNS AUTO: ABNORMAL /HPF
S PNEUM DNA CSF QL NAA+NON-PROBE: NOT DETECTED
SODIUM 24H UR-SCNC: 39 MOL/L
SODIUM SERPL-SCNC: 135 MMOL/L (ref 135–147)
SP GR UR STRIP.AUTO: 1.03 (ref 1–1.03)
TOTAL CELLS COUNTED BLD: NO
TOTAL CELLS COUNTED SPEC: 100
TREPONEMA PALLIDUM IGG+IGM AB [PRESENCE] IN SERUM OR PLASMA BY IMMUNOASSAY: NORMAL
TUBE # CSF: 4
UROBILINOGEN UR STRIP-ACNC: 2 MG/DL
VZV DNA CSF QL NAA+NON-PROBE: NOT DETECTED
WBC # BLD AUTO: 19.44 THOUSAND/UL (ref 4.31–10.16)
WBC # CSF AUTO: 3 /UL (ref 0–5)
WBC #/AREA URNS AUTO: ABNORMAL /HPF

## 2024-02-12 PROCEDURE — 99233 SBSQ HOSP IP/OBS HIGH 50: CPT | Performed by: STUDENT IN AN ORGANIZED HEALTH CARE EDUCATION/TRAINING PROGRAM

## 2024-02-12 PROCEDURE — 87389 HIV-1 AG W/HIV-1&-2 AB AG IA: CPT | Performed by: INTERNAL MEDICINE

## 2024-02-12 PROCEDURE — 86780 TREPONEMA PALLIDUM: CPT | Performed by: INTERNAL MEDICINE

## 2024-02-12 PROCEDURE — 87483 CNS DNA AMP PROBE TYPE 12-25: CPT | Performed by: PHYSICIAN ASSISTANT

## 2024-02-12 PROCEDURE — 82945 GLUCOSE OTHER FLUID: CPT | Performed by: PHYSICIAN ASSISTANT

## 2024-02-12 PROCEDURE — 83916 OLIGOCLONAL BANDS: CPT | Performed by: PHYSICIAN ASSISTANT

## 2024-02-12 PROCEDURE — 82550 ASSAY OF CK (CPK): CPT | Performed by: STUDENT IN AN ORGANIZED HEALTH CARE EDUCATION/TRAINING PROGRAM

## 2024-02-12 PROCEDURE — 85610 PROTHROMBIN TIME: CPT

## 2024-02-12 PROCEDURE — 88108 CYTOPATH CONCENTRATE TECH: CPT | Performed by: PATHOLOGY

## 2024-02-12 PROCEDURE — 62328 DX LMBR SPI PNXR W/FLUOR/CT: CPT

## 2024-02-12 PROCEDURE — 87806 HIV AG W/HIV1&2 ANTB W/OPTIC: CPT

## 2024-02-12 PROCEDURE — 82948 REAGENT STRIP/BLOOD GLUCOSE: CPT

## 2024-02-12 PROCEDURE — 88185 FLOWCYTOMETRY/TC ADD-ON: CPT | Performed by: PHYSICIAN ASSISTANT

## 2024-02-12 PROCEDURE — 88184 FLOWCYTOMETRY/ TC 1 MARKER: CPT | Performed by: PHYSICIAN ASSISTANT

## 2024-02-12 PROCEDURE — 82570 ASSAY OF URINE CREATININE: CPT | Performed by: STUDENT IN AN ORGANIZED HEALTH CARE EDUCATION/TRAINING PROGRAM

## 2024-02-12 PROCEDURE — 89051 BODY FLUID CELL COUNT: CPT | Performed by: PHYSICIAN ASSISTANT

## 2024-02-12 PROCEDURE — A9585 GADOBUTROL INJECTION: HCPCS | Performed by: FAMILY MEDICINE

## 2024-02-12 PROCEDURE — 80048 BASIC METABOLIC PNL TOTAL CA: CPT | Performed by: STUDENT IN AN ORGANIZED HEALTH CARE EDUCATION/TRAINING PROGRAM

## 2024-02-12 PROCEDURE — 84157 ASSAY OF PROTEIN OTHER: CPT | Performed by: PHYSICIAN ASSISTANT

## 2024-02-12 PROCEDURE — 99233 SBSQ HOSP IP/OBS HIGH 50: CPT | Performed by: INTERNAL MEDICINE

## 2024-02-12 PROCEDURE — 85027 COMPLETE CBC AUTOMATED: CPT | Performed by: STUDENT IN AN ORGANIZED HEALTH CARE EDUCATION/TRAINING PROGRAM

## 2024-02-12 PROCEDURE — 84300 ASSAY OF URINE SODIUM: CPT | Performed by: STUDENT IN AN ORGANIZED HEALTH CARE EDUCATION/TRAINING PROGRAM

## 2024-02-12 PROCEDURE — 99223 1ST HOSP IP/OBS HIGH 75: CPT | Performed by: INTERNAL MEDICINE

## 2024-02-12 PROCEDURE — 84165 PROTEIN E-PHORESIS SERUM: CPT | Performed by: PATHOLOGY

## 2024-02-12 PROCEDURE — 81001 URINALYSIS AUTO W/SCOPE: CPT | Performed by: STUDENT IN AN ORGANIZED HEALTH CARE EDUCATION/TRAINING PROGRAM

## 2024-02-12 PROCEDURE — 84166 PROTEIN E-PHORESIS/URINE/CSF: CPT | Performed by: PATHOLOGY

## 2024-02-12 PROCEDURE — 95816 EEG AWAKE AND DROWSY: CPT | Performed by: PSYCHIATRY & NEUROLOGY

## 2024-02-12 PROCEDURE — 87070 CULTURE OTHR SPECIMN AEROBIC: CPT | Performed by: PHYSICIAN ASSISTANT

## 2024-02-12 PROCEDURE — 89050 BODY FLUID CELL COUNT: CPT | Performed by: PHYSICIAN ASSISTANT

## 2024-02-12 PROCEDURE — 86334 IMMUNOFIX E-PHORESIS SERUM: CPT | Performed by: PATHOLOGY

## 2024-02-12 PROCEDURE — 95816 EEG AWAKE AND DROWSY: CPT

## 2024-02-12 PROCEDURE — 70553 MRI BRAIN STEM W/O & W/DYE: CPT

## 2024-02-12 RX ORDER — GADOBUTROL 604.72 MG/ML
9 INJECTION INTRAVENOUS
Status: COMPLETED | OUTPATIENT
Start: 2024-02-12 | End: 2024-02-12

## 2024-02-12 RX ORDER — AMLODIPINE BESYLATE 5 MG/1
5 TABLET ORAL DAILY
Status: DISCONTINUED | OUTPATIENT
Start: 2024-02-13 | End: 2024-02-22 | Stop reason: HOSPADM

## 2024-02-12 RX ORDER — INSULIN LISPRO 100 [IU]/ML
1-5 INJECTION, SOLUTION INTRAVENOUS; SUBCUTANEOUS
Status: DISCONTINUED | OUTPATIENT
Start: 2024-02-12 | End: 2024-02-18

## 2024-02-12 RX ORDER — HEPARIN SODIUM 5000 [USP'U]/ML
5000 INJECTION, SOLUTION INTRAVENOUS; SUBCUTANEOUS EVERY 8 HOURS SCHEDULED
Status: DISPENSED | OUTPATIENT
Start: 2024-02-12 | End: 2024-02-15

## 2024-02-12 RX ORDER — LIDOCAINE HYDROCHLORIDE 10 MG/ML
5 INJECTION, SOLUTION EPIDURAL; INFILTRATION; INTRACAUDAL; PERINEURAL
Status: COMPLETED | OUTPATIENT
Start: 2024-02-12 | End: 2024-02-12

## 2024-02-12 RX ADMIN — LEVETIRACETAM 1000 MG: 100 INJECTION, SOLUTION INTRAVENOUS at 22:11

## 2024-02-12 RX ADMIN — HEPARIN SODIUM 5000 UNITS: 5000 INJECTION INTRAVENOUS; SUBCUTANEOUS at 22:11

## 2024-02-12 RX ADMIN — LEVETIRACETAM 1000 MG: 100 INJECTION, SOLUTION INTRAVENOUS at 08:28

## 2024-02-12 RX ADMIN — GADOBUTROL 9 ML: 604.72 INJECTION INTRAVENOUS at 19:11

## 2024-02-12 RX ADMIN — INSULIN LISPRO 3 UNITS: 100 INJECTION, SOLUTION INTRAVENOUS; SUBCUTANEOUS at 22:54

## 2024-02-12 RX ADMIN — ACETAMINOPHEN 650 MG: 325 TABLET, FILM COATED ORAL at 20:16

## 2024-02-12 RX ADMIN — ACETAMINOPHEN 650 MG: 325 TABLET, FILM COATED ORAL at 00:26

## 2024-02-12 RX ADMIN — PANTOPRAZOLE SODIUM 40 MG: 40 TABLET, DELAYED RELEASE ORAL at 05:50

## 2024-02-12 RX ADMIN — AMLODIPINE BESYLATE 5 MG: 5 TABLET ORAL at 08:28

## 2024-02-12 RX ADMIN — ACETAMINOPHEN 650 MG: 325 TABLET, FILM COATED ORAL at 05:50

## 2024-02-12 RX ADMIN — SODIUM CHLORIDE 125 ML/HR: 0.9 INJECTION, SOLUTION INTRAVENOUS at 13:12

## 2024-02-12 RX ADMIN — SODIUM CHLORIDE 125 ML/HR: 0.9 INJECTION, SOLUTION INTRAVENOUS at 14:54

## 2024-02-12 RX ADMIN — INSULIN LISPRO 1 UNITS: 100 INJECTION, SOLUTION INTRAVENOUS; SUBCUTANEOUS at 13:10

## 2024-02-12 RX ADMIN — DIPHENHYDRAMINE HCL 50 MG: 25 TABLET ORAL at 08:28

## 2024-02-12 RX ADMIN — LIDOCAINE HYDROCHLORIDE 5 ML: 10 INJECTION, SOLUTION EPIDURAL; INFILTRATION; INTRACAUDAL; PERINEURAL at 11:30

## 2024-02-12 NOTE — PROGRESS NOTES
Progress Note - Infectious Disease   Delphine Nelson 73 y.o. male MRN: [id number]  Unit/Bed#: [facility] 721-01 Encounter: [id number]      Impression/Recommendations:  Fever, with mild leukocytosis.  Etiology of this is unclear.  Consider postictal fever.  Consider fever secondary to underlying inflammatory process.  Patient does not have chills or symptoms of fever.  He may have had chronic fever at home.  There is no obvious active infection.  He remains clinically and systemically well despite his fever.  Admission blood cultures have no growth thus far.  With patient clinically and systemically well, likely prolonged fever and no obvious active infection, given need for extensive workup of underlying inflammatory process, will keep patient off antibiotic so that it does not affect workup.  Continue to observe off antibiotic.  Monitor temperature/WBC.    Follow-up on admission blood cultures.     Focal vasogenic edema and, with new onset seizure.  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  However, dermatologist is considering scleromyxedema.  Regardless, this appears to be an inflammatory process rather than infectious.  Patient scheduled to get lumbar puncture tomorrow.  He may need brain biopsy if results from skin biopsy and lumbar puncture are unrevealing.  Observe off antibiotic, as seen above.  Follow-up on CSF findings at LP later today.     Diffuse skin rash, intermittent for the last many years.  Patient is status post punch biopsy of rash on right side.  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  No evidence of cellulitis or acute infection of skin.  HIV screen negative.  Follow-up on pathology from punch biopsy.  Follow-up on syphilis screening.     Discussed with patient and his family in detail regarding the above plan.    Antibiotics:  None    Subjective:  Patient is sleepy but arousable.  When aroused, he is alert and oriented.  Temperature trending down.  No  chills.  No diarrhea.    Objective:  Vitals:  Temp:  [98.6 °F (37 °C)-103 °F (39.4 °C)] 98.7 °F (37.1 °C)  HR:  [] 91  Resp:  [17] 17  BP: (113-140)/(55-81) 113/55  SpO2:  [94 %-98 %] 97 %  Temp (24hrs), Av.6 °F (38.1 °C), Min:98.6 °F (37 °C), Max:103 °F (39.4 °C)  Current: Temperature: 98.7 °F (37.1 °C)    Physical Exam:     General: Awake, alert, cooperative, no distress.   Neck:  Supple. No mass.  No lymphadenopathy.   Lungs: Expansion symmetric, no rales, no wheezing, respirations unlabored.   Heart:  Regular rate and rhythm, S1 and S2 normal, no murmur.   Abdomen: Soft, nondistended, non-tender, bowel sounds active all four quadrants, no masses, no organomegaly.   Extremities: No edema. No erythema/warmth. No ulcer. Nontender to palpation.   Skin:  Stable diffuse papular rash.   Neuro: Moves all extremities.     Invasive Devices       Peripheral Intravenous Line  Duration             Peripheral IV 02/10/24 Right Antecubital 2 days                    Labs studies:   I have personally reviewed pertinent labs.  Results from last 7 days   Lab Units 24  0822 24  0431 02/10/24  0403 24  0549   POTASSIUM mmol/L 3.7 4.2 4.1 3.5   CHLORIDE mmol/L 102 100 101 103   CO2 mmol/L 22 24 28 28   BUN mg/dL 22 14 8 11   CREATININE mg/dL 1.80* 1.45* 1.05 1.01   EGFR ml/min/1.73sq m 37 48 71 74   CALCIUM mg/dL 8.4 9.0 8.9 8.8   AST U/L  --  22 26 21   ALT U/L  --  23 23 24   ALK PHOS U/L  --  54 53 53     Results from last 7 days   Lab Units 24  0822 24  0647 02/10/24  0355   WBC Thousand/uL 19.44* 14.83* 11.29*   HEMOGLOBIN g/dL 14.1 15.2 15.2   PLATELETS Thousands/uL 286 319 334     Results from last 7 days   Lab Units 02/10/24  0627 02/10/24  0333   BLOOD CULTURE  No Growth at 48 hrs. No Growth at 48 hrs.       Imaging Studies:   I have personally reviewed pertinent imaging study reports and images in PACS.    EKG, Pathology, and Other Studies:   I have personally reviewed pertinent  reports.

## 2024-02-12 NOTE — PROGRESS NOTES
"Progress Note - Neurology   Delphine Nelson 73 y.o. male [id number]  Unit/Bed#: [facility] 721/[facility] 721-01    Assessment:    * New onset seizure (HCC)  Assessment & Plan  73 y.o. male with HTN, HLD, DM2, [id number], Erectile dysfunction, Migraines, Asthma, GERD and intermittent extremity and trunk pruritic rash who presented following a witnessed seizure. Upon EMS arrival patient was reportedly \"posturing\" and was given Versed 4mg. Aside from elevated BS, labs were unremarkable. Imaging concern for Inflammatory cerebral amyloid angiopathy with a subacute cortical microbleed in the left frontal lobe with vasogenic edema as as detailed below.    Work-up/Imaging:  UA negative  Labs unremarkable aside from WBC 8-->11, Mg 1.6, CRP 7.6, ESR 16  UDS negative (aside from positive for benzos as expected)  CTH revealed:   Hypodensity within the left frontal and right parietal occipital lobes, contrast-enhanced MRI of the brain recommended for further characterization to exclude underlying mass lesions with associated vasogenic edema.   CTA H/N:   No hemodynamically significant stenosis in the major arteries of the neck.  No irregularity of the M1 segment of the left middle cerebral artery possibly representing angiitis/vasculitis or other vasculopathy.  Focal areas of vasogenic edema better characterized on the recent brain MRI, ascribed to represent acute amyloid angiitis/acute inflammatory cerebral amyloid angiopathy. Other etiologies not excluded.  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   Patient was transferred to [facility] for neurology and neurosurgery evaluation.   Echo: EF 60-65%, mild AV stenosis  CTA CAP w: No acute abnormality or suspicious mass Cholelithiasis. Hepatic steatosis.  CSF studies:  WNL: WBC, protein  Abnormal: Glucose 101    Plan:  Continue Keppra 1g BID  Ativan PRN for motor seizure activity > 2 minutes  S/p LP " via IR on 2/12  CSF studies: RBC, Gram stain, culture, ME panel, oligoclonal bands, Cytology, Flow cytometry  Consider MRI vessel wall imaging for further work up- attending neurologist to discuss further with patient and family  Pending further work up, may consider initiating steroids  Routine EEG pending  Pending work-up will likely need repeat MRI brain in 4-6 weeks  Telemetry  Seizure precautions  [employer] form completed and faxed by previous neurology AP. Patient aware that they are not to drive.   Neurosurgery following; appreciate recommendations  ID following; appreciate recommendations  Monitor neuro exam; notify with any changes  Medical management and supportive care per primary team. Correction of any metabolic or infectious disturbances.     Brain edema (HCC)  Assessment & Plan  As noted on imaging.  Imaging and planned work-up as detailed above    Cerebral amyloid angiopathy   Assessment & Plan  As noted on imaging.   Work-up as detailed above.       Recommendations for outpatient neurological follow up have yet to be determined.    Case and treatment plan reviewed with attending neurologist, Dr. Velazco. Please see attending attestation for any further recommendations.    Subjective:   Patient resting in bed. He reports itching through his body that has been ongoing. He denies any headache or pain.        Past Medical History:   Diagnosis Date    Diabetes mellitus (HCC)     ED (erectile dysfunction)     GERD (gastroesophageal reflux disease)     Hypercholesterolemia     Hypertension     Migraines      Past Surgical History:   Procedure Laterality Date    COLONOSCOPY  08/01/2016    FL LUMBAR PUNCTURE DIAGNOSTIC  2/12/2024    NO PAST SURGERIES       Family History   Problem Relation Age of Onset    Heart disease Mother     Asthma Father     Kidney disease Brother     Diabetes Brother      Social History     Socioeconomic History    Marital status: /Civil Union     Spouse name: None    Number of  children: None    Years of education: None    Highest education level: None   Occupational History    None   Tobacco Use    Smoking status: Never    Smokeless tobacco: Never   Vaping Use    Vaping status: Never Used   Substance and Sexual Activity    Alcohol use: Never    Drug use: Never    Sexual activity: Not Currently     Partners: Female   Other Topics Concern    None   Social History Narrative    None     Social Determinants of Health     Financial Resource Strain: Low Risk  (9/29/2023)    Overall Financial Resource Strain (CARDIA)     Difficulty of Paying Living Expenses: Not hard at all   Food Insecurity: No Food Insecurity (2/10/2024)    Hunger Vital Sign     Worried About Running Out of Food in the Last Year: Never true     Ran Out of Food in the Last Year: Never true   Transportation Needs: Patient Unable To Answer (2/10/2024)    PRAPARE - Transportation     Lack of Transportation (Medical): Patient unable to answer     Lack of Transportation (Non-Medical): Patient unable to answer   Physical Activity: Not on file   Stress: Not on file   Social Connections: Not on file   Intimate Partner Violence: Not on file   Housing Stability: Patient Unable To Answer (2/10/2024)    Housing Stability Vital Sign     Unable to Pay for Housing in the Last Year: Patient unable to answer     Number of Places Lived in the Last Year: Not on file     Unstable Housing in the Last Year: Patient unable to answer         Medications:  All current active meds have been reviewed and current meds:  Scheduled Meds:  Current Facility-Administered Medications   Medication Dose Route Frequency Provider Last Rate    acetaminophen  650 mg Oral Q6H PRN Andrea Ibarra      [START ON 2/13/2024] amLODIPine  5 mg Oral Daily Debra Adler PA-C      diphenhydrAMINE  50 mg Oral Daily Andrea Ibarra      insulin lispro  1-5 Units Subcutaneous TID AC Giovanny Arzola DO      levETIRAcetam  1,000 mg Intravenous Q12H [facility] Giovanny Arzola DO       "LORazepam  1 mg Intravenous Q6H PRN Fabiana Cutler, DO      ondansetron  4 mg Intravenous Q6H PRN Giovanny Arzola, DO      pantoprazole  40 mg Oral Early Morning Giovanny Arzola, DO      sodium chloride  125 mL/hr Intravenous Continuous Andrea Ibarra 125 mL/hr (02/12/24 1312)     Continuous Infusions:sodium chloride, 125 mL/hr, Last Rate: 125 mL/hr (02/12/24 1312)      PRN Meds:.  acetaminophen    LORazepam    ondansetron       ROS:   A 12 system ROS was completed. Other than the above mentioned complaints in the HPI and those commented on below, all remaining systems were negative.      Vitals:   /95   Pulse 101   Temp 99.3 °F (37.4 °C)   Resp 18   Ht 5' 8\" (1.727 m)   Wt 89.9 kg (198 lb 4.5 oz)   SpO2 96%   BMI 30.15 kg/m²       Physical Exam:   Physical Exam  Vitals and nursing note reviewed.   Constitutional:       General: He is not in acute distress.     Appearance: Normal appearance. He is not ill-appearing.   HENT:      Head: Normocephalic.      Mouth/Throat:      Mouth: Mucous membranes are moist.      Pharynx: Oropharynx is clear.   Eyes:      General: No scleral icterus.        Right eye: No discharge.         Left eye: No discharge.      Extraocular Movements: Extraocular movements intact and EOM normal.      Conjunctiva/sclera: Conjunctivae normal.   Cardiovascular:      Rate and Rhythm: Normal rate.   Pulmonary:      Effort: Pulmonary effort is normal. No respiratory distress.   Musculoskeletal:         General: Normal range of motion.   Skin:     General: Skin is warm and dry.      Coloration: Skin is not jaundiced or pale.      Findings: Erythema and rash present.   Neurological:      Mental Status: He is alert and oriented to person, place, and time.      Coordination: Finger-Nose-Finger Test normal.   Psychiatric:         Mood and Affect: Mood normal.       Neurologic Exam     Mental Status   Oriented to person, place, and time.   Level of consciousness: alert  Able to follow commands " appropriately. No aphasia or dysarthria noted.     Cranial Nerves     CN II   Right visual field deficit: none  Left visual field deficit: none     CN III, IV, VI   Extraocular motions are normal.     CN V   Facial sensation intact.     CN VII   Facial expression full, symmetric.     CN VIII   Hearing: intact    CN IX, X   Palate: symmetric    CN XI   CN XI normal.     CN XII   CN XII normal.     Motor Exam   Muscle bulk: normal  Bilateral UE strength 5/5 deltoids, biceps, triceps, hand   Bilateral LE strength 5/5 hip flexion, dorsiflexion, plantar flexion     Sensory Exam   Light touch normal.     Gait, Coordination, and Reflexes     Coordination   Finger to nose coordination: normal    Tremor   Resting tremor: absent  Intention tremor: absent          Labs: I have personally reviewed pertinent reports.   Recent Results (from the past 24 hour(s))   Fingerstick Glucose (POCT)    Collection Time: 02/11/24  4:26 PM   Result Value Ref Range    POC Glucose 191 (H) 65 - 140 mg/dl   Fingerstick Glucose (POCT)    Collection Time: 02/11/24  9:10 PM   Result Value Ref Range    POC Glucose 223 (H) 65 - 140 mg/dl   Fingerstick Glucose (POCT)    Collection Time: 02/12/24  6:34 AM   Result Value Ref Range    POC Glucose 142 (H) 65 - 140 mg/dl   RAPID HIV 1/2 AB-AG COMBO for 12 years old and above    Collection Time: 02/12/24  8:22 AM   Result Value Ref Range    Rapid HIV 1 AND 2 Non-Reactive Non-Reactive    HIV-1 P24 Ag Screen Non-Reactive Non-Reactive   RPR-Syphilis Screening (Total Syphilis IGG/IGM)    Collection Time: 02/12/24  8:22 AM   Result Value Ref Range    Syphilis Total Antibody Non-reactive Non-Reactive   HIV 1/2 AG/AB w Reflex [facility] for 2 yr old and above    Collection Time: 02/12/24  8:22 AM   Result Value Ref Range    HIV-1 p24 Antigen Non-Reactive Non-Reactive    HIV-1 Antibody Non-Reactive Non-Reactive    HIV-2 Antibody Non-Reactive Non-Reactive    HIV Ag-Ab 5th Gen Non-Reactive Non-Reactive   Protime-INR     Collection Time: 02/12/24  8:22 AM   Result Value Ref Range    Protime 14.1 11.6 - 14.5 seconds    INR 1.10 0.84 - 1.19   Basic metabolic panel    Collection Time: 02/12/24  8:22 AM   Result Value Ref Range    Sodium 135 135 - 147 mmol/L    Potassium 3.7 3.5 - 5.3 mmol/L    Chloride 102 96 - 108 mmol/L    CO2 22 21 - 32 mmol/L    ANION GAP 11 mmol/L    BUN 22 5 - 25 mg/dL    Creatinine 1.80 (H) 0.60 - 1.30 mg/dL    Glucose 154 (H) 65 - 140 mg/dL    Calcium 8.4 8.4 - 10.2 mg/dL    eGFR 37 ml/min/1.73sq m   CBC and Platelet    Collection Time: 02/12/24  8:22 AM   Result Value Ref Range    WBC 19.44 (H) 4.31 - 10.16 Thousand/uL    RBC 4.98 3.88 - 5.62 Million/uL    Hemoglobin 14.1 12.0 - 17.0 g/dL    Hematocrit 42.9 36.5 - 49.3 %    MCV 86 82 - 98 fL    MCH 28.3 26.8 - 34.3 pg    MCHC 32.9 31.4 - 37.4 g/dL    RDW 14.1 11.6 - 15.1 %    Platelets 286 149 - 390 Thousands/uL    MPV 9.1 8.9 - 12.7 fL   Fingerstick Glucose (POCT)    Collection Time: 02/12/24 10:36 AM   Result Value Ref Range    POC Glucose 194 (H) 65 - 140 mg/dl   Glucose CSF    Collection Time: 02/12/24 11:57 AM   Result Value Ref Range    Glucose,  (H) 40 - 70 mg/dL   Protein CSF    Collection Time: 02/12/24 11:57 AM   Result Value Ref Range    Protein, CSF 36 15 - 45 mg/dL   RBC count,CSF    Collection Time: 02/12/24 11:57 AM   Result Value Ref Range    RBC, CSF 0 0 - 10 uL   CSF white cell count with differential    Collection Time: 02/12/24 11:57 AM   Result Value Ref Range    Appearance, CSF clear  and colorless     Tube Number, CSF 4     WBC, CSF 3 0 - 5 /uL    Xanthochromia No No   Gram stain CSF    Collection Time: 02/12/24 11:57 AM    Specimen: Lumbar Puncture; Cerebrospinal Fluid   Result Value Ref Range    Gram Stain Result No Polys or Bacteria seen     Gram Stain Result 1+ Mononuclear Cells    CSF Diff    Collection Time: 02/12/24 11:57 AM   Result Value Ref Range    Total Counted 100     Lymphs % CSF 80 %    Monocytes % (CSF) 20 %        Imaging: I have personally reviewed pertinent imaging in PACS, and I have personally reviewed PACS reports.     EKG, Pathology, and Other Studies: I have personally reviewed pertinent reports.       VTE Prophylaxis: Sequential compression device (Venodyne)         Total time spent today 33 minutes. Greater than 50% of total time was spent with the patient and / or family counseling and / or coordination of care. A description of the counseling / coordination of care: Patient seen and evaluated. Case reviewed with attending. Chart thoroughly reviewed including imaging and labs. Coordination of care with RN. Discussion of LP with patient and family.

## 2024-02-12 NOTE — PLAN OF CARE
Problem: SAFETY ADULT  Goal: Patient will remain free of falls  Description: INTERVENTIONS:  - Educate patient/family on patient safety including physical limitations  - Instruct patient to call for assistance with activity   - Consult OT/PT to assist with strengthening/mobility   - Keep Call bell within reach  - Keep bed low and locked with side rails adjusted as appropriate  - Keep care items and personal belongings within reach  - Initiate and maintain comfort rounds  - Make Fall Risk Sign visible to staff  - Offer Toileting every 2 Hours, in advance of need  - Initiate/Maintain bed alarm  - Obtain necessary fall risk management equipment: non skid footwear  - Apply yellow socks and bracelet for high fall risk patients  - Consider moving patient to room near nurses station  2/12/2024 1129 by Misael Huitron RN  Outcome: Progressing  2/12/2024 1128 by Misael Huitron RN  Outcome: Progressing     Problem: DISCHARGE PLANNING  Goal: Discharge to home or other facility with appropriate resources  Description: INTERVENTIONS:  - Identify barriers to discharge w/patient and caregiver  - Arrange for needed discharge resources and transportation as appropriate  - Identify discharge learning needs (meds, wound care, etc.)  - Arrange for interpretive services to assist at discharge as needed  - Refer to Case Management Department for coordinating discharge planning if the patient needs post-hospital services based on physician/advanced practitioner order or complex needs related to functional status, cognitive ability, or social support system  2/12/2024 1129 by Misael Huitron RN  Outcome: Progressing  2/12/2024 1128 by Misael Huitron RN  Outcome: Progressing     Problem: Knowledge Deficit  Goal: Patient/family/caregiver demonstrates understanding of disease process, treatment plan, medications, and discharge instructions  Description: Complete learning assessment and assess knowledge base.  Interventions:  - Provide  teaching at level of understanding  - Provide teaching via preferred learning methods  2/12/2024 1129 by Misael Huitron RN  Outcome: Progressing  2/12/2024 1128 by Misael Huitron RN  Outcome: Progressing     Problem: NEUROSENSORY - ADULT  Goal: Remains free of injury related to seizures activity  Description: INTERVENTIONS  - Maintain airway, patient safety  and administer oxygen as ordered  - Monitor patient for seizure activity, document and report duration and description of seizure to physician/advanced practitioner  - If seizure occurs,  ensure patient safety during seizure  - Reorient patient post seizure  - Seizure pads on all 4 side rails  - Instruct patient/family to notify RN of any seizure activity including if an aura is experienced  - Instruct patient/family to call for assistance with activity based on nursing assessment  - Administer anti-seizure medications if ordered    2/12/2024 1129 by Misael Huitron RN  Outcome: Progressing  2/12/2024 1128 by Misael Huitron RN  Outcome: Progressing     Problem: CARDIOVASCULAR - ADULT  Goal: Maintains optimal cardiac output and hemodynamic stability  Description: INTERVENTIONS:  - Monitor I/O, vital signs and rhythm  - Monitor for S/S and trends of decreased cardiac output  - Administer and titrate ordered vasoactive medications to optimize hemodynamic stability  - Assess quality of pulses, skin color and temperature  - Assess for signs of decreased coronary artery perfusion  - Instruct patient to report change in severity of symptoms  2/12/2024 1129 by Misael Huitron RN  Outcome: Progressing  2/12/2024 1128 by Misael Huitron RN  Outcome: Progressing  Goal: Absence of cardiac dysrhythmias or at baseline rhythm  Description: INTERVENTIONS:  - Continuous cardiac monitoring, vital signs, obtain 12 lead EKG if ordered  - Administer antiarrhythmic and heart rate control medications as ordered  - Monitor electrolytes and administer replacement therapy as  ordered  2/12/2024 1129 by Misael Huitron RN  Outcome: Progressing  2/12/2024 1128 by Misael Huitron, RN  Outcome: Progressing

## 2024-02-12 NOTE — ASSESSMENT & PLAN NOTE
Worsening renal function today with creatinine of 1.8  There are not many renal function measurements in the outpatient setting however we may surmise that baseline renal function is around 1.0-1.4  Yesterday lisinopril held because renal function was continuing to increase  Patient appears mildly hypovolemic yesterday, he has been on IVF, but endorses poor p.o. intake, likely also has some insensible water losses from fevers  Nephrology consulted, hold statin while awaiting CK level, check urine lytes

## 2024-02-12 NOTE — PROGRESS NOTES
"[facility]  Progress Note  Name: Delphine Nelson I  MRN: [id number]  Unit/Bed#: [facility] 721-01 I Date of Admission: 2/10/2024   Date of Service: 2/12/2024 I Hospital Day: 2    Assessment/Plan   KRISTIN (acute kidney injury) (HCC)  Assessment & Plan  Worsening renal function today with creatinine of 1.8  There are not many renal function measurements in the outpatient setting however we may surmise that baseline renal function is around 1.0-1.4  Yesterday lisinopril held because renal function was continuing to increase  Patient appears mildly hypovolemic yesterday, he has been on IVF, but endorses poor p.o. intake, likely also has some insensible water losses from fevers  Nephrology consulted, hold statin while awaiting CK level, check urine lytes    Fever  Assessment & Plan  Patient noted to have fevers throughout stay, Tmax one 101.4 over the last 24 hours  No obvious source of infection, suspect this could be due to underlying inflammatory process  Continue supportive measures and would hold off of antibiotics at this time  Continue to monitor microbial cultures, LP planned for tomorrow    Rash  Assessment & Plan  Pruritic, nonpainful, erythematous blanching rash on the extremities as well as trunk  Reports he has had this rash that comes and goes intermittently over the last 7 years   Scheduled Tylenol and Benadryl continued, this is what patient takes at home to treat this rash  Derm evaluated patient and punch biopsy pending.  Appreciate their expertise  Rash appears more convalescent and erythematous today, family states that this is normal course for the disease    Respiratory disease  Assessment & Plan  Reported history of \"asthma\" although not on any PTA inhalers  Denies ever having had PFTs  No significant wheezing on exam today  Portable chest x-ray in the ER at [place] with low lung volumes with vascular crowding as well as mild opacity at the medial left base, likely " "atelectasis, but pneumonia/aspiration not excluded in the appropriate clinical setting.  No significant pathology noted on CT chest abdomen pelvis    Type 2 diabetes mellitus with stage 3a chronic kidney disease, without long-term current use of insulin (HCC)  Assessment & Plan  Lab Results   Component Value Date    HGBA1C 7.7 (H) 09/07/2023       Recent Labs     02/11/24  2110 02/12/24  0634 02/12/24  1036 02/12/24  1558   POCGLU 223* 142* 194* 127         Blood Sugar Average: Last 72 hrs:  (P) [id number]  Diabetic diet as well as sliding scale insulin with meals    Hypercholesterolemia  Assessment & Plan  Holding PTA atorvastatin while awaiting CK level    Essential hypertension  Assessment & Plan  Continue PTA Norvasc, lisinopril held for increasing renal function    * New onset seizure (HCC)  Assessment & Plan  Was in [facility] on early Thursday AM and had a tonic-clonic seizure.  Patient without any known history of seizures  Presented to the ER at [place] and initially was combative; had initial CT head with noted hypodensity within the left frontal and right parietal occipital lobes.  ER staff there reached out to specialist on-call and patient was recommended to be transferred to [place] for neurology as well as neurosurgery and further workup.  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 Flores with Neurology was consulted and reviewed MRI brain; although noted cerebral amyloid angiopathy on MRI, patient's lack of significant burden of amyloid angiopathy and the rest of the brain parenchyma as well as the lack of cognitive atypical and does not meet diagnostic criteria.  While cerebral amyloid angiopathy is possibility still, patient with wide range of other possibilities that need to be excluded " including press, CNS vasculitis and certain CNS infections.  A repeat stat CTA head was ordered and this CTA head and neck with focal areas of vasogenic edema better characterized on the recent brain MRI  In addition to imaging, neurology recommended Keppra 1 g twice daily as well as an LP for CSF analysis and potential EEG.   Patient returned back to baseline mentation after his seizure and has not had repeat since  Transferred to [place] on the late hours of 2/9  Admit to medicine on telemetry stepdown to with neurochecks every 2 hours.  Consult neurology as well as neurosurgery.  Seizure precautions.  Holding any antiplatelet or anticoagulation agents. Will order LP per neurology recommendations.  Continue IV Keppra.  Will additionally order a CT chest/abdomen/pelvis with contrast to evaluate given wide differential for symptoms  Echo with preserved LVEF  Order blood cultures, ESR/CRP as well as further rheumatologic studies  Appreciate neurology and neurosurgery recommendations  LP today with IR, followup lab studies           VTE Pharmacologic Prophylaxis: VTE Score: 3 Moderate Risk (Score 3-4) - Pharmacological DVT Prophylaxis Ordered: heparin.    Mobility:   Basic Mobility Inpatient Raw Score: 18  JH-HLM Goal: 6: Walk 10 steps or more  JH-HLM Achieved: 6: Walk 10 steps or more  HLM Goal achieved. Continue to encourage appropriate mobility.    Patient Centered Rounds: I performed bedside rounds with nursing staff today.   Discussions with Specialists or Other Care Team Provider: ID, Nephro    Education and Discussions with Family / Patient: Updated  (wife) at bedside.    Total Time Spent on Date of Encounter in care of patient: 45 mins. This time was spent on one or more of the following: performing physical exam; counseling and coordination of care; obtaining or reviewing history; documenting in the medical record; reviewing/ordering tests, medications or procedures; communicating with other  healthcare professionals and discussing with patient's family/caregivers.    Current Length of Stay: 2 day(s)  Current Patient Status: Inpatient   Certification Statement: The patient will continue to require additional inpatient hospital stay due to Fevers  Discharge Plan: Anticipate discharge in 48-72 hrs to discharge location to be determined pending rehab evaluations.    Code Status: Level 1 - Full Code    Subjective:   Patient seen and examined at bedside, he has no new complaints.  Patient's rash appears to become a little more erythematous and patient and wife states that this is normal and that course of the rash.  Wife notes that patient's urine may have been a little darker overnight.  He is a little somnolent, but wife notes that he just got Benadryl    Objective:     Vitals:   Temp (24hrs), Av °F (37.8 °C), Min:97.5 °F (36.4 °C), Max:102.5 °F (39.2 °C)    Temp:  [97.5 °F (36.4 °C)-102.5 °F (39.2 °C)] 99.3 °F (37.4 °C)  HR:  [] 101  Resp:  [18] 18  BP: (112-140)/(55-95) 126/95  SpO2:  [94 %-98 %] 96 %  Body mass index is 30.15 kg/m².     Input and Output Summary (last 24 hours):     Intake/Output Summary (Last 24 hours) at 2024 1726  Last data filed at 2024 1001  Gross per 24 hour   Intake 200 ml   Output 150 ml   Net 50 ml       Physical Exam:   Physical Exam  Vitals and nursing note reviewed.   Constitutional:       General: He is not in acute distress.     Appearance: He is well-developed. He is not toxic-appearing or diaphoretic.   HENT:      Head: Normocephalic and atraumatic.      Mouth/Throat:      Mouth: Mucous membranes are dry.   Eyes:      General: No scleral icterus.     Extraocular Movements: Extraocular movements intact.      Conjunctiva/sclera: Conjunctivae normal.   Cardiovascular:      Rate and Rhythm: Normal rate and regular rhythm.      Pulses: Normal pulses.      Heart sounds: No murmur heard.     No friction rub. No gallop.   Pulmonary:      Effort: Pulmonary  effort is normal. No respiratory distress.      Breath sounds: Normal breath sounds. No wheezing, rhonchi or rales.   Abdominal:      General: Abdomen is flat. Bowel sounds are normal. There is no distension.      Palpations: Abdomen is soft. There is no mass.      Tenderness: There is no abdominal tenderness. There is no guarding.   Musculoskeletal:         General: No swelling.      Cervical back: Neck supple.      Right lower leg: No edema.      Left lower leg: No edema.   Lymphadenopathy:      Cervical: No cervical adenopathy.   Skin:     General: Skin is warm and dry.      Capillary Refill: Capillary refill takes less than 2 seconds.      Coloration: Skin is not jaundiced.      Findings: Rash present.      Comments: Diffuse morbilliform rash noted, appears more erythematous and confluent today   Neurological:      General: No focal deficit present.      Mental Status: He is alert and oriented to person, place, and time.      Sensory: No sensory deficit.      Motor: No weakness.   Psychiatric:         Mood and Affect: Mood normal.          Additional Data:     Labs:  Results from last 7 days   Lab Units 02/12/24  0822 02/11/24  0647 02/10/24  0355   WBC Thousand/uL 19.44*   < > 11.29*   HEMOGLOBIN g/dL 14.1   < > 15.2   HEMATOCRIT % 42.9   < > 46.9   PLATELETS Thousands/uL 286   < > 334   NEUTROS PCT %  --   --  85*   LYMPHS PCT %  --   --  7*   MONOS PCT %  --   --  5   EOS PCT %  --   --  3    < > = values in this interval not displayed.     Results from last 7 days   Lab Units 02/12/24  0822 02/11/24  0431   SODIUM mmol/L 135 137   POTASSIUM mmol/L 3.7 4.2   CHLORIDE mmol/L 102 100   CO2 mmol/L 22 24   BUN mg/dL 22 14   CREATININE mg/dL 1.80* 1.45*   ANION GAP mmol/L 11 13   CALCIUM mg/dL 8.4 9.0   ALBUMIN g/dL  --  3.9   TOTAL BILIRUBIN mg/dL  --  0.91   ALK PHOS U/L  --  54   ALT U/L  --  23   AST U/L  --  22   GLUCOSE RANDOM mg/dL 154* 154*     Results from last 7 days   Lab Units 02/12/24  0822   INR   1.10     Results from last 7 days   Lab Units 02/12/24  1558 02/12/24  1036 02/12/24  0634 02/11/24  2110 02/11/24  1626 02/11/24  1028 02/11/24  0615 02/10/24  2115 02/10/24  1607 02/10/24  1053 02/10/24  0646 02/09/24  2046   POC GLUCOSE mg/dl 127 194* 142* 223* 191* 204* 148* 152* 129 156* 156* 165*         Results from last 7 days   Lab Units 02/10/24  2316   PROCALCITONIN ng/ml 0.30*       Lines/Drains:  Invasive Devices       Peripheral Intravenous Line  Duration             Peripheral IV 02/10/24 Right Antecubital 2 days                      Telemetry:  Telemetry Orders (From admission, onward)               24 Hour Telemetry Monitoring  Continuous x 24 Hours (Telem)        Question:  Reason for 24 Hour Telemetry  Answer:  TIA/Suspected CVA/ Confirmed CVA                     Telemetry Reviewed: Normal Sinus Rhythm  Indication for Continued Telemetry Use: Arrthymias requiring medical therapy             Imaging: Reviewed radiology reports from this admission including: MRI brain    Recent Cultures (last 7 days):   Results from last 7 days   Lab Units 02/12/24  1157 02/10/24  0627 02/10/24  0333   BLOOD CULTURE   --  No Growth at 48 hrs. No Growth at 48 hrs.   GRAM STAIN RESULT  No Polys or Bacteria seen  1+ Mononuclear Cells  --   --        Last 24 Hours Medication List:   Current Facility-Administered Medications   Medication Dose Route Frequency Provider Last Rate    acetaminophen  650 mg Oral Q6H PRN Andrea Ibarra      [START ON 2/13/2024] amLODIPine  5 mg Oral Daily Debra Adler PA-C      diphenhydrAMINE  50 mg Oral Daily Andrea Ibarra      insulin lispro  1-5 Units Subcutaneous TID AC Giovanny Arzola, DO      levETIRAcetam  1,000 mg Intravenous Q12H [facility] Giovanny Arzola, DO      LORazepam  1 mg Intravenous Q6H PRN Fabiana Cutler, DO      ondansetron  4 mg Intravenous Q6H PRN Giovanny Arzola, DO      pantoprazole  40 mg Oral Early Morning Giovanny Arzola, DO      sodium chloride  125 mL/hr Intravenous  Continuous Andrea Ibarra 125 mL/hr (02/12/24 1454)        Today, Patient Was Seen By: Andrea Ibarra    **Please Note: This note may have been constructed using a voice recognition system.**

## 2024-02-12 NOTE — CONSULTS
Consultation - Nephrology   Delphine Nelson 73 y.o. male MRN: [id number]  Unit/Bed#: [facility] 721-01 Encounter: [id number]    ASSESSMENT/PLAN:   Acute kidney injury: Suspect prerenal versus ATN in the setting of contrast nephropathy (CT 2/9 and 2/10) + lisinopril + transient hypotension 2/10  Creatinine worsening to 1.8 today  UA: Trace protein, 1+ ketones  CT: No hydronephrosis, bladder unremarkable  Repeat UA and urine electrolytes pending  Hold lisinopril  Continue NS at 125 cc/h  Check a.m. 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 BMP    HISTORY OF PRESENT ILLNESS:  Requesting Physician: Andrea Ibarra  Reason for Consult: Acute kidney injury    Delphine Nelson is a 73 y.o. year old male who was admitted to [facility] with new onset seizure.  He was started on antiepileptic medication and imaging was concerning for focal vasogenic edema concerning for inflammatory cerebral amyloid angiopathy.  Also with a diffuse rash which has been intermittent for about 7 years.  Had punch biopsy of rash and awaiting results.  Developed for the past few days and underwent lumbar puncture today.  Unfortunately now with worsening renal function and nephrology was consulted.  Patient is currently lethargic so the history comes from the chart and from family at bedside.      PAST  MEDICAL HISTORY:  Past Medical History:   Diagnosis Date    Diabetes mellitus (HCC)     ED (erectile dysfunction)     GERD (gastroesophageal reflux disease)     Hypercholesterolemia     Hypertension     Migraines        PAST SURGICAL HISTORY:  Past Surgical History:   Procedure Laterality Date    COLONOSCOPY  08/01/2016    NO PAST SURGERIES         ALLERGIES:  Allergies   Allergen Reactions    Compazine [Prochlorperazine] Tongue Swelling    Zithromax [Azithromycin] Other (See Comments)     .       SOCIAL HISTORY:  Social History     Substance and Sexual Activity   Alcohol Use Never     Social History     Substance and Sexual Activity   Drug Use Never     Social History     Tobacco Use   Smoking Status Never   Smokeless Tobacco Never       FAMILY HISTORY:  Family History   Problem Relation Age of Onset    Heart disease Mother     Asthma Father     Kidney disease Brother     Diabetes Brother        MEDICATIONS:  Scheduled Meds:  Current Facility-Administered Medications   Medication Dose Route Frequency Provider Last Rate    acetaminophen  650 mg Oral Q6H PRN Andrea Ibarra      amLODIPine  5 mg Oral Daily Giovanny Arzola, DO      diphenhydrAMINE  50 mg Oral Daily Andrea Ibarra      insulin lispro  1-5 Units Subcutaneous TID AC Giovanny Arzola, DO      levETIRAcetam  1,000 mg Intravenous Q12H [facility] Giovanny Arzola, DO      LORazepam  1 mg Intravenous Q6H PRN Fabiana Cutler, DO      ondansetron  4 mg Intravenous Q6H PRN Giovanny Arzola, DO      pantoprazole  40 mg Oral Early Morning Giovanny Arzola, DO      sodium chloride  125 mL/hr Intravenous Continuous Andrea Ibarra 100 mL/hr (02/11/24 2042)       PRN Meds:.  acetaminophen    LORazepam    ondansetron    Continuous Infusions:sodium chloride, 125 mL/hr, Last Rate: 100 mL/hr (02/11/24 2042)        REVIEW OF SYSTEMS:  A complete review of systems was unable to be done as patient is lethargic    PHYSICAL EXAM:  Current Weight: Weight - Scale: 89.9 kg (198 lb 4.5 oz)  First  Weight: Weight - Scale: 90 kg (198 lb 6.6 oz)  Vitals:    02/12/24 1217   BP: 119/57   Pulse: 87   Resp: 18   Temp: 99.1 °F (37.3 °C)   SpO2: 98%       Intake/Output Summary (Last 24 hours) at 2/12/2024 1229  Last data filed at 2/12/2024 1001  Gross per 24 hour   Intake 200 ml   Output 150 ml   Net 50 ml     General:  appears comfortable and in no acute distress   Skin:  No rash  Eyes:  Sclerae anicteric, no periorbital edema   ENT:  Moist mucous membranes  Neck:  Trachea midline, symmetric   Chest:  Clear to auscultation bilaterally with no wheezes, rales or rhonchi  CVS:  Regular rate and rhythm  Abdomen:  Soft, nontender, nondistended  Neuro:  lethargic  Extremities: no lower extremity edema     Lab Results:   Results from last 7 days   Lab Units 02/12/24  0822 02/11/24  0647 02/11/24  0431 02/10/24  0623 02/10/24  0403 02/10/24  0355 02/09/24  0549 02/08/24  0836   WBC Thousand/uL 19.44* 14.83*  --   --   --  11.29* 8.16 9.62   HEMOGLOBIN g/dL 14.1 15.2  --   --   --  15.2 14.3 14.7   HEMATOCRIT % 42.9 46.0  --   --   --  46.9 43.4 46.9   PLATELETS Thousands/uL 286 319  --   --   --  334 328 362   SODIUM mmol/L 135  --  137  --  138  --  139 137   POTASSIUM mmol/L 3.7  --  4.2  --  4.1  --  3.5 3.7   CHLORIDE mmol/L 102  --  100  --  101  --  103 98   CO2 mmol/L 22 --  24  --  28  --  28 20*   BUN mg/dL 22  --  14  --  8  --  11 14   CREATININE mg/dL 1.80*  --  1.45*  --  1.05  --  1.01 1.27   CALCIUM mg/dL 8.4  --  9.0  --  8.9  --  8.8 9.2   MAGNESIUM mg/dL  --   --  2.0 1.6*  --   --  1.9 2.1       Radiology Results:   CT chest abdomen pelvis w contrast   Final Result by Edward Padilla DO (02/11 0515)      No acute abnormality or suspicious mass      Cholelithiasis.      Hepatic steatosis.         Workstation performed: [id number]         FL IN-patient lumbar puncture    (Results Pending)

## 2024-02-12 NOTE — ASSESSMENT & PLAN NOTE
"Reported history of \"asthma\" although not on any PTA inhalers  Denies ever having had PFTs  No significant wheezing on exam today  Portable chest x-ray in the ER at [place] with low lung volumes with vascular crowding as well as mild opacity at the medial left base, likely atelectasis, but pneumonia/aspiration not excluded in the appropriate clinical setting.  No significant pathology noted on CT chest abdomen pelvis  "

## 2024-02-12 NOTE — ASSESSMENT & PLAN NOTE
Pruritic, nonpainful, erythematous blanching rash on the extremities as well as trunk  Reports he has had this rash that comes and goes intermittently over the last 7 years   Scheduled Tylenol and Benadryl continued, this is what patient takes at home to treat this rash  Derm evaluated patient and punch biopsy pending.  Appreciate their expertise  Rash appears more convalescent and erythematous today, family states that this is normal course for the disease

## 2024-02-12 NOTE — ASSESSMENT & PLAN NOTE
"Was in [facility] on early Thursday AM and had a tonic-clonic seizure.  Patient without any known history of seizures  Presented to the ER at [place] and initially was combative; had initial CT head with noted hypodensity within the left frontal and right parietal occipital lobes.  ER staff there reached out to specialist on-call and patient was recommended to be transferred to [place] for neurology as well as neurosurgery and further workup.  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 Flores with Neurology was consulted and reviewed MRI brain; although noted cerebral amyloid angiopathy on MRI, patient's lack of significant burden of amyloid angiopathy and the rest of the brain parenchyma as well as the lack of cognitive atypical and does not meet diagnostic criteria.  While cerebral amyloid angiopathy is possibility still, patient with wide range of other possibilities that need to be excluded including press, CNS vasculitis and certain CNS infections.  A repeat stat CTA head was ordered and this CTA head and neck with focal areas of vasogenic edema better characterized on the recent brain MRI  In addition to imaging, neurology recommended Keppra 1 g twice daily as well as an LP for CSF analysis and potential EEG.   Patient returned back to baseline mentation after his seizure and has not had repeat since  Transferred to [place] on the late hours of 2/9  Admit to medicine on telemetry stepdown to with neurochecks every 2 hours.  Consult neurology as well as neurosurgery.  Seizure precautions.  Holding any antiplatelet or anticoagulation agents. Will order LP per neurology recommendations.  Continue IV Keppra.  Will additionally order a CT chest/abdomen/pelvis with contrast to evaluate given wide differential for " symptoms  Echo with preserved LVEF  Order blood cultures, ESR/CRP as well as further rheumatologic studies  Appreciate neurology and neurosurgery recommendations  LP today with IR, followup lab studies

## 2024-02-12 NOTE — ASSESSMENT & PLAN NOTE
Lab Results   Component Value Date    HGBA1C 7.7 (H) 09/07/2023       Recent Labs     02/11/24  2110 02/12/24  0634 02/12/24  1036 02/12/24  1558   POCGLU 223* 142* 194* 127         Blood Sugar Average: Last 72 hrs:  (P) [id number]  Diabetic diet as well as sliding scale insulin with meals

## 2024-02-13 LAB
ANION GAP SERPL CALCULATED.3IONS-SCNC: 7 MMOL/L
BUN SERPL-MCNC: 15 MG/DL (ref 5–25)
C-ANCA TITR SER IF: NORMAL TITER
CALCIUM SERPL-MCNC: 8.1 MG/DL (ref 8.4–10.2)
CHLORIDE SERPL-SCNC: 106 MMOL/L (ref 96–108)
CK SERPL-CCNC: 322 U/L (ref 39–308)
CO2 SERPL-SCNC: 23 MMOL/L (ref 21–32)
CREAT SERPL-MCNC: 1.16 MG/DL (ref 0.6–1.3)
ERYTHROCYTE [DISTWIDTH] IN BLOOD BY AUTOMATED COUNT: 14.1 % (ref 11.6–15.1)
GFR SERPL CREATININE-BSD FRML MDRD: 63 ML/MIN/1.73SQ M
GLUCOSE SERPL-MCNC: 144 MG/DL (ref 65–140)
GLUCOSE SERPL-MCNC: 146 MG/DL (ref 65–140)
GLUCOSE SERPL-MCNC: 148 MG/DL (ref 65–140)
GLUCOSE SERPL-MCNC: 192 MG/DL (ref 65–140)
GLUCOSE SERPL-MCNC: 214 MG/DL (ref 65–140)
HCT VFR BLD AUTO: 38.9 % (ref 36.5–49.3)
HGB BLD-MCNC: 12.8 G/DL (ref 12–17)
MCH RBC QN AUTO: 28.4 PG (ref 26.8–34.3)
MCHC RBC AUTO-ENTMCNC: 32.9 G/DL (ref 31.4–37.4)
MCV RBC AUTO: 86 FL (ref 82–98)
MYELOPEROXIDASE AB SER IA-ACNC: <0.2 UNITS (ref 0–0.9)
P-ANCA ATYPICAL TITR SER IF: NORMAL TITER
P-ANCA TITR SER IF: NORMAL TITER
PLATELET # BLD AUTO: 250 THOUSANDS/UL (ref 149–390)
PMV BLD AUTO: 9.3 FL (ref 8.9–12.7)
POTASSIUM SERPL-SCNC: 3.7 MMOL/L (ref 3.5–5.3)
PROTEINASE3 AB SER IA-ACNC: <0.2 UNITS (ref 0–0.9)
RBC # BLD AUTO: 4.51 MILLION/UL (ref 3.88–5.62)
SODIUM SERPL-SCNC: 136 MMOL/L (ref 135–147)
WBC # BLD AUTO: 14.51 THOUSAND/UL (ref 4.31–10.16)

## 2024-02-13 PROCEDURE — 99233 SBSQ HOSP IP/OBS HIGH 50: CPT | Performed by: STUDENT IN AN ORGANIZED HEALTH CARE EDUCATION/TRAINING PROGRAM

## 2024-02-13 PROCEDURE — B31R1ZZ FLUOROSCOPY OF INTRACRANIAL ARTERIES USING LOW OSMOLAR CONTRAST: ICD-10-PCS | Performed by: RADIOLOGY

## 2024-02-13 PROCEDURE — B3121ZZ FLUOROSCOPY OF LEFT SUBCLAVIAN ARTERY USING LOW OSMOLAR CONTRAST: ICD-10-PCS | Performed by: RADIOLOGY

## 2024-02-13 PROCEDURE — 99233 SBSQ HOSP IP/OBS HIGH 50: CPT | Performed by: FAMILY MEDICINE

## 2024-02-13 PROCEDURE — 99233 SBSQ HOSP IP/OBS HIGH 50: CPT | Performed by: INTERNAL MEDICINE

## 2024-02-13 PROCEDURE — 82948 REAGENT STRIP/BLOOD GLUCOSE: CPT

## 2024-02-13 PROCEDURE — 80048 BASIC METABOLIC PNL TOTAL CA: CPT | Performed by: PHYSICIAN ASSISTANT

## 2024-02-13 PROCEDURE — B31D1ZZ FLUOROSCOPY OF RIGHT VERTEBRAL ARTERY USING LOW OSMOLAR CONTRAST: ICD-10-PCS | Performed by: RADIOLOGY

## 2024-02-13 PROCEDURE — 82550 ASSAY OF CK (CPK): CPT | Performed by: STUDENT IN AN ORGANIZED HEALTH CARE EDUCATION/TRAINING PROGRAM

## 2024-02-13 PROCEDURE — 85027 COMPLETE CBC AUTOMATED: CPT | Performed by: STUDENT IN AN ORGANIZED HEALTH CARE EDUCATION/TRAINING PROGRAM

## 2024-02-13 PROCEDURE — B3151ZZ FLUOROSCOPY OF BILATERAL COMMON CAROTID ARTERIES USING LOW OSMOLAR CONTRAST: ICD-10-PCS | Performed by: RADIOLOGY

## 2024-02-13 PROCEDURE — B31H1ZZ FLUOROSCOPY OF RIGHT UPPER EXTREMITY ARTERIES USING LOW OSMOLAR CONTRAST: ICD-10-PCS | Performed by: RADIOLOGY

## 2024-02-13 PROCEDURE — 99232 SBSQ HOSP IP/OBS MODERATE 35: CPT | Performed by: INTERNAL MEDICINE

## 2024-02-13 RX ORDER — BENZOCAINE/MENTHOL 6 MG-10 MG
LOZENGE MUCOUS MEMBRANE 4 TIMES DAILY PRN
Status: DISCONTINUED | OUTPATIENT
Start: 2024-02-13 | End: 2024-02-22 | Stop reason: HOSPADM

## 2024-02-13 RX ORDER — DIPHENHYDRAMINE HYDROCHLORIDE, ZINC ACETATE 2; .1 G/100G; G/100G
CREAM TOPICAL 3 TIMES DAILY PRN
Status: DISCONTINUED | OUTPATIENT
Start: 2024-02-13 | End: 2024-02-22 | Stop reason: HOSPADM

## 2024-02-13 RX ADMIN — LEVETIRACETAM 1000 MG: 100 INJECTION, SOLUTION INTRAVENOUS at 09:42

## 2024-02-13 RX ADMIN — HEPARIN SODIUM 5000 UNITS: 5000 INJECTION INTRAVENOUS; SUBCUTANEOUS at 05:12

## 2024-02-13 RX ADMIN — PANTOPRAZOLE SODIUM 40 MG: 40 TABLET, DELAYED RELEASE ORAL at 05:12

## 2024-02-13 RX ADMIN — SODIUM CHLORIDE 75 ML/HR: 0.9 INJECTION, SOLUTION INTRAVENOUS at 21:37

## 2024-02-13 RX ADMIN — HEPARIN SODIUM 5000 UNITS: 5000 INJECTION INTRAVENOUS; SUBCUTANEOUS at 13:56

## 2024-02-13 RX ADMIN — SODIUM CHLORIDE 125 ML/HR: 0.9 INJECTION, SOLUTION INTRAVENOUS at 05:16

## 2024-02-13 RX ADMIN — INSULIN LISPRO 2 UNITS: 100 INJECTION, SOLUTION INTRAVENOUS; SUBCUTANEOUS at 21:44

## 2024-02-13 RX ADMIN — HEPARIN SODIUM 5000 UNITS: 5000 INJECTION INTRAVENOUS; SUBCUTANEOUS at 21:37

## 2024-02-13 RX ADMIN — INSULIN LISPRO 1 UNITS: 100 INJECTION, SOLUTION INTRAVENOUS; SUBCUTANEOUS at 11:09

## 2024-02-13 RX ADMIN — AMLODIPINE BESYLATE 5 MG: 5 TABLET ORAL at 09:43

## 2024-02-13 RX ADMIN — LEVETIRACETAM 1000 MG: 100 INJECTION, SOLUTION INTRAVENOUS at 21:37

## 2024-02-13 RX ADMIN — DIPHENHYDRAMINE HCL 50 MG: 25 TABLET ORAL at 09:42

## 2024-02-13 RX ADMIN — SODIUM CHLORIDE 125 ML/HR: 0.9 INJECTION, SOLUTION INTRAVENOUS at 00:44

## 2024-02-13 NOTE — ASSESSMENT & PLAN NOTE
Lab Results   Component Value Date    HGBA1C 7.7 (H) 09/07/2023       Recent Labs     02/12/24  1036 02/12/24  1558 02/12/24  2207 02/13/24  0620   POCGLU 194* 127 306* 144*         Blood Sugar Average: Last 72 hrs:  (P) 174.4568941572460175  Diabetic diet as well as sliding scale insulin with meals

## 2024-02-13 NOTE — PROGRESS NOTES
"NEPHROLOGY PROGRESS NOTE    Delphine Nelson 73 y.o. male MRN: [id number]  Unit/Bed#: [facility] 732-01 Encounter: [id number]  Reason for Consult: Acute renal failure    The patient was resting in bed when I went in he was awakened really never had any specific complaints for me was just weak debilitated says he is not eating that much.  No distress breathing comfortably.    ASSESSMENT/PLAN:  1.  Renal    The patient developed acute renal failure after receiving IV contrast.  This was performed on 2/10/2024.  With supportive care some IV fluid support creatinine has declined back to normal baseline of 1.1 with normal electrolytes.  Urinalysis was not that impressive for intrinsic process as reviewed.  At this point given renal functions back to baseline can reduce IV fluids until eating better more awake but the acute kidney injury issue is resolved.    Reduce saline to 75 cc/h.  Can discontinue once eating better at the discretion of the primary service.    We will sign off please call if we can be of further assistance.        SUBJECTIVE:  Review of Systems   Constitutional: Positive for fever and malaise/fatigue. Negative for chills.   HENT: Negative.     Eyes: Negative.    Cardiovascular:  Negative for chest pain, dyspnea on exertion and orthopnea.   Respiratory:  Negative for cough, shortness of breath, sputum production and wheezing.    Gastrointestinal:  Negative for abdominal pain, diarrhea, nausea and vomiting.   Genitourinary: Negative.    Neurological:  Positive for weakness. Negative for dizziness and headaches.       OBJECTIVE:  Current Weight: Weight - Scale: 91.2 kg (201 lb 1 oz)  Vitals:Temp (24hrs), Av.2 °F (37.3 °C), Min:97.5 °F (36.4 °C), Max:101.2 °F (38.4 °C)  Current: Temperature: 97.9 °F (36.6 °C)   Blood pressure 133/74, pulse 89, temperature 97.9 °F (36.6 °C), resp. rate 18, height 5' 8\" (1.727 m), weight 91.2 kg (201 lb 1 oz), SpO2 96%. , Body mass index is 30.57 kg/m².      Intake/Output " "Summary (Last 24 hours) at 2/13/2024 1300  Last data filed at 2/13/2024 0700  Gross per 24 hour   Intake 240 ml   Output 801 ml   Net -561 ml       Physical Exam: /74   Pulse 89   Temp 97.9 °F (36.6 °C)   Resp 18   Ht 5' 8\" (1.727 m)   Wt 91.2 kg (201 lb 1 oz)   SpO2 96%   BMI 30.57 kg/m²   Physical Exam  Constitutional:       General: He is not in acute distress.     Appearance: He is not toxic-appearing or diaphoretic.   HENT:      Head: Normocephalic and atraumatic.      Nose: Nose normal.      Mouth/Throat:      Mouth: Mucous membranes are dry.   Eyes:      General: No scleral icterus.     Extraocular Movements: Extraocular movements intact.   Cardiovascular:      Rate and Rhythm: Normal rate and regular rhythm.      Heart sounds:      No friction rub. No gallop.   Pulmonary:      Effort: Pulmonary effort is normal. No respiratory distress.      Breath sounds: No wheezing or rales.   Abdominal:      General: Bowel sounds are normal. There is no distension.      Palpations: Abdomen is soft.      Tenderness: There is no abdominal tenderness. There is no rebound.   Musculoskeletal:      Cervical back: Normal range of motion and neck supple.   Skin:     Findings: Rash present.   Neurological:      Mental Status: He is alert and oriented to person, place, and time. Mental status is at baseline.         Medications:    Current Facility-Administered Medications:     acetaminophen (TYLENOL) tablet 650 mg, 650 mg, Oral, Q6H PRN, Andrea Ibarra, 650 mg at 02/12/24 2016    amLODIPine (NORVASC) tablet 5 mg, 5 mg, Oral, Daily, Debra Adler PA-C, 5 mg at 02/13/24 0943    diphenhydrAMINE (BENADRYL) tablet 50 mg, 50 mg, Oral, Daily, Andrea Ibarra, 50 mg at 02/13/24 0942    heparin (porcine) subcutaneous injection 5,000 Units, 5,000 Units, Subcutaneous, Q8H ARIANNA, Andrea Ibarra, 5,000 Units at 02/13/24 0512    insulin lispro (HumaLOG) 100 units/mL subcutaneous injection 1-5 Units, 1-5 Units, " "Subcutaneous, 4x Daily (AC & HS), 1 Units at 02/13/24 1109 **AND** Fingerstick Glucose (POCT), , , 4x Daily AC and at bedtime, Inez Mijares PA-C    levETIRAcetam (KEPPRA) injection 1,000 mg, 1,000 mg, Intravenous, Q12H ARIANNA, Giovanny Arzola DO, 1,000 mg at 02/13/24 0942    LORazepam (ATIVAN) injection 1 mg, 1 mg, Intravenous, Q6H PRN, Fabiana Cutler DO    ondansetron (ZOFRAN) injection 4 mg, 4 mg, Intravenous, Q6H PRN, Giovanny Arzola DO    pantoprazole (PROTONIX) EC tablet 40 mg, 40 mg, Oral, Early Morning, Giovanny Arzola DO, 40 mg at 02/13/24 0512    sodium chloride 0.9 % infusion, 125 mL/hr, Intravenous, Continuous, Andrea Ibarra, Last Rate: 125 mL/hr at 02/13/24 0516, 125 mL/hr at 02/13/24 0516    Laboratory Results:  Lab Results   Component Value Date    WBC 14.51 (H) 02/13/2024    HGB 12.8 02/13/2024    HCT 38.9 02/13/2024    MCV 86 02/13/2024     02/13/2024     Lab Results   Component Value Date    SODIUM 136 02/13/2024    K 3.7 02/13/2024     02/13/2024    CO2 23 02/13/2024    BUN 15 02/13/2024    CREATININE 1.16 02/13/2024    GLUC 146 (H) 02/13/2024    CALCIUM 8.1 (L) 02/13/2024     Lab Results   Component Value Date    CALCIUM 8.1 (L) 02/13/2024     No results found for: \"LABPROT\"    "

## 2024-02-13 NOTE — ASSESSMENT & PLAN NOTE
"Reported history of \"asthma\" although not on any PTA inhalers  Denies ever having had PFTs  No significant wheezing on exam today  Portable chest x-ray in the ER at [place] with low lung volumes with vascular crowding as well as mild opacity at the medial left base, likely atelectasis, but pneumonia/aspiration not excluded in the appropriate clinical setting.  No significant pathology noted on CT chest abdomen pelvis  "

## 2024-02-13 NOTE — PROGRESS NOTES
"Progress Note - Neurology   Delphine Nelson 73 y.o. male [id number]  Unit/Bed#: [facility] 732/[facility] 732-01    Assessment:    * New onset seizure (HCC)  Assessment & Plan  73 y.o. male with HTN, HLD, DM2, [id number], Erectile dysfunction, Migraines, Asthma, GERD and intermittent extremity and trunk pruritic rash who presented following a witnessed seizure. Upon EMS arrival patient was reportedly \"posturing\" and was given Versed 4mg. Aside from elevated BS, labs were unremarkable. Imaging concern for Inflammatory cerebral amyloid angiopathy with a subacute cortical microbleed in the left frontal lobe with vasogenic edema as as detailed below.    Work-up/Imaging:  UA negative  Labs unremarkable aside from WBC 8-->11, Mg 1.6, CRP 7.6, ESR 16  UDS negative (aside from positive for benzos as expected)  CTH revealed:   Hypodensity within the left frontal and right parietal occipital lobes, contrast-enhanced MRI of the brain recommended for further characterization to exclude underlying mass lesions with associated vasogenic edema.   CTA H/N:   No hemodynamically significant stenosis in the major arteries of the neck.  No irregularity of the M1 segment of the left middle cerebral artery possibly representing angiitis/vasculitis or other vasculopathy.  Focal areas of vasogenic edema better characterized on the recent brain MRI, ascribed to represent acute amyloid angiitis/acute inflammatory cerebral amyloid angiopathy. Other etiologies not excluded.  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   Patient was transferred to [facility] for neurology and neurosurgery evaluation.   Echo: EF 60-65%, mild AV stenosis  CTA CAP w: No acute abnormality or suspicious mass Cholelithiasis. Hepatic steatosis.  Routine EEG: Intermittent left fronto-temporal delta activities suggest a focal area of neuronal dysfunction in that region. Background activities " are overall too slow suggesting mild diffuse cerebral dysfunction of nonspecific etiology.   MRI wall vessel imaging:  Findings again seen most consistent with inflammatory amyloid angiopathy with vasogenic edema and petechial hemorrhages most pronounced in the left frontal and right posterior temporal/occipital regions.  1 or 2 new tiny foci of restricted diffusion in the left parietal lobe may be related to microhemorrhages versus tiny infarcts. Otherwise no change.  Stable mild to moderate stenosis in the left M1 segment.  Vessel wall imaging limited due to motion and venous contamination.  CSF studies:  WNL: WBC, protein, RBC, gram stain, ME panel  Abnormal: Glucose 101    Plan:  Continue Keppra 1g BID  Ativan PRN for motor seizure activity > 2 minutes  S/p LP via IR on 2/12  CSF studies: culture, oligoclonal bands, Cytology, Flow cytometry  Cerebral angiogram to be completed  Pending further work up, may consider initiating steroids  Pending work-up will likely need repeat MRI brain in 4-6 weeks  Telemetry  Seizure precautions  [employer] form completed and faxed by previous neurology AP. Patient aware that they are not to drive.   Neurosurgery following; appreciate recommendations  ID following; appreciate recommendations  Monitor neuro exam; notify with any changes  Medical management and supportive care per primary team. Correction of any metabolic or infectious disturbances.     Brain edema (HCC)  Assessment & Plan  As noted on imaging.  Imaging and planned work-up as detailed above    Cerebral amyloid angiopathy   Assessment & Plan  As noted on imaging.   Work-up as detailed above.       Recommendations for outpatient neurological follow up have yet to be determined.    Case and treatment plan reviewed with attending neurologist, Dr. Velazco. Please see attending attestation for any further recommendations.    Subjective:   Patient resting in bed. He reports continued itching throughout his body. He denies any  headaches.        Past Medical History:   Diagnosis Date    Diabetes mellitus (HCC)     ED (erectile dysfunction)     GERD (gastroesophageal reflux disease)     Hypercholesterolemia     Hypertension     Migraines      Past Surgical History:   Procedure Laterality Date    COLONOSCOPY  08/01/2016    FL LUMBAR PUNCTURE DIAGNOSTIC  2/12/2024    NO PAST SURGERIES       Family History   Problem Relation Age of Onset    Heart disease Mother     Asthma Father     Kidney disease Brother     Diabetes Brother      Social History     Socioeconomic History    Marital status: /Civil Union     Spouse name: None    Number of children: None    Years of education: None    Highest education level: None   Occupational History    None   Tobacco Use    Smoking status: Never    Smokeless tobacco: Never   Vaping Use    Vaping status: Never Used   Substance and Sexual Activity    Alcohol use: Never    Drug use: Never    Sexual activity: Not Currently     Partners: Female   Other Topics Concern    None   Social History Narrative    None     Social Determinants of Health     Financial Resource Strain: Low Risk  (9/29/2023)    Overall Financial Resource Strain (CARDIA)     Difficulty of Paying Living Expenses: Not hard at all   Food Insecurity: No Food Insecurity (2/10/2024)    Hunger Vital Sign     Worried About Running Out of Food in the Last Year: Never true     Ran Out of Food in the Last Year: Never true   Transportation Needs: Patient Unable To Answer (2/10/2024)    PRAPARE - Transportation     Lack of Transportation (Medical): Patient unable to answer     Lack of Transportation (Non-Medical): Patient unable to answer   Physical Activity: Not on file   Stress: Not on file   Social Connections: Not on file   Intimate Partner Violence: Not on file   Housing Stability: Patient Unable To Answer (2/10/2024)    Housing Stability Vital Sign     Unable to Pay for Housing in the Last Year: Patient unable to answer     Number of Places  "Lived in the Last Year: Not on file     Unstable Housing in the Last Year: Patient unable to answer         Medications:  All current active meds have been reviewed and current meds:  Scheduled Meds:  Current Facility-Administered Medications   Medication Dose Route Frequency Provider Last Rate    acetaminophen  650 mg Oral Q6H PRN Andrea Ibarra      amLODIPine  5 mg Oral Daily Debra Adler PA-C      diphenhydrAMINE  50 mg Oral Daily Andrea Ibarra      heparin (porcine)  5,000 Units Subcutaneous Q8H [facility] Andrea Ibarra      insulin lispro  1-5 Units Subcutaneous 4x Daily (AC & HS) Inez Mijares PA-C      levETIRAcetam  1,000 mg Intravenous Q12H ARIANNA Giovanny Arzola, DO      LORazepam  1 mg Intravenous Q6H PRN Fabiana Cutler, DO      ondansetron  4 mg Intravenous Q6H PRN Giovanny Arzola, DO      pantoprazole  40 mg Oral Early Morning Giovanny Arzola, DO      sodium chloride  75 mL/hr Intravenous Continuous Jose Daniel Hi MD 75 mL/hr (02/13/24 1356)     Continuous Infusions:sodium chloride, 75 mL/hr, Last Rate: 75 mL/hr (02/13/24 1356)      PRN Meds:.  acetaminophen    LORazepam    ondansetron       ROS:   A 12 system ROS was completed. Other than the above mentioned complaints in the HPI and those commented on below, all remaining systems were negative.      Vitals:   /74   Pulse 89   Temp 97.9 °F (36.6 °C)   Resp 18   Ht 5' 8\" (1.727 m)   Wt 91.2 kg (201 lb 1 oz)   SpO2 96%   BMI 30.57 kg/m²       Physical Exam:   Physical Exam  Vitals and nursing note reviewed.   Constitutional:       General: He is not in acute distress.     Appearance: Normal appearance. He is not ill-appearing.   HENT:      Head: Normocephalic.      Mouth/Throat:      Mouth: Mucous membranes are moist.      Pharynx: Oropharynx is clear.   Eyes:      General: No scleral icterus.        Right eye: No discharge.         Left eye: No discharge.      Extraocular Movements: Extraocular movements intact and EOM normal.      " Conjunctiva/sclera: Conjunctivae normal.   Cardiovascular:      Rate and Rhythm: Normal rate.   Pulmonary:      Effort: Pulmonary effort is normal. No respiratory distress.   Skin:     General: Skin is warm and dry.      Coloration: Skin is not jaundiced or pale.      Findings: Erythema and rash present.   Neurological:      Mental Status: He is alert and oriented to person, place, and time.   Psychiatric:         Mood and Affect: Mood normal.       Neurologic Exam     Mental Status   Oriented to person, place, and time.   Level of consciousness: alert  Able to follow commands appropriately. No dysarthria noted.     Cranial Nerves     CN II   Right visual field deficit: none  Left visual field deficit: none     CN III, IV, VI   Extraocular motions are normal.     CN V   Facial sensation intact.     CN VII   Facial expression full, symmetric.     CN VIII   Hearing: intact    CN IX, X   Palate: symmetric    CN XI   CN XI normal.     CN XII   CN XII normal.     Motor Exam   Muscle bulk: normal  Bilateral UE strength 5/5 deltoids, biceps, triceps, hand   Bilateral LE strength 5/5 hip flexion, dorsiflexion, plantar flexion     Sensory Exam   Light touch normal.     Gait, Coordination, and Reflexes     Tremor   Resting tremor: absent  Intention tremor: absent    Reflexes   Right plantar: normal  Left plantar: normal          Labs: I have personally reviewed pertinent reports.   Recent Results (from the past 24 hour(s))   Fingerstick Glucose (POCT)    Collection Time: 02/12/24  3:58 PM   Result Value Ref Range    POC Glucose 127 65 - 140 mg/dl   Fingerstick Glucose (POCT)    Collection Time: 02/12/24 10:07 PM   Result Value Ref Range    POC Glucose 306 (H) 65 - 140 mg/dl   Basic metabolic panel    Collection Time: 02/13/24  5:11 AM   Result Value Ref Range    Sodium 136 135 - 147 mmol/L    Potassium 3.7 3.5 - 5.3 mmol/L    Chloride 106 96 - 108 mmol/L    CO2 23 21 - 32 mmol/L    ANION GAP 7 mmol/L    BUN 15 5 - 25  mg/dL    Creatinine 1.16 0.60 - 1.30 mg/dL    Glucose 146 (H) 65 - 140 mg/dL    Calcium 8.1 (L) 8.4 - 10.2 mg/dL    eGFR 63 ml/min/1.73sq m   CK    Collection Time: 02/13/24  5:11 AM   Result Value Ref Range    Total  (H) 39 - 308 U/L   CBC and Platelet    Collection Time: 02/13/24  5:11 AM   Result Value Ref Range    WBC 14.51 (H) 4.31 - 10.16 Thousand/uL    RBC 4.51 3.88 - 5.62 Million/uL    Hemoglobin 12.8 12.0 - 17.0 g/dL    Hematocrit 38.9 36.5 - 49.3 %    MCV 86 82 - 98 fL    MCH 28.4 26.8 - 34.3 pg    MCHC 32.9 31.4 - 37.4 g/dL    RDW 14.1 11.6 - 15.1 %    Platelets 250 149 - 390 Thousands/uL    MPV 9.3 8.9 - 12.7 fL   Fingerstick Glucose (POCT)    Collection Time: 02/13/24  6:20 AM   Result Value Ref Range    POC Glucose 144 (H) 65 - 140 mg/dl   Fingerstick Glucose (POCT)    Collection Time: 02/13/24 10:42 AM   Result Value Ref Range    POC Glucose 192 (H) 65 - 140 mg/dl       Imaging: I have personally reviewed pertinent imaging in PACS, and I have personally reviewed PACS reports.     EKG, Pathology, and Other Studies: I have personally reviewed pertinent reports.       VTE Prophylaxis: Sequential compression device (Venodyne)  and Heparin        Total time spent today 32 minutes. Greater than 50% of total time was spent with the patient and / or family counseling and / or coordination of care. A description of the counseling / coordination of care: Patient seen and evaluated. Case reviewed with attending. Chart thoroughly reviewed including imaging and labs. Coordination of care with primary team. Discussion of imaging with patient and family.

## 2024-02-13 NOTE — PROGRESS NOTES
"[facility]  Progress Note  Name: Delphine Nelson I  MRN: [id number]  Unit/Bed#: [facility] 732-01 I Date of Admission: 2/10/2024   Date of Service: 2/13/2024 I Hospital Day: 3    Assessment/Plan   * New onset seizure (HCC)  Assessment & Plan  Was in [facility] on early 2/8 AM and had a tonic-clonic seizure.  Patient without any known history of seizures  Presented to the ER at [place] and initially was combative; had initial CT head with noted hypodensity within the left frontal and right parietal occipital lobes.  ER staff there reached out to specialist on-call and patient was recommended to be transferred to [place] for neurology as well as neurosurgery and further workup.  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 Flores with Neurology was consulted and reviewed MRI brain; although noted cerebral amyloid angiopathy on MRI, patient's lack of significant burden of amyloid angiopathy and the rest of the brain parenchyma as well as the lack of cognitive atypical and does not meet diagnostic criteria.  While cerebral amyloid angiopathy is possibility still, patient with wide range of other possibilities that need to be excluded including press, CNS vasculitis and certain CNS infections.  A repeat stat CTA head was ordered and this CTA head and neck with focal areas of vasogenic edema better characterized on the recent brain MRI  In addition to imaging, neurology recommended Keppra 1 g twice daily as well as an LP for CSF analysis and potential EEG.   Patient returned back to baseline mentation after his seizure and has not had repeat since  Transferred to [place] on the late hours of 2/9  Admit to medicine on telemetry stepdown to with neurochecks every 2 hours.  Consult neurology as " "well as neurosurgery.  Seizure precautions.  Holding any antiplatelet or anticoagulation agents.  Continue IV Keppra.  F/U CT chest/abdomen/pelvis   F/U LP  Echo with preserved LVEF  Order blood cultures, ESR/CRP as well as further rheumatologic studies  Appreciate neurology and neurosurgery recommendations    KRISTIN (acute kidney injury) (HCC)  Assessment & Plan  Worsening renal function today with creatinine of 1.8  There are not many renal function measurements in the outpatient setting however we may surmise that baseline renal function is around 1.0-1.4  Hold lisinopril   Patient appears mildly hypovolemic yesterday, he has been on IVF, but endorses poor p.o. intake, likely also has some insensible water losses from fevers  Nephrology consulted, hold statin while awaiting CK level, check urine lytes    Fever  Assessment & Plan  Patient noted to have fevers throughout stay  No obvious source of infection, suspect this could be due to underlying inflammatory process  Continue supportive measures and would hold off of antibiotics at this time  Continue to monitor microbial cultures  F/U LP  ID following    Rash  Assessment & Plan  Pruritic, nonpainful, erythematous blanching rash on the extremities as well as trunk  Reports he has had this rash that comes and goes intermittently over the last 7 years   Scheduled Tylenol and Benadryl continued, this is what patient takes at home to treat this rash  Derm evaluated patient and punch biopsy pending.  Appreciate their expertise  Rash appears more convalescent and erythematous today, family states that this is normal course for the disease    Respiratory disease  Assessment & Plan  Reported history of \"asthma\" although not on any PTA inhalers  Denies ever having had PFTs  No significant wheezing on exam today  Portable chest x-ray in the ER at [place] with low lung volumes with vascular crowding as well as mild opacity at the medial left base, likely atelectasis, but " pneumonia/aspiration not excluded in the appropriate clinical setting.  No significant pathology noted on CT chest abdomen pelvis    Type 2 diabetes mellitus with stage 3a chronic kidney disease, without long-term current use of insulin ([facility])  Assessment & Plan  Lab Results   Component Value Date    HGBA1C 7.7 (H) 09/07/2023       Recent Labs     02/12/24  1036 02/12/24  1558 02/12/24  2207 02/13/24  0620   POCGLU 194* 127 306* 144*         Blood Sugar Average: Last 72 hrs:  (P) [id number]  Diabetic diet as well as sliding scale insulin with meals    Hypercholesterolemia  Assessment & Plan  Holding PTA atorvastatin while awaiting CK level    Essential hypertension  Assessment & Plan  Continue PTA Norvasc, lisinopril held for increasing renal function               VTE Pharmacologic Prophylaxis: VTE Score: 3 Moderate Risk (Score 3-4) - Pharmacological DVT Prophylaxis Ordered: heparin.    Mobility:   Basic Mobility Inpatient Raw Score: 20  JH-HLM Goal: 6: Walk 10 steps or more  JH-HLM Achieved: 6: Walk 10 steps or more  HLM Goal achieved. Continue to encourage appropriate mobility.    Patient Centered Rounds: I performed bedside rounds with nursing staff today.   Discussions with Specialists or Other Care Team Provider: Reviewed notes with Nephrology, ID    Education and Discussions with Family / Patient: Updated  (wife) at bedside.    Total Time Spent on Date of Encounter in care of patient: 60 mins. This time was spent on one or more of the following: performing physical exam; counseling and coordination of care; obtaining or reviewing history; documenting in the medical record; reviewing/ordering tests, medications or procedures; communicating with other healthcare professionals and discussing with patient's family/caregivers.    Current Length of Stay: 3 day(s)  Current Patient Status: Inpatient   Certification Statement: The patient will continue to require additional inpatient hospital  stay due to New onset seizure work up  Discharge Plan:  Pending results of LP / MRI / clinical improvement    Code Status: Level 1 - Full Code    Subjective:   This is a very pleasant 73-year-old gentleman who was seen and evaluated today at bedside.  Patient has no acute concerns or complaints at this time.  Patient is patiently waiting for the results of his labs come back.    Objective:     Vitals:   Temp (24hrs), Av.5 °F (37.5 °C), Min:97.9 °F (36.6 °C), Max:101.2 °F (38.4 °C)    Temp:  [97.9 °F (36.6 °C)-101.2 °F (38.4 °C)] 97.9 °F (36.6 °C)  HR:  [] 89  Resp:  [18] 18  BP: (121-144)/(67-95) 133/74  SpO2:  [94 %-99 %] 96 %  Body mass index is 30.57 kg/m².     Input and Output Summary (last 24 hours):     Intake/Output Summary (Last 24 hours) at 2024 1359  Last data filed at 2024 0700  Gross per 24 hour   Intake 240 ml   Output 801 ml   Net -561 ml       Physical Exam:   Physical Exam  Vitals reviewed.   HENT:      Head: Normocephalic.      Nose: No congestion.      Mouth/Throat:      Pharynx: No oropharyngeal exudate or posterior oropharyngeal erythema.   Eyes:      Conjunctiva/sclera: Conjunctivae normal.   Cardiovascular:      Rate and Rhythm: Normal rate and regular rhythm.   Pulmonary:      Effort: Pulmonary effort is normal.   Abdominal:      General: Abdomen is flat.      Palpations: Abdomen is soft.   Skin:     General: Skin is warm and dry.      Findings: Rash present.   Neurological:      Mental Status: He is alert and oriented to person, place, and time. Mental status is at baseline.          Additional Data:     Labs:  Results from last 7 days   Lab Units 24  0511 24  0647 02/10/24  0355   WBC Thousand/uL 14.51*   < > 11.29*   HEMOGLOBIN g/dL 12.8   < > 15.2   HEMATOCRIT % 38.9   < > 46.9   PLATELETS Thousands/uL 250   < > 334   NEUTROS PCT %  --   --  85*   LYMPHS PCT %  --   --  7*   MONOS PCT %  --   --  5   EOS PCT %  --   --  3    < > = values in this interval not  displayed.     Results from last 7 days   Lab Units 02/13/24  0511 02/12/24  0822 02/11/24  0431   SODIUM mmol/L 136   < > 137   POTASSIUM mmol/L 3.7   < > 4.2   CHLORIDE mmol/L 106   < > 100   CO2 mmol/L 23   < > 24   BUN mg/dL 15   < > 14   CREATININE mg/dL 1.16   < > 1.45*   ANION GAP mmol/L 7   < > 13   CALCIUM mg/dL 8.1*   < > 9.0   ALBUMIN g/dL  --   --  3.9   TOTAL BILIRUBIN mg/dL  --   --  0.91   ALK PHOS U/L  --   --  54   ALT U/L  --   --  23   AST U/L  --   --  22   GLUCOSE RANDOM mg/dL 146*   < > 154*    < > = values in this interval not displayed.     Results from last 7 days   Lab Units 02/12/24  0822   INR  1.10     Results from last 7 days   Lab Units 02/13/24  1042 02/13/24  0620 02/12/24  2207 02/12/24  1558 02/12/24  1036 02/12/24  0634 02/11/24  2110 02/11/24  1626 02/11/24  1028 02/11/24  0615 02/10/24  2115 02/10/24  1607   POC GLUCOSE mg/dl 192* 144* 306* 127 194* 142* 223* 191* 204* 148* 152* 129         Results from last 7 days   Lab Units 02/10/24  2316   PROCALCITONIN ng/ml 0.30*       Lines/Drains:  Invasive Devices       Peripheral Intravenous Line  Duration             Peripheral IV 02/10/24 Right Antecubital 3 days                      Telemetry:  Telemetry Orders (From admission, onward)               24 Hour Telemetry Monitoring  Continuous x 24 Hours (Telem)        Expiring   Question:  Reason for 24 Hour Telemetry  Answer:  TIA/Suspected CVA/ Confirmed CVA                     Telemetry Reviewed: Normal Sinus Rhythm  Indication for Continued Telemetry Use: No indication for continued use. Will discontinue.              Imaging: Reviewed radiology reports from this admission including: chest CT scan, abdominal/pelvic CT, and MRI brain    Recent Cultures (last 7 days):   Results from last 7 days   Lab Units 02/12/24  1157 02/10/24  0627 02/10/24  0333   BLOOD CULTURE   --  No Growth at 72 hrs. No Growth at 72 hrs.   GRAM STAIN RESULT  No Polys or Bacteria seen  1+ Mononuclear Cells   --   --        Last 24 Hours Medication List:   Current Facility-Administered Medications   Medication Dose Route Frequency Provider Last Rate    acetaminophen  650 mg Oral Q6H PRN Andrea Ibarra      amLODIPine  5 mg Oral Daily Debra Adler PA-C      diphenhydrAMINE  50 mg Oral Daily Andrea Ibarra      heparin (porcine)  5,000 Units Subcutaneous Q8H [facility] Andrea Ibarra      insulin lispro  1-5 Units Subcutaneous 4x Daily (AC & HS) Inez Mijares PA-C      levETIRAcetam  1,000 mg Intravenous Q12H [facility] Giovanny Arzola, DO      LORazepam  1 mg Intravenous Q6H PRN Fabiana Cutler, DO      ondansetron  4 mg Intravenous Q6H PRN Giovanny Arzola, DO      pantoprazole  40 mg Oral Early Morning Giovanny Arzola, DO      sodium chloride  75 mL/hr Intravenous Continuous Jose Daniel Hi MD 75 mL/hr (02/13/24 1632)        Today, Patient Was Seen By: Tacos Jean MD    **Please Note: This note may have been constructed using a voice recognition system.**

## 2024-02-13 NOTE — RESTORATIVE TECHNICIAN NOTE
Restorative Technician Note      Patient Name: Delphine Nelson     Note Type: Mobility  Patient Position Upon Consult: Supine  Activity Performed: Ambulated; Dangled; Stood  Assistive Device: Other (Comment) (A x1)  Education Provided: Yes  Patient Position at End of Consult: Supine; All needs within reach; Bed/Chair alarm activated    Cinthia JACKSON, Restorative Technician,

## 2024-02-13 NOTE — ASSESSMENT & PLAN NOTE
Patient noted to have fevers throughout stay  No obvious source of infection, suspect this could be due to underlying inflammatory process  Continue supportive measures and would hold off of antibiotics at this time  Continue to monitor microbial cultures  F/U LP  ID following

## 2024-02-13 NOTE — ASSESSMENT & PLAN NOTE
"Was in [facility] on early 2/8 AM and had a tonic-clonic seizure.  Patient without any known history of seizures  Presented to the ER at [place] and initially was combative; had initial CT head with noted hypodensity within the left frontal and right parietal occipital lobes.  ER staff there reached out to specialist on-call and patient was recommended to be transferred to [place] for neurology as well as neurosurgery and further workup.  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 Flores with Neurology was consulted and reviewed MRI brain; although noted cerebral amyloid angiopathy on MRI, patient's lack of significant burden of amyloid angiopathy and the rest of the brain parenchyma as well as the lack of cognitive atypical and does not meet diagnostic criteria.  While cerebral amyloid angiopathy is possibility still, patient with wide range of other possibilities that need to be excluded including press, CNS vasculitis and certain CNS infections.  A repeat stat CTA head was ordered and this CTA head and neck with focal areas of vasogenic edema better characterized on the recent brain MRI  In addition to imaging, neurology recommended Keppra 1 g twice daily as well as an LP for CSF analysis and potential EEG.   Patient returned back to baseline mentation after his seizure and has not had repeat since  Transferred to [place] on the late hours of 2/9  Admit to medicine on telemetry stepdown to with neurochecks every 2 hours.  Consult neurology as well as neurosurgery.  Seizure precautions.  Holding any antiplatelet or anticoagulation agents.  Continue IV Keppra.  F/U CT chest/abdomen/pelvis - Unremarkable   F/U LP  Echo with preserved LVEF  Order blood cultures, ESR/CRP as well as further rheumatologic " studies  Appreciate neurology and neurosurgery recommendations

## 2024-02-13 NOTE — PROGRESS NOTES
Progress Note - Infectious Disease   Delphine Nelson 73 y.o. male MRN: [id number]  Unit/Bed#: [facility] 732-01 Encounter: [id number]      Impression/Recommendations:  Fever, with mild leukocytosis.  Etiology of this is unclear.  Consider postictal fever.  Consider fever secondary to underlying inflammatory process.  Patient does not have chills or symptoms of fever.  He may have had chronic fever at home.  There is no obvious active infection.  He remains clinically and systemically well despite fever and leukocytosis.  Admission blood cultures have no growth thus far.  With patient clinically and systemically well, likely prolonged fever and no obvious active infection, given need for extensive workup of underlying inflammatory process, will keep patient off antibiotic so that it does not affect workup.  Continue to observe off antibiotic.  Monitor temperature/WBC.    Follow-up on admission blood cultures.     Focal vasogenic edema and, with new onset seizure.  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  However, dermatologist is considering scleromyxedema.  Regardless, this appears to be an inflammatory process rather than infectious.  Patient is status post lumbar puncture with essentially normal CSF with negative/M/E PCR panel.  He may need brain biopsy if diagnosis cannot be established noninvasively.  Observe off antibiotic, as seen above.  Follow-up on all pending CSF studies.     Diffuse skin rash, intermittent for the last many years.  Patient is status post punch biopsy of rash on right side.  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  No evidence of cellulitis or acute infection of skin.  HIV screen negative.  Syphilis screen negative.  Follow-up on pathology from punch biopsy.     Discussed with patient and his family in detail regarding the above plan.     Antibiotics:  None     Subjective:  Patient is sleepy but arousable.  When aroused, he is alert and  oriented.  Temperature intermittently up.  No chills.  No diarrhea.     Objective:  Vitals:  Temp:  [97.5 °F (36.4 °C)-101.2 °F (38.4 °C)] 97.9 °F (36.6 °C)  HR:  [] 89  Resp:  [18] 18  BP: (112-144)/(57-95) 133/74  SpO2:  [94 %-99 %] 96 %  Temp (24hrs), Av.2 °F (37.3 °C), Min:97.5 °F (36.4 °C), Max:101.2 °F (38.4 °C)  Current: Temperature: 97.9 °F (36.6 °C)    Physical Exam:     General: Awake, alert, cooperative, no distress.   Neck:  Supple. No mass.  No lymphadenopathy.   Lungs: Expansion symmetric, no rales, no wheezing, respirations unlabored.   Heart:  Regular rate and rhythm, S1 and S2 normal, no murmur.   Abdomen: Soft, nondistended, non-tender, bowel sounds active all four quadrants, no masses, no organomegaly.   Extremities: No edema. No erythema/warmth. No ulcer. Nontender to palpation.   Skin:  Stable diffuse papular rash.   Neuro: Moves all extremities.     Invasive Devices       Peripheral Intravenous Line  Duration             Peripheral IV 02/10/24 Right Antecubital 3 days                    Labs studies:   I have personally reviewed pertinent labs.  Results from last 7 days   Lab Units 24  0511 24  0822 24  0431 02/10/24  0403 24  0549   POTASSIUM mmol/L 3.7 3.7 4.2 4.1 3.5   CHLORIDE mmol/L 106 102 100 101 103   CO2 mmol/L 23 22 24 28 28   BUN mg/dL 15 22 14 8 11   CREATININE mg/dL 1.16 1.80* 1.45* 1.05 1.01   EGFR ml/min/1.73sq m 63 37 48 71 74   CALCIUM mg/dL 8.1* 8.4 9.0 8.9 8.8   AST U/L  --   --  22 26 21   ALT U/L  --   --  23 23 24   ALK PHOS U/L  --   --  54 53 53     Results from last 7 days   Lab Units 24  0511 24  0822 24  0647   WBC Thousand/uL 14.51* 19.44* 14.83*   HEMOGLOBIN g/dL 12.8 14.1 15.2   PLATELETS Thousands/uL 250 286 319     Results from last 7 days   Lab Units 24  1157 02/10/24  0627 02/10/24  0333   BLOOD CULTURE   --  No Growth at 72 hrs. No Growth at 72 hrs.   GRAM STAIN RESULT  No Polys or Bacteria seen  1+  Mononuclear Cells  --   --        Imaging Studies:   I have personally reviewed pertinent imaging study reports and images in PACS.    EKG, Pathology, and Other Studies:   I have personally reviewed pertinent reports.

## 2024-02-13 NOTE — ASSESSMENT & PLAN NOTE
Worsening renal function today with creatinine of 1.8  There are not many renal function measurements in the outpatient setting however we may surmise that baseline renal function is around 1.0-1.4  Hold lisinopril   Patient appears mildly hypovolemic yesterday, he has been on IVF, but endorses poor p.o. intake, likely also has some insensible water losses from fevers  Nephrology consulted, hold statin while awaiting CK level, check urine lytes

## 2024-02-13 NOTE — PLAN OF CARE
Problem: SAFETY ADULT  Goal: Patient will remain free of falls  Description: INTERVENTIONS:  - Educate patient/family on patient safety including physical limitations  - Instruct patient to call for assistance with activity   - Consult OT/PT to assist with strengthening/mobility   - Keep Call bell within reach  - Keep bed low and locked with side rails adjusted as appropriate  - Keep care items and personal belongings within reach  - Initiate and maintain comfort rounds  - Make Fall Risk Sign visible to staff  - Offer Toileting every 2 Hours, in advance of need  - Initiate/Maintain bed alarm  - Obtain necessary fall risk management equipment: non skid footwear  - Apply yellow socks and bracelet for high fall risk patients  - Consider moving patient to room near nurses station  Outcome: Progressing     Problem: Knowledge Deficit  Goal: Patient/family/caregiver demonstrates understanding of disease process, treatment plan, medications, and discharge instructions  Description: Complete learning assessment and assess knowledge base.  Interventions:  - Provide teaching at level of understanding  - Provide teaching via preferred learning methods  Outcome: Progressing     Problem: NEUROSENSORY - ADULT  Goal: Remains free of injury related to seizures activity  Description: INTERVENTIONS  - Maintain airway, patient safety  and administer oxygen as ordered  - Monitor patient for seizure activity, document and report duration and description of seizure to physician/advanced practitioner  - If seizure occurs,  ensure patient safety during seizure  - Reorient patient post seizure  - Seizure pads on all 4 side rails  - Instruct patient/family to notify RN of any seizure activity including if an aura is experienced  - Instruct patient/family to call for assistance with activity based on nursing assessment  - Administer anti-seizure medications if ordered    Outcome: Progressing

## 2024-02-14 LAB
ALBUMIN SERPL BCP-MCNC: 3.5 G/DL (ref 3.5–5)
ALP SERPL-CCNC: 83 U/L (ref 34–104)
ALT SERPL W P-5'-P-CCNC: 28 U/L (ref 7–52)
ANION GAP SERPL CALCULATED.3IONS-SCNC: 8 MMOL/L
AST SERPL W P-5'-P-CCNC: 23 U/L (ref 13–39)
BASOPHILS # BLD MANUAL: 0 THOUSAND/UL (ref 0–0.1)
BASOPHILS NFR MAR MANUAL: 0 % (ref 0–1)
BILIRUB SERPL-MCNC: 0.78 MG/DL (ref 0.2–1)
BUN SERPL-MCNC: 8 MG/DL (ref 5–25)
CALCIUM SERPL-MCNC: 8.7 MG/DL (ref 8.4–10.2)
CHLORIDE SERPL-SCNC: 104 MMOL/L (ref 96–108)
CO2 SERPL-SCNC: 25 MMOL/L (ref 21–32)
CREAT SERPL-MCNC: 1.05 MG/DL (ref 0.6–1.3)
EOSINOPHIL # BLD MANUAL: 1.36 THOUSAND/UL (ref 0–0.4)
EOSINOPHIL NFR BLD MANUAL: 13 % (ref 0–6)
ERYTHROCYTE [DISTWIDTH] IN BLOOD BY AUTOMATED COUNT: 14 % (ref 11.6–15.1)
GFR SERPL CREATININE-BSD FRML MDRD: 71 ML/MIN/1.73SQ M
GLUCOSE SERPL-MCNC: 133 MG/DL (ref 65–140)
GLUCOSE SERPL-MCNC: 139 MG/DL (ref 65–140)
GLUCOSE SERPL-MCNC: 172 MG/DL (ref 65–140)
GLUCOSE SERPL-MCNC: 175 MG/DL (ref 65–140)
GLUCOSE SERPL-MCNC: 203 MG/DL (ref 65–140)
HCT VFR BLD AUTO: 42.9 % (ref 36.5–49.3)
HGB BLD-MCNC: 13.9 G/DL (ref 12–17)
LYMPHOCYTES # BLD AUTO: 1.57 THOUSAND/UL (ref 0.6–4.47)
LYMPHOCYTES # BLD AUTO: 4 % (ref 14–44)
MCH RBC QN AUTO: 27.9 PG (ref 26.8–34.3)
MCHC RBC AUTO-ENTMCNC: 32.4 G/DL (ref 31.4–37.4)
MCV RBC AUTO: 86 FL (ref 82–98)
MONOCYTES # BLD AUTO: 0.42 THOUSAND/UL (ref 0–1.22)
MONOCYTES NFR BLD: 4 % (ref 4–12)
NEUTROPHILS # BLD MANUAL: 7.11 THOUSAND/UL (ref 1.85–7.62)
NEUTS BAND NFR BLD MANUAL: 6 % (ref 0–8)
NEUTS SEG NFR BLD AUTO: 62 % (ref 43–75)
PLATELET # BLD AUTO: 292 THOUSANDS/UL (ref 149–390)
PLATELET BLD QL SMEAR: ADEQUATE
PMV BLD AUTO: 9.2 FL (ref 8.9–12.7)
POTASSIUM SERPL-SCNC: 3.7 MMOL/L (ref 3.5–5.3)
PROT SERPL-MCNC: 6.6 G/DL (ref 6.4–8.4)
RBC # BLD AUTO: 4.98 MILLION/UL (ref 3.88–5.62)
RBC MORPH BLD: NORMAL
SODIUM SERPL-SCNC: 137 MMOL/L (ref 135–147)
VARIANT LYMPHS # BLD AUTO: 11 %
WBC # BLD AUTO: 10.46 THOUSAND/UL (ref 4.31–10.16)

## 2024-02-14 PROCEDURE — 88108 CYTOPATH CONCENTRATE TECH: CPT | Performed by: PATHOLOGY

## 2024-02-14 PROCEDURE — 99233 SBSQ HOSP IP/OBS HIGH 50: CPT | Performed by: INTERNAL MEDICINE

## 2024-02-14 PROCEDURE — 80053 COMPREHEN METABOLIC PANEL: CPT | Performed by: FAMILY MEDICINE

## 2024-02-14 PROCEDURE — 85027 COMPLETE CBC AUTOMATED: CPT | Performed by: FAMILY MEDICINE

## 2024-02-14 PROCEDURE — 99232 SBSQ HOSP IP/OBS MODERATE 35: CPT | Performed by: FAMILY MEDICINE

## 2024-02-14 PROCEDURE — 85007 BL SMEAR W/DIFF WBC COUNT: CPT | Performed by: FAMILY MEDICINE

## 2024-02-14 PROCEDURE — 82948 REAGENT STRIP/BLOOD GLUCOSE: CPT

## 2024-02-14 PROCEDURE — 99233 SBSQ HOSP IP/OBS HIGH 50: CPT | Performed by: RADIOLOGY

## 2024-02-14 RX ORDER — LISINOPRIL 10 MG/1
10 TABLET ORAL DAILY
Status: DISCONTINUED | OUTPATIENT
Start: 2024-02-14 | End: 2024-02-22 | Stop reason: HOSPADM

## 2024-02-14 RX ADMIN — LEVETIRACETAM 1000 MG: 100 INJECTION, SOLUTION INTRAVENOUS at 08:34

## 2024-02-14 RX ADMIN — INSULIN LISPRO 1 UNITS: 100 INJECTION, SOLUTION INTRAVENOUS; SUBCUTANEOUS at 21:41

## 2024-02-14 RX ADMIN — DIPHENHYDRAMINE HCL 50 MG: 25 TABLET ORAL at 08:34

## 2024-02-14 RX ADMIN — ACETAMINOPHEN 650 MG: 325 TABLET, FILM COATED ORAL at 01:12

## 2024-02-14 RX ADMIN — DIPHENHYDRAMINE HYDROCHLORIDE, ZINC ACETATE: 2; .1 CREAM TOPICAL at 01:03

## 2024-02-14 RX ADMIN — LEVETIRACETAM 1000 MG: 100 INJECTION, SOLUTION INTRAVENOUS at 21:41

## 2024-02-14 RX ADMIN — HEPARIN SODIUM 5000 UNITS: 5000 INJECTION INTRAVENOUS; SUBCUTANEOUS at 05:55

## 2024-02-14 RX ADMIN — HYDROCORTISONE: 1 CREAM TOPICAL at 01:03

## 2024-02-14 RX ADMIN — AMLODIPINE BESYLATE 5 MG: 5 TABLET ORAL at 08:34

## 2024-02-14 RX ADMIN — SODIUM CHLORIDE 75 ML/HR: 0.9 INJECTION, SOLUTION INTRAVENOUS at 11:48

## 2024-02-14 RX ADMIN — INSULIN LISPRO 1 UNITS: 100 INJECTION, SOLUTION INTRAVENOUS; SUBCUTANEOUS at 11:48

## 2024-02-14 RX ADMIN — HYDROCORTISONE: 1 CREAM TOPICAL at 05:57

## 2024-02-14 RX ADMIN — INSULIN LISPRO 1 UNITS: 100 INJECTION, SOLUTION INTRAVENOUS; SUBCUTANEOUS at 17:34

## 2024-02-14 RX ADMIN — HEPARIN SODIUM 5000 UNITS: 5000 INJECTION INTRAVENOUS; SUBCUTANEOUS at 13:43

## 2024-02-14 RX ADMIN — PANTOPRAZOLE SODIUM 40 MG: 40 TABLET, DELAYED RELEASE ORAL at 05:56

## 2024-02-14 RX ADMIN — LISINOPRIL 10 MG: 10 TABLET ORAL at 10:24

## 2024-02-14 RX ADMIN — HEPARIN SODIUM 5000 UNITS: 5000 INJECTION INTRAVENOUS; SUBCUTANEOUS at 21:41

## 2024-02-14 RX ADMIN — HYDROCORTISONE: 1 CREAM TOPICAL at 16:00

## 2024-02-14 NOTE — PROGRESS NOTES
"[facility]  Progress Note  Name: Delphine Nelson I  MRN: [id number]  Unit/Bed#: [facility] 732-01 I Date of Admission: 2/10/2024   Date of Service: 2/14/2024 I Hospital Day: 4    Assessment/Plan   * New onset seizure (HCC)  Assessment & Plan  Was in [facility] on early 2/8 AM and had a tonic-clonic seizure.  Patient without any known history of seizures  Presented to the ER at [place] and initially was combative; had initial CT head with noted hypodensity within the left frontal and right parietal occipital lobes.  ER staff there reached out to specialist on-call and patient was recommended to be transferred to [place] for neurology as well as neurosurgery and further workup.  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 Flores with Neurology was consulted and reviewed MRI brain; although noted cerebral amyloid angiopathy on MRI, patient's lack of significant burden of amyloid angiopathy and the rest of the brain parenchyma as well as the lack of cognitive atypical and does not meet diagnostic criteria.  While cerebral amyloid angiopathy is possibility still, patient with wide range of other possibilities that need to be excluded including press, CNS vasculitis and certain CNS infections.  A repeat stat CTA head was ordered and this CTA head and neck with focal areas of vasogenic edema better characterized on the recent brain MRI  In addition to imaging, neurology recommended Keppra 1 g twice daily as well as an LP for CSF analysis and potential EEG.   Patient returned back to baseline mentation after his seizure and has not had repeat since  Transferred to [place] on the late hours of 2/9  Consult neurology .  Seizure precautions.  Holding any antiplatelet or anticoagulation agents.  " "Continue IV Keppra.  F/U CT chest/abdomen/pelvis - Unremarkable   F/U LP  Echo with preserved LVEF  Order blood cultures, ESR/CRP as well as further rheumatologic studies  Appreciate neurology and neurosurgery recommendations  Given MRI findings neurosurgery consulted for diagnostic angiogram    KRISTIN (acute kidney injury) (HCC)  Assessment & Plan  Back to baseline  There are not many renal function measurements in the outpatient setting however we may surmise that baseline renal function is around 1.0-1.4  Resume lisinopril 10 mg daily      Fever  Assessment & Plan  Patient noted to have fevers throughout stay  No obvious source of infection, suspect this could be due to underlying inflammatory process  Continue supportive measures and would hold off of antibiotics at this time  Continue to monitor microbial cultures  F/U LP  ID following    Rash  Assessment & Plan  Pruritic, nonpainful, erythematous blanching rash on the extremities as well as trunk  Reports he has had this rash that comes and goes intermittently over the last 7 years   Scheduled Tylenol and Benadryl continued, this is what patient takes at home to treat this rash  Derm evaluated patient and punch biopsy pending.  Appreciate their expertise  Rash appears more convalescent and erythematous today, family states that this is normal course for the disease  Follow-up punch biopsy results    Respiratory disease  Assessment & Plan  Reported history of \"asthma\" although not on any PTA inhalers  Denies ever having had PFTs  No significant wheezing on exam today  Portable chest x-ray in the ER at [place] with low lung volumes with vascular crowding as well as mild opacity at the medial left base, likely atelectasis, but pneumonia/aspiration not excluded in the appropriate clinical setting.  No significant pathology noted on CT chest abdomen pelvis    Cerebral amyloid angiopathy   Assessment & Plan  Noted on MRI  Neurosurgery consulted for diagnostic " angio    Type 2 diabetes mellitus with stage 3a chronic kidney disease, without long-term current use of insulin (HCC)  Assessment & Plan  Lab Results   Component Value Date    HGBA1C 7.7 (H) 09/07/2023       Recent Labs     02/13/24  1042 02/13/24  1612 02/13/24  2054 02/14/24  0634   POCGLU 192* 148* 214* 139         Blood Sugar Average: Last 72 hrs:  (P) 182.7540240943289736  Diabetic diet as well as sliding scale insulin with meals    Hypercholesterolemia  Assessment & Plan  Holding PTA atorvastatin while awaiting CK level    Essential hypertension  Assessment & Plan  Continue PTA Norvasc, lisinopril held for increasing renal function               VTE Pharmacologic Prophylaxis: VTE Score: 3 Moderate Risk (Score 3-4) - Pharmacological DVT Prophylaxis Ordered: heparin.    Mobility:   Basic Mobility Inpatient Raw Score: 22  JH-HLM Goal: 7: Walk 25 feet or more  JH-HLM Achieved: 7: Walk 25 feet or more  HLM Goal achieved. Continue to encourage appropriate mobility.    Patient Centered Rounds: I performed bedside rounds with nursing staff today.   Discussions with Specialists or Other Care Team Provider: Neuro surgery / ID    Education and Discussions with Family / Patient: Updated  (son) at bedside.    Total Time Spent on Date of Encounter in care of patient: 45 mins. This time was spent on one or more of the following: performing physical exam; counseling and coordination of care; obtaining or reviewing history; documenting in the medical record; reviewing/ordering tests, medications or procedures; communicating with other healthcare professionals and discussing with patient's family/caregivers.    Current Length of Stay: 4 day(s)  Current Patient Status: Inpatient   Certification Statement: The patient will continue to require additional inpatient hospital stay due to Please see above  Discharge Plan:  Pending clinical improvement    Code Status: Level 1 - Full Code    Subjective:   This is a very  pleasant 73 y.o. male who was seen today at bedside. Patient has no new complaints. Patient is not in any acute distress.     Objective:     Vitals:   Temp (24hrs), Av °F (37.2 °C), Min:98.2 °F (36.8 °C), Max:99.9 °F (37.7 °C)    Temp:  [98.2 °F (36.8 °C)-99.9 °F (37.7 °C)] 98.2 °F (36.8 °C)  HR:  [76-90] 76  Resp:  [17] 17  BP: (128-144)/(70-82) 144/78  SpO2:  [96 %-98 %] 97 %  Body mass index is 30.73 kg/m².     Input and Output Summary (last 24 hours):     Intake/Output Summary (Last 24 hours) at 2024 1511  Last data filed at 2024 0601  Gross per 24 hour   Intake 600 ml   Output --   Net 600 ml       Physical Exam:   Physical Exam  Vitals reviewed.   HENT:      Head: Normocephalic.      Nose: No congestion.      Mouth/Throat:      Pharynx: No oropharyngeal exudate or posterior oropharyngeal erythema.   Eyes:      Conjunctiva/sclera: Conjunctivae normal.   Cardiovascular:      Rate and Rhythm: Normal rate and regular rhythm.   Pulmonary:      Effort: Pulmonary effort is normal.   Abdominal:      General: Abdomen is flat.      Palpations: Abdomen is soft.   Skin:     General: Skin is warm and dry.      Findings: Rash present.   Neurological:      Mental Status: He is alert and oriented to person, place, and time. Mental status is at baseline.          Additional Data:     Labs:  Results from last 7 days   Lab Units 24  0759 24  0647 02/10/24  0355   WBC Thousand/uL 10.46*   < > 11.29*   HEMOGLOBIN g/dL 13.9   < > 15.2   HEMATOCRIT % 42.9   < > 46.9   PLATELETS Thousands/uL 292   < > 334   BANDS PCT % 6  --   --    NEUTROS PCT %  --   --  85*   LYMPHS PCT %  --   --  7*   LYMPHO PCT % 4*  --   --    MONOS PCT %  --   --  5   MONO PCT % 4  --   --    EOS PCT % 13*  --  3    < > = values in this interval not displayed.     Results from last 7 days   Lab Units 24  0759   SODIUM mmol/L 137   POTASSIUM mmol/L 3.7   CHLORIDE mmol/L 104   CO2 mmol/L 25   BUN mg/dL 8   CREATININE mg/dL 1.05    ANION GAP mmol/L 8   CALCIUM mg/dL 8.7   ALBUMIN g/dL 3.5   TOTAL BILIRUBIN mg/dL 0.78   ALK PHOS U/L 83   ALT U/L 28   AST U/L 23   GLUCOSE RANDOM mg/dL 133     Results from last 7 days   Lab Units 02/12/24  0822   INR  1.10     Results from last 7 days   Lab Units 02/14/24  1028 02/14/24  0634 02/13/24  2054 02/13/24  1612 02/13/24  1042 02/13/24  0620 02/12/24  2207 02/12/24  1558 02/12/24  1036 02/12/24  0634 02/11/24  2110 02/11/24  1626   POC GLUCOSE mg/dl 203* 139 214* 148* 192* 144* 306* 127 194* 142* 223* 191*         Results from last 7 days   Lab Units 02/10/24  2316   PROCALCITONIN ng/ml 0.30*       Lines/Drains:  Invasive Devices       Peripheral Intravenous Line  Duration             Peripheral IV 02/13/24 Dorsal (posterior);Left Hand <1 day                          Imaging: No pertinent imaging reviewed.    Recent Cultures (last 7 days):   Results from last 7 days   Lab Units 02/12/24  1157 02/10/24  0627 02/10/24  0333   BLOOD CULTURE   --  No Growth After 4 Days. No Growth After 4 Days.   GRAM STAIN RESULT  No Polys or Bacteria seen  1+ Mononuclear Cells  --   --        Last 24 Hours Medication List:   Current Facility-Administered Medications   Medication Dose Route Frequency Provider Last Rate    acetaminophen  650 mg Oral Q6H PRN Andrea Ibarra      amLODIPine  5 mg Oral Daily Debra Adler PA-C      diphenhydrAMINE  50 mg Oral Daily Andrea Ibarra      diphenhydrAMINE-zinc acetate   Topical TID PRN Inez Mijares PA-C      heparin (porcine)  5,000 Units Subcutaneous Q8H ARIANNA Tacos Jean MD      hydrocortisone   Topical 4x Daily PRN Inez Mijares PA-C      insulin lispro  1-5 Units Subcutaneous 4x Daily (AC & HS) Inez Mijares PA-C      levETIRAcetam  1,000 mg Intravenous Q12H ARIANNA Giovanny Arzola, DO      lisinopril  10 mg Oral Daily Tacos Jean MD      LORazepam  1 mg Intravenous Q6H PRN Fabiana Cutler, DO      ondansetron  4 mg Intravenous Q6H PRN Giovanny Arzola, DO       pantoprazole  40 mg Oral Early Morning Giovanny Arzola DO      sodium chloride  75 mL/hr Intravenous Continuous Jose Daniel Hi MD 75 mL/hr (02/14/24 1148)        Today, Patient Was Seen By: Tacos Jean MD    **Please Note: This note may have been constructed using a voice recognition system.**

## 2024-02-14 NOTE — PLAN OF CARE
Problem: SAFETY ADULT  Goal: Patient will remain free of falls  Description: INTERVENTIONS:  - Educate patient/family on patient safety including physical limitations  - Instruct patient to call for assistance with activity   - Consult OT/PT to assist with strengthening/mobility   - Keep Call bell within reach  - Keep bed low and locked with side rails adjusted as appropriate  - Keep care items and personal belongings within reach  - Initiate and maintain comfort rounds  - Make Fall Risk Sign visible to staff  - Offer Toileting every 2 Hours, in advance of need  - Initiate/Maintain bed alarm  - Obtain necessary fall risk management equipment: non skid footwear  - Apply yellow socks and bracelet for high fall risk patients  - Consider moving patient to room near nurses station  2/14/2024 1010 by Anna Villarreal RN  Outcome: Progressing  2/14/2024 1010 by Anna Villarreal RN  Outcome: Progressing     Problem: DISCHARGE PLANNING  Goal: Discharge to home or other facility with appropriate resources  Description: INTERVENTIONS:  - Identify barriers to discharge w/patient and caregiver  - Arrange for needed discharge resources and transportation as appropriate  - Identify discharge learning needs (meds, wound care, etc.)  - Arrange for interpretive services to assist at discharge as needed  - Refer to Case Management Department for coordinating discharge planning if the patient needs post-hospital services based on physician/advanced practitioner order or complex needs related to functional status, cognitive ability, or social support system  2/14/2024 1010 by Anna Villarreal RN  Outcome: Progressing  2/14/2024 1010 by Anna Villarreal RN  Outcome: Progressing     Problem: Knowledge Deficit  Goal: Patient/family/caregiver demonstrates understanding of disease process, treatment plan, medications, and discharge instructions  Description: Complete learning assessment and assess knowledge base.  Interventions:  - Provide  teaching at level of understanding  - Provide teaching via preferred learning methods  2/14/2024 1010 by Anna Villarreal RN  Outcome: Progressing  2/14/2024 1010 by Anna Villarreal RN  Outcome: Progressing     Problem: NEUROSENSORY - ADULT  Goal: Remains free of injury related to seizures activity  Description: INTERVENTIONS  - Maintain airway, patient safety  and administer oxygen as ordered  - Monitor patient for seizure activity, document and report duration and description of seizure to physician/advanced practitioner  - If seizure occurs,  ensure patient safety during seizure  - Reorient patient post seizure  - Seizure pads on all 4 side rails  - Instruct patient/family to notify RN of any seizure activity including if an aura is experienced  - Instruct patient/family to call for assistance with activity based on nursing assessment  - Administer anti-seizure medications if ordered    2/14/2024 1010 by Anna Villarreal RN  Outcome: Progressing  2/14/2024 1010 by Anna Villarreal RN  Outcome: Progressing     Problem: CARDIOVASCULAR - ADULT  Goal: Maintains optimal cardiac output and hemodynamic stability  Description: INTERVENTIONS:  - Monitor I/O, vital signs and rhythm  - Monitor for S/S and trends of decreased cardiac output  - Administer and titrate ordered vasoactive medications to optimize hemodynamic stability  - Assess quality of pulses, skin color and temperature  - Assess for signs of decreased coronary artery perfusion  - Instruct patient to report change in severity of symptoms  2/14/2024 1010 by Anna Villarreal RN  Outcome: Progressing  2/14/2024 1010 by Anna Villarreal RN  Outcome: Progressing  Goal: Absence of cardiac dysrhythmias or at baseline rhythm  Description: INTERVENTIONS:  - Continuous cardiac monitoring, vital signs, obtain 12 lead EKG if ordered  - Administer antiarrhythmic and heart rate control medications as ordered  - Monitor electrolytes and administer replacement therapy as  ordered  2/14/2024 1010 by Anna Villarreal RN  Outcome: Progressing  2/14/2024 1010 by Anna Villarreal, RN  Outcome: Progressing

## 2024-02-14 NOTE — ASSESSMENT & PLAN NOTE
"Reported history of \"asthma\" although not on any PTA inhalers  Denies ever having had PFTs  No significant wheezing on exam today  Portable chest x-ray in the ER at [place] with low lung volumes with vascular crowding as well as mild opacity at the medial left base, likely atelectasis, but pneumonia/aspiration not excluded in the appropriate clinical setting.  No significant pathology noted on CT chest abdomen pelvis  "

## 2024-02-14 NOTE — ASSESSMENT & PLAN NOTE
Lab Results   Component Value Date    HGBA1C 7.7 (H) 09/07/2023       Recent Labs     02/13/24  1042 02/13/24  1612 02/13/24 2054 02/14/24  0634   POCGLU 192* 148* 214* 139         Blood Sugar Average: Last 72 hrs:  (P) 182.4768611790116244  Diabetic diet as well as sliding scale insulin with meals

## 2024-02-14 NOTE — PROGRESS NOTES
Progress Note - Infectious Disease   Delphine Nelson 73 y.o. male MRN: [id number]  Unit/Bed#: [facility] 732-01 Encounter: [id number]      Impression/Recommendations:  Fever, with mild leukocytosis.  Etiology of this is unclear.  Consider postictal fever.  Consider fever secondary to underlying inflammatory process.  Patient does not have chills or symptoms of fever.  He may have had chronic fever at home.  There is no obvious active infection.  He remains clinically and systemically well despite fever and leukocytosis.  Admission blood cultures have no growth thus far.  With patient clinically and systemically well, likely prolonged fever and no obvious active infection, given need for extensive workup of underlying inflammatory process, will keep patient off antibiotic so that it does not affect workup.  Continue to observe off antibiotic.  Monitor temperature/WBC.    Follow-up on admission blood cultures.     Focal vasogenic edema and, with new onset seizure.  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  However, dermatologist is considering scleromyxedema.  Regardless, this appears to be an inflammatory process rather than infectious.  Patient is status post lumbar puncture with essentially normal CSF with negative/M/E PCR panel.  He may need brain biopsy if diagnosis cannot be established noninvasively.  Observe off antibiotic, as in above.  Follow-up on all pending CSF studies.  Plan for CNS angiogram tomorrow noted.  Probable need for brain biopsy if all studies are nonrevealing.     Diffuse skin rash, intermittent for the last many years.  Patient is status post punch biopsy of rash on right side.  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  No evidence of cellulitis or acute infection of skin.  HIV screen negative.  Syphilis screen negative.  Follow-up on pathology from punch biopsy.     Discussed with patient and his family in detail regarding the above plan.      Antibiotics:  None     Subjective:  Patient is awake and alert today.  No headache.  No confusion.  Temperature intermittently up.  No chills.  No diarrhea.    Objective:  Vitals:  Temp:  [98.2 °F (36.8 °C)-99.9 °F (37.7 °C)] 98.2 °F (36.8 °C)  HR:  [76-90] 76  Resp:  [17] 17  BP: (128-144)/(70-82) 144/78  SpO2:  [96 %-98 %] 97 %  Temp (24hrs), Av °F (37.2 °C), Min:98.2 °F (36.8 °C), Max:99.9 °F (37.7 °C)  Current: Temperature: 98.2 °F (36.8 °C)    Physical Exam:     General: Awake, alert, cooperative, no distress.   Neck:  Supple. No mass.  No lymphadenopathy.   Lungs: Expansion symmetric, no rales, no wheezing, respirations unlabored.   Heart:  Regular rate and rhythm, S1 and S2 normal, no murmur.   Abdomen: Soft, nondistended, non-tender, bowel sounds active all four quadrants, no masses, no organomegaly.   Extremities: No edema. No erythema/warmth. No ulcer. Nontender to palpation.   Skin:  Stable diffuse papular rash.   Neuro: Moves all extremities.     Invasive Devices       Peripheral Intravenous Line  Duration             Peripheral IV 24 Dorsal (posterior);Left Hand <1 day                    Labs studies:   I have personally reviewed pertinent labs.  Results from last 7 days   Lab Units 24  0759 24  0511 24  0822 24  0431 02/10/24  0403   POTASSIUM mmol/L 3.7 3.7 3.7 4.2 4.1   CHLORIDE mmol/L 104 106 102 100 101   CO2 mmol/L 25 23 22 24 28   BUN mg/dL 8 15 22 14 8   CREATININE mg/dL 1.05 1.16 1.80* 1.45* 1.05   EGFR ml/min/1.73sq m 71 63 37 48 71   CALCIUM mg/dL 8.7 8.1* 8.4 9.0 8.9   AST U/L 23  --   --  22 26   ALT U/L 28  --   --  23 23   ALK PHOS U/L 83  --   --  54 53     Results from last 7 days   Lab Units 24  0759 24  0511 24  0822   WBC Thousand/uL 10.46* 14.51* 19.44*   HEMOGLOBIN g/dL 13.9 12.8 14.1   PLATELETS Thousands/uL 292 250 286     Results from last 7 days   Lab Units 24  1157 02/10/24  0627 02/10/24  0333   BLOOD CULTURE   --   No Growth After 4 Days. No Growth After 4 Days.   GRAM STAIN RESULT  No Polys or Bacteria seen  1+ Mononuclear Cells  --   --        Imaging Studies:   I have personally reviewed pertinent imaging study reports and images in PACS.    EKG, Pathology, and Other Studies:   I have personally reviewed pertinent reports.

## 2024-02-14 NOTE — ASSESSMENT & PLAN NOTE
Patient presented with new onset tonic - clonic seizure at [facility] on 2/8/24  No significant PMH, no prior seizure like activity  Imaging significant for non specific edema left frontal and right occipital lobes without underlying mass  On exam, patient just had benedryl and is very sleepy. 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 Focal areas of vasogenic edema better characterized on the recent brain MRI, ascribed to represent acute amyloid angiitis/acute inflammatory cerebral amyloid angiopathy. Other etiologies not excluded    Plan:  Continue to monitor neurological exam  Neurology following for management of abnormal findings  Continue Keppra 1 G twice daily, Ativan as neede  Consider vEEG  LP 2/12 unremarkable   CT CAP negative.  Hold all AC/AP medications at this time  SBP less than 160  Medical management per primary team  PT/OT evaluation, okay to mobilize as tolerated  DVT ppx: SCDs, heparin SQ.    Neurosurgery will plan for formal angiogram tomorrow, 2/15 with Dr. Montana.  If negative, can consider brain biopsy.  Please call questions or concerns.

## 2024-02-14 NOTE — ASSESSMENT & PLAN NOTE
"Was in [facility] on early 2/8 AM and had a tonic-clonic seizure.  Patient without any known history of seizures  Presented to the ER at [place] and initially was combative; had initial CT head with noted hypodensity within the left frontal and right parietal occipital lobes.  ER staff there reached out to specialist on-call and patient was recommended to be transferred to [place] for neurology as well as neurosurgery and further workup.  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 Flores with Neurology was consulted and reviewed MRI brain; although noted cerebral amyloid angiopathy on MRI, patient's lack of significant burden of amyloid angiopathy and the rest of the brain parenchyma as well as the lack of cognitive atypical and does not meet diagnostic criteria.  While cerebral amyloid angiopathy is possibility still, patient with wide range of other possibilities that need to be excluded including press, CNS vasculitis and certain CNS infections.  A repeat stat CTA head was ordered and this CTA head and neck with focal areas of vasogenic edema better characterized on the recent brain MRI  In addition to imaging, neurology recommended Keppra 1 g twice daily as well as an LP for CSF analysis and potential EEG.   Patient returned back to baseline mentation after his seizure and has not had repeat since  Transferred to [place] on the late hours of 2/9  Consult neurology .  Seizure precautions.  Holding any antiplatelet or anticoagulation agents.  Continue IV Keppra.  F/U CT chest/abdomen/pelvis - Unremarkable   F/U LP  Echo with preserved LVEF  Order blood cultures, ESR/CRP as well as further rheumatologic studies  Appreciate neurology and neurosurgery recommendations  Given MRI findings neurosurgery consulted for " diagnostic angiogram

## 2024-02-14 NOTE — ASSESSMENT & PLAN NOTE
Pruritic, nonpainful, erythematous blanching rash on the extremities as well as trunk  Reports he has had this rash that comes and goes intermittently over the last 7 years   Scheduled Tylenol and Benadryl continued, this is what patient takes at home to treat this rash  Derm evaluated patient and punch biopsy pending.  Appreciate their expertise  Rash appears more convalescent and erythematous today, family states that this is normal course for the disease  Follow-up punch biopsy results

## 2024-02-14 NOTE — ASSESSMENT & PLAN NOTE
Back to baseline  There are not many renal function measurements in the outpatient setting however we may surmise that baseline renal function is around 1.0-1.4  Resume lisinopril 10 mg daily

## 2024-02-14 NOTE — ASSESSMENT & PLAN NOTE
Lab Results   Component Value Date    HGBA1C 7.7 (H) 09/07/2023       Recent Labs     02/13/24  1612 02/13/24 2054 02/14/24  0634 02/14/24  1028   POCGLU 148* 214* 139 203*         Blood Sugar Average: Last 72 hrs:  (P) 183.8106666877599395    Management per primary team  SSI ordered

## 2024-02-14 NOTE — PROGRESS NOTES
[facility]  Progress Note  Name: Delphine Nelson I  MRN: [id number]  Unit/Bed#: [facility] 732-01 I Date of Admission: 2/10/2024   Date of Service: 2/14/2024 I Hospital Day: 4    Assessment/Plan   Brain edema (HCC)  Assessment & Plan  Unclear etiology  See above for plan    Rash  Assessment & Plan  Nonspecific, diffuse pruritic rash x 8 years.  S/p skin biopsy 2/10.      Type 2 diabetes mellitus with stage 3a chronic kidney disease, without long-term current use of insulin (HCC)  Assessment & Plan  Lab Results   Component Value Date    HGBA1C 7.7 (H) 09/07/2023       Recent Labs     02/13/24  1612 02/13/24 2054 02/14/24  0634 02/14/24  1028   POCGLU 148* 214* 139 203*         Blood Sugar Average: Last 72 hrs:  (P) [id number]    Management per primary team  SSI ordered    * New onset seizure (HCC)  Assessment & Plan  Patient presented with new onset tonic - clonic seizure at [facility] on 2/8/24  No significant PMH, no prior seizure like activity  Imaging significant for non specific edema left frontal and right occipital lobes without underlying mass  On exam, patient just had benedryl and is very sleepy. 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 Focal areas of vasogenic edema better characterized on the recent brain MRI, ascribed to represent acute amyloid angiitis/acute inflammatory cerebral amyloid angiopathy. Other etiologies not excluded    Plan:  Continue to monitor neurological exam  Neurology following for management of abnormal findings  Continue Keppra 1 G twice daily, Ativan as neede  Consider vEEG  LP 2/12 unremarkable   CT CAP negative.  Hold all AC/AP  "medications at this time  SBP less than 160  Medical management per primary team  PT/OT evaluation, okay to mobilize as tolerated  DVT ppx: SCDs, heparin SQ.    Neurosurgery will plan for formal angiogram tomorrow, 2/15 with Dr. Montana.  If negative, can consider brain biopsy.  Please call questions or concerns.           Subjective/Objective   Chief Complaint: \"I\"m ok.\"    Subjective: Denies HA or further seizure activity. C/o pruritic rash throughout body diffusely. Improves with lotion.    Objective: NAD. Exam non-focal.    I/O         02/12 0701 02/13 0700 02/13 0701 02/14 0700 02/14 0701  02/15 0700    P.O. 240 880     I.V. (mL/kg)  600 (6.5)     Total Intake(mL/kg) 240 (2.6) 1480 (16.1)     Urine (mL/kg/hr) 950 (0.4)      Stool 1      Total Output 951      Net -711 +1480            Unmeasured Urine Occurrence 2 x 4 x     Unmeasured Stool Occurrence 1 x 0 x             Invasive Devices       Peripheral Intravenous Line  Duration             Peripheral IV 02/13/24 Dorsal (posterior);Left Hand <1 day                    Physical Exam:  Vitals: Blood pressure 144/78, pulse 76, temperature 98.2 °F (36.8 °C), resp. rate 17, height 5' 8\" (1.727 m), weight 91.7 kg (202 lb 1.6 oz), SpO2 97%.,Body mass index is 30.73 kg/m².        General appearance: alert, appears stated age, cooperative and no distress  Head: Normocephalic, without obvious abnormality, atraumatic  Eyes: EOMI, PERRL  Neck: supple, symmetrical, trachea midline and NT  Back: no kyphosis present, no tenderness to percussion or palpation  Lungs: non labored breathing  Heart: regular heart rate  Neurologic:   Mental status: Alert, oriented x3, thought content appropriate  Cranial nerves: grossly intact (Cranial nerves II-XII)  Sensory: normal to LT  Motor: moving all extremities without focal weakness  Coordination: finger to nose normal bilaterally, no drift bilaterally      Lab Results:  Results from last 7 days   Lab Units 02/14/24  0759 02/13/24  0511 " "02/12/24  0822 02/11/24  0647 02/10/24  0355 02/09/24  0549 02/08/24  0836   WBC Thousand/uL 10.46* 14.51* 19.44*   < > 11.29* 8.16 9.62   HEMOGLOBIN g/dL 13.9 12.8 14.1   < > 15.2 14.3 14.7   HEMATOCRIT % 42.9 38.9 42.9   < > 46.9 43.4 46.9   PLATELETS Thousands/uL 292 250 286   < > 334 328 362   NEUTROS PCT %  --   --   --   --  85* 66 44   MONOS PCT %  --   --   --   --  5 9 8   MONO PCT % 4  --   --   --   --   --   --    EOS PCT % 13*  --   --   --  3 4 6    < > = values in this interval not displayed.     Results from last 7 days   Lab Units 02/14/24  0759 02/13/24  0511 02/12/24  0822 02/11/24  0431 02/10/24  0403   SODIUM mmol/L 137 136 135 137 138   POTASSIUM mmol/L 3.7 3.7 3.7 4.2 4.1   CHLORIDE mmol/L 104 106 102 100 101   CO2 mmol/L 25 23 22 24 28   BUN mg/dL 8 15 22 14 8   CREATININE mg/dL 1.05 1.16 1.80* 1.45* 1.05   CALCIUM mg/dL 8.7 8.1* 8.4 9.0 8.9   ALK PHOS U/L 83  --   --  54 53   ALT U/L 28  --   --  23 23   AST U/L 23  --   --  22 26     Results from last 7 days   Lab Units 02/11/24  0431 02/10/24  0623 02/09/24  0549   MAGNESIUM mg/dL 2.0 1.6* 1.9         Results from last 7 days   Lab Units 02/12/24  0822 02/10/24  0356   INR  1.10 0.94   PTT seconds  --  28     No results found for: \"TROPONINT\"  ABG:No results found for: \"PHART\", \"AWO3HGE\", \"PO2ART\", \"DZC7GQE\", \"Q1UVRBSV\", \"BEART\", \"SOURCE\"    Imaging Studies: I have personally reviewed pertinent reports.   and I have personally reviewed pertinent films in PACS    MRI brain w wo contrast    Result Date: 2/13/2024  Impression: Findings again seen most consistent with inflammatory amyloid angiopathy with vasogenic edema and petechial hemorrhages most pronounced in the left frontal and right posterior temporal/occipital regions. 1 or 2 new tiny foci of restricted diffusion in the left parietal lobe may be related to microhemorrhages versus tiny infarcts. Otherwise no change. Stable mild to moderate stenosis in the left M1 segment. Vessel wall " imaging limited due to motion and venous contamination. Workstation performed: [id number]     [place] IN-patient lumbar puncture    Result Date: 2/12/2024  Impression: Successful fluoroscopically guided lumbar puncture. Opening pressure: 17 cm of water Closing pressure: 10 cm of water This procedure was performed by LUIS Stovall under the direct supervision of Dr. Bernard Ann. Workstation performed: [id number]     CT chest abdomen pelvis w contrast    Result Date: 2/11/2024  Impression: No acute abnormality or suspicious mass Cholelithiasis. Hepatic steatosis. Workstation performed: [id number]       EKG, Pathology, and Other Studies: I have personally reviewed pertinent reports.      VTE Pharmacologic Prophylaxis: Sequential compression device (Venodyne)  and Heparin    VTE Mechanical Prophylaxis: sequential compression device

## 2024-02-14 NOTE — RESTORATIVE TECHNICIAN NOTE
Restorative Technician Note      Patient Name: Delphine Nelson     Note Type: Mobility  Patient Position Upon Consult: Supine  Activity Performed: Ambulated; Dangled; Stood  Assistive Device: Other (Comment) (none)  Education Provided: Yes  Patient Position at End of Consult: Supine; All needs within reach; Bed/Chair alarm activated    Cinthia JACKSON, Restorative Technician,

## 2024-02-15 ENCOUNTER — ANESTHESIA (INPATIENT)
Dept: RADIOLOGY | Facility: HOSPITAL | Age: 74
DRG: 545 | End: 2024-02-15
Payer: COMMERCIAL

## 2024-02-15 ENCOUNTER — APPOINTMENT (INPATIENT)
Dept: RADIOLOGY | Facility: HOSPITAL | Age: 74
DRG: 545 | End: 2024-02-15
Attending: RADIOLOGY
Payer: COMMERCIAL

## 2024-02-15 ENCOUNTER — ANESTHESIA EVENT (INPATIENT)
Dept: RADIOLOGY | Facility: HOSPITAL | Age: 74
DRG: 545 | End: 2024-02-15
Payer: COMMERCIAL

## 2024-02-15 LAB
ALBUMIN SERPL BCP-MCNC: 3.5 G/DL (ref 3.5–5)
ALP SERPL-CCNC: 110 U/L (ref 34–104)
ALT SERPL W P-5'-P-CCNC: 54 U/L (ref 7–52)
ANION GAP SERPL CALCULATED.3IONS-SCNC: 10 MMOL/L
AST SERPL W P-5'-P-CCNC: 46 U/L (ref 13–39)
BACTERIA BLD CULT: NORMAL
BACTERIA BLD CULT: NORMAL
BACTERIA CSF CULT: NO GROWTH
BILIRUB SERPL-MCNC: 0.74 MG/DL (ref 0.2–1)
BUN SERPL-MCNC: 7 MG/DL (ref 5–25)
CALCIUM SERPL-MCNC: 8.7 MG/DL (ref 8.4–10.2)
CHLORIDE SERPL-SCNC: 101 MMOL/L (ref 96–108)
CO2 SERPL-SCNC: 28 MMOL/L (ref 21–32)
CREAT SERPL-MCNC: 1.06 MG/DL (ref 0.6–1.3)
CRYOGLOB SER QL 1D COLD INC: NORMAL
ERYTHROCYTE [DISTWIDTH] IN BLOOD BY AUTOMATED COUNT: 13.6 % (ref 11.6–15.1)
GFR SERPL CREATININE-BSD FRML MDRD: 70 ML/MIN/1.73SQ M
GLUCOSE SERPL-MCNC: 134 MG/DL (ref 65–140)
GLUCOSE SERPL-MCNC: 143 MG/DL (ref 65–140)
GLUCOSE SERPL-MCNC: 148 MG/DL (ref 65–140)
GLUCOSE SERPL-MCNC: 149 MG/DL (ref 65–140)
GLUCOSE SERPL-MCNC: 199 MG/DL (ref 65–140)
HCT VFR BLD AUTO: 41.8 % (ref 36.5–49.3)
HGB BLD-MCNC: 13.5 G/DL (ref 12–17)
INR PPP: 1 (ref 0.84–1.19)
MCH RBC QN AUTO: 27.8 PG (ref 26.8–34.3)
MCHC RBC AUTO-ENTMCNC: 32.3 G/DL (ref 31.4–37.4)
MCV RBC AUTO: 86 FL (ref 82–98)
PLATELET # BLD AUTO: 339 THOUSANDS/UL (ref 149–390)
PMV BLD AUTO: 10.1 FL (ref 8.9–12.7)
POTASSIUM SERPL-SCNC: 3.5 MMOL/L (ref 3.5–5.3)
PROT SERPL-MCNC: 6.1 G/DL (ref 6.4–8.4)
PROTHROMBIN TIME: 13.1 SECONDS (ref 11.6–14.5)
RBC # BLD AUTO: 4.85 MILLION/UL (ref 3.88–5.62)
SCAN RESULT: NORMAL
SODIUM SERPL-SCNC: 139 MMOL/L (ref 135–147)
WBC # BLD AUTO: 12.36 THOUSAND/UL (ref 4.31–10.16)

## 2024-02-15 PROCEDURE — C1887 CATHETER, GUIDING: HCPCS

## 2024-02-15 PROCEDURE — 99233 SBSQ HOSP IP/OBS HIGH 50: CPT | Performed by: INTERNAL MEDICINE

## 2024-02-15 PROCEDURE — 36226 PLACE CATH VERTEBRAL ART: CPT

## 2024-02-15 PROCEDURE — C1769 GUIDE WIRE: HCPCS

## 2024-02-15 PROCEDURE — 88305 TISSUE EXAM BY PATHOLOGIST: CPT | Performed by: STUDENT IN AN ORGANIZED HEALTH CARE EDUCATION/TRAINING PROGRAM

## 2024-02-15 PROCEDURE — 88313 SPECIAL STAINS GROUP 2: CPT | Performed by: STUDENT IN AN ORGANIZED HEALTH CARE EDUCATION/TRAINING PROGRAM

## 2024-02-15 PROCEDURE — 99232 SBSQ HOSP IP/OBS MODERATE 35: CPT | Performed by: FAMILY MEDICINE

## 2024-02-15 PROCEDURE — 36223 PLACE CATH CAROTID/INOM ART: CPT

## 2024-02-15 PROCEDURE — 76937 US GUIDE VASCULAR ACCESS: CPT

## 2024-02-15 PROCEDURE — 85610 PROTHROMBIN TIME: CPT | Performed by: NURSE PRACTITIONER

## 2024-02-15 PROCEDURE — C1894 INTRO/SHEATH, NON-LASER: HCPCS

## 2024-02-15 PROCEDURE — 88350 IMFLUOR EA ADDL 1ANTB STN PX: CPT | Performed by: STUDENT IN AN ORGANIZED HEALTH CARE EDUCATION/TRAINING PROGRAM

## 2024-02-15 PROCEDURE — 88346 IMFLUOR 1ST 1ANTB STAIN PX: CPT | Performed by: STUDENT IN AN ORGANIZED HEALTH CARE EDUCATION/TRAINING PROGRAM

## 2024-02-15 PROCEDURE — 80053 COMPREHEN METABOLIC PANEL: CPT | Performed by: FAMILY MEDICINE

## 2024-02-15 PROCEDURE — 85027 COMPLETE CBC AUTOMATED: CPT | Performed by: FAMILY MEDICINE

## 2024-02-15 PROCEDURE — 88307 TISSUE EXAM BY PATHOLOGIST: CPT | Performed by: STUDENT IN AN ORGANIZED HEALTH CARE EDUCATION/TRAINING PROGRAM

## 2024-02-15 PROCEDURE — 82948 REAGENT STRIP/BLOOD GLUCOSE: CPT

## 2024-02-15 RX ORDER — HEPARIN SODIUM 1000 [USP'U]/ML
INJECTION, SOLUTION INTRAVENOUS; SUBCUTANEOUS AS NEEDED
Status: COMPLETED | OUTPATIENT
Start: 2024-02-15 | End: 2024-02-15

## 2024-02-15 RX ORDER — NITROGLYCERIN 20 MG/100ML
INJECTION INTRAVENOUS AS NEEDED
Status: COMPLETED | OUTPATIENT
Start: 2024-02-15 | End: 2024-02-15

## 2024-02-15 RX ORDER — VERAPAMIL HYDROCHLORIDE 2.5 MG/ML
INJECTION, SOLUTION INTRAVENOUS AS NEEDED
Status: COMPLETED | OUTPATIENT
Start: 2024-02-15 | End: 2024-02-15

## 2024-02-15 RX ORDER — IODIXANOL 320 MG/ML
300 INJECTION, SOLUTION INTRAVASCULAR
Status: COMPLETED | OUTPATIENT
Start: 2024-02-15 | End: 2024-02-15

## 2024-02-15 RX ORDER — PROPOFOL 10 MG/ML
INJECTION, EMULSION INTRAVENOUS AS NEEDED
Status: DISCONTINUED | OUTPATIENT
Start: 2024-02-15 | End: 2024-02-15

## 2024-02-15 RX ORDER — LIDOCAINE HYDROCHLORIDE 10 MG/ML
INJECTION, SOLUTION EPIDURAL; INFILTRATION; INTRACAUDAL; PERINEURAL AS NEEDED
Status: COMPLETED | OUTPATIENT
Start: 2024-02-15 | End: 2024-02-15

## 2024-02-15 RX ORDER — SODIUM CHLORIDE 9 MG/ML
INJECTION, SOLUTION INTRAVENOUS CONTINUOUS PRN
Status: DISCONTINUED | OUTPATIENT
Start: 2024-02-15 | End: 2024-02-15

## 2024-02-15 RX ADMIN — LEVETIRACETAM 1000 MG: 100 INJECTION, SOLUTION INTRAVENOUS at 21:35

## 2024-02-15 RX ADMIN — PANTOPRAZOLE SODIUM 40 MG: 40 TABLET, DELAYED RELEASE ORAL at 05:25

## 2024-02-15 RX ADMIN — SODIUM CHLORIDE: 0.9 INJECTION, SOLUTION INTRAVENOUS at 13:18

## 2024-02-15 RX ADMIN — HEPARIN SODIUM 5000 UNITS: 5000 INJECTION INTRAVENOUS; SUBCUTANEOUS at 05:25

## 2024-02-15 RX ADMIN — INSULIN LISPRO 1 UNITS: 100 INJECTION, SOLUTION INTRAVENOUS; SUBCUTANEOUS at 21:35

## 2024-02-15 RX ADMIN — ACETAMINOPHEN 650 MG: 325 TABLET, FILM COATED ORAL at 23:14

## 2024-02-15 RX ADMIN — PROPOFOL 25 MG: 10 INJECTION, EMULSION INTRAVENOUS at 13:53

## 2024-02-15 RX ADMIN — PROPOFOL 25 MG: 10 INJECTION, EMULSION INTRAVENOUS at 13:52

## 2024-02-15 RX ADMIN — SODIUM CHLORIDE 75 ML/HR: 0.9 INJECTION, SOLUTION INTRAVENOUS at 02:48

## 2024-02-15 RX ADMIN — LISINOPRIL 10 MG: 10 TABLET ORAL at 08:03

## 2024-02-15 RX ADMIN — VERAPAMIL HYDROCHLORIDE 5 MG: 2.5 INJECTION INTRAVENOUS at 13:55

## 2024-02-15 RX ADMIN — LIDOCAINE HYDROCHLORIDE 3 ML: 10 INJECTION, SOLUTION EPIDURAL; INFILTRATION; INTRACAUDAL; PERINEURAL at 13:50

## 2024-02-15 RX ADMIN — SODIUM CHLORIDE 75 ML/HR: 0.9 INJECTION, SOLUTION INTRAVENOUS at 18:30

## 2024-02-15 RX ADMIN — PROPOFOL 50 MG: 10 INJECTION, EMULSION INTRAVENOUS at 13:51

## 2024-02-15 RX ADMIN — Medication 600 MCG: at 13:50

## 2024-02-15 RX ADMIN — HEPARIN SODIUM 2000 UNITS: 1000 INJECTION INTRAVENOUS; SUBCUTANEOUS at 13:55

## 2024-02-15 RX ADMIN — LIDOCAINE HYDROCHLORIDE 1 ML: 10 INJECTION, SOLUTION EPIDURAL; INFILTRATION; INTRACAUDAL; PERINEURAL at 13:55

## 2024-02-15 RX ADMIN — Medication 400 MCG: at 13:55

## 2024-02-15 RX ADMIN — IODIXANOL 130 ML: 320 INJECTION, SOLUTION INTRAVASCULAR at 15:07

## 2024-02-15 RX ADMIN — LEVETIRACETAM 1000 MG: 100 INJECTION, SOLUTION INTRAVENOUS at 08:03

## 2024-02-15 RX ADMIN — DIPHENHYDRAMINE HCL 50 MG: 25 TABLET ORAL at 08:03

## 2024-02-15 RX ADMIN — AMLODIPINE BESYLATE 5 MG: 5 TABLET ORAL at 08:03

## 2024-02-15 NOTE — ANESTHESIA PREPROCEDURE EVALUATION
Procedure:  IR CEREBRAL ANGIOGRAPHY    Relevant Problems   CARDIO   (+) Essential hypertension   (+) Hypercholesterolemia      ENDO   (+) Type 2 diabetes mellitus with stage 3a chronic kidney disease, without long-term current use of insulin (HCC)      GI/HEPATIC   (+) GERD (gastroesophageal reflux disease)      /RENAL   (+) KRISTIN (acute kidney injury) (HCC)   (+) Stage 3a chronic kidney disease (HCC)      NEURO/PSYCH   (+) New onset seizure (HCC)      Cardiovascular and Mediastinum   (+) Cerebral amyloid angiopathy      Labs:   Results from last 7 days   Lab Units 02/15/24  0533 02/14/24  0759 02/13/24  0511 02/12/24  0822 02/11/24  0647 02/10/24  0355 02/09/24  0549   WBC Thousand/uL 12.36* 10.46* 14.51* 19.44* 14.83* 11.29* 8.16   HEMOGLOBIN g/dL 13.5 13.9 12.8 14.1 15.2 15.2 14.3   HEMATOCRIT % 41.8 42.9 38.9 42.9 46.0 46.9 43.4   PLATELETS Thousands/uL 339 292 250 286 319 334 328   NEUTROS PCT %  --   --   --   --   --  85* 66   BANDS PCT %  --  6  --   --   --   --   --    MONOS PCT %  --   --   --   --   --  5 9   MONO PCT %  --  4  --   --   --   --   --    EOS PCT %  --  13*  --   --   --  3 4     Results from last 7 days   Lab Units 02/15/24  0533 02/14/24  0759 02/13/24  0511 02/12/24  0822 02/11/24  0431 02/10/24  0403 02/09/24  0549   SODIUM mmol/L 139 137 136 135 137 138 139   POTASSIUM mmol/L 3.5 3.7 3.7 3.7 4.2 4.1 3.5   CHLORIDE mmol/L 101 104 106 102 100 101 103   CO2 mmol/L 28 25 23 22 24 28 28   ANION GAP mmol/L 10 8 7 11 13 9 8   BUN mg/dL 7 8 15 22 14 8 11   CREATININE mg/dL 1.06 1.05 1.16 1.80* 1.45* 1.05 1.01   EGFR ml/min/1.73sq m 70 71 63 37 48 71 74   CALCIUM mg/dL 8.7 8.7 8.1* 8.4 9.0 8.9 8.8   GLUCOSE RANDOM mg/dL 143* 133 146* 154* 154* 141* 132   ALT U/L 54* 28  --   --  23 23 24   AST U/L 46* 23  --   --  22 26 21   ALK PHOS U/L 110* 83  --   --  54 53 53   ALBUMIN g/dL 3.5 3.5  --   --  3.9 4.0 3.8   TOTAL BILIRUBIN mg/dL 0.74 0.78  --   --  0.91 0.72 0.66     Results from last 7  days   Lab Units 02/11/24  0431 02/10/24  0623 02/09/24  0549   MAGNESIUM mg/dL 2.0 1.6* 1.9      Results from last 7 days   Lab Units 02/15/24  0533 02/12/24  0822 02/10/24  0356   INR  1.00 1.10 0.94   PTT seconds  --   --  28        Results from last 7 days   Lab Units 02/10/24  2316   PROCALCITONIN ng/ml 0.30*      02/10/24 1252   Echo complete w/ contrast if indicated (Final result)       Impression  No impression found.      Narrative    Left Ventricle: Left ventricular cavity size is normal. Wall thickness  is mildly increased. The left ventricular ejection fraction is 60-65%.  Systolic function is normal. Wall motion is normal. Diastolic function is  normal.    Right Ventricle: Right ventricular cavity size is normal. Systolic  function is normal.    Aortic Valve: There is mild stenosis.     Physical Exam    Airway    Mallampati score: III  TM Distance: >3 FB  Neck ROM: full     Dental   No notable dental hx     Cardiovascular  Cardiovascular exam normal    Pulmonary  Pulmonary exam normal     Other Findings        Anesthesia Plan  ASA Score- 3     Anesthesia Type- IV sedation with anesthesia with ASA Monitors.         Additional Monitors:     Airway Plan:     Comment: I discussed risks (reviewed with patient on the anesthesia consent form), benefits and alternatives of monitored sedation including the possibility under sedation to have recall or mild discomfort.  .       Plan Factors-    Chart reviewed.   Existing labs reviewed. Patient summary reviewed.                  Induction- intravenous.    Postoperative Plan-     Informed Consent- Anesthetic plan and risks discussed with patient.  I personally reviewed this patient with the CRNA. Discussed and agreed on the Anesthesia Plan with the CRNA..

## 2024-02-15 NOTE — ANESTHESIA POSTPROCEDURE EVALUATION
Post-Op Assessment Note    CV Status:  Stable    Pain management: adequate       Mental Status:  Awake and alert   Hydration Status:  Stable   PONV Controlled:  None   Airway Patency:  Patent     Post Op Vitals Reviewed: Yes    No anethesia notable event occurred.    Staff: CRNA           BP      Temp      Pulse     Resp      SpO2

## 2024-02-15 NOTE — DISCHARGE INSTRUCTIONS
Today, you underwent a diagnostic cerebral angiogram under the care of Dr. Montana for evaluation of vasculitis  ?  The following instructions will help you care for yourself, or be cared for upon your return home today. These are guidelines for your care right after your surgery only.   ?  Notify Your Doctor or Nurse if you have any of the following:  ?  SYMPTOMS OF WOUND INFECTION--   Increased pain in or around the incision   Swelling around the incision  Any drainage from the incision  Incision separates or opens up  Warmth in the tissues around the incision  Redness or tenderness on the skin near the incision   Fever (temperature greater than 101 degrees F)   ?  NEUROLOGICAL CHANGES--  Change in alertness  Increased sleepiness   Nausea and vomiting   New onset of numbness or weakness in arms or legs   New problems with your bowels or bladder  New or worse problems with balance or walking  Seizures, new or worsening  ?  UNRELIEVED HEADACHE PAIN--  New or increased pain unrelieved with pain medications   Pain associated with nausea and vomiting   Pain associated with other symptoms  ?  QUESTIONS OR PROBLEMS--  Any questions or problems that you are unsure about  Wound Care:  Keep Incision Clean and Dry   You may shower daily, but do not soak incision. Pat dry after showering.   No tub baths, soaking, swimming for 1 week after angiogram.   You do not need to cover the incision. Mild to moderate bruising and tenderness to the site is expected and may last up to 1-2 weeks after your procedure.   ?  A closure device was placed at the catheter insertion site. This is MRI compatible. Remove the dressing 24 hours after your procedure.   If your groin site is bleeding, apply firm pressure for 10 minutes. Reinforce dressing rather than removing and checking frequently. If continues to bleed through the dressing after 1 hour, contact your neurosurgeon's office.   Anticipatory Education:  ?  PAIN MED W/ Acetaminophen  (Tylenol)  --IF a prescription for pain medicine has been sent home with you:  --Narcotic pain medication may cause constipation. Be sure to take stool softeners or laxatives while you are on narcotic pain medication.   --Do not drive after taking prescription pain medicine.   ?  If this medicine is too strong, or no longer necessary, or we did NOT recommend/prescribe oral narcotics, you may take:   - Tylenol Extra-strength/Acetaminophen, 2 tablets every 4-6 hours as needed for mild pain. DO NOT TAKE MORE THAN 4000MG PER DAY from combined sources. NOTE: Remember to eat when taking pain medicines in order to avoid nausea. Watch for constipation. Eat plenty of fruits, vegetables, juices, and drink 6-8 glasses of water each day.   Constipation: Stay active and drink at least 6-8 cups of fluid each day to prevent constipation. If you need a laxative or stool softener follow the package directions or consult with your local pharmacists if you have questions.  ?  After anesthesia, rest for 24 hours. Do not drive, drink alcohol beverages or make any important decisions during this time. General anesthesia may cause sore throat, jaw discomfort or muscle aches. These symptoms can last for one or two days.  Activity: Please follow these instructions:  Advance your activity as you can tolerate. You may do light house work; nothing strenuous   You may walk all you want. You may go up and down the steps. Use the railing for support  Do not do excessive bending, straining or heavy lifting for 48 hours after your procedure  Do not drive or return to work until you are instructed   It is normal for your energy level and sleep patterns to change after surgery.   Get extra sleep at night and take naps during the day to help you feel less tired.   Take rest periods during the day.   Complete recovery may take several weeks.  ?  You may resume driving after 24-48 hours recovery.   You may return to work after 48 hours of recovery.    ?  Diet:  Your doctor has recommended that you follow these diet instructions at home. Refer to the patient education materials you received during your hospital stay. If you would like more nutrition counseling, ask your doctor about making an appointment with an outpatient dietitian.  Resume your home diet  ?  Medications:  Please resume your home medications as instructed.   ?  Home Supplies and Equipment:  none  Additional Contacts:  ?  CONTACTS FOR NEUROSURGERY: You may call your neurosurgeon’s office if you have questions between 8:30 am and 4:30 pm. You may request to speak to the nurse practitioner who is available Monday through Friday.   ?  For off hours or the weekend you may call your neurosurgeon's office to leave a message.

## 2024-02-15 NOTE — PROGRESS NOTES
Progress Note - Infectious Disease   Delphine Nelson 73 y.o. male MRN: [id number]  Unit/Bed#: [facility] 732-01 Encounter: [id number]      Impression/Recommendations:  Fever, with mild leukocytosis.  Etiology of this is unclear.  Consider postictal fever.  Consider fever secondary to underlying inflammatory process.  Patient does not have chills or symptoms of fever.  He may have had chronic fever at home.  There is no obvious active infection.  He remains clinically and systemically well despite fever and leukocytosis.  Admission blood cultures have no growth.  With patient clinically and systemically well, likely prolonged fever and no obvious active infection, given need for extensive workup of underlying inflammatory process, will keep patient off antibiotic so that it does not affect workup.  Continue to observe off antibiotic.  Monitor temperature/WBC.       Focal vasogenic edema and, with new onset seizure.  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  However, dermatologist is considering scleromyxedema.  Regardless, this appears to be an inflammatory process rather than infectious.  Patient is status post lumbar puncture with essentially normal CSF with negative/M/E PCR panel.  He may need brain biopsy if diagnosis cannot be established noninvasively.  Observe off antibiotic, as in above.  Follow-up on all pending CSF studies.  Plan for CNS angiogram later today noted.  Probable need for brain biopsy if all studies are nonrevealing.     Diffuse skin rash, intermittent for the last many years.  Patient is status post punch biopsy of rash on right side.  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  No evidence of cellulitis or acute infection of skin.  HIV screen negative.  Syphilis screen negative.  Follow-up on pathology from punch biopsy.     Discussed with patient and his family in detail regarding the above plan.     Antibiotics:  None     Subjective:  Patient is awake  and alert.  No headache.  No confusion.  Temperature intermittently up.  No chills.  No diarrhea.    Objective:  Vitals:  Temp:  [98.8 °F (37.1 °C)-99.2 °F (37.3 °C)] 99.2 °F (37.3 °C)  HR:  [81-87] 81  Resp:  [17] 17  BP: (129-142)/(62-79) 142/70  SpO2:  [97 %-99 %] 99 %  Temp (24hrs), Av °F (37.2 °C), Min:98.8 °F (37.1 °C), Max:99.2 °F (37.3 °C)  Current: Temperature: 99.2 °F (37.3 °C)    Physical Exam:     General: Awake, alert, cooperative, no distress.   Neck:  Supple. No mass.  No lymphadenopathy.   Lungs: Expansion symmetric, no rales, no wheezing, respirations unlabored.   Heart:  Regular rate and rhythm, S1 and S2 normal, no murmur.   Abdomen: Soft, nondistended, non-tender, bowel sounds active all four quadrants, no masses, no organomegaly.   Extremities: No edema. No erythema/warmth. No ulcer. Nontender to palpation.   Skin:  Improving diffuse papular rash.   Neuro: Moves all extremities.     Invasive Devices       Peripheral Intravenous Line  Duration             Peripheral IV 24 Right;Upper;Ventral (anterior) Arm <1 day                    Labs studies:   I have personally reviewed pertinent labs.  Results from last 7 days   Lab Units 02/15/24  0533 24  0759 24  0511 24  0822 24  0431   POTASSIUM mmol/L 3.5 3.7 3.7   < > 4.2   CHLORIDE mmol/L 101 104 106   < > 100   CO2 mmol/L 28 25 23   < > 24   BUN mg/dL 7 8 15   < > 14   CREATININE mg/dL 1.06 1.05 1.16   < > 1.45*   EGFR ml/min/1.73sq m 70 71 63   < > 48   CALCIUM mg/dL 8.7 8.7 8.1*   < > 9.0   AST U/L 46* 23  --   --  22   ALT U/L 54* 28  --   --  23   ALK PHOS U/L 110* 83  --   --  54    < > = values in this interval not displayed.     Results from last 7 days   Lab Units 02/15/24  0533 24  0759 24  0511   WBC Thousand/uL 12.36* 10.46* 14.51*   HEMOGLOBIN g/dL 13.5 13.9 12.8   PLATELETS Thousands/uL 339 292 250     Results from last 7 days   Lab Units 24  1157 02/10/24  0627 02/10/24  0333   BLOOD  CULTURE   --  No Growth After 5 Days. No Growth After 5 Days.   GRAM STAIN RESULT  No Polys or Bacteria seen  1+ Mononuclear Cells  --   --        Imaging Studies:   I have personally reviewed pertinent imaging study reports and images in PACS.    EKG, Pathology, and Other Studies:   I have personally reviewed pertinent reports.

## 2024-02-15 NOTE — PROGRESS NOTES
"[facility]  Progress Note  Name: Delphine Nelson I  MRN: [id number]  Unit/Bed#: [facility]PHP 732-01 I Date of Admission: 2/10/2024   Date of Service: 2/15/2024 I Hospital Day: 5    Assessment/Plan   * New onset seizure (HCC)  Assessment & Plan  Was in [facility] on early 2/8 AM and had a tonic-clonic seizure.  Patient without any known history of seizures  Presented to the ER at [place] and initially was combative; had initial CT head with noted hypodensity within the left frontal and right parietal occipital lobes.  ER staff there reached out to specialist on-call and patient was recommended to be transferred to [place] for neurology as well as neurosurgery and further workup.  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 Flores with Neurology was consulted and reviewed MRI brain; although noted cerebral amyloid angiopathy on MRI, patient's lack of significant burden of amyloid angiopathy and the rest of the brain parenchyma as well as the lack of cognitive atypical and does not meet diagnostic criteria.  While cerebral amyloid angiopathy is possibility still, patient with wide range of other possibilities that need to be excluded including press, CNS vasculitis and certain CNS infections.  A repeat stat CTA head was ordered and this CTA head and neck with focal areas of vasogenic edema better characterized on the recent brain MRI  In addition to imaging, neurology recommended Keppra 1 g twice daily as well as an LP for CSF analysis and potential EEG.   Patient returned back to baseline mentation after his seizure and has not had repeat since  Transferred to [place] on the late hours of 2/9  Consult neurology .  Seizure precautions.  Holding any antiplatelet or anticoagulation agents.  " "Continue IV Keppra.  F/U CT chest/abdomen/pelvis - Unremarkable   F/U LP  Echo with preserved LVEF  Order blood cultures, ESR/CRP as well as further rheumatologic studies  Appreciate neurology and neurosurgery recommendations  Given MRI findings neurosurgery consulted for diagnostic angiogram 2/15  If unremarkable may consider brain biopsy    Cerebral amyloid angiopathy   Assessment & Plan  Noted on MRI  Neurosurgery who performed diagnostic angio on February 15    KRISTIN (acute kidney injury) (HCC)  Assessment & Plan  Back to baseline  There are not many renal function measurements in the outpatient setting however we may surmise that baseline renal function is around 1.0-1.4  Resume lisinopril 10 mg daily      Fever  Assessment & Plan  Patient noted to have fevers throughout stay  No obvious source of infection, suspect this could be due to underlying inflammatory process  Continue supportive measures and would hold off of antibiotics at this time  Continue to monitor microbial cultures  F/U LP  ID following    Rash  Assessment & Plan  Pruritic, nonpainful, erythematous blanching rash on the extremities as well as trunk  Reports he has had this rash that comes and goes intermittently over the last 7 years   Scheduled Tylenol and Benadryl continued, this is what patient takes at home to treat this rash  Derm evaluated patient and punch biopsy pending.  Appreciate their expertise  Rash appears more convalescent and erythematous today, family states that this is normal course for the disease  Follow-up punch biopsy results    Respiratory disease  Assessment & Plan  Reported history of \"asthma\" although not on any PTA inhalers  Portable chest x-ray in the ER at [place] with low lung volumes with vascular crowding as well as mild opacity at the medial left base, likely atelectasis, but pneumonia/aspiration not excluded in the appropriate clinical setting.  No significant pathology noted on CT chest abdomen pelvis    Type 2 " diabetes mellitus with stage 3a chronic kidney disease, without long-term current use of insulin (HCC)  Assessment & Plan  Lab Results   Component Value Date    HGBA1C 7.7 (H) 09/07/2023       Recent Labs     02/14/24  1028 02/14/24  1622 02/14/24  2133 02/15/24  0650   POCGLU 203* 172* 175* 148*         Blood Sugar Average: Last 72 hrs:  (P) 177.5461147055995931  Diabetic diet as well as sliding scale insulin with meals    Hypercholesterolemia  Assessment & Plan  Holding atorvastatin    Essential hypertension  Assessment & Plan  Continue PTA Norvasc, lisinopril   Blood pressure at goal               VTE Pharmacologic Prophylaxis: VTE Score: 3 Moderate Risk (Score 3-4) - Pharmacological DVT Prophylaxis Ordered: heparin.    Mobility:   Basic Mobility Inpatient Raw Score: 22  JH-HLM Goal: 7: Walk 25 feet or more  JH-HLM Achieved: 7: Walk 25 feet or more  HLM Goal achieved. Continue to encourage appropriate mobility.    Patient Centered Rounds: I performed bedside rounds with nursing staff today.   Discussions with Specialists or Other Care Team Provider: Neurology     Education and Discussions with Family / Patient: Updated  (wife) at bedside.    Total Time Spent on Date of Encounter in care of patient: 45 mins. This time was spent on one or more of the following: performing physical exam; counseling and coordination of care; obtaining or reviewing history; documenting in the medical record; reviewing/ordering tests, medications or procedures; communicating with other healthcare professionals and discussing with patient's family/caregivers.    Current Length of Stay: 5 day(s)  Current Patient Status: Inpatient   Certification Statement: The patient will continue to require additional inpatient hospital stay due to Neurologic work up  Discharge Plan:  Pending workup results    Code Status: Level 1 - Full Code    Subjective:   This is a very pleasant 73 y.o. male who was seen today at bedside. Patient has  no new complaints. Patient is not in any acute distress.       Objective:     Vitals:   Temp (24hrs), Av °F (37.2 °C), Min:98.8 °F (37.1 °C), Max:99.2 °F (37.3 °C)    Temp:  [98.8 °F (37.1 °C)-99.2 °F (37.3 °C)] 99.2 °F (37.3 °C)  HR:  [81-87] 81  Resp:  [17] 17  BP: (129-142)/(62-79) 142/70  SpO2:  [97 %-99 %] 99 %  Body mass index is 30.91 kg/m².     Input and Output Summary (last 24 hours):     Intake/Output Summary (Last 24 hours) at 2/15/2024 1502  Last data filed at 2/15/2024 1422  Gross per 24 hour   Intake 100 ml   Output 300 ml   Net -200 ml       Physical Exam:   Physical Exam  Vitals reviewed.   HENT:      Head: Normocephalic.      Nose: No congestion.      Mouth/Throat:      Pharynx: No oropharyngeal exudate or posterior oropharyngeal erythema.   Eyes:      Conjunctiva/sclera: Conjunctivae normal.   Cardiovascular:      Rate and Rhythm: Normal rate and regular rhythm.   Pulmonary:      Effort: Pulmonary effort is normal.   Abdominal:      General: Abdomen is flat.      Palpations: Abdomen is soft.   Skin:     General: Skin is warm and dry.      Findings: Rash present.   Neurological:      Mental Status: He is alert and oriented to person, place, and time. Mental status is at baseline.          Additional Data:     Labs:  Results from last 7 days   Lab Units 02/15/24  0533 24  0759 24  0647 02/10/24  0355   WBC Thousand/uL 12.36* 10.46*   < > 11.29*   HEMOGLOBIN g/dL 13.5 13.9   < > 15.2   HEMATOCRIT % 41.8 42.9   < > 46.9   PLATELETS Thousands/uL 339 292   < > 334   BANDS PCT %  --  6  --   --    NEUTROS PCT %  --   --   --  85*   LYMPHS PCT %  --   --   --  7*   LYMPHO PCT %  --  4*  --   --    MONOS PCT %  --   --   --  5   MONO PCT %  --  4  --   --    EOS PCT %  --  13*  --  3    < > = values in this interval not displayed.     Results from last 7 days   Lab Units 02/15/24  0533   SODIUM mmol/L 139   POTASSIUM mmol/L 3.5   CHLORIDE mmol/L 101   CO2 mmol/L 28   BUN mg/dL 7   CREATININE  mg/dL 1.06   ANION GAP mmol/L 10   CALCIUM mg/dL 8.7   ALBUMIN g/dL 3.5   TOTAL BILIRUBIN mg/dL 0.74   ALK PHOS U/L 110*   ALT U/L 54*   AST U/L 46*   GLUCOSE RANDOM mg/dL 143*     Results from last 7 days   Lab Units 02/15/24  0533   INR  1.00     Results from last 7 days   Lab Units 02/15/24  1111 02/15/24  0650 02/14/24  2133 02/14/24  1622 02/14/24  1028 02/14/24  0634 02/13/24  2054 02/13/24  1612 02/13/24  1042 02/13/24  0620 02/12/24  2207 02/12/24  1558   POC GLUCOSE mg/dl 149* 148* 175* 172* 203* 139 214* 148* 192* 144* 306* 127         Results from last 7 days   Lab Units 02/10/24  2316   PROCALCITONIN ng/ml 0.30*       Lines/Drains:  Invasive Devices       Peripheral Intravenous Line  Duration             Peripheral IV 02/14/24 Right;Upper;Ventral (anterior) Arm <1 day                          Imaging: No pertinent imaging reviewed.    Recent Cultures (last 7 days):   Results from last 7 days   Lab Units 02/12/24  1157 02/10/24  0627 02/10/24  0333   BLOOD CULTURE   --  No Growth After 5 Days. No Growth After 5 Days.   GRAM STAIN RESULT  No Polys or Bacteria seen  1+ Mononuclear Cells  --   --        Last 24 Hours Medication List:   Current Facility-Administered Medications   Medication Dose Route Frequency Provider Last Rate    acetaminophen  650 mg Oral Q6H PRN Andrea Ibarra      amLODIPine  5 mg Oral Daily Debra Adler PA-C      diphenhydrAMINE  50 mg Oral Daily Andrea Ibarra      diphenhydrAMINE-zinc acetate   Topical TID PRN Inez Mijares PA-C      hydrocortisone   Topical 4x Daily PRN Inez Mijares PA-C      insulin lispro  1-5 Units Subcutaneous 4x Daily (AC & HS) Inez Mijares PA-C      levETIRAcetam  1,000 mg Intravenous Q12H ARIANNA Giovanny Arzola, DO      lisinopril  10 mg Oral Daily Tacos Jean MD      LORazepam  1 mg Intravenous Q6H PRN Fabiana Cutler, DO      ondansetron  4 mg Intravenous Q6H PRN Giovanny Arzoal DO      pantoprazole  40 mg Oral Early Morning Giovanny MARSH  DO Ness      sodium chloride  75 mL/hr Intravenous Continuous Jose Daniel Hi MD 75 mL/hr (02/15/24 1597)        Today, Patient Was Seen By: Tacos Jean MD    **Please Note: This note may have been constructed using a voice recognition system.**

## 2024-02-15 NOTE — ASSESSMENT & PLAN NOTE
"Was in [facility] on early 2/8 AM and had a tonic-clonic seizure.  Patient without any known history of seizures  Presented to the ER at [place] and initially was combative; had initial CT head with noted hypodensity within the left frontal and right parietal occipital lobes.  ER staff there reached out to specialist on-call and patient was recommended to be transferred to [place] for neurology as well as neurosurgery and further workup.  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 Flores with Neurology was consulted and reviewed MRI brain; although noted cerebral amyloid angiopathy on MRI, patient's lack of significant burden of amyloid angiopathy and the rest of the brain parenchyma as well as the lack of cognitive atypical and does not meet diagnostic criteria.  While cerebral amyloid angiopathy is possibility still, patient with wide range of other possibilities that need to be excluded including press, CNS vasculitis and certain CNS infections.  A repeat stat CTA head was ordered and this CTA head and neck with focal areas of vasogenic edema better characterized on the recent brain MRI  In addition to imaging, neurology recommended Keppra 1 g twice daily as well as an LP for CSF analysis and potential EEG.   Patient returned back to baseline mentation after his seizure and has not had repeat since  Transferred to [place] on the late hours of 2/9  Consult neurology .  Seizure precautions.  Holding any antiplatelet or anticoagulation agents.  Continue IV Keppra.  F/U CT chest/abdomen/pelvis - Unremarkable   F/U LP  Echo with preserved LVEF  Order blood cultures, ESR/CRP as well as further rheumatologic studies  Appreciate neurology and neurosurgery recommendations  Given MRI findings neurosurgery consulted for " diagnostic angiogram 2/15  If unremarkable may consider brain biopsy

## 2024-02-15 NOTE — SEDATION DOCUMENTATION
IR cerebral angiogram by . Anesthesia present throughout entire case. R wrist used for access and closed with TR band. Report called to P7 RN.

## 2024-02-15 NOTE — PLAN OF CARE
Problem: SAFETY ADULT  Goal: Patient will remain free of falls  Description: INTERVENTIONS:  - Educate patient/family on patient safety including physical limitations  - Instruct patient to call for assistance with activity   - Consult OT/PT to assist with strengthening/mobility   - Keep Call bell within reach  - Keep bed low and locked with side rails adjusted as appropriate  - Keep care items and personal belongings within reach  - Initiate and maintain comfort rounds  - Make Fall Risk Sign visible to staff  - Offer Toileting every 2 Hours, in advance of need  - Initiate/Maintain bed alarm  - Obtain necessary fall risk management equipment: non skid footwear  - Apply yellow socks and bracelet for high fall risk patients  - Consider moving patient to room near nurses station  Outcome: Progressing     Problem: DISCHARGE PLANNING  Goal: Discharge to home or other facility with appropriate resources  Description: INTERVENTIONS:  - Identify barriers to discharge w/patient and caregiver  - Arrange for needed discharge resources and transportation as appropriate  - Identify discharge learning needs (meds, wound care, etc.)  - Arrange for interpretive services to assist at discharge as needed  - Refer to Case Management Department for coordinating discharge planning if the patient needs post-hospital services based on physician/advanced practitioner order or complex needs related to functional status, cognitive ability, or social support system  Outcome: Progressing     Problem: Knowledge Deficit  Goal: Patient/family/caregiver demonstrates understanding of disease process, treatment plan, medications, and discharge instructions  Description: Complete learning assessment and assess knowledge base.  Interventions:  - Provide teaching at level of understanding  - Provide teaching via preferred learning methods  Outcome: Progressing     Problem: NEUROSENSORY - ADULT  Goal: Remains free of injury related to seizures  activity  Description: INTERVENTIONS  - Maintain airway, patient safety  and administer oxygen as ordered  - Monitor patient for seizure activity, document and report duration and description of seizure to physician/advanced practitioner  - If seizure occurs,  ensure patient safety during seizure  - Reorient patient post seizure  - Seizure pads on all 4 side rails  - Instruct patient/family to notify RN of any seizure activity including if an aura is experienced  - Instruct patient/family to call for assistance with activity based on nursing assessment  - Administer anti-seizure medications if ordered    Outcome: Progressing     Problem: CARDIOVASCULAR - ADULT  Goal: Maintains optimal cardiac output and hemodynamic stability  Description: INTERVENTIONS:  - Monitor I/O, vital signs and rhythm  - Monitor for S/S and trends of decreased cardiac output  - Administer and titrate ordered vasoactive medications to optimize hemodynamic stability  - Assess quality of pulses, skin color and temperature  - Assess for signs of decreased coronary artery perfusion  - Instruct patient to report change in severity of symptoms  Outcome: Progressing

## 2024-02-15 NOTE — ASSESSMENT & PLAN NOTE
"Reported history of \"asthma\" although not on any PTA inhalers  Portable chest x-ray in the ER at [place] with low lung volumes with vascular crowding as well as mild opacity at the medial left base, likely atelectasis, but pneumonia/aspiration not excluded in the appropriate clinical setting.  No significant pathology noted on CT chest abdomen pelvis  "

## 2024-02-15 NOTE — ASSESSMENT & PLAN NOTE
Lab Results   Component Value Date    HGBA1C 7.7 (H) 09/07/2023       Recent Labs     02/14/24  1028 02/14/24  1622 02/14/24  2133 02/15/24  0650   POCGLU 203* 172* 175* 148*         Blood Sugar Average: Last 72 hrs:  (P) 177.6320130616345181  Diabetic diet as well as sliding scale insulin with meals

## 2024-02-16 LAB
ALBUMIN SERPL BCP-MCNC: 3.2 G/DL (ref 3.5–5)
ALP SERPL-CCNC: 108 U/L (ref 34–104)
ALT SERPL W P-5'-P-CCNC: 53 U/L (ref 7–52)
ANION GAP SERPL CALCULATED.3IONS-SCNC: 9 MMOL/L
AST SERPL W P-5'-P-CCNC: 35 U/L (ref 13–39)
BILIRUB SERPL-MCNC: 0.76 MG/DL (ref 0.2–1)
BUN SERPL-MCNC: 7 MG/DL (ref 5–25)
CALCIUM ALBUM COR SERPL-MCNC: 9.4 MG/DL (ref 8.3–10.1)
CALCIUM SERPL-MCNC: 8.8 MG/DL (ref 8.4–10.2)
CHLORIDE SERPL-SCNC: 102 MMOL/L (ref 96–108)
CO2 SERPL-SCNC: 27 MMOL/L (ref 21–32)
CREAT SERPL-MCNC: 1.14 MG/DL (ref 0.6–1.3)
ERYTHROCYTE [DISTWIDTH] IN BLOOD BY AUTOMATED COUNT: 13.6 % (ref 11.6–15.1)
GFR SERPL CREATININE-BSD FRML MDRD: 64 ML/MIN/1.73SQ M
GLUCOSE SERPL-MCNC: 139 MG/DL (ref 65–140)
GLUCOSE SERPL-MCNC: 143 MG/DL (ref 65–140)
GLUCOSE SERPL-MCNC: 169 MG/DL (ref 65–140)
GLUCOSE SERPL-MCNC: 232 MG/DL (ref 65–140)
GLUCOSE SERPL-MCNC: 248 MG/DL (ref 65–140)
HCT VFR BLD AUTO: 38.1 % (ref 36.5–49.3)
HGB BLD-MCNC: 12.8 G/DL (ref 12–17)
MCH RBC QN AUTO: 28.5 PG (ref 26.8–34.3)
MCHC RBC AUTO-ENTMCNC: 33.6 G/DL (ref 31.4–37.4)
MCV RBC AUTO: 85 FL (ref 82–98)
OLIGOCLONAL BANDS.IT SER+CSF QL: NORMAL
PLATELET # BLD AUTO: 320 THOUSANDS/UL (ref 149–390)
PMV BLD AUTO: 9.4 FL (ref 8.9–12.7)
POTASSIUM SERPL-SCNC: 3.5 MMOL/L (ref 3.5–5.3)
PROT SERPL-MCNC: 5.8 G/DL (ref 6.4–8.4)
RBC # BLD AUTO: 4.49 MILLION/UL (ref 3.88–5.62)
SODIUM SERPL-SCNC: 138 MMOL/L (ref 135–147)
WBC # BLD AUTO: 13.31 THOUSAND/UL (ref 4.31–10.16)

## 2024-02-16 PROCEDURE — 82948 REAGENT STRIP/BLOOD GLUCOSE: CPT

## 2024-02-16 PROCEDURE — 99232 SBSQ HOSP IP/OBS MODERATE 35: CPT | Performed by: FAMILY MEDICINE

## 2024-02-16 PROCEDURE — 85027 COMPLETE CBC AUTOMATED: CPT | Performed by: FAMILY MEDICINE

## 2024-02-16 PROCEDURE — NC001 PR NO CHARGE: Performed by: RADIOLOGY

## 2024-02-16 PROCEDURE — 80053 COMPREHEN METABOLIC PANEL: CPT | Performed by: FAMILY MEDICINE

## 2024-02-16 PROCEDURE — 99233 SBSQ HOSP IP/OBS HIGH 50: CPT | Performed by: STUDENT IN AN ORGANIZED HEALTH CARE EDUCATION/TRAINING PROGRAM

## 2024-02-16 RX ORDER — HEPARIN SODIUM 5000 [USP'U]/ML
5000 INJECTION, SOLUTION INTRAVENOUS; SUBCUTANEOUS EVERY 8 HOURS SCHEDULED
Status: DISCONTINUED | OUTPATIENT
Start: 2024-02-16 | End: 2024-02-22 | Stop reason: HOSPADM

## 2024-02-16 RX ADMIN — LEVETIRACETAM 1000 MG: 100 INJECTION, SOLUTION INTRAVENOUS at 21:16

## 2024-02-16 RX ADMIN — HYDROCORTISONE: 1 CREAM TOPICAL at 12:27

## 2024-02-16 RX ADMIN — DIPHENHYDRAMINE HCL 50 MG: 25 TABLET ORAL at 09:42

## 2024-02-16 RX ADMIN — INSULIN LISPRO 2 UNITS: 100 INJECTION, SOLUTION INTRAVENOUS; SUBCUTANEOUS at 21:14

## 2024-02-16 RX ADMIN — LEVETIRACETAM 1000 MG: 100 INJECTION, SOLUTION INTRAVENOUS at 09:42

## 2024-02-16 RX ADMIN — INSULIN LISPRO 2 UNITS: 100 INJECTION, SOLUTION INTRAVENOUS; SUBCUTANEOUS at 12:27

## 2024-02-16 RX ADMIN — HEPARIN SODIUM 5000 UNITS: 5000 INJECTION INTRAVENOUS; SUBCUTANEOUS at 21:14

## 2024-02-16 RX ADMIN — INSULIN LISPRO 1 UNITS: 100 INJECTION, SOLUTION INTRAVENOUS; SUBCUTANEOUS at 17:51

## 2024-02-16 RX ADMIN — DIPHENHYDRAMINE HYDROCHLORIDE, ZINC ACETATE: 2; .1 CREAM TOPICAL at 15:56

## 2024-02-16 RX ADMIN — PANTOPRAZOLE SODIUM 40 MG: 40 TABLET, DELAYED RELEASE ORAL at 05:51

## 2024-02-16 RX ADMIN — LISINOPRIL 10 MG: 10 TABLET ORAL at 09:43

## 2024-02-16 RX ADMIN — HYDROCORTISONE: 1 CREAM TOPICAL at 21:34

## 2024-02-16 RX ADMIN — AMLODIPINE BESYLATE 5 MG: 5 TABLET ORAL at 09:42

## 2024-02-16 NOTE — ASSESSMENT & PLAN NOTE
Patient presented with new onset tonic - clonic seizure at [facility] on 2/8/24  No significant PMH, no prior seizure like activity  Imaging significant for non specific edema left frontal and right occipital lobes without underlying mass  On exam, patient just had benedryl and is very sleepy. GCS 15, no focal findings\  Now POD 1 s/p IR cerebral angiogram on 2/15/24 showing left M1 irregularity with unclear etiology.     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 Focal areas of vasogenic edema better characterized on the recent brain MRI, ascribed to represent acute amyloid angiitis/acute inflammatory cerebral amyloid angiopathy. Other etiologies not excluded    Plan:  Continue to monitor neurological exam  Neurology following for management of abnormal findings  Continue Keppra 1 G twice daily, Ativan as neede  Consider vEEG  LP 2/12 unremarkable   CT CAP negative.  Hold all AC/AP medications at this time  SBP less than 160  Will plan for brain biopsy Monday vs Tuesday  Medical management per primary team  PT/OT evaluation, okay to mobilize as tolerated  DVT ppx: SCDs, heparin SQ.    Neurosurgery will see the patient as needed and on Sunday for pre-op. Please call questions or concerns.

## 2024-02-16 NOTE — ASSESSMENT & PLAN NOTE
Pruritic, nonpainful, erythematous blanching rash on the extremities as well as trunk  Reports he has had this rash that comes and goes intermittently over the last 7 years   Scheduled Tylenol and Benadryl continued, this is what patient takes at home to treat this rash  Derm evaluated patient and punch biopsy pending.  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

## 2024-02-16 NOTE — ASSESSMENT & PLAN NOTE
"Reported history of \"asthma\" although not on any PTA inhalers  Portable chest x-ray in the ER at [place] with low lung volumes with vascular crowding as well as mild opacity at the medial left base, likely atelectasis, but pneumonia/aspiration not excluded in the appropriate clinical setting.  No significant pathology noted on CT chest abdomen pelvis  "

## 2024-02-16 NOTE — ASSESSMENT & PLAN NOTE
Lab Results   Component Value Date    HGBA1C 7.7 (H) 09/07/2023       Recent Labs     02/15/24  1111 02/15/24  1548 02/15/24  2058 02/16/24  0717   POCGLU 149* 134 199* 139         Blood Sugar Average: Last 72 hrs:  (P) 165.6008833026446441  Diabetic diet as well as sliding scale insulin with meals

## 2024-02-16 NOTE — ASSESSMENT & PLAN NOTE
"Was in [facility] on early 2/8 AM and had a tonic-clonic seizure.  Patient without any known history of seizures  Presented to the ER at [place] and initially was combative; had initial CT head with noted hypodensity within the left frontal and right parietal occipital lobes.  ER staff there reached out to specialist on-call and patient was recommended to be transferred to [place] for neurology as well as neurosurgery and further workup.  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 Flores with Neurology was consulted and reviewed MRI brain; although noted cerebral amyloid angiopathy on MRI, patient's lack of significant burden of amyloid angiopathy and the rest of the brain parenchyma as well as the lack of cognitive atypical and does not meet diagnostic criteria.  While cerebral amyloid angiopathy is possibility still, patient with wide range of other possibilities that need to be excluded including press, CNS vasculitis and certain CNS infections.  A repeat stat CTA head was ordered and this CTA head and neck with focal areas of vasogenic edema better characterized on the recent brain MRI  In addition to imaging, neurology recommended Keppra 1 g twice daily as well as an LP for CSF analysis and potential EEG.   Patient returned back to baseline mentation after his seizure and has not had repeat since  Transferred to [facility] on the late hours of 2/9  Consult neurology .  Seizure precautions.  Holding any antiplatelet or anticoagulation agents.  Continue IV Keppra.  F/U CT chest/abdomen/pelvis - Unremarkable   F/U LP  Echo with preserved LVEF  Order blood cultures, ESR/CRP as well as further rheumatologic studies  Appreciate neurology and neurosurgery recommendations  Given MRI findings neurosurgery consulted for " diagnostic angiogram 2/15  Left MCA irregularity with areas of narrowing. Findings are nonspecific and may reflect either an atherosclerotic or inflammatory vasculopathy.   Patient will need brain biopsy by neurosurgery

## 2024-02-16 NOTE — PROGRESS NOTES
"Progress Note - Neurology   Delphine Nelson 73 y.o. male [id number]  Unit/Bed#: [facility] 732/[facility] 732-01    Assessment/Plan:      * New onset seizure (HCC)  Assessment & Plan  73 y.o. male with HTN, HLD, DM2, [id number], Erectile dysfunction, Migraines, Asthma, GERD and intermittent extremity and trunk pruritic rash who presented following a witnessed seizure. Upon EMS arrival patient was reportedly \"posturing\" and was given Versed 4mg. Aside from elevated BS, labs were unremarkable. Imaging concern for Inflammatory cerebral amyloid angiopathy with a subacute cortical microbleed in the left frontal lobe with vasogenic edema as as detailed below.    Work-up/Imaging:  UA negative  Labs unremarkable aside from WBC 8-->11, Mg 1.6, CRP 7.6, ESR 16  UDS negative (aside from positive for benzos as expected)  CTH revealed:   Hypodensity within the left frontal and right parietal occipital lobes, contrast-enhanced MRI of the brain recommended for further characterization to exclude underlying mass lesions with associated vasogenic edema.   CTA H/N:   No hemodynamically significant stenosis in the major arteries of the neck.  No irregularity of the M1 segment of the left middle cerebral artery possibly representing angiitis/vasculitis or other vasculopathy.  Focal areas of vasogenic edema better characterized on the recent brain MRI, ascribed to represent acute amyloid angiitis/acute inflammatory cerebral amyloid angiopathy. Other etiologies not excluded.  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   Patient was transferred to [facility] for neurology and neurosurgery evaluation.   Echo: EF 60-65%, mild AV stenosis  CTA CAP w: No acute abnormality or suspicious mass Cholelithiasis. Hepatic steatosis.  Routine EEG: Intermittent left fronto-temporal delta activities suggest a focal area of neuronal dysfunction in that region. Background " "activities are overall too slow suggesting mild diffuse cerebral dysfunction of nonspecific etiology.   MRI wall vessel imaging:  Findings again seen most consistent with inflammatory amyloid angiopathy with vasogenic edema and petechial hemorrhages most pronounced in the left frontal and right posterior temporal/occipital regions.  1 or 2 new tiny foci of restricted diffusion in the left parietal lobe may be related to microhemorrhages versus tiny infarcts. Otherwise no change.  Stable mild to moderate stenosis in the left M1 segment.  Vessel wall imaging limited due to motion and venous contamination.  IR angiography 2/15/24:  \"Left MCA irregularity with areas of narrowing. Findings are nonspecific and may reflect either an atherosclerotic or inflammatory vasculopathy. \"  CSF studies:  WNL: WBC, protein, RBC, gram stain, ME panel  Abnormal: Glucose 101    Plan:  Continue Keppra 1g BID  Ativan PRN for motor seizure activity > 2 minutes  S/p LP via IR on 2/12  CSF studies: culture, oligoclonal bands, Cytology, Flow cytometry  Cerebral angiogram completed 2/15/24  Recommend brain bx. Workup thus far has not been able to differentiate between CAA-RI versus vasculitis.   Pending work-up will likely need repeat MRI brain in 4-6 weeks  Telemetry  Seizure precautions  [employer] form completed and faxed by previous neurology AP. Patient aware that they are not to drive.   Neurosurgery following; appreciate recommendations  ID following; appreciate recommendations  Monitor neuro exam; notify with any changes  Medical management and supportive care per primary team. Correction of any metabolic or infectious disturbances.     Cerebral amyloid angiopathy   Assessment & Plan  As noted on imaging.   Work-up as detailed above.        Recommendations for outpatient neurological follow up have yet to be determined.    Case and plan discussed with attending neurologist.  Please see attending attestation for any further recommendations " and/or changes to plan.      Subjective:   Pt reports rash is very itchy today. Pt denies weakness. Pt reports walking well with therapy today.         Past Medical History:   Diagnosis Date    Diabetes mellitus (HCC)     ED (erectile dysfunction)     GERD (gastroesophageal reflux disease)     Hypercholesterolemia     Hypertension     Migraines      Past Surgical History:   Procedure Laterality Date    COLONOSCOPY  08/01/2016    FL LUMBAR PUNCTURE DIAGNOSTIC  2/12/2024    IR CEREBRAL ANGIOGRAPHY  2/15/2024    NO PAST SURGERIES       Family History   Problem Relation Age of Onset    Heart disease Mother     Asthma Father     Kidney disease Brother     Diabetes Brother      Social History     Socioeconomic History    Marital status: /Civil Union     Spouse name: None    Number of children: None    Years of education: None    Highest education level: None   Occupational History    None   Tobacco Use    Smoking status: Never    Smokeless tobacco: Never   Vaping Use    Vaping status: Never Used   Substance and Sexual Activity    Alcohol use: Never    Drug use: Never    Sexual activity: Not Currently     Partners: Female   Other Topics Concern    None   Social History Narrative    None     Social Determinants of Health     Financial Resource Strain: Low Risk  (9/29/2023)    Overall Financial Resource Strain (CARDIA)     Difficulty of Paying Living Expenses: Not hard at all   Food Insecurity: No Food Insecurity (2/14/2024)    Hunger Vital Sign     Worried About Running Out of Food in the Last Year: Never true     Ran Out of Food in the Last Year: Never true   Transportation Needs: No Transportation Needs (2/14/2024)    PRAPARE - Transportation     Lack of Transportation (Medical): No     Lack of Transportation (Non-Medical): No   Physical Activity: Not on file   Stress: Not on file   Social Connections: Not on file   Intimate Partner Violence: Not on file   Housing Stability: Low Risk  (2/14/2024)    Housing  "Stability Vital Sign     Unable to Pay for Housing in the Last Year: No     Number of Places Lived in the Last Year: 1     Unstable Housing in the Last Year: No     E-Cigarette/Vaping    E-Cigarette Use Never User      E-Cigarette/Vaping Substances    Nicotine No     THC No     CBD No     Flavoring No     Other No     Unknown No          Medications:  All current active meds have been reviewed and current meds:  Scheduled Meds:  Current Facility-Administered Medications   Medication Dose Route Frequency Provider Last Rate    acetaminophen  650 mg Oral Q6H PRN Andrea Ibarra      amLODIPine  5 mg Oral Daily Debra Adler PA-C      diphenhydrAMINE  50 mg Oral Daily Andrea Ibarra      diphenhydrAMINE-zinc acetate   Topical TID PRN Inez Mijares PA-C      hydrocortisone   Topical 4x Daily PRN Inez Mijares PA-C      insulin lispro  1-5 Units Subcutaneous 4x Daily (AC & HS) Inez Mijares PA-C      levETIRAcetam  1,000 mg Intravenous Q12H ARIANNA Giovanny Arzola, DO      lisinopril  10 mg Oral Daily Tacos Jean MD      LORazepam  1 mg Intravenous Q6H PRN Fabiana Cutler, DO      ondansetron  4 mg Intravenous Q6H PRN Giovanny Arzola, DO      pantoprazole  40 mg Oral Early Morning Giovanny Arzola, DO      sodium chloride  75 mL/hr Intravenous Continuous Jose Daniel Hi MD 75 mL/hr (02/15/24 1830)     Continuous Infusions:sodium chloride, 75 mL/hr, Last Rate: 75 mL/hr (02/15/24 1830)      PRN Meds:.  acetaminophen    diphenhydrAMINE-zinc acetate    hydrocortisone    LORazepam    ondansetron       ROS:   Review of Systems   Skin:  Positive for rash.   Neurological:  Negative for speech difficulty and weakness.             Vitals:   /72   Pulse 83   Temp 98.3 °F (36.8 °C)   Resp 18   Ht 5' 8\" (1.727 m)   Wt 88.6 kg (195 lb 5.2 oz)   SpO2 95%   BMI 29.70 kg/m²     Physical Exam:   Physical Exam  Vitals and nursing note reviewed.   HENT:      Head: Normocephalic and atraumatic.   Eyes:      General: No " "scleral icterus.        Right eye: No discharge.         Left eye: No discharge.      Extraocular Movements: Extraocular movements intact and EOM normal.      Conjunctiva/sclera: Conjunctivae normal.   Cardiovascular:      Rate and Rhythm: Normal rate.   Pulmonary:      Effort: Pulmonary effort is normal.   Neurological:      Mental Status: He is alert.   Psychiatric:      Comments: Pleasant and cooperative during exam        Neurologic Exam     Mental Status   Follows 1 step commands.   Attention: appropriately attends to provider. Concentration: no redirection or reorientation required during exam.   Speech: (No dysarthria appreciated on exam. Speech fluent. )  Level of consciousness: alert  Able to name object (glove, glasses, watch).    -oriented to self   -oriented to month and year   -Oriented to place \"[facility]\"     Cranial Nerves     CN II   Visual acuity: (grossly intact)  Right visual field deficit: none  Left visual field deficit: none     CN III, IV, VI   Extraocular motions are normal.   Right pupil: Size: 3 mm. Shape: regular.   Left pupil: Size: 3 mm. Shape: regular.   Nystagmus: none   Conjugate gaze: present    CN V   Facial sensation intact.   Right facial sensation deficit: none  Left facial sensation deficit: none    CN VII   Facial expression full, symmetric.   Right facial weakness: none  Left facial weakness: none    CN VIII   Hearing impaired: grossly intact.    Motor Exam   Muscle bulk: normal  Overall muscle tone: normal Strength to confrontation testing:  -Bilateral hand  5/5   -bilateral elbow flexion and extension 5/5   -Bilateral shoulder abduction 5/5   -Bilateral plantar flexion and dorsiflexion 5/5   -bilateral knee flexion and extension 5/5   -Bilateral hip flexion 5/5     Sensory Exam   Light touch normal.   Right arm light touch: normal  Left arm light touch: normal  Right leg light touch: normal  Left leg light touch: normal    Gait, Coordination, and Reflexes "     Gait  Gait: (deferred for patient safety)          Labs: I have personally reviewed pertinent reports.   Recent Results (from the past 24 hour(s))   Fingerstick Glucose (POCT)    Collection Time: 02/15/24  3:48 PM   Result Value Ref Range    POC Glucose 134 65 - 140 mg/dl   Fingerstick Glucose (POCT)    Collection Time: 02/15/24  8:58 PM   Result Value Ref Range    POC Glucose 199 (H) 65 - 140 mg/dl   CBC and differential    Collection Time: 02/16/24  5:51 AM   Result Value Ref Range    WBC 13.31 (H) 4.31 - 10.16 Thousand/uL    RBC 4.49 3.88 - 5.62 Million/uL    Hemoglobin 12.8 12.0 - 17.0 g/dL    Hematocrit 38.1 36.5 - 49.3 %    MCV 85 82 - 98 fL    MCH 28.5 26.8 - 34.3 pg    MCHC 33.6 31.4 - 37.4 g/dL    RDW 13.6 11.6 - 15.1 %    MPV 9.4 8.9 - 12.7 fL    Platelets 320 149 - 390 Thousands/uL   Comprehensive metabolic panel    Collection Time: 02/16/24  5:51 AM   Result Value Ref Range    Sodium 138 135 - 147 mmol/L    Potassium 3.5 3.5 - 5.3 mmol/L    Chloride 102 96 - 108 mmol/L    CO2 27 21 - 32 mmol/L    ANION GAP 9 mmol/L    BUN 7 5 - 25 mg/dL    Creatinine 1.14 0.60 - 1.30 mg/dL    Glucose 143 (H) 65 - 140 mg/dL    Calcium 8.8 8.4 - 10.2 mg/dL    Corrected Calcium 9.4 8.3 - 10.1 mg/dL    AST 35 13 - 39 U/L    ALT 53 (H) 7 - 52 U/L    Alkaline Phosphatase 108 (H) 34 - 104 U/L    Total Protein 5.8 (L) 6.4 - 8.4 g/dL    Albumin 3.2 (L) 3.5 - 5.0 g/dL    Total Bilirubin 0.76 0.20 - 1.00 mg/dL    eGFR 64 ml/min/1.73sq m   Fingerstick Glucose (POCT)    Collection Time: 02/16/24  7:17 AM   Result Value Ref Range    POC Glucose 139 65 - 140 mg/dl   Fingerstick Glucose (POCT)    Collection Time: 02/16/24 10:56 AM   Result Value Ref Range    POC Glucose 248 (H) 65 - 140 mg/dl       Imaging: I have personally reviewed pertinent imaging in PACS, including MRI Brain wwo contrast 2/12/24, CTA H/N wwo contrast 2/9/24, MRI brain wwo contrast 2/8/24, CTH wo contrast 2/8/24,  and I have personally reviewed PACS reports.      EKG, Pathology, and Other Studies: I have personally reviewed pertinent reports.       VTE Prophylaxis: Sequential compression device (Venodyne)         This note was completed in part utilizing Dragon Software.  Grammatical errors, random word insertions, spelling mistakes, and incomplete sentences may be an occasional consequence of this system secondary to software limitations, ambient noise, and hardware issues.  If you have any questions or concerns about the content, text, or information contained within the body of this dictation, please contact the provider for clarification.

## 2024-02-16 NOTE — PROGRESS NOTES
[facility]  Progress Note  Name: Delphine Nelson I  MRN: [id number]  Unit/Bed#: [facility] 732-01 I Date of Admission: 2/10/2024   Date of Service: 2/16/2024 I Hospital Day: 6    Assessment/Plan   * New onset seizure (HCC)  Assessment & Plan  Patient presented with new onset tonic - clonic seizure at [facility] on 2/8/24  No significant PMH, no prior seizure like activity  Imaging significant for non specific edema left frontal and right occipital lobes without underlying mass  On exam, patient just had benedryl and is very sleepy. GCS 15, no focal findings\  Now POD 1 s/p IR cerebral angiogram on 2/15/24 showing left M1 irregularity with unclear etiology.     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 Focal areas of vasogenic edema better characterized on the recent brain MRI, ascribed to represent acute amyloid angiitis/acute inflammatory cerebral amyloid angiopathy. Other etiologies not excluded    Plan:  Continue to monitor neurological exam  Neurology following for management of abnormal findings  Continue Keppra 1 G twice daily, Ativan as neede  Consider vEEG  LP 2/12 unremarkable   CT CAP negative.  Hold all AC/AP medications at this time  SBP less than 160  Will plan for brain biopsy Monday vs Tuesday  Medical management per primary team  PT/OT evaluation, okay to mobilize as tolerated  DVT ppx: SCDs, heparin SQ.    Neurosurgery will see the patient as needed and on Sunday for pre-op. Please call questions or concerns.    Brain edema (HCC)  Assessment & Plan  Unclear etiology  See above for plan    Rash  Assessment & Plan  Nonspecific, diffuse pruritic rash x 8 years.  S/p skin biopsy  "2/10.      Type 2 diabetes mellitus with stage 3a chronic kidney disease, without long-term current use of insulin ([facility])  Assessment & Plan  Management per primary team  SSI ordered, Hb A1c 7.7 in 9/2023             Subjective/Objective     Chief Complaint: Doing ok    Subjective: No headache, dizziness, changes in vision. No further seizures.     Objective: Laying in bed comfortably, alert.     I/O         02/14 0701  02/15 0700 02/15 0701 02/16 0700 02/16 0701 02/17 0700    P.O.  400     I.V. (mL/kg)  100 (1.1)     Total Intake(mL/kg)  500 (5.6)     Urine (mL/kg/hr)  775 (0.4)     Stool  0     Total Output  775     Net  -275            Unmeasured Urine Occurrence 3 x 4 x     Unmeasured Stool Occurrence  1 x             Invasive Devices       Peripheral Intravenous Line  Duration             Peripheral IV 02/14/24 Right;Upper;Ventral (anterior) Arm 1 day                    Physical Exam:  Vitals: Blood pressure 133/72, pulse 83, temperature 98.3 °F (36.8 °C), resp. rate 18, height 5' 8\" (1.727 m), weight 88.6 kg (195 lb 5.2 oz), SpO2 95%.,Body mass index is 29.7 kg/m².    Hemodynamic Monitoring: MAP:      General appearance:  Appears stated age  Head: Normocephalic, without obvious abnormality, atraumatic  Eyes: EOMI, PERRL  Neck: supple, symmetrical, trachea midline   Lungs: non labored breathing  Heart: regular heart rate, radial and pedal pulses intact, no hematoma present at area of IR angiogram catheter insertion  Neurologic:   Mental status: Alert, oriented x3, thought content appropriate  Cranial nerves: grossly intact (Cranial nerves II-XII)  Sensory: normal to LT x4  Motor: moving all extremities without focal weakness   Coordination: finger to nose test normal bilaterally, no drift in bilateral upper extremities        Lab Results:  Results from last 7 days   Lab Units 02/16/24  0551 02/15/24  0533 02/14/24  0759 02/11/24  0647 02/10/24  0355   WBC Thousand/uL 13.31* 12.36* 10.46*   < > 11.29* " "  HEMOGLOBIN g/dL 12.8 13.5 13.9   < > 15.2   HEMATOCRIT % 38.1 41.8 42.9   < > 46.9   PLATELETS Thousands/uL 320 339 292   < > 334   NEUTROS PCT %  --   --   --   --  85*   MONOS PCT %  --   --   --   --  5   MONO PCT %  --   --  4  --   --    EOS PCT %  --   --  13*  --  3    < > = values in this interval not displayed.     Results from last 7 days   Lab Units 02/16/24  0551 02/15/24  0533 02/14/24  0759   POTASSIUM mmol/L 3.5 3.5 3.7   CHLORIDE mmol/L 102 101 104   CO2 mmol/L 27 28 25   BUN mg/dL 7 7 8   CREATININE mg/dL 1.14 1.06 1.05   CALCIUM mg/dL 8.8 8.7 8.7   ALK PHOS U/L 108* 110* 83   ALT U/L 53* 54* 28   AST U/L 35 46* 23     Results from last 7 days   Lab Units 02/11/24  0431 02/10/24  0623   MAGNESIUM mg/dL 2.0 1.6*         Results from last 7 days   Lab Units 02/15/24  0533 02/12/24  0822 02/10/24  0356   INR  1.00 1.10 0.94   PTT seconds  --   --  28     No results found for: \"TROPONINT\"  ABG:No results found for: \"PHART\", \"KPC9MEM\", \"PO2ART\", \"FYK1FGG\", \"T5CZVVMH\", \"BEART\", \"SOURCE\"    Imaging Studies: I have personally reviewed pertinent reports and I have personally reviewed pertinent films in PACS    EKG, Pathology, and Other Studies: I have personally reviewed pertinent reports.      VTE Mechanical Prophylaxis: sequential compression device        "

## 2024-02-16 NOTE — PLAN OF CARE
Problem: SAFETY ADULT  Goal: Patient will remain free of falls  Description: INTERVENTIONS:  - Educate patient/family on patient safety including physical limitations  - Instruct patient to call for assistance with activity   - Consult OT/PT to assist with strengthening/mobility   - Keep Call bell within reach  - Keep bed low and locked with side rails adjusted as appropriate  - Keep care items and personal belongings within reach  - Initiate and maintain comfort rounds  - Make Fall Risk Sign visible to staff  - Offer Toileting every 2 Hours, in advance of need  - Initiate/Maintain bed alarm  - Obtain necessary fall risk management equipment: non skid footwear  - Apply yellow socks and bracelet for high fall risk patients  - Consider moving patient to room near nurses station  Outcome: Progressing     Problem: DISCHARGE PLANNING  Goal: Discharge to home or other facility with appropriate resources  Description: INTERVENTIONS:  - Identify barriers to discharge w/patient and caregiver  - Arrange for needed discharge resources and transportation as appropriate  - Identify discharge learning needs (meds, wound care, etc.)  - Arrange for interpretive services to assist at discharge as needed  - Refer to Case Management Department for coordinating discharge planning if the patient needs post-hospital services based on physician/advanced practitioner order or complex needs related to functional status, cognitive ability, or social support system  Outcome: Progressing     Problem: NEUROSENSORY - ADULT  Goal: Remains free of injury related to seizures activity  Description: INTERVENTIONS  - Maintain airway, patient safety  and administer oxygen as ordered  - Monitor patient for seizure activity, document and report duration and description of seizure to physician/advanced practitioner  - If seizure occurs,  ensure patient safety during seizure  - Reorient patient post seizure  - Seizure pads on all 4 side rails  - Instruct  patient/family to notify RN of any seizure activity including if an aura is experienced  - Instruct patient/family to call for assistance with activity based on nursing assessment  - Administer anti-seizure medications if ordered    Outcome: Progressing     Problem: CARDIOVASCULAR - ADULT  Goal: Maintains optimal cardiac output and hemodynamic stability  Description: INTERVENTIONS:  - Monitor I/O, vital signs and rhythm  - Monitor for S/S and trends of decreased cardiac output  - Administer and titrate ordered vasoactive medications to optimize hemodynamic stability  - Assess quality of pulses, skin color and temperature  - Assess for signs of decreased coronary artery perfusion  - Instruct patient to report change in severity of symptoms  Outcome: Progressing

## 2024-02-16 NOTE — PROGRESS NOTES
"[facility]  Progress Note  Name: Delphine Nelson I  MRN: [id number]  Unit/Bed#: [facility]PHP 732-01 I Date of Admission: 2/10/2024   Date of Service: 2/16/2024 I Hospital Day: 6    Assessment/Plan   * New onset seizure (HCC)  Assessment & Plan  Was in [facility] on early 2/8 AM and had a tonic-clonic seizure.  Patient without any known history of seizures  Presented to the ER at [place] and initially was combative; had initial CT head with noted hypodensity within the left frontal and right parietal occipital lobes.  ER staff there reached out to specialist on-call and patient was recommended to be transferred to [place] for neurology as well as neurosurgery and further workup.  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 Flores with Neurology was consulted and reviewed MRI brain; although noted cerebral amyloid angiopathy on MRI, patient's lack of significant burden of amyloid angiopathy and the rest of the brain parenchyma as well as the lack of cognitive atypical and does not meet diagnostic criteria.  While cerebral amyloid angiopathy is possibility still, patient with wide range of other possibilities that need to be excluded including press, CNS vasculitis and certain CNS infections.  A repeat stat CTA head was ordered and this CTA head and neck with focal areas of vasogenic edema better characterized on the recent brain MRI  In addition to imaging, neurology recommended Keppra 1 g twice daily as well as an LP for CSF analysis and potential EEG.   Patient returned back to baseline mentation after his seizure and has not had repeat since  Transferred to [place] on the late hours of 2/9  Consult neurology .  Seizure precautions.  Holding any antiplatelet or anticoagulation agents.  " Continue IV Keppra.  F/U CT chest/abdomen/pelvis - Unremarkable   F/U LP  Echo with preserved LVEF  Order blood cultures, ESR/CRP as well as further rheumatologic studies  Appreciate neurology and neurosurgery recommendations  Given MRI findings neurosurgery consulted for diagnostic angiogram 2/15  Left MCA irregularity with areas of narrowing. Findings are nonspecific and may reflect either an atherosclerotic or inflammatory vasculopathy.   Patient will need brain biopsy by neurosurgery    Cerebral amyloid angiopathy   Assessment & Plan  Noted on MRI  Neurosurgery who performed diagnostic angio on February 15    KRISTIN (acute kidney injury) (HCC)  Assessment & Plan  Back to baseline  There are not many renal function measurements in the outpatient setting however we may surmise that baseline renal function is around 1.0-1.4  Resume lisinopril 10 mg daily      Fever  Assessment & Plan  Patient noted to have fevers throughout stay  No obvious source of infection, suspect this could be due to underlying inflammatory process  Continue supportive measures and would hold off of antibiotics at this time  Continue to monitor microbial cultures  F/U LP  ID following    Rash  Assessment & Plan  Pruritic, nonpainful, erythematous blanching rash on the extremities as well as trunk  Reports he has had this rash that comes and goes intermittently over the last 7 years   Scheduled Tylenol and Benadryl continued, this is what patient takes at home to treat this rash  Derm evaluated patient and punch biopsy pending.  Appreciate their expertise  Rash appears more convalescent and erythematous today, family states that this is normal course for the disease  Follow-up punch biopsy results-reviewed results with dermatology February 15, 2024  Urticarial reaction vs hypersensitivity reaction vs eczema  Little increased mucin, but pathologist strongly disfavored a mucin deposition disease    Respiratory disease  Assessment & Plan  Reported  "history of \"asthma\" although not on any PTA inhalers  Portable chest x-ray in the ER at [place] with low lung volumes with vascular crowding as well as mild opacity at the medial left base, likely atelectasis, but pneumonia/aspiration not excluded in the appropriate clinical setting.  No significant pathology noted on CT chest abdomen pelvis    Type 2 diabetes mellitus with stage 3a chronic kidney disease, without long-term current use of insulin ([facility])  Assessment & Plan  Lab Results   Component Value Date    HGBA1C 7.7 (H) 09/07/2023       Recent Labs     02/15/24  1111 02/15/24  1548 02/15/24  2058 02/16/24  0717   POCGLU 149* 134 199* 139         Blood Sugar Average: Last 72 hrs:  (P) [id number]  Diabetic diet as well as sliding scale insulin with meals    Hypercholesterolemia  Assessment & Plan  Holding atorvastatin    Essential hypertension  Assessment & Plan  Continue PTA Norvasc, lisinopril   Blood pressure at goal               VTE Pharmacologic Prophylaxis: VTE Score: 3 Moderate Risk (Score 3-4) - Pharmacological DVT Prophylaxis Ordered: heparin.    Mobility:   Basic Mobility Inpatient Raw Score: 22  JH-HLM Goal: 7: Walk 25 feet or more  JH-HLM Achieved: 7: Walk 25 feet or more  HLM Goal achieved. Continue to encourage appropriate mobility.    Patient Centered Rounds: I performed bedside rounds with nursing staff today.   Discussions with Specialists or Other Care Team Provider: Reviewed notes by neurology and neurosurgery    Education and Discussions with Family / Patient: Updated  (wife) at bedside.    Total Time Spent on Date of Encounter in care of patient: 45 mins. This time was spent on one or more of the following: performing physical exam; counseling and coordination of care; obtaining or reviewing history; documenting in the medical record; reviewing/ordering tests, medications or procedures; communicating with other healthcare professionals and discussing with patient's " family/caregivers.    Current Length of Stay: 6 day(s)  Current Patient Status: Inpatient   Certification Statement: The patient will continue to require additional inpatient hospital stay due to patient will need brain biopsy  Discharge Plan:  Pending clinical improvement    Code Status: Level 1 - Full Code    Subjective:   Is a very pleasant 73-year-old gentleman who was seen evaluated today at bedside.  Patient was eager to find out the results of his angiogram.  Patient has no acute concerns at this time.  I spoke to patient about the results of his skin biopsy.    Objective:     Vitals:   Temp (24hrs), Av.7 °F (37.6 °C), Min:97.9 °F (36.6 °C), Max:101.3 °F (38.5 °C)    Temp:  [97.9 °F (36.6 °C)-101.3 °F (38.5 °C)] 99.2 °F (37.3 °C)  HR:  [] 87  Resp:  [16-18] 16  BP: (108-158)/(55-88) 138/64  SpO2:  [89 %-98 %] 94 %  Body mass index is 29.7 kg/m².     Input and Output Summary (last 24 hours):     Intake/Output Summary (Last 24 hours) at 2024 1624  Last data filed at 2024 0700  Gross per 24 hour   Intake 400 ml   Output 475 ml   Net -75 ml       Physical Exam:   Physical Exam  Vitals reviewed.   HENT:      Head: Normocephalic.      Nose: No congestion.      Mouth/Throat:      Pharynx: No oropharyngeal exudate or posterior oropharyngeal erythema.   Eyes:      Conjunctiva/sclera: Conjunctivae normal.   Cardiovascular:      Rate and Rhythm: Normal rate and regular rhythm.   Pulmonary:      Effort: Pulmonary effort is normal.   Abdominal:      General: Abdomen is flat.      Palpations: Abdomen is soft.   Skin:     General: Skin is warm and dry.      Findings: Rash present.   Neurological:      Mental Status: He is alert and oriented to person, place, and time. Mental status is at baseline.          Additional Data:     Labs:  Results from last 7 days   Lab Units 24  0551 02/15/24  0533 24  0759 24  0647 02/10/24  0355   WBC Thousand/uL 13.31*   < > 10.46*   < > 11.29*    HEMOGLOBIN g/dL 12.8   < > 13.9   < > 15.2   HEMATOCRIT % 38.1   < > 42.9   < > 46.9   PLATELETS Thousands/uL 320   < > 292   < > 334   BANDS PCT %  --   --  6  --   --    NEUTROS PCT %  --   --   --   --  85*   LYMPHS PCT %  --   --   --   --  7*   LYMPHO PCT %  --   --  4*  --   --    MONOS PCT %  --   --   --   --  5   MONO PCT %  --   --  4  --   --    EOS PCT %  --   --  13*  --  3    < > = values in this interval not displayed.     Results from last 7 days   Lab Units 02/16/24  0551   SODIUM mmol/L 138   POTASSIUM mmol/L 3.5   CHLORIDE mmol/L 102   CO2 mmol/L 27   BUN mg/dL 7   CREATININE mg/dL 1.14   ANION GAP mmol/L 9   CALCIUM mg/dL 8.8   ALBUMIN g/dL 3.2*   TOTAL BILIRUBIN mg/dL 0.76   ALK PHOS U/L 108*   ALT U/L 53*   AST U/L 35   GLUCOSE RANDOM mg/dL 143*     Results from last 7 days   Lab Units 02/15/24  0533   INR  1.00     Results from last 7 days   Lab Units 02/16/24  1613 02/16/24  1056 02/16/24  0717 02/15/24  2058 02/15/24  1548 02/15/24  1111 02/15/24  0650 02/14/24  2133 02/14/24  1622 02/14/24  1028 02/14/24  0634 02/13/24  2054   POC GLUCOSE mg/dl 169* 248* 139 199* 134 149* 148* 175* 172* 203* 139 214*         Results from last 7 days   Lab Units 02/10/24  2316   PROCALCITONIN ng/ml 0.30*       Lines/Drains:  Invasive Devices       Peripheral Intravenous Line  Duration             Peripheral IV 02/14/24 Right;Upper;Ventral (anterior) Arm 1 day                          Imaging: Reviewed radiology reports from this admission including: Diagnostic angiogram    Recent Cultures (last 7 days):   Results from last 7 days   Lab Units 02/12/24  1157 02/10/24  0627 02/10/24  0333   BLOOD CULTURE   --  No Growth After 5 Days. No Growth After 5 Days.   GRAM STAIN RESULT  No Polys or Bacteria seen  1+ Mononuclear Cells  --   --        Last 24 Hours Medication List:   Current Facility-Administered Medications   Medication Dose Route Frequency Provider Last Rate    acetaminophen  650 mg Oral Q6H PRN  Andrea Ibarra      amLODIPine  5 mg Oral Daily Debra Adler PA-C      diphenhydrAMINE  50 mg Oral Daily Andrea Ibarra      diphenhydrAMINE-zinc acetate   Topical TID PRN Inez Mijares PA-C      heparin (porcine)  5,000 Units Subcutaneous Q8H [facility] Tacos Jean MD      hydrocortisone   Topical 4x Daily PRN Inez Mijares PA-C      insulin lispro  1-5 Units Subcutaneous 4x Daily (AC & HS) Inez Mijares PA-C      levETIRAcetam  1,000 mg Intravenous Q12H [facility] Giovanny Arzola, DO      lisinopril  10 mg Oral Daily Tacos Jean MD      LORazepam  1 mg Intravenous Q6H PRN Fabiana Cutler, DO      ondansetron  4 mg Intravenous Q6H PRN Giovanny Arzola, DO      pantoprazole  40 mg Oral Early Morning Giovanny Arzola, DO      sodium chloride  75 mL/hr Intravenous Continuous Jose Daniel Hi MD 75 mL/hr (02/15/24 1830)        Today, Patient Was Seen By: Tacos Jean MD    **Please Note: This note may have been constructed using a voice recognition system.**

## 2024-02-16 NOTE — RESULT ENCOUNTER NOTE
DERMATOPATHOLOGY RESULT NOTE    Results reviewed by ordering physician.  Called patient to personally discuss results. No answer, left voicemail with result. Patient will need clinical follow up once discharged from inpatient setting. This will be set up through on call resident and ambassador .       Instructions for Clinical Derm Team:   (remember to route Result Note to appropriate staff):    Patient will need clinical follow up once discharged from inpatient setting. This will be set up through on call resident and ambassador     Result & Plan by Specimen:    Specimen A: benign  Plan: reassured, benign and prescription for topical steroids    Tissue Exam: [id number]  Order: [id number]  Status: Final result      Visible to patient: Yes (not seen)    0 Result Notes     Component   Case Report  Surgical Pathology Report                         Case: [id number]                                  Authorizing Provider:  Kourtney Chavez MD            Collected:           02/10/2024 1542              Ordering Location:     [facility]      Received:            02/10/2024 [address] [place] [address] 7                                                              Pathologist:           Ynes Dumont MD                                                          Specimens:   A) - Skin, Other, A: right inner thigh                                                             B) - Skin, Other, B; right inner thigh                                                  Final Diagnosis  A. 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 Mucin deposition is increased in the superficial dermis (best seen on colloidal iron stain), which is a nonspecific finding,1 though a concurrent localized variant of lichen myxedematosus cannot be fully excluded (though is not favored); findings of scleromyxedema are not seen. Clinical pathologic correlation is advised. Pathogenic microorganisms are not seen with PAS stain. Amyloid deposition is not seen with Congo red stain. Multiple levels examined. If the rash were to progress/persist despite therapy, additional sampling should be sought to exclude development of a more significant disease.   References:  1. Joseph FORTUNE, George ALMONTE. J Cutan Pathol. 2015 Oct;42(10):722-9. PMID: [id number].        B. Skin, right inner thigh, immunofluorescence:    IgG: negative  IgM: negative  IgA: negative  C3: negative  Fibrinogen: negative      Impression: The direct immunofluorescence findings are negative/non-diagnostic.  Electronically signed by Ynes Dumont MD on 2/15/2024 at 10:39 AM

## 2024-02-17 LAB
ALBUMIN SERPL BCP-MCNC: 3.7 G/DL (ref 3.5–5)
ALP SERPL-CCNC: 105 U/L (ref 34–104)
ALT SERPL W P-5'-P-CCNC: 44 U/L (ref 7–52)
ANION GAP SERPL CALCULATED.3IONS-SCNC: 9 MMOL/L
AST SERPL W P-5'-P-CCNC: 25 U/L (ref 13–39)
BASOPHILS # BLD MANUAL: 0 THOUSAND/UL (ref 0–0.1)
BASOPHILS NFR MAR MANUAL: 0 % (ref 0–1)
BILIRUB SERPL-MCNC: 0.6 MG/DL (ref 0.2–1)
BUN SERPL-MCNC: 8 MG/DL (ref 5–25)
CALCIUM SERPL-MCNC: 9 MG/DL (ref 8.4–10.2)
CHLORIDE SERPL-SCNC: 100 MMOL/L (ref 96–108)
CO2 SERPL-SCNC: 28 MMOL/L (ref 21–32)
CREAT SERPL-MCNC: 1.07 MG/DL (ref 0.6–1.3)
EOSINOPHIL # BLD MANUAL: 1.26 THOUSAND/UL (ref 0–0.4)
EOSINOPHIL NFR BLD MANUAL: 11 % (ref 0–6)
ERYTHROCYTE [DISTWIDTH] IN BLOOD BY AUTOMATED COUNT: 13.8 % (ref 11.6–15.1)
GFR SERPL CREATININE-BSD FRML MDRD: 69 ML/MIN/1.73SQ M
GLUCOSE SERPL-MCNC: 141 MG/DL (ref 65–140)
GLUCOSE SERPL-MCNC: 188 MG/DL (ref 65–140)
GLUCOSE SERPL-MCNC: 219 MG/DL (ref 65–140)
GLUCOSE SERPL-MCNC: 283 MG/DL (ref 65–140)
GLUCOSE SERPL-MCNC: 293 MG/DL (ref 65–140)
HCT VFR BLD AUTO: 41.8 % (ref 36.5–49.3)
HGB BLD-MCNC: 13.5 G/DL (ref 12–17)
LYMPHOCYTES # BLD AUTO: 19 % (ref 14–44)
LYMPHOCYTES # BLD AUTO: 2.87 THOUSAND/UL (ref 0.6–4.47)
MCH RBC QN AUTO: 28.2 PG (ref 26.8–34.3)
MCHC RBC AUTO-ENTMCNC: 32.3 G/DL (ref 31.4–37.4)
MCV RBC AUTO: 87 FL (ref 82–98)
METAMYELOCYTES NFR BLD MANUAL: 4 % (ref 0–1)
MONOCYTES # BLD AUTO: 0.11 THOUSAND/UL (ref 0–1.22)
MONOCYTES NFR BLD: 1 % (ref 4–12)
MYELOCYTES NFR BLD MANUAL: 1 % (ref 0–1)
NEUTROPHILS # BLD MANUAL: 6.66 THOUSAND/UL (ref 1.85–7.62)
NEUTS BAND NFR BLD MANUAL: 1 % (ref 0–8)
NEUTS SEG NFR BLD AUTO: 57 % (ref 43–75)
PLATELET # BLD AUTO: 342 THOUSANDS/UL (ref 149–390)
PLATELET BLD QL SMEAR: ADEQUATE
PMV BLD AUTO: 9.8 FL (ref 8.9–12.7)
POLYCHROMASIA BLD QL SMEAR: PRESENT
POTASSIUM SERPL-SCNC: 3.4 MMOL/L (ref 3.5–5.3)
PROT SERPL-MCNC: 6.6 G/DL (ref 6.4–8.4)
RBC # BLD AUTO: 4.79 MILLION/UL (ref 3.88–5.62)
RBC MORPH BLD: PRESENT
SODIUM SERPL-SCNC: 137 MMOL/L (ref 135–147)
VARIANT LYMPHS # BLD AUTO: 6 %
WBC # BLD AUTO: 11.48 THOUSAND/UL (ref 4.31–10.16)

## 2024-02-17 PROCEDURE — 85027 COMPLETE CBC AUTOMATED: CPT | Performed by: FAMILY MEDICINE

## 2024-02-17 PROCEDURE — 85007 BL SMEAR W/DIFF WBC COUNT: CPT | Performed by: FAMILY MEDICINE

## 2024-02-17 PROCEDURE — 80053 COMPREHEN METABOLIC PANEL: CPT | Performed by: FAMILY MEDICINE

## 2024-02-17 PROCEDURE — 82948 REAGENT STRIP/BLOOD GLUCOSE: CPT

## 2024-02-17 PROCEDURE — 99232 SBSQ HOSP IP/OBS MODERATE 35: CPT | Performed by: FAMILY MEDICINE

## 2024-02-17 RX ORDER — LEVETIRACETAM 500 MG/1
1000 TABLET ORAL EVERY 12 HOURS SCHEDULED
Status: DISCONTINUED | OUTPATIENT
Start: 2024-02-17 | End: 2024-02-22 | Stop reason: HOSPADM

## 2024-02-17 RX ADMIN — DIPHENHYDRAMINE HCL 50 MG: 25 TABLET ORAL at 07:59

## 2024-02-17 RX ADMIN — HEPARIN SODIUM 5000 UNITS: 5000 INJECTION INTRAVENOUS; SUBCUTANEOUS at 13:25

## 2024-02-17 RX ADMIN — LEVETIRACETAM 1000 MG: 500 TABLET, FILM COATED ORAL at 22:23

## 2024-02-17 RX ADMIN — INSULIN LISPRO 3 UNITS: 100 INJECTION, SOLUTION INTRAVENOUS; SUBCUTANEOUS at 22:23

## 2024-02-17 RX ADMIN — INSULIN LISPRO 3 UNITS: 100 INJECTION, SOLUTION INTRAVENOUS; SUBCUTANEOUS at 11:50

## 2024-02-17 RX ADMIN — LISINOPRIL 10 MG: 10 TABLET ORAL at 07:59

## 2024-02-17 RX ADMIN — INSULIN LISPRO 2 UNITS: 100 INJECTION, SOLUTION INTRAVENOUS; SUBCUTANEOUS at 16:41

## 2024-02-17 RX ADMIN — AMLODIPINE BESYLATE 5 MG: 5 TABLET ORAL at 07:59

## 2024-02-17 RX ADMIN — SODIUM CHLORIDE 1000 MG: 0.9 INJECTION, SOLUTION INTRAVENOUS at 18:12

## 2024-02-17 RX ADMIN — PANTOPRAZOLE SODIUM 40 MG: 40 TABLET, DELAYED RELEASE ORAL at 05:06

## 2024-02-17 RX ADMIN — HEPARIN SODIUM 5000 UNITS: 5000 INJECTION INTRAVENOUS; SUBCUTANEOUS at 22:23

## 2024-02-17 RX ADMIN — HEPARIN SODIUM 5000 UNITS: 5000 INJECTION INTRAVENOUS; SUBCUTANEOUS at 05:06

## 2024-02-17 RX ADMIN — LEVETIRACETAM 1000 MG: 100 INJECTION, SOLUTION INTRAVENOUS at 07:59

## 2024-02-17 NOTE — PLAN OF CARE
Problem: SAFETY ADULT  Goal: Patient will remain free of falls  Description: INTERVENTIONS:  - Educate patient/family on patient safety including physical limitations  - Instruct patient to call for assistance with activity   - Consult OT/PT to assist with strengthening/mobility   - Keep Call bell within reach  - Keep bed low and locked with side rails adjusted as appropriate  - Keep care items and personal belongings within reach  - Initiate and maintain comfort rounds  - Make Fall Risk Sign visible to staff  - Offer Toileting every 2 Hours, in advance of need  - Initiate/Maintain bed alarm  - Obtain necessary fall risk management equipment: non skid footwear  - Apply yellow socks and bracelet for high fall risk patients  - Consider moving patient to room near nurses station  Outcome: Progressing     Problem: NEUROSENSORY - ADULT  Goal: Remains free of injury related to seizures activity  Description: INTERVENTIONS  - Maintain airway, patient safety  and administer oxygen as ordered  - Monitor patient for seizure activity, document and report duration and description of seizure to physician/advanced practitioner  - If seizure occurs,  ensure patient safety during seizure  - Reorient patient post seizure  - Seizure pads on all 4 side rails  - Instruct patient/family to notify RN of any seizure activity including if an aura is experienced  - Instruct patient/family to call for assistance with activity based on nursing assessment  - Administer anti-seizure medications if ordered    Outcome: Progressing

## 2024-02-17 NOTE — PROGRESS NOTES
"[facility]  Progress Note  Name: Delphine Nelson I  MRN: [id number]  Unit/Bed#: [facility]PHP 732-01 I Date of Admission: 2/10/2024   Date of Service: 2/17/2024 I Hospital Day: 7    Assessment/Plan   * New onset seizure (HCC)  Assessment & Plan  Was in [facility] on early 2/8 AM and had a tonic-clonic seizure.  Patient without any known history of seizures  Presented to the ER at [place] and initially was combative; had initial CT head with noted hypodensity within the left frontal and right parietal occipital lobes.  ER staff there reached out to specialist on-call and patient was recommended to be transferred to [place] for neurology as well as neurosurgery and further workup.  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 Flores with Neurology was consulted and reviewed MRI brain; although noted cerebral amyloid angiopathy on MRI, patient's lack of significant burden of amyloid angiopathy and the rest of the brain parenchyma as well as the lack of cognitive atypical and does not meet diagnostic criteria.  While cerebral amyloid angiopathy is possibility still, patient with wide range of other possibilities that need to be excluded including press, CNS vasculitis and certain CNS infections.  A repeat stat CTA head was ordered and this CTA head and neck with focal areas of vasogenic edema better characterized on the recent brain MRI  In addition to imaging, neurology recommended Keppra 1 g twice daily as well as an LP for CSF analysis and potential EEG.   Patient returned back to baseline mentation after his seizure and has not had repeat since  Transferred to [place] on the late hours of 2/9  Consult neurology .  Seizure precautions.  Holding any antiplatelet or anticoagulation agents.  " Continue IV Keppra.  F/U CT chest/abdomen/pelvis - Unremarkable   F/U LP  Echo with preserved LVEF  Order blood cultures, ESR/CRP as well as further rheumatologic studies  Appreciate neurology and neurosurgery recommendations  Given MRI findings neurosurgery consulted for diagnostic angiogram 2/15  Left MCA irregularity with areas of narrowing. Findings are nonspecific and may reflect either an atherosclerotic or inflammatory vasculopathy.   Patient will need brain biopsy by neurosurgery    Cerebral amyloid angiopathy   Assessment & Plan  Noted on MRI  Neurosurgery performed diagnostic angio on February 15  Patient will need brain biopsy to rule out inflammatory process    KRISTIN (acute kidney injury) (HCC)  Assessment & Plan  Back to baseline  There are not many renal function measurements in the outpatient setting however we may surmise that baseline renal function is around 1.0-1.4  Resume lisinopril 10 mg daily      Fever  Assessment & Plan  Patient noted to have fevers throughout stay  No obvious source of infection, suspect this could be due to underlying inflammatory process  Continue supportive measures and would hold off of antibiotics at this time  Continue to monitor microbial cultures  F/U LP  ID following  Not felt to be infectious at this time most likely inflammatory    Rash  Assessment & Plan  Pruritic, nonpainful, erythematous blanching rash on the extremities as well as trunk  Reports he has had this rash that comes and goes intermittently over the last 7 years   Scheduled Tylenol and Benadryl continued, this is what patient takes at home to treat this rash  Derm evaluated patient and punch biopsy pending.  Appreciate their expertise  Rash appears more convalescent and erythematous today, family states that this is normal course for the disease  Follow-up punch biopsy results-reviewed results with dermatology February 15, 2024  Urticarial reaction vs hypersensitivity reaction vs eczema  Little  "increased mucin, but pathologist strongly disfavored a mucin deposition disease    Respiratory disease  Assessment & Plan  Reported history of \"asthma\" although not on any PTA inhalers  Portable chest x-ray in the ER at [place] with low lung volumes with vascular crowding as well as mild opacity at the medial left base, likely atelectasis, but pneumonia/aspiration not excluded in the appropriate clinical setting.  No significant pathology noted on CT chest abdomen pelvis    Type 2 diabetes mellitus with stage 3a chronic kidney disease, without long-term current use of insulin ([facility])  Assessment & Plan  Lab Results   Component Value Date    HGBA1C 7.7 (H) 09/07/2023       Recent Labs     02/16/24  1056 02/16/24  1613 02/16/24  2105 02/17/24  0602   POCGLU 248* 169* 232* 141*         Blood Sugar Average: Last 72 hrs:  (P) 172.4749340528527210  Diabetic diet as well as sliding scale insulin with meals    Hypercholesterolemia  Assessment & Plan  Holding atorvastatin    Essential hypertension  Assessment & Plan  Continue PTA Norvasc, lisinopril   Blood pressure at goal               VTE Pharmacologic Prophylaxis: VTE Score: 3 Moderate Risk (Score 3-4) - Pharmacological DVT Prophylaxis Ordered: heparin.    Mobility:   Basic Mobility Inpatient Raw Score: 23  JH-HLM Goal: 7: Walk 25 feet or more  JH-HLM Achieved: 3: Sit at edge of bed  HLM Goal NOT achieved. Continue with multidisciplinary rounding and encourage appropriate mobility to improve upon HLM goals.    Patient Centered Rounds: I performed bedside rounds with nursing staff today.   Discussions with Specialists or Other Care Team Provider: Reviewed notes from neurology as well as neurosurgery    Education and Discussions with Family / Patient: Updated  (wife) at bedside.    Total Time Spent on Date of Encounter in care of patient: 45 mins. This time was spent on one or more of the following: performing physical exam; counseling and coordination of care; " obtaining or reviewing history; documenting in the medical record; reviewing/ordering tests, medications or procedures; communicating with other healthcare professionals and discussing with patient's family/caregivers.    Current Length of Stay: 7 day(s)  Current Patient Status: Inpatient   Certification Statement: The patient will continue to require additional inpatient hospital stay due to continued evaluation and treatment by neurology and neurosurgery for the above  Discharge Plan:  Pending clinical improvement    Code Status: Level 1 - Full Code    Subjective:   This is a pleasant 73-year-old gentleman who was seen evaluated at bedside.  Patient no acute complaints at this time.    Objective:     Vitals:   Temp (24hrs), Av.9 °F (37.2 °C), Min:98.7 °F (37.1 °C), Max:99.2 °F (37.3 °C)    Temp:  [98.7 °F (37.1 °C)-99.2 °F (37.3 °C)] 98.7 °F (37.1 °C)  HR:  [77-90] 77  Resp:  [15-16] 15  BP: (138-148)/(64-76) 142/73  SpO2:  [94 %-98 %] 98 %  Body mass index is 30.59 kg/m².     Input and Output Summary (last 24 hours):   No intake or output data in the 24 hours ending 24 1450    Physical Exam:   Physical Exam  Vitals reviewed.   HENT:      Head: Normocephalic.      Nose: No congestion.      Mouth/Throat:      Pharynx: No oropharyngeal exudate or posterior oropharyngeal erythema.   Eyes:      Conjunctiva/sclera: Conjunctivae normal.   Cardiovascular:      Rate and Rhythm: Normal rate and regular rhythm.   Pulmonary:      Effort: Pulmonary effort is normal.   Abdominal:      General: Abdomen is flat.      Palpations: Abdomen is soft.   Skin:     General: Skin is warm and dry.      Findings: Rash present.   Neurological:      Mental Status: He is alert and oriented to person, place, and time. Mental status is at baseline.          Additional Data:     Labs:  Results from last 7 days   Lab Units 24  0900   WBC Thousand/uL 11.48*   HEMOGLOBIN g/dL 13.5   HEMATOCRIT % 41.8   PLATELETS Thousands/uL 342    BANDS PCT % 1   LYMPHO PCT % 19   MONO PCT % 1*   EOS PCT % 11*     Results from last 7 days   Lab Units 02/17/24  0900   SODIUM mmol/L 137   POTASSIUM mmol/L 3.4*   CHLORIDE mmol/L 100   CO2 mmol/L 28   BUN mg/dL 8   CREATININE mg/dL 1.07   ANION GAP mmol/L 9   CALCIUM mg/dL 9.0   ALBUMIN g/dL 3.7   TOTAL BILIRUBIN mg/dL 0.60   ALK PHOS U/L 105*   ALT U/L 44   AST U/L 25   GLUCOSE RANDOM mg/dL 188*     Results from last 7 days   Lab Units 02/15/24  0533   INR  1.00     Results from last 7 days   Lab Units 02/17/24  1105 02/17/24  0602 02/16/24  2105 02/16/24  1613 02/16/24  1056 02/16/24  0717 02/15/24  2058 02/15/24  1548 02/15/24  1111 02/15/24  0650 02/14/24  2133 02/14/24  1622   POC GLUCOSE mg/dl 283* 141* 232* 169* 248* 139 199* 134 149* 148* 175* 172*         Results from last 7 days   Lab Units 02/10/24  2316   PROCALCITONIN ng/ml 0.30*       Lines/Drains:  Invasive Devices       Peripheral Intravenous Line  Duration             Peripheral IV 02/14/24 Right;Upper;Ventral (anterior) Arm 2 days                          Imaging: Reviewed radiology reports from this admission including: procedure reports    Recent Cultures (last 7 days):   Results from last 7 days   Lab Units 02/12/24  1157   GRAM STAIN RESULT  No Polys or Bacteria seen  1+ Mononuclear Cells       Last 24 Hours Medication List:   Current Facility-Administered Medications   Medication Dose Route Frequency Provider Last Rate    acetaminophen  650 mg Oral Q6H PRN Andrea Ibarra      amLODIPine  5 mg Oral Daily Debra Adler PA-C      diphenhydrAMINE  50 mg Oral Daily Andrea Ibarra      diphenhydrAMINE-zinc acetate   Topical TID PRN Inez Mijares PA-C      heparin (porcine)  5,000 Units Subcutaneous Q8H [facility] Tacos Jean MD      hydrocortisone   Topical 4x Daily PRN Inez Mijares PA-C      insulin lispro  1-5 Units Subcutaneous 4x Daily (AC & HS) Inez Mijares PA-C      levETIRAcetam  1,000 mg Intravenous Q12H ARIANNA  Giovanny Arzola, DO      lisinopril  10 mg Oral Daily Tacos Jean MD      LORazepam  1 mg Intravenous Q6H PRN Fabiana Cutler, DO      ondansetron  4 mg Intravenous Q6H PRN Giovanny Arzola, DO      pantoprazole  40 mg Oral Early Morning Giovanny Arzola, DO      sodium chloride  75 mL/hr Intravenous Continuous Jose Daniel Hi MD Stopped (02/16/24 5048)        Today, Patient Was Seen By: Tacos Jean MD    **Please Note: This note may have been constructed using a voice recognition system.**

## 2024-02-17 NOTE — PLAN OF CARE
Problem: SAFETY ADULT  Goal: Patient will remain free of falls  Description: INTERVENTIONS:  - Educate patient/family on patient safety including physical limitations  - Instruct patient to call for assistance with activity   - Consult OT/PT to assist with strengthening/mobility   - Keep Call bell within reach  - Keep bed low and locked with side rails adjusted as appropriate  - Keep care items and personal belongings within reach  - Initiate and maintain comfort rounds  - Make Fall Risk Sign visible to staff  - Offer Toileting every 2 Hours, in advance of need  - Initiate/Maintain bed alarm  - Obtain necessary fall risk management equipment: non skid footwear  - Apply yellow socks and bracelet for high fall risk patients  - Consider moving patient to room near nurses station  Outcome: Progressing     Problem: DISCHARGE PLANNING  Goal: Discharge to home or other facility with appropriate resources  Description: INTERVENTIONS:  - Identify barriers to discharge w/patient and caregiver  - Arrange for needed discharge resources and transportation as appropriate  - Identify discharge learning needs (meds, wound care, etc.)  - Arrange for interpretive services to assist at discharge as needed  - Refer to Case Management Department for coordinating discharge planning if the patient needs post-hospital services based on physician/advanced practitioner order or complex needs related to functional status, cognitive ability, or social support system  Outcome: Progressing     Problem: Knowledge Deficit  Goal: Patient/family/caregiver demonstrates understanding of disease process, treatment plan, medications, and discharge instructions  Description: Complete learning assessment and assess knowledge base.  Interventions:  - Provide teaching at level of understanding  - Provide teaching via preferred learning methods  Outcome: Progressing

## 2024-02-17 NOTE — ASSESSMENT & PLAN NOTE
Lab Results   Component Value Date    HGBA1C 7.7 (H) 09/07/2023       Recent Labs     02/16/24  1056 02/16/24  1613 02/16/24  2105 02/17/24  0602   POCGLU 248* 169* 232* 141*         Blood Sugar Average: Last 72 hrs:  (P) 172.6118692471195404  Diabetic diet as well as sliding scale insulin with meals

## 2024-02-17 NOTE — ASSESSMENT & PLAN NOTE
Patient noted to have fevers throughout stay  No obvious source of infection, suspect this could be due to underlying inflammatory process  Continue supportive measures and would hold off of antibiotics at this time  Continue to monitor microbial cultures  F/U LP  ID following  Not felt to be infectious at this time most likely inflammatory

## 2024-02-17 NOTE — ASSESSMENT & PLAN NOTE
"Was in [facility] on early 2/8 AM and had a tonic-clonic seizure.  Patient without any known history of seizures  Presented to the ER at [place] and initially was combative; had initial CT head with noted hypodensity within the left frontal and right parietal occipital lobes.  ER staff there reached out to specialist on-call and patient was recommended to be transferred to [place] for neurology as well as neurosurgery and further workup.  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 Flores with Neurology was consulted and reviewed MRI brain; although noted cerebral amyloid angiopathy on MRI, patient's lack of significant burden of amyloid angiopathy and the rest of the brain parenchyma as well as the lack of cognitive atypical and does not meet diagnostic criteria.  While cerebral amyloid angiopathy is possibility still, patient with wide range of other possibilities that need to be excluded including press, CNS vasculitis and certain CNS infections.  A repeat stat CTA head was ordered and this CTA head and neck with focal areas of vasogenic edema better characterized on the recent brain MRI  In addition to imaging, neurology recommended Keppra 1 g twice daily as well as an LP for CSF analysis and potential EEG.   Patient returned back to baseline mentation after his seizure and has not had repeat since  Transferred to [place] on the late hours of 2/9  Consult neurology .  Seizure precautions.  Holding any antiplatelet or anticoagulation agents.  Continue IV Keppra.  F/U CT chest/abdomen/pelvis - Unremarkable   F/U LP  Echo with preserved LVEF  Order blood cultures, ESR/CRP as well as further rheumatologic studies  Appreciate neurology and neurosurgery recommendations  Given MRI findings neurosurgery consulted for " diagnostic angiogram 2/15  Left MCA irregularity with areas of narrowing. Findings are nonspecific and may reflect either an atherosclerotic or inflammatory vasculopathy.   Patient will need brain biopsy by neurosurgery

## 2024-02-17 NOTE — ASSESSMENT & PLAN NOTE
Noted on MRI  Neurosurgery performed diagnostic angio on February 15  Patient will need brain biopsy to rule out inflammatory process

## 2024-02-17 NOTE — ASSESSMENT & PLAN NOTE
"Reported history of \"asthma\" although not on any PTA inhalers  Portable chest x-ray in the ER at [place] with low lung volumes with vascular crowding as well as mild opacity at the medial left base, likely atelectasis, but pneumonia/aspiration not excluded in the appropriate clinical setting.  No significant pathology noted on CT chest abdomen pelvis  "

## 2024-02-18 LAB
ALBUMIN SERPL BCP-MCNC: 3.7 G/DL (ref 3.5–5)
ALP SERPL-CCNC: 96 U/L (ref 34–104)
ALT SERPL W P-5'-P-CCNC: 42 U/L (ref 7–52)
ANION GAP SERPL CALCULATED.3IONS-SCNC: 10 MMOL/L
AST SERPL W P-5'-P-CCNC: 23 U/L (ref 13–39)
BILIRUB SERPL-MCNC: 0.57 MG/DL (ref 0.2–1)
BUN SERPL-MCNC: 10 MG/DL (ref 5–25)
CALCIUM SERPL-MCNC: 8.9 MG/DL (ref 8.4–10.2)
CHLORIDE SERPL-SCNC: 97 MMOL/L (ref 96–108)
CO2 SERPL-SCNC: 28 MMOL/L (ref 21–32)
CREAT SERPL-MCNC: 1.08 MG/DL (ref 0.6–1.3)
ERYTHROCYTE [DISTWIDTH] IN BLOOD BY AUTOMATED COUNT: 13.5 % (ref 11.6–15.1)
GFR SERPL CREATININE-BSD FRML MDRD: 68 ML/MIN/1.73SQ M
GLUCOSE SERPL-MCNC: 237 MG/DL (ref 65–140)
GLUCOSE SERPL-MCNC: 299 MG/DL (ref 65–140)
GLUCOSE SERPL-MCNC: 348 MG/DL (ref 65–140)
GLUCOSE SERPL-MCNC: 363 MG/DL (ref 65–140)
GLUCOSE SERPL-MCNC: 366 MG/DL (ref 65–140)
HCT VFR BLD AUTO: 40 % (ref 36.5–49.3)
HGB BLD-MCNC: 12.9 G/DL (ref 12–17)
MCH RBC QN AUTO: 27.9 PG (ref 26.8–34.3)
MCHC RBC AUTO-ENTMCNC: 32.3 G/DL (ref 31.4–37.4)
MCV RBC AUTO: 87 FL (ref 82–98)
PLATELET # BLD AUTO: 388 THOUSANDS/UL (ref 149–390)
PMV BLD AUTO: 9.4 FL (ref 8.9–12.7)
POTASSIUM SERPL-SCNC: 4.2 MMOL/L (ref 3.5–5.3)
PROT SERPL-MCNC: 6.6 G/DL (ref 6.4–8.4)
RBC # BLD AUTO: 4.62 MILLION/UL (ref 3.88–5.62)
SODIUM SERPL-SCNC: 135 MMOL/L (ref 135–147)
WBC # BLD AUTO: 10.14 THOUSAND/UL (ref 4.31–10.16)

## 2024-02-18 PROCEDURE — 85027 COMPLETE CBC AUTOMATED: CPT | Performed by: FAMILY MEDICINE

## 2024-02-18 PROCEDURE — 82948 REAGENT STRIP/BLOOD GLUCOSE: CPT

## 2024-02-18 PROCEDURE — 80053 COMPREHEN METABOLIC PANEL: CPT | Performed by: FAMILY MEDICINE

## 2024-02-18 PROCEDURE — 99232 SBSQ HOSP IP/OBS MODERATE 35: CPT | Performed by: FAMILY MEDICINE

## 2024-02-18 RX ORDER — SULFAMETHOXAZOLE AND TRIMETHOPRIM 800; 160 MG/1; MG/1
1 TABLET ORAL 3 TIMES WEEKLY
Status: DISCONTINUED | OUTPATIENT
Start: 2024-02-19 | End: 2024-02-22 | Stop reason: HOSPADM

## 2024-02-18 RX ORDER — INSULIN GLARGINE 100 [IU]/ML
5 INJECTION, SOLUTION SUBCUTANEOUS
Status: DISCONTINUED | OUTPATIENT
Start: 2024-02-18 | End: 2024-02-19

## 2024-02-18 RX ORDER — INSULIN LISPRO 100 [IU]/ML
1-6 INJECTION, SOLUTION INTRAVENOUS; SUBCUTANEOUS
Status: DISCONTINUED | OUTPATIENT
Start: 2024-02-18 | End: 2024-02-20

## 2024-02-18 RX ADMIN — LEVETIRACETAM 1000 MG: 500 TABLET, FILM COATED ORAL at 21:50

## 2024-02-18 RX ADMIN — INSULIN LISPRO 3 UNITS: 100 INJECTION, SOLUTION INTRAVENOUS; SUBCUTANEOUS at 09:11

## 2024-02-18 RX ADMIN — SODIUM CHLORIDE 1000 MG: 0.9 INJECTION, SOLUTION INTRAVENOUS at 17:18

## 2024-02-18 RX ADMIN — HEPARIN SODIUM 5000 UNITS: 5000 INJECTION INTRAVENOUS; SUBCUTANEOUS at 21:50

## 2024-02-18 RX ADMIN — INSULIN LISPRO 6 UNITS: 100 INJECTION, SOLUTION INTRAVENOUS; SUBCUTANEOUS at 11:36

## 2024-02-18 RX ADMIN — PANTOPRAZOLE SODIUM 40 MG: 40 TABLET, DELAYED RELEASE ORAL at 06:36

## 2024-02-18 RX ADMIN — LEVETIRACETAM 1000 MG: 500 TABLET, FILM COATED ORAL at 09:11

## 2024-02-18 RX ADMIN — INSULIN LISPRO 3 UNITS: 100 INJECTION, SOLUTION INTRAVENOUS; SUBCUTANEOUS at 17:16

## 2024-02-18 RX ADMIN — LISINOPRIL 10 MG: 10 TABLET ORAL at 09:11

## 2024-02-18 RX ADMIN — HEPARIN SODIUM 5000 UNITS: 5000 INJECTION INTRAVENOUS; SUBCUTANEOUS at 06:37

## 2024-02-18 RX ADMIN — DIPHENHYDRAMINE HCL 50 MG: 25 TABLET ORAL at 09:11

## 2024-02-18 RX ADMIN — AMLODIPINE BESYLATE 5 MG: 5 TABLET ORAL at 09:11

## 2024-02-18 RX ADMIN — HEPARIN SODIUM 5000 UNITS: 5000 INJECTION INTRAVENOUS; SUBCUTANEOUS at 14:36

## 2024-02-18 RX ADMIN — INSULIN GLARGINE 5 UNITS: 100 INJECTION, SOLUTION SUBCUTANEOUS at 21:50

## 2024-02-18 NOTE — ASSESSMENT & PLAN NOTE
Noted on MRI  Neurosurgery performed diagnostic angio on February 15  Patient will begin systemic steroid treatment

## 2024-02-18 NOTE — ASSESSMENT & PLAN NOTE
Patient noted to have fevers throughout stay  No obvious source of infection, suspect this could be due to underlying inflammatory process  Continue supportive measures and would hold off of antibiotics at this time  Continue to monitor microbial cultures  F/U LP  ID following  Not felt to be infectious at this time most likely inflammatory   Follow up with GI physician as outpatient

## 2024-02-18 NOTE — ASSESSMENT & PLAN NOTE
Lab Results   Component Value Date    HGBA1C 7.7 (H) 09/07/2023       Recent Labs     02/17/24  1105 02/17/24  1638 02/17/24 2057 02/18/24  0635   POCGLU 283* 219* 293* 299*         Blood Sugar Average: Last 72 hrs:  (P) 204.4376522031728084  Diabetic diet as well as sliding scale insulin with meals  Will start patient on 5 units of Lantus at night and titrate up while patient is receiving steroids

## 2024-02-18 NOTE — QUICK NOTE
BRIEF NEUROLOGY UPDATE NOTE    Had long discussion w/ patient and wife today regarding next steps in w/u. Patient opted to forego brain bx and instead pursue empiric immunotherapy w/ OTP clinical and radiographic monitoring. He was able to accurately explain the risks and benefits of both options. I think this is a reasonable way to proceed.    Plan:  - Start IVMP 1g daily x5ds (can try to arrange the last few doses at [facility] infusion center)  - After IVMP course start oral prednisone taper:   - 60mg daily x1mo   - Taper by 10mg per month   - Start PJP ppx w/ TMP-SMX 160mg/800mg three times weekly until prednisone dose lower than 20mg/d   - Continue PTA protonix 40mg daily for GI protection   - Closely monitor BP and BG; may need OTP adjustment to DM regimen  - MRI brain w/ and w/o contrast in 6wks  - OTP f/u w/ Dr. Witt of Neuro-immunology  - Continue LEV 1g BID  - At time of discharge prescribe clonazepam ODT 1mg for seizure rescue  - No driving for 6mos    Royer Velazco MD  Neurology  02/17/24

## 2024-02-18 NOTE — PROGRESS NOTES
"[facility]  Progress Note  Name: Delphine Nelson I  MRN: [id number]  Unit/Bed#: [facility] 732-01 I Date of Admission: 2/10/2024   Date of Service: 2/18/2024 I Hospital Day: 8    Assessment/Plan   * New onset seizure (HCC)  Assessment & Plan  Was in [facility] on early 2/8 AM and had a tonic-clonic seizure.  Patient without any known history of seizures  Presented to the ER at [facility] and initially was combative; had initial CT head with noted hypodensity within the left frontal and right parietal occipital lobes.  ER staff there reached out to specialist on-call and patient was recommended to be transferred to [place] for neurology as well as neurosurgery and further workup.  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 Flores with Neurology was consulted and reviewed MRI brain; although noted cerebral amyloid angiopathy on MRI, patient's lack of significant burden of amyloid angiopathy and the rest of the brain parenchyma as well as the lack of cognitive atypical and does not meet diagnostic criteria.  While cerebral amyloid angiopathy is possibility still, patient with wide range of other possibilities that need to be excluded including press, CNS vasculitis and certain CNS infections.  A repeat stat CTA head was ordered and this CTA head and neck with focal areas of vasogenic edema better characterized on the recent brain MRI  In addition to imaging, neurology recommended Keppra 1 g twice daily as well as an LP for CSF analysis and potential EEG.   Patient returned back to baseline mentation after his seizure and has not had repeat since  Transferred to [place] on the late hours of 2/9  Consult neurology .  Seizure precautions.  Holding any antiplatelet or anticoagulation agents.  " Continue IV Keppra.  F/U CT chest/abdomen/pelvis - Unremarkable   F/U LP  Echo with preserved LVEF  Order blood cultures, ESR/CRP as well as further rheumatologic studies  Appreciate neurology and neurosurgery recommendations  Given MRI findings neurosurgery consulted for diagnostic angiogram 2/15  Left MCA irregularity with areas of narrowing. Findings are nonspecific and may reflect either an atherosclerotic or inflammatory vasculopathy.   Patient opted not to get brain biopsy and instead start IV steroids  Spoke to neurology : Patient will get 1 g of IVMP for 5 days which started on February 17, 2024  After IVMP course start oral prednisone taper:  60 mg daily x1mo  Taper by 10mg per month  Start PJP ppx w/ TMP-SMX 160mg/800mg three times weekly until prednisone dose lower than 20mg/d  Continue PTA protonix 40mg daily for GI protection  Closely monitor BP and BG; may need OTP adjustment to DM regimen    Cerebral amyloid angiopathy   Assessment & Plan  Noted on MRI  Neurosurgery performed diagnostic angio on February 15  Patient will begin systemic steroid treatment    KRISTIN (acute kidney injury) (HCC)  Assessment & Plan  Back to baseline  There are not many renal function measurements in the outpatient setting however we may surmise that baseline renal function is around 1.0-1.4  Resume lisinopril 10 mg daily      Fever  Assessment & Plan  Patient noted to have fevers throughout stay  No obvious source of infection, suspect this could be due to underlying inflammatory process  Continue supportive measures and would hold off of antibiotics at this time  Continue to monitor microbial cultures  F/U LP  ID following  Not felt to be infectious at this time most likely inflammatory    Rash  Assessment & Plan  Pruritic, nonpainful, erythematous blanching rash on the extremities as well as trunk  Reports he has had this rash that comes and goes intermittently over the last 7 years   Scheduled Tylenol and Benadryl  continued, this is what patient takes at home to treat this rash  Derm evaluated patient and punch biopsy pending.  Appreciate their expertise  Rash appears more convalescent and erythematous today, family states that this is normal course for the disease  Follow-up punch biopsy results-reviewed results with dermatology February 15, 2024  Urticarial reaction vs hypersensitivity reaction vs eczema  Little increased mucin, but pathologist strongly disfavored a mucin deposition disease  Anticipating significant improvement with systemic steroids    Type 2 diabetes mellitus with stage 3a chronic kidney disease, without long-term current use of insulin ([facility])  Assessment & Plan  Lab Results   Component Value Date    HGBA1C 7.7 (H) 09/07/2023       Recent Labs     02/17/24  1105 02/17/24  1638 02/17/24  2057 02/18/24  0635   POCGLU 283* 219* 293* 299*         Blood Sugar Average: Last 72 hrs:  (P) 204.6024757995973097  Diabetic diet as well as sliding scale insulin with meals  Will start patient on 5 units of Lantus at night and titrate up while patient is receiving steroids    Hypercholesterolemia  Assessment & Plan  Holding atorvastatin    Essential hypertension  Assessment & Plan  Continue PTA Norvasc, lisinopril   Blood pressure at goal  Blood pressure anticipated to increase with steroids               VTE Pharmacologic Prophylaxis: VTE Score: 3 Moderate Risk (Score 3-4) - Pharmacological DVT Prophylaxis Ordered: heparin.    Mobility:   Basic Mobility Inpatient Raw Score: 23  JH-HLM Goal: 7: Walk 25 feet or more  JH-HLM Achieved: 3: Sit at edge of bed  HLM Goal NOT achieved. Continue with multidisciplinary rounding and encourage appropriate mobility to improve upon HLM goals.    Patient Centered Rounds: I performed bedside rounds with nursing staff today.   Discussions with Specialists or Other Care Team Provider: Neurology    Education and Discussions with Family / Patient: Updated  (wife) at  bedside.    Total Time Spent on Date of Encounter in care of patient: 40 mins. This time was spent on one or more of the following: performing physical exam; counseling and coordination of care; obtaining or reviewing history; documenting in the medical record; reviewing/ordering tests, medications or procedures; communicating with other healthcare professionals and discussing with patient's family/caregivers.    Current Length of Stay: 8 day(s)  Current Patient Status: Inpatient   Certification Statement: The patient will continue to require additional inpatient hospital stay due to IV steroids and close monitoring  Discharge Plan: Anticipate discharge in >72 hrs to pending clinical improvement    Code Status: Level 1 - Full Code    Subjective:   Is a very pleasant 73-year-old gentleman who was seen and evaluated today at bedside.  Patient is not in any acute distress.    Objective:     Vitals:   Temp (24hrs), Av.7 °F (36.5 °C), Min:97.7 °F (36.5 °C), Max:97.7 °F (36.5 °C)    Temp:  [97.7 °F (36.5 °C)] 97.7 °F (36.5 °C)  HR:  [82] 82  Resp:  [18] 18  BP: (137-142)/(71-72) 142/72  SpO2:  [95 %-96 %] 95 %  Body mass index is 30.59 kg/m².     Input and Output Summary (last 24 hours):   No intake or output data in the 24 hours ending 24 1610    Physical Exam:   Physical Exam  Vitals reviewed.   HENT:      Head: Normocephalic.      Nose: No congestion.      Mouth/Throat:      Pharynx: No oropharyngeal exudate or posterior oropharyngeal erythema.   Eyes:      Conjunctiva/sclera: Conjunctivae normal.   Cardiovascular:      Rate and Rhythm: Normal rate and regular rhythm.   Pulmonary:      Effort: Pulmonary effort is normal.   Abdominal:      General: Abdomen is flat.      Palpations: Abdomen is soft.   Skin:     General: Skin is warm and dry.      Findings: Rash present.   Neurological:      Mental Status: He is alert and oriented to person, place, and time. Mental status is at baseline.          Additional  Data:     Labs:  Results from last 7 days   Lab Units 02/18/24  0548 02/17/24  0900   WBC Thousand/uL 10.14 11.48*   HEMOGLOBIN g/dL 12.9 13.5   HEMATOCRIT % 40.0 41.8   PLATELETS Thousands/uL 388 342   BANDS PCT %  --  1   LYMPHO PCT %  --  19   MONO PCT %  --  1*   EOS PCT %  --  11*     Results from last 7 days   Lab Units 02/18/24  0548   SODIUM mmol/L 135   POTASSIUM mmol/L 4.2   CHLORIDE mmol/L 97   CO2 mmol/L 28   BUN mg/dL 10   CREATININE mg/dL 1.08   ANION GAP mmol/L 10   CALCIUM mg/dL 8.9   ALBUMIN g/dL 3.7   TOTAL BILIRUBIN mg/dL 0.57   ALK PHOS U/L 96   ALT U/L 42   AST U/L 23   GLUCOSE RANDOM mg/dL 348*     Results from last 7 days   Lab Units 02/15/24  0533   INR  1.00     Results from last 7 days   Lab Units 02/18/24  1110 02/18/24  0635 02/17/24  2057 02/17/24  1638 02/17/24  1105 02/17/24  0602 02/16/24  2105 02/16/24  1613 02/16/24  1056 02/16/24  0717 02/15/24  2058 02/15/24  1548   POC GLUCOSE mg/dl 366* 299* 293* 219* 283* 141* 232* 169* 248* 139 199* 134               Lines/Drains:  Invasive Devices       Peripheral Intravenous Line  Duration             Peripheral IV 02/18/24 Left;Proximal;Ventral (anterior) Forearm <1 day                          Imaging: No pertinent imaging reviewed.    Recent Cultures (last 7 days):   Results from last 7 days   Lab Units 02/12/24  1157   GRAM STAIN RESULT  No Polys or Bacteria seen  1+ Mononuclear Cells       Last 24 Hours Medication List:   Current Facility-Administered Medications   Medication Dose Route Frequency Provider Last Rate    acetaminophen  650 mg Oral Q6H PRN Andrea Ibarra      amLODIPine  5 mg Oral Daily Debra Adler PA-C      diphenhydrAMINE  50 mg Oral Daily Andrea Ibarra      diphenhydrAMINE-zinc acetate   Topical TID PRN Inez Mijares PA-C      heparin (porcine)  5,000 Units Subcutaneous Q8H [facility] Tacos Jean MD      hydrocortisone   Topical 4x Daily PRN Inez Mijares PA-C      insulin glargine  5 Units  Subcutaneous HS Tacos Jean MD      insulin lispro  1-6 Units Subcutaneous TID AC Tacos Jean MD      levETIRAcetam  1,000 mg Oral Q12H ARIANNA Tacos Jean MD      lisinopril  10 mg Oral Daily Tacos Jean MD      LORazepam  1 mg Intravenous Q6H PRN Fabiana Cutler, DO      methylPREDNISolone sodium succinate  1,000 mg Intravenous Daily Tacos Jean MD 1,000 mg (02/17/24 1812)    ondansetron  4 mg Intravenous Q6H PRN Giovanny Arzola, DO      pantoprazole  40 mg Oral Early Morning Giovanny Arzola, DO      sodium chloride  75 mL/hr Intravenous Continuous Jose Daniel Hi MD Stopped (02/16/24 1848)    [START ON 2/19/2024] sulfamethoxazole-trimethoprim  1 tablet Oral Once per day on Monday Wednesday Friday Tacos Jean MD          Today, Patient Was Seen By: Tacos Jean MD    **Please Note: This note may have been constructed using a voice recognition system.**

## 2024-02-18 NOTE — QUICK NOTE
Spoke with patients wife over the phone they have opted to forego brain bx and instead pursue empiric immunotherapy w/ OTP clinical and radiographic monitoring. Neurosurgery will sign off, call with any further questions or concerns

## 2024-02-18 NOTE — ASSESSMENT & PLAN NOTE
Continue PTA Norvasc, lisinopril   Blood pressure at goal  Blood pressure anticipated to increase with steroids

## 2024-02-18 NOTE — ASSESSMENT & PLAN NOTE
"Was in [facility] on early 2/8 AM and had a tonic-clonic seizure.  Patient without any known history of seizures  Presented to the ER at [place] and initially was combative; had initial CT head with noted hypodensity within the left frontal and right parietal occipital lobes.  ER staff there reached out to specialist on-call and patient was recommended to be transferred to [place] for neurology as well as neurosurgery and further workup.  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 Flores with Neurology was consulted and reviewed MRI brain; although noted cerebral amyloid angiopathy on MRI, patient's lack of significant burden of amyloid angiopathy and the rest of the brain parenchyma as well as the lack of cognitive atypical and does not meet diagnostic criteria.  While cerebral amyloid angiopathy is possibility still, patient with wide range of other possibilities that need to be excluded including press, CNS vasculitis and certain CNS infections.  A repeat stat CTA head was ordered and this CTA head and neck with focal areas of vasogenic edema better characterized on the recent brain MRI  In addition to imaging, neurology recommended Keppra 1 g twice daily as well as an LP for CSF analysis and potential EEG.   Patient returned back to baseline mentation after his seizure and has not had repeat since  Transferred to [place] on the late hours of 2/9  Consult neurology .  Seizure precautions.  Holding any antiplatelet or anticoagulation agents.  Continue IV Keppra.  F/U CT chest/abdomen/pelvis - Unremarkable   F/U LP  Echo with preserved LVEF  Order blood cultures, ESR/CRP as well as further rheumatologic studies  Appreciate neurology and neurosurgery recommendations  Given MRI findings neurosurgery consulted for " diagnostic angiogram 2/15  Left MCA irregularity with areas of narrowing. Findings are nonspecific and may reflect either an atherosclerotic or inflammatory vasculopathy.   Patient opted not to get brain biopsy and instead start IV steroids  Spoke to neurology : Patient will get 1 g of IVMP for 5 days which started on February 17, 2024  After IVMP course start oral prednisone taper:  60 mg daily x1mo  Taper by 10mg per month  Start PJP ppx w/ TMP-SMX 160mg/800mg three times weekly until prednisone dose lower than 20mg/d  Continue PTA protonix 40mg daily for GI protection  Closely monitor BP and BG; may need OTP adjustment to DM regimen

## 2024-02-18 NOTE — ASSESSMENT & PLAN NOTE
Pruritic, nonpainful, erythematous blanching rash on the extremities as well as trunk  Reports he has had this rash that comes and goes intermittently over the last 7 years   Scheduled Tylenol and Benadryl continued, this is what patient takes at home to treat this rash  Derm evaluated patient and punch biopsy pending.  Appreciate their expertise  Rash appears more convalescent and erythematous today, family states that this is normal course for the disease  Follow-up punch biopsy results-reviewed results with dermatology February 15, 2024  Urticarial reaction vs hypersensitivity reaction vs eczema  Little increased mucin, but pathologist strongly disfavored a mucin deposition disease  Anticipating significant improvement with systemic steroids

## 2024-02-19 LAB
GLUCOSE SERPL-MCNC: 330 MG/DL (ref 65–140)
GLUCOSE SERPL-MCNC: 338 MG/DL (ref 65–140)
GLUCOSE SERPL-MCNC: 347 MG/DL (ref 65–140)
GLUCOSE SERPL-MCNC: 451 MG/DL (ref 65–140)

## 2024-02-19 PROCEDURE — 82948 REAGENT STRIP/BLOOD GLUCOSE: CPT

## 2024-02-19 PROCEDURE — 99232 SBSQ HOSP IP/OBS MODERATE 35: CPT | Performed by: FAMILY MEDICINE

## 2024-02-19 RX ORDER — INSULIN GLARGINE 100 [IU]/ML
10 INJECTION, SOLUTION SUBCUTANEOUS
Status: DISCONTINUED | OUTPATIENT
Start: 2024-02-19 | End: 2024-02-20

## 2024-02-19 RX ADMIN — SULFAMETHOXAZOLE AND TRIMETHOPRIM 1 TABLET: 800; 160 TABLET ORAL at 09:01

## 2024-02-19 RX ADMIN — PANTOPRAZOLE SODIUM 40 MG: 40 TABLET, DELAYED RELEASE ORAL at 06:53

## 2024-02-19 RX ADMIN — SODIUM CHLORIDE 1000 MG: 0.9 INJECTION, SOLUTION INTRAVENOUS at 17:04

## 2024-02-19 RX ADMIN — HEPARIN SODIUM 5000 UNITS: 5000 INJECTION INTRAVENOUS; SUBCUTANEOUS at 22:05

## 2024-02-19 RX ADMIN — HEPARIN SODIUM 5000 UNITS: 5000 INJECTION INTRAVENOUS; SUBCUTANEOUS at 17:03

## 2024-02-19 RX ADMIN — LEVETIRACETAM 1000 MG: 500 TABLET, FILM COATED ORAL at 22:05

## 2024-02-19 RX ADMIN — INSULIN LISPRO 6 UNITS: 100 INJECTION, SOLUTION INTRAVENOUS; SUBCUTANEOUS at 12:31

## 2024-02-19 RX ADMIN — DIPHENHYDRAMINE HCL 50 MG: 25 TABLET ORAL at 09:01

## 2024-02-19 RX ADMIN — INSULIN LISPRO 5 UNITS: 100 INJECTION, SOLUTION INTRAVENOUS; SUBCUTANEOUS at 09:02

## 2024-02-19 RX ADMIN — LEVETIRACETAM 1000 MG: 500 TABLET, FILM COATED ORAL at 09:01

## 2024-02-19 RX ADMIN — HEPARIN SODIUM 5000 UNITS: 5000 INJECTION INTRAVENOUS; SUBCUTANEOUS at 06:53

## 2024-02-19 RX ADMIN — AMLODIPINE BESYLATE 5 MG: 5 TABLET ORAL at 09:01

## 2024-02-19 RX ADMIN — INSULIN GLARGINE 10 UNITS: 100 INJECTION, SOLUTION SUBCUTANEOUS at 22:06

## 2024-02-19 RX ADMIN — LISINOPRIL 10 MG: 10 TABLET ORAL at 09:02

## 2024-02-19 RX ADMIN — INSULIN LISPRO 5 UNITS: 100 INJECTION, SOLUTION INTRAVENOUS; SUBCUTANEOUS at 17:04

## 2024-02-19 NOTE — ASSESSMENT & PLAN NOTE
Lab Results   Component Value Date    HGBA1C 7.7 (H) 09/07/2023       Recent Labs     02/18/24  1110 02/18/24  1620 02/18/24  2106 02/19/24  0655   POCGLU 366* 237* 363* 347*         Blood Sugar Average: Last 72 hrs:  (P) 256.9549741654019082  Diabetic diet as well as sliding scale insulin with meals  Will start patient on 5 units of Lantus at night 2/18 and titrate up while patient is receiving steroids  Increasing Lantus to 10 units at night 2/19  Continue sliding scale

## 2024-02-19 NOTE — PROGRESS NOTES
[facility]  Progress Note  Name: Delphine Nelsno I  MRN: [id number]  Unit/Bed#: PPHP 732-01 I Date of Admission: 2/10/2024   Date of Service: 2/19/2024 I Hospital Day: 9    Assessment/Plan   * New onset seizure (HCC)  Assessment & Plan  Continue IV Keppra.  F/U CT chest/abdomen/pelvis - Unremarkable   F/U LP-unremarkable  Echo with preserved LVEF  Lab work unremarkable to date  Appreciate neurology and neurosurgery recommendations  Patient had diagnostic angiogram done by neurosurgery on February 15  Left MCA irregularity with areas of narrowing. Findings are nonspecific and may reflect either an atherosclerotic or inflammatory vasculopathy.   Patient opted not to get brain biopsy and instead start IV steroids  Spoke to neurology : Patient will get 1 g of IVMP for 5 days which started on February 17, 2024  After IVMP course start oral prednisone taper:  60 mg daily x1mo  Taper by 10mg per month  Start PJP ppx w/ TMP-SMX 160mg/800mg three times weekly until prednisone dose lower than 20mg/d  Continue PTA protonix 40mg daily for GI protection  Closely monitor BP and BG; may need OTP adjustment to DM regimen    Cerebral amyloid angiopathy   Assessment & Plan  Noted on MRI  Neurosurgery performed diagnostic angio on February 15  Patient will begin systemic steroid treatment    KRISTIN (acute kidney injury) (HCC)  Assessment & Plan  Back to baseline  There are not many renal function measurements in the outpatient setting however we may surmise that baseline renal function is around 1.0-1.4  Resume lisinopril 10 mg daily      Fever  Assessment & Plan  Patient noted to have fevers throughout stay  No obvious source of infection, suspect this could be due to underlying inflammatory process  Continue supportive measures and would hold off of antibiotics at this time  Not felt to be infectious at this time most likely inflammatory    Rash  Assessment & Plan  Pruritic, nonpainful,  erythematous blanching rash on the extremities as well as trunk  Reports he has had this rash that comes and goes intermittently over the last 7 years   Scheduled Tylenol and Benadryl continued, this is what patient takes at home to treat this rash  Derm evaluated patient and punch biopsy pending.  Appreciate their expertise  Rash appears more convalescent and erythematous today, family states that this is normal course for the disease  Follow-up punch biopsy results-reviewed results with dermatology February 15, 2024  Urticarial reaction vs hypersensitivity reaction vs eczema  Little increased mucin, but pathologist strongly disfavored a mucin deposition disease  Anticipating significant improvement with systemic steroids    Type 2 diabetes mellitus with stage 3a chronic kidney disease, without long-term current use of insulin ([facility])  Assessment & Plan  Lab Results   Component Value Date    HGBA1C 7.7 (H) 09/07/2023       Recent Labs     02/18/24  1110 02/18/24  1620 02/18/24  2106 02/19/24  0655   POCGLU 366* 237* 363* 347*         Blood Sugar Average: Last 72 hrs:  (P) 256.3314310987366129  Diabetic diet as well as sliding scale insulin with meals  Will start patient on 5 units of Lantus at night 2/18 and titrate up while patient is receiving steroids  Increasing Lantus to 10 units at night 2/19  Continue sliding scale    Hypercholesterolemia  Assessment & Plan  Holding atorvastatin    Essential hypertension  Assessment & Plan  Continue PTA Norvasc, lisinopril   Blood pressure at goal  Blood pressure anticipated to increase with steroids               VTE Pharmacologic Prophylaxis: VTE Score: 3 Moderate Risk (Score 3-4) - Pharmacological DVT Prophylaxis Ordered: heparin.    Mobility:   Basic Mobility Inpatient Raw Score: 23  JH-HLM Goal: 7: Walk 25 feet or more  JH-HLM Achieved: 8: Walk 250 feet ot more  HLM Goal achieved. Continue to encourage appropriate mobility.    Patient Centered Rounds: I performed bedside  rounds with nursing staff today.   Discussions with Specialists or Other Care Team Provider: Neurology    Education and Discussions with Family / Patient: Updated  (wife) at bedside.    Total Time Spent on Date of Encounter in care of patient: 45 mins. This time was spent on one or more of the following: performing physical exam; counseling and coordination of care; obtaining or reviewing history; documenting in the medical record; reviewing/ordering tests, medications or procedures; communicating with other healthcare professionals and discussing with patient's family/caregivers.    Current Length of Stay: 9 day(s)  Current Patient Status: Inpatient   Certification Statement: The patient will continue to require additional inpatient hospital stay due to IV steroids with close monitoring for glycemia  Discharge Plan: Anticipate discharge in 48 hrs to home.    Code Status: Level 1 - Full Code    Subjective:   Is a very pleasant 73-year-old gentleman who was seen evaluated today at bedside.  Patient has no acute concerns at this time.  Patient states that his rash is significantly better.    Objective:     Vitals:   Temp (24hrs), Av.3 °F (36.8 °C), Min:98.1 °F (36.7 °C), Max:98.6 °F (37 °C)    Temp:  [98.1 °F (36.7 °C)-98.6 °F (37 °C)] 98.1 °F (36.7 °C)  HR:  [82-88] 88  BP: (141-143)/(72-97) 143/97  SpO2:  [95 %-97 %] 97 %  Body mass index is 30.59 kg/m².     Input and Output Summary (last 24 hours):   No intake or output data in the 24 hours ending 24 1440    Physical Exam:   Physical Exam  Vitals reviewed.   HENT:      Head: Normocephalic.      Nose: No congestion.      Mouth/Throat:      Pharynx: No oropharyngeal exudate or posterior oropharyngeal erythema.   Eyes:      Conjunctiva/sclera: Conjunctivae normal.   Cardiovascular:      Rate and Rhythm: Normal rate and regular rhythm.   Pulmonary:      Effort: Pulmonary effort is normal.   Abdominal:      General: Abdomen is flat.       Palpations: Abdomen is soft.   Skin:     General: Skin is warm and dry.      Findings: Rash present.   Neurological:      Mental Status: He is alert and oriented to person, place, and time. Mental status is at baseline.          Additional Data:     Labs:  Results from last 7 days   Lab Units 02/18/24  0548 02/17/24  0900   WBC Thousand/uL 10.14 11.48*   HEMOGLOBIN g/dL 12.9 13.5   HEMATOCRIT % 40.0 41.8   PLATELETS Thousands/uL 388 342   BANDS PCT %  --  1   LYMPHO PCT %  --  19   MONO PCT %  --  1*   EOS PCT %  --  11*     Results from last 7 days   Lab Units 02/18/24  0548   SODIUM mmol/L 135   POTASSIUM mmol/L 4.2   CHLORIDE mmol/L 97   CO2 mmol/L 28   BUN mg/dL 10   CREATININE mg/dL 1.08   ANION GAP mmol/L 10   CALCIUM mg/dL 8.9   ALBUMIN g/dL 3.7   TOTAL BILIRUBIN mg/dL 0.57   ALK PHOS U/L 96   ALT U/L 42   AST U/L 23   GLUCOSE RANDOM mg/dL 348*     Results from last 7 days   Lab Units 02/15/24  0533   INR  1.00     Results from last 7 days   Lab Units 02/19/24  1107 02/19/24  0655 02/18/24  2106 02/18/24  1620 02/18/24  1110 02/18/24  0635 02/17/24  2057 02/17/24  1638 02/17/24  1105 02/17/24  0602 02/16/24  2105 02/16/24  1613   POC GLUCOSE mg/dl 451* 347* 363* 237* 366* 299* 293* 219* 283* 141* 232* 169*               Lines/Drains:  Invasive Devices       Peripheral Intravenous Line  Duration             Peripheral IV 02/18/24 Left;Proximal;Ventral (anterior) Forearm 1 day                          Imaging: No pertinent imaging reviewed.    Recent Cultures (last 7 days):           Last 24 Hours Medication List:   Current Facility-Administered Medications   Medication Dose Route Frequency Provider Last Rate    acetaminophen  650 mg Oral Q6H PRN Andrea Ibarra      amLODIPine  5 mg Oral Daily Debra Adler PA-C      diphenhydrAMINE  50 mg Oral Daily Andrea Ibarra      diphenhydrAMINE-zinc acetate   Topical TID PRN Inez Mijares PA-C      heparin (porcine)  5,000 Units Subcutaneous Q8H AIRANNA  Tacos Jean MD      hydrocortisone   Topical 4x Daily PRN Inez Mijares PA-C      insulin glargine  10 Units Subcutaneous HS Tacos Jean MD      insulin lispro  1-6 Units Subcutaneous TID AC Tacos Jean MD      levETIRAcetam  1,000 mg Oral Q12H ARIANNA Tacos Jean MD      lisinopril  10 mg Oral Daily Tacos Jean MD      LORazepam  1 mg Intravenous Q6H PRN Fabiana Cutler, DO      methylPREDNISolone sodium succinate  1,000 mg Intravenous Daily Tacos Jean MD 1,000 mg (02/18/24 1394)    ondansetron  4 mg Intravenous Q6H PRN Giovanny Arzola, DO      pantoprazole  40 mg Oral Early Morning Giovanny Arzola, DO      sulfamethoxazole-trimethoprim  1 tablet Oral Once per day on Monday Wednesday Friday Tacos Jean MD          Today, Patient Was Seen By: Tacos Jean MD    **Please Note: This note may have been constructed using a voice recognition system.**

## 2024-02-19 NOTE — ASSESSMENT & PLAN NOTE
Continue IV Keppra.  F/U CT chest/abdomen/pelvis - Unremarkable   F/U LP-unremarkable  Echo with preserved LVEF  Lab work unremarkable to date  Appreciate neurology and neurosurgery recommendations  Patient had diagnostic angiogram done by neurosurgery on February 15  Left MCA irregularity with areas of narrowing. Findings are nonspecific and may reflect either an atherosclerotic or inflammatory vasculopathy.   Patient opted not to get brain biopsy and instead start IV steroids  Spoke to neurology : Patient will get 1 g of IVMP for 5 days which started on February 17, 2024  After IVMP course start oral prednisone taper:  60 mg daily x1mo  Taper by 10mg per month  Start PJP ppx w/ TMP-SMX 160mg/800mg three times weekly until prednisone dose lower than 20mg/d  Continue PTA protonix 40mg daily for GI protection  Closely monitor BP and BG; may need OTP adjustment to DM regimen

## 2024-02-19 NOTE — ASSESSMENT & PLAN NOTE
Patient noted to have fevers throughout stay  No obvious source of infection, suspect this could be due to underlying inflammatory process  Continue supportive measures and would hold off of antibiotics at this time  Not felt to be infectious at this time most likely inflammatory

## 2024-02-20 DIAGNOSIS — E78.00 PURE HYPERCHOLESTEROLEMIA: ICD-10-CM

## 2024-02-20 LAB
ALBUMIN SERPL BCP-MCNC: 3.9 G/DL (ref 3.5–5)
ALP SERPL-CCNC: 85 U/L (ref 34–104)
ALT SERPL W P-5'-P-CCNC: 42 U/L (ref 7–52)
ANION GAP SERPL CALCULATED.3IONS-SCNC: 11 MMOL/L
AST SERPL W P-5'-P-CCNC: 22 U/L (ref 13–39)
BILIRUB SERPL-MCNC: 0.53 MG/DL (ref 0.2–1)
BUN SERPL-MCNC: 24 MG/DL (ref 5–25)
CALCIUM SERPL-MCNC: 9 MG/DL (ref 8.4–10.2)
CHLORIDE SERPL-SCNC: 99 MMOL/L (ref 96–108)
CO2 SERPL-SCNC: 27 MMOL/L (ref 21–32)
CREAT SERPL-MCNC: 1.12 MG/DL (ref 0.6–1.3)
ERYTHROCYTE [DISTWIDTH] IN BLOOD BY AUTOMATED COUNT: 13.8 % (ref 11.6–15.1)
GFR SERPL CREATININE-BSD FRML MDRD: 65 ML/MIN/1.73SQ M
GLUCOSE SERPL-MCNC: 227 MG/DL (ref 65–140)
GLUCOSE SERPL-MCNC: 338 MG/DL (ref 65–140)
GLUCOSE SERPL-MCNC: 375 MG/DL (ref 65–140)
GLUCOSE SERPL-MCNC: 377 MG/DL (ref 65–140)
GLUCOSE SERPL-MCNC: 381 MG/DL (ref 65–140)
GLUCOSE SERPL-MCNC: 394 MG/DL (ref 65–140)
GLUCOSE SERPL-MCNC: 422 MG/DL (ref 65–140)
HCT VFR BLD AUTO: 41.1 % (ref 36.5–49.3)
HGB BLD-MCNC: 13.3 G/DL (ref 12–17)
MCH RBC QN AUTO: 27.8 PG (ref 26.8–34.3)
MCHC RBC AUTO-ENTMCNC: 32.4 G/DL (ref 31.4–37.4)
MCV RBC AUTO: 86 FL (ref 82–98)
PLATELET # BLD AUTO: 397 THOUSANDS/UL (ref 149–390)
PMV BLD AUTO: 9.9 FL (ref 8.9–12.7)
POTASSIUM SERPL-SCNC: 4.6 MMOL/L (ref 3.5–5.3)
PROT SERPL-MCNC: 7.1 G/DL (ref 6.4–8.4)
RBC # BLD AUTO: 4.79 MILLION/UL (ref 3.88–5.62)
SODIUM SERPL-SCNC: 137 MMOL/L (ref 135–147)
WBC # BLD AUTO: 18.55 THOUSAND/UL (ref 4.31–10.16)

## 2024-02-20 PROCEDURE — 99233 SBSQ HOSP IP/OBS HIGH 50: CPT | Performed by: INTERNAL MEDICINE

## 2024-02-20 PROCEDURE — 80053 COMPREHEN METABOLIC PANEL: CPT | Performed by: FAMILY MEDICINE

## 2024-02-20 PROCEDURE — 99232 SBSQ HOSP IP/OBS MODERATE 35: CPT | Performed by: INTERNAL MEDICINE

## 2024-02-20 PROCEDURE — 82948 REAGENT STRIP/BLOOD GLUCOSE: CPT

## 2024-02-20 PROCEDURE — 85027 COMPLETE CBC AUTOMATED: CPT | Performed by: FAMILY MEDICINE

## 2024-02-20 RX ADMIN — LEVETIRACETAM 1000 MG: 500 TABLET, FILM COATED ORAL at 08:42

## 2024-02-20 RX ADMIN — DIPHENHYDRAMINE HCL 50 MG: 25 TABLET ORAL at 08:43

## 2024-02-20 RX ADMIN — SODIUM CHLORIDE 1000 MG: 0.9 INJECTION, SOLUTION INTRAVENOUS at 17:03

## 2024-02-20 RX ADMIN — HEPARIN SODIUM 5000 UNITS: 5000 INJECTION INTRAVENOUS; SUBCUTANEOUS at 06:29

## 2024-02-20 RX ADMIN — SODIUM CHLORIDE 7 UNITS/HR: 9 INJECTION, SOLUTION INTRAVENOUS at 15:51

## 2024-02-20 RX ADMIN — AMLODIPINE BESYLATE 5 MG: 5 TABLET ORAL at 08:43

## 2024-02-20 RX ADMIN — LISINOPRIL 10 MG: 10 TABLET ORAL at 08:43

## 2024-02-20 RX ADMIN — PANTOPRAZOLE SODIUM 40 MG: 40 TABLET, DELAYED RELEASE ORAL at 06:29

## 2024-02-20 RX ADMIN — HEPARIN SODIUM 5000 UNITS: 5000 INJECTION INTRAVENOUS; SUBCUTANEOUS at 13:57

## 2024-02-20 RX ADMIN — INSULIN LISPRO 6 UNITS: 100 INJECTION, SOLUTION INTRAVENOUS; SUBCUTANEOUS at 08:45

## 2024-02-20 RX ADMIN — INSULIN LISPRO 6 UNITS: 100 INJECTION, SOLUTION INTRAVENOUS; SUBCUTANEOUS at 11:47

## 2024-02-20 RX ADMIN — LEVETIRACETAM 1000 MG: 500 TABLET, FILM COATED ORAL at 20:30

## 2024-02-20 RX ADMIN — DIPHENHYDRAMINE HYDROCHLORIDE, ZINC ACETATE: 2; .1 CREAM TOPICAL at 16:02

## 2024-02-20 RX ADMIN — HEPARIN SODIUM 5000 UNITS: 5000 INJECTION INTRAVENOUS; SUBCUTANEOUS at 20:30

## 2024-02-20 NOTE — ASSESSMENT & PLAN NOTE
Pruritic, nonpainful, erythematous blanching rash on the extremities as well as trunk  Reports he has had this rash that comes and goes intermittently over the last 7 years   Scheduled Tylenol and Benadryl continued, this is what patient takes at home to treat this rash  Derm evaluated patient and punch biopsy pending.  Appreciate their expertise  Follow-up punch biopsy results-reviewed results with dermatology February 15, 2024 by prior provider  Urticarial reaction vs hypersensitivity reaction vs eczema  Little increased mucin, but pathologist strongly disfavored a mucin deposition disease  Anticipating significant improvement with systemic steroids

## 2024-02-20 NOTE — PROGRESS NOTES
[facility]  Progress Note  Name: Delphine Nelson I  MRN: [id number]  Unit/Bed#: [facility] 732-01 I Date of Admission: 2/10/2024   Date of Service: 2/20/2024 I Hospital Day: 10    Assessment/Plan   * New onset seizure (HCC)  Assessment & Plan  Continue IV Keppra.  F/U CT chest/abdomen/pelvis - Unremarkable   F/U LP-unremarkable  Echo with preserved LVEF  Lab work unremarkable to date  Appreciate neurology and neurosurgery recommendations  Patient had diagnostic angiogram done by neurosurgery on February 15  Left MCA irregularity with areas of narrowing. Findings are nonspecific and may reflect either an atherosclerotic or inflammatory vasculopathy.   Patient opted not to get brain biopsy and instead start IV steroids  Spoke to neurology : Patient will get 1 g of IVMP for 5 days which started on February 17, 2024  After IVMP course start oral prednisone taper:  60 mg daily x1mo  Taper by 10mg per month  Start PJP ppx w/ TMP-SMX 160mg/800mg three times weekly until prednisone dose lower than 20mg/d  Continue PTA protonix 40mg daily for GI protection  Closely monitor BP and BG; may need OTP adjustment to DM regimen    Fever  Assessment & Plan  Patient noted to have fevers throughout stay  No obvious source of infection, suspect this could be due to underlying inflammatory process  Continue supportive measures and would hold off of antibiotics at this time  Not felt to be infectious at this time most likely inflammatory    Rash  Assessment & Plan  Pruritic, nonpainful, erythematous blanching rash on the extremities as well as trunk  Reports he has had this rash that comes and goes intermittently over the last 7 years   Scheduled Tylenol and Benadryl continued, this is what patient takes at home to treat this rash  Derm evaluated patient and punch biopsy pending.  Appreciate their expertise  Follow-up punch biopsy results-reviewed results with dermatology February 15, 2024 by prior  provider  Urticarial reaction vs hypersensitivity reaction vs eczema  Little increased mucin, but pathologist strongly disfavored a mucin deposition disease  Anticipating significant improvement with systemic steroids    Cerebral amyloid angiopathy   Assessment & Plan  Noted on MRI  Neurosurgery performed diagnostic angio on February 15  Patient on steroid treatment    Hypercholesterolemia  Assessment & Plan  Holding atorvastatin    Essential hypertension  Assessment & Plan  Continue PTA Norvasc, lisinopril   Blood pressure at goal  Blood pressure anticipated to increase with steroids               VTE Pharmacologic Prophylaxis: VTE Score: 3 Moderate Risk (Score 3-4) - Pharmacological DVT Prophylaxis Ordered: heparin.    Mobility:   Basic Mobility Inpatient Raw Score: 23  JH-HLM Goal: 7: Walk 25 feet or more  JH-HLM Achieved: 8: Walk 250 feet ot more  HLM Goal NOT achieved. Continue with multidisciplinary rounding and encourage appropriate mobility to improve upon HLM goals.    Patient Centered Rounds: I performed bedside rounds with nursing staff today.   Education and Discussions with Family / Patient: Updated  (wife) at bedside.    Total Time Spent on Date of Encounter in care of patient: 55 mins. This time was spent on one or more of the following: performing physical exam; counseling and coordination of care; obtaining or reviewing history; documenting in the medical record; reviewing/ordering tests, medications or procedures; communicating with other healthcare professionals and discussing with patient's family/caregivers.    Current Length of Stay: 10 day(s)  Current Patient Status: Inpatient     Discharge Plan: Anticipate discharge in 24-48 hrs to home.    Code Status: Level 1 - Full Code    Subjective:   nad    Objective:     Vitals:   Temp (24hrs), Av.1 °F (36.7 °C), Min:97.7 °F (36.5 °C), Max:98.4 °F (36.9 °C)    Temp:  [97.7 °F (36.5 °C)-98.4 °F (36.9 °C)] 97.7 °F (36.5 °C)  HR:   [74-87] 74  Resp:  [16-20] 16  BP: (119-148)/(63-86) 148/86  SpO2:  [96 %-97 %] 97 %  Body mass index is 30.07 kg/m².     Input and Output Summary (last 24 hours):     Intake/Output Summary (Last 24 hours) at 2/20/2024 1004  Last data filed at 2/20/2024 0700  Gross per 24 hour   Intake 240 ml   Output --   Net 240 ml       Physical Exam:   Physical Exam  Constitutional:       Appearance: Normal appearance.   HENT:      Head: Normocephalic and atraumatic.   Cardiovascular:      Rate and Rhythm: Normal rate and regular rhythm.      Heart sounds: No murmur heard.  Pulmonary:      Effort: Pulmonary effort is normal.      Breath sounds: Normal breath sounds.   Abdominal:      General: Abdomen is flat.   Neurological:      General: No focal deficit present.      Mental Status: He is alert and oriented to person, place, and time.   Psychiatric:         Mood and Affect: Mood normal.     Labs:  Results from last 7 days   Lab Units 02/20/24  0651 02/18/24  0548 02/17/24  0900   WBC Thousand/uL 18.55*   < > 11.48*   HEMOGLOBIN g/dL 13.3   < > 13.5   HEMATOCRIT % 41.1   < > 41.8   PLATELETS Thousands/uL 397*   < > 342   BANDS PCT %  --   --  1   LYMPHO PCT %  --   --  19   MONO PCT %  --   --  1*   EOS PCT %  --   --  11*    < > = values in this interval not displayed.     Results from last 7 days   Lab Units 02/20/24  0651   SODIUM mmol/L 137   POTASSIUM mmol/L 4.6   CHLORIDE mmol/L 99   CO2 mmol/L 27   BUN mg/dL 24   CREATININE mg/dL 1.12   ANION GAP mmol/L 11   CALCIUM mg/dL 9.0   ALBUMIN g/dL 3.9   TOTAL BILIRUBIN mg/dL 0.53   ALK PHOS U/L 85   ALT U/L 42   AST U/L 22   GLUCOSE RANDOM mg/dL 375*     Results from last 7 days   Lab Units 02/15/24  0533   INR  1.00     Results from last 7 days   Lab Units 02/20/24  0617 02/19/24  2100 02/19/24  1614 02/19/24  1107 02/19/24  0655 02/18/24  2106 02/18/24  1620 02/18/24  1110 02/18/24  0635 02/17/24  2057 02/17/24  1638 02/17/24  1105   POC GLUCOSE mg/dl 377* 338* 330* 451* 347*  363* 237* 366* 299* 293* 219* 283*               Lines/Drains:  Invasive Devices       Peripheral Intravenous Line  Duration             Peripheral IV 02/18/24 Left;Proximal;Ventral (anterior) Forearm 2 days                              Recent Cultures (last 7 days):         Last 24 Hours Medication List:   Current Facility-Administered Medications   Medication Dose Route Frequency Provider Last Rate    acetaminophen  650 mg Oral Q6H PRN Andrea Ibarra      amLODIPine  5 mg Oral Daily Debra Adler PA-C      diphenhydrAMINE  50 mg Oral Daily Andrea Ibarra      diphenhydrAMINE-zinc acetate   Topical TID PRN Inez Mijares PA-C      heparin (porcine)  5,000 Units Subcutaneous Q8H ARIANNA Tacos Jean MD      hydrocortisone   Topical 4x Daily PRN Inez Mijares PA-C      insulin glargine  10 Units Subcutaneous HS Tacos Jean MD      insulin lispro  1-6 Units Subcutaneous TID AC Tacos Jean MD      levETIRAcetam  1,000 mg Oral Q12H ARIANNA Tacos Jean MD      lisinopril  10 mg Oral Daily Tacos Jean MD      LORazepam  1 mg Intravenous Q6H PRN Fabiana Cutler,       methylPREDNISolone sodium succinate  1,000 mg Intravenous Daily Tacos Jean MD 1,000 mg (02/19/24 1704)    ondansetron  4 mg Intravenous Q6H PRN Giovanny Arzola, DO      pantoprazole  40 mg Oral Early Morning Giovanny Arzola, DO      sulfamethoxazole-trimethoprim  1 tablet Oral Once per day on Monday Wednesday Friday Tacos Jean MD          Today, Patient Was Seen By: Cristine Ruano DO    **Please Note: This note may have been constructed using a voice recognition system.**

## 2024-02-20 NOTE — PROGRESS NOTES
Progress Note - Infectious Disease   Delphine Nelson 73 y.o. male MRN: [id number]  Unit/Bed#: [facility] 732-01 Encounter: [id number]      Impression/Recommendations:  Fever, with mild leukocytosis.  Source is most likely either postictal or secondary to underlying inflammatory process.  Admission blood cultures have no growth.  Patient is now on systemic corticosteroid.  Fever is resolved on it.  WBC increasing, most likely steroid effect.  Continue to observe off antibiotic.  Monitor temperature/WBC.       Focal vasogenic edema and, with new onset seizure.  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  Patient is status post lumbar puncture with essentially normal CSF with negative/M/E PCR panel.  Cerebral angiogram with nonspecific findings. Patient and family opted for empiric steroid versus brain biopsy.  Steroid started over weekend.  Observe off antibiotic, as in above.  Corticosteroid per neurology service.     Diffuse skin rash, intermittent for the last many years.  Patient is status post punch biopsy of rash on right side.  Pathology was nonspecific.  HIV screen negative.  Syphilis screen negative.     Discussed with patient and his family in detail regarding the above plan.    With no active infection, I will sign off.  Thank you for the consultation.  Please do not hesitate to reconsult us for any further questions or issues.     Antibiotics:  None     Subjective:  Patient is awake and alert.  No headache.  No confusion.  Temperature is down.  No chills.  No diarrhea.    Objective:  Vitals:  Temp:  [97.7 °F (36.5 °C)-98.4 °F (36.9 °C)] 97.7 °F (36.5 °C)  HR:  [74-87] 74  Resp:  [16-20] 16  BP: (119-148)/(63-86) 148/86  SpO2:  [96 %-97 %] 97 %  Temp (24hrs), Av.1 °F (36.7 °C), Min:97.7 °F (36.5 °C), Max:98.4 °F (36.9 °C)  Current: Temperature: 97.7 °F (36.5 °C)    Physical Exam:     General: Awake, alert, cooperative, no distress.   Neck:  Supple. No mass.  No  lymphadenopathy.   Lungs: Expansion symmetric, no rales, no wheezing, respirations unlabored.   Heart:  Regular rate and rhythm, S1 and S2 normal, no murmur.   Abdomen: Soft, nondistended, non-tender, bowel sounds active all four quadrants, no masses, no organomegaly.   Extremities: No edema. No erythema/warmth. No ulcer. Nontender to palpation.   Skin:  Improving diffuse papular rash.   Neuro: Moves all extremities.     Invasive Devices       Peripheral Intravenous Line  Duration             Peripheral IV 02/18/24 Left;Proximal;Ventral (anterior) Forearm 2 days                    Labs studies:   I have personally reviewed pertinent labs.  Results from last 7 days   Lab Units 02/20/24  0651 02/18/24  0548 02/17/24  0900   POTASSIUM mmol/L 4.6 4.2 3.4*   CHLORIDE mmol/L 99 97 100   CO2 mmol/L 27 28 28   BUN mg/dL 24 10 8   CREATININE mg/dL 1.12 1.08 1.07   EGFR ml/min/1.73sq m 65 68 69   CALCIUM mg/dL 9.0 8.9 9.0   AST U/L 22 23 25   ALT U/L 42 42 44   ALK PHOS U/L 85 96 105*     Results from last 7 days   Lab Units 02/20/24  0651 02/18/24  0548 02/17/24  0900   WBC Thousand/uL 18.55* 10.14 11.48*   HEMOGLOBIN g/dL 13.3 12.9 13.5   PLATELETS Thousands/uL 397* 388 342           Imaging Studies:   I have personally reviewed pertinent imaging study reports and images in PACS.    EKG, Pathology, and Other Studies:   I have personally reviewed pertinent reports.

## 2024-02-20 NOTE — ASSESSMENT & PLAN NOTE
Noted on MRI  Neurosurgery performed diagnostic angio on February 15  Patient on steroid treatment

## 2024-02-21 PROBLEM — R50.9 FEVER: Status: RESOLVED | Noted: 2024-02-11 | Resolved: 2024-02-21

## 2024-02-21 LAB
GLUCOSE SERPL-MCNC: 135 MG/DL (ref 65–140)
GLUCOSE SERPL-MCNC: 147 MG/DL (ref 65–140)
GLUCOSE SERPL-MCNC: 153 MG/DL (ref 65–140)
GLUCOSE SERPL-MCNC: 157 MG/DL (ref 65–140)
GLUCOSE SERPL-MCNC: 162 MG/DL (ref 65–140)
GLUCOSE SERPL-MCNC: 190 MG/DL (ref 65–140)
GLUCOSE SERPL-MCNC: 297 MG/DL (ref 65–140)
GLUCOSE SERPL-MCNC: 340 MG/DL (ref 65–140)

## 2024-02-21 PROCEDURE — 99233 SBSQ HOSP IP/OBS HIGH 50: CPT | Performed by: INTERNAL MEDICINE

## 2024-02-21 PROCEDURE — 99239 HOSP IP/OBS DSCHRG MGMT >30: CPT | Performed by: INTERNAL MEDICINE

## 2024-02-21 PROCEDURE — 82948 REAGENT STRIP/BLOOD GLUCOSE: CPT

## 2024-02-21 RX ORDER — LEVETIRACETAM 1000 MG/1
1000 TABLET ORAL EVERY 12 HOURS SCHEDULED
Qty: 60 TABLET | Refills: 0 | Status: SHIPPED | OUTPATIENT
Start: 2024-02-21 | End: 2024-02-22

## 2024-02-21 RX ORDER — INSULIN GLARGINE 100 [IU]/ML
30 INJECTION, SOLUTION SUBCUTANEOUS EVERY MORNING
Qty: 30 ML | Refills: 0 | Status: SHIPPED | OUTPATIENT
Start: 2024-02-21 | End: 2024-02-22

## 2024-02-21 RX ORDER — SULFAMETHOXAZOLE AND TRIMETHOPRIM 800; 160 MG/1; MG/1
1 TABLET ORAL 3 TIMES WEEKLY
Qty: 65 TABLET | Refills: 0 | Status: SHIPPED | OUTPATIENT
Start: 2024-02-23 | End: 2024-02-22

## 2024-02-21 RX ORDER — INSULIN GLARGINE 100 [IU]/ML
30 INJECTION, SOLUTION SUBCUTANEOUS 2 TIMES DAILY
Qty: 30 ML | Refills: 0 | Status: SHIPPED | OUTPATIENT
Start: 2024-02-21 | End: 2024-02-21

## 2024-02-21 RX ORDER — PREDNISONE 10 MG/1
TABLET ORAL DAILY
Qty: 630 TABLET | Refills: 0 | Status: SHIPPED | OUTPATIENT
Start: 2024-02-22 | End: 2024-02-22

## 2024-02-21 RX ORDER — ATORVASTATIN CALCIUM 40 MG/1
40 TABLET, FILM COATED ORAL DAILY
Qty: 90 TABLET | Refills: 1 | Status: SHIPPED | OUTPATIENT
Start: 2024-02-21

## 2024-02-21 RX ORDER — INSULIN LISPRO 100 [IU]/ML
5 INJECTION, SOLUTION INTRAVENOUS; SUBCUTANEOUS
Status: DISCONTINUED | OUTPATIENT
Start: 2024-02-21 | End: 2024-02-22 | Stop reason: HOSPADM

## 2024-02-21 RX ORDER — CLONAZEPAM 1 MG/1
1 TABLET, ORALLY DISINTEGRATING ORAL 2 TIMES DAILY PRN
Qty: 20 TABLET | Refills: 0 | Status: SHIPPED | OUTPATIENT
Start: 2024-02-21 | End: 2024-02-22

## 2024-02-21 RX ORDER — INSULIN GLARGINE 100 [IU]/ML
30 INJECTION, SOLUTION SUBCUTANEOUS EVERY 12 HOURS SCHEDULED
Status: DISCONTINUED | OUTPATIENT
Start: 2024-02-21 | End: 2024-02-22 | Stop reason: HOSPADM

## 2024-02-21 RX ORDER — INSULIN LISPRO 100 [IU]/ML
5 INJECTION, SOLUTION INTRAVENOUS; SUBCUTANEOUS
Qty: 15 ML | Refills: 0 | Status: SHIPPED | OUTPATIENT
Start: 2024-02-21 | End: 2024-02-22

## 2024-02-21 RX ADMIN — HEPARIN SODIUM 5000 UNITS: 5000 INJECTION INTRAVENOUS; SUBCUTANEOUS at 21:42

## 2024-02-21 RX ADMIN — INSULIN LISPRO 5 UNITS: 100 INJECTION, SOLUTION INTRAVENOUS; SUBCUTANEOUS at 13:00

## 2024-02-21 RX ADMIN — PANTOPRAZOLE SODIUM 40 MG: 40 TABLET, DELAYED RELEASE ORAL at 05:33

## 2024-02-21 RX ADMIN — SODIUM CHLORIDE 1000 MG: 0.9 INJECTION, SOLUTION INTRAVENOUS at 18:06

## 2024-02-21 RX ADMIN — AMLODIPINE BESYLATE 5 MG: 5 TABLET ORAL at 08:12

## 2024-02-21 RX ADMIN — LISINOPRIL 10 MG: 10 TABLET ORAL at 08:12

## 2024-02-21 RX ADMIN — HEPARIN SODIUM 5000 UNITS: 5000 INJECTION INTRAVENOUS; SUBCUTANEOUS at 05:33

## 2024-02-21 RX ADMIN — LEVETIRACETAM 1000 MG: 500 TABLET, FILM COATED ORAL at 08:12

## 2024-02-21 RX ADMIN — INSULIN GLARGINE 30 UNITS: 100 INJECTION, SOLUTION SUBCUTANEOUS at 10:42

## 2024-02-21 RX ADMIN — LEVETIRACETAM 1000 MG: 500 TABLET, FILM COATED ORAL at 21:42

## 2024-02-21 RX ADMIN — INSULIN LISPRO 5 UNITS: 100 INJECTION, SOLUTION INTRAVENOUS; SUBCUTANEOUS at 17:12

## 2024-02-21 RX ADMIN — SULFAMETHOXAZOLE AND TRIMETHOPRIM 1 TABLET: 800; 160 TABLET ORAL at 08:12

## 2024-02-21 RX ADMIN — DIPHENHYDRAMINE HCL 50 MG: 25 TABLET ORAL at 08:12

## 2024-02-21 RX ADMIN — INSULIN GLARGINE 30 UNITS: 100 INJECTION, SOLUTION SUBCUTANEOUS at 21:42

## 2024-02-21 NOTE — DISCHARGE SUMMARY
[facility]  Discharge- Delphine Nelson 1950, 73 y.o. male MRN: [id number]  Unit/Bed#: [facility] 732-01 Encounter: [id number]  Primary Care Provider: Tristin Ling MD   Date and time admitted to hospital: 2/10/2024 12:18 AM    * New onset seizure (HCC)  Assessment & Plan  Continue IV Keppra.  F/U CT chest/abdomen/pelvis - Unremarkable   F/U LP-unremarkable  Echo with preserved LVEF  Lab work unremarkable to date  Appreciate neurology and neurosurgery recommendations  Patient had diagnostic angiogram done by neurosurgery on February 15  Left MCA irregularity with areas of narrowing. Findings are nonspecific and may reflect either an atherosclerotic or inflammatory vasculopathy.   Patient opted not to get brain biopsy and instead start IV steroids  As per neurology: Patient will get 1 g of IVMP for 5 days which started on February 17, 2024  After IVMP course start oral prednisone taper:  60 mg daily x1mo  Taper by 10mg per month  Start PJP ppx w/ TMP-SMX 160mg/800mg three times weekly until prednisone dose lower than 20mg/d  Continue PTA protonix 40mg daily for GI protection  Closely monitor BP and BG; may need OTP adjustment to DM regimen    KRISTIN (acute kidney injury) (HCC)  Assessment & Plan  Back to baseline  There are not many renal function measurements in the outpatient setting however we may surmise that baseline renal function is around 1.0-1.4  Resume lisinopril 10 mg daily      Rash  Assessment & Plan  Pruritic, nonpainful, erythematous blanching rash on the extremities as well as trunk  Reports he has had this rash that comes and goes intermittently over the last 7 years   Scheduled Tylenol and Benadryl continued, this is what patient takes at home to treat this rash  Derm evaluated patient and punch biopsy pending.  Appreciate their expertise  Follow-up punch biopsy results-reviewed results with dermatology February 15, 2024 by prior provider  Urticarial reaction vs  "hypersensitivity reaction vs eczema  Little increased mucin, but pathologist strongly disfavored a mucin deposition disease  Anticipating significant improvement with systemic steroids    Respiratory disease  Assessment & Plan  Reported history of \"asthma\" although not on any PTA inhalers  Portable chest x-ray in the ER at [place] with low lung volumes with vascular crowding as well as mild opacity at the medial left base, likely atelectasis, but pneumonia/aspiration not excluded in the appropriate clinical setting.  No significant pathology noted on CT chest abdomen pelvis    Cerebral amyloid angiopathy   Assessment & Plan  Noted on MRI  Neurosurgery performed diagnostic angio on February 15  Patient on steroid treatment    Type 2 diabetes mellitus with stage 3a chronic kidney disease, without long-term current use of insulin (HCC)  Assessment & Plan  Lab Results   Component Value Date    HGBA1C 7.7 (H) 09/07/2023       Recent Labs     02/21/24  0153 02/21/24  0416 02/21/24  0614 02/21/24  0752   POCGLU 147* 135 153* 162*         Blood Sugar Average: Last 72 hrs:  (P) 297.8353842706992858  Diabetic diet as well as sliding scale insulin with meals  Will start patient on 5 units of Lantus at night 2/18 and titrate up while patient is receiving steroids  Lantus to 30 units twice daily      Hypercholesterolemia  Assessment & Plan  Holding atorvastatin    Essential hypertension  Assessment & Plan  Continue PTA Norvasc, lisinopril   Blood pressure at goal  Blood pressure anticipated to increase with steroids              Medical Problems       Resolved Problems  Date Reviewed: 2/12/2024   None         Admission Date:   Admission Orders (From admission, onward)       Ordered        02/10/24 0032  Inpatient Admission  Once                            Admitting Diagnosis: Seizure (HCC) [R56.9]    HPI: This is a very pleasant 73-year-old male who presented initially from [place] with a past medical history of hypertension " diabetes and asthma with symptoms of headache.  Patient arrived to [facility] on February 9 as a transfer from [facility].  Patient presented there with new onset seizures.  He was found to have vasogenic edema with unclear etiology.  Patient was reportedly in his baseline health until Thursday morning when he was at [facility] and had a new onset tonic-clonic seizure.  He was then sent to the [facility] where an initial CT head showed cerebral hypodensities the patient was accepted as a transfer to [place] for neurology assessment.  There was noted concern for cerebral amyloid angiopathy possibility on initial assessment.  Procedures Performed: No orders of the defined types were placed in this encounter.      Summary of Hospital Course: He was evaluated by neurology.  Initially brain biopsy was considered ;however , patient deferred in favor of prolonged taper of steroids.  Patient presented clinically with focal vasogenic edema with new onset seizures.  There was a suggested findings of inflammatory cerebral amyloid angiopathy.  Patient is status post lumbar puncture with essentially normal CSF.   Patient will require prolonged steroid regimen.  Will discharge on prednisone 60 mg daily.  Will need close outpatient follow-up with neurology.  PJP prophylaxis while on high-dose steroids until prednisone dosing is at 20 mg daily..              Discharge instructions/Information to patient and family:   See after visit summary for information provided to patient and family.      Provisions for Follow-Up Care:  See after visit summary for information related to follow-up care and any pertinent home health orders.      PCP: Tristin Ling MD    Disposition: Home    Planned Readmission: No    Discharge Statement   I spent 85 minutes discharging the patient. This time was spent on the day of discharge. I had direct contact with the patient on the day of discharge. Additional documentation is required if more than  30 minutes were spent on discharge.     Discharge Medications:  See after visit summary for reconciled discharge medications provided to patient and family.

## 2024-02-21 NOTE — PLAN OF CARE
Problem: SAFETY ADULT  Goal: Patient will remain free of falls  Description: INTERVENTIONS:  - Educate patient/family on patient safety including physical limitations  - Instruct patient to call for assistance with activity   - Consult OT/PT to assist with strengthening/mobility   - Keep Call bell within reach  - Keep bed low and locked with side rails adjusted as appropriate  - Keep care items and personal belongings within reach  - Initiate and maintain comfort rounds  - Make Fall Risk Sign visible to staff  - Offer Toileting every 2 Hours, in advance of need  - Initiate/Maintain bed alarm  - Obtain necessary fall risk management equipment: non skid footwear  - Apply yellow socks and bracelet for high fall risk patients  - Consider moving patient to room near nurses station  Outcome: Progressing

## 2024-02-21 NOTE — CASE MANAGEMENT
Case Management Discharge Planning Note    Patient name Delphine Nelson  Location [facility] 732/[facility] 732-01 MRN [id number]  : 1950 Date 2024       Current Admission Date: 2/10/2024  Current Admission Diagnosis:New onset seizure (HCC)   Patient Active Problem List    Diagnosis Date Noted    KRISTIN (acute kidney injury) (HCC) 2024    Respiratory disease 02/10/2024    Rash 02/10/2024    Brain edema (HCC) 02/10/2024    Cerebral amyloid angiopathy  2024    New onset seizure (HCC) 2024    Type 2 diabetes mellitus with stage 3a chronic kidney disease, without long-term current use of insulin (HCC) 2023    Stage 3a chronic kidney disease (HCC) 2022    Essential hypertension 2020    Colon polyps 2020    Hypercholesterolemia 2020    GERD (gastroesophageal reflux disease) 2020    Erectile dysfunction 2020      LOS (days): 11  Geometric Mean LOS (GMLOS) (days): 5.8  Days to GMLOS:-5.9     OBJECTIVE:  Risk of Unplanned Readmission Score: 13.58         Current admission status: Inpatient   Preferred Pharmacy:   [facility]/pharmacy #[id number] [place], [place] - [address]  [address] [address]  Phone: [phone] Fax: [phone]    [facility] #[id number] [place], [place] - [address] [facility] [address]  Phone: [phone] Fax: [phone]    [facility] [address] [facility] [place] - [address]  [address] [address]  Phone: [phone] Fax: [phone]    [facility] - [place] - [address] [facility]  [address] [facility]  [place] [address]  Phone: [phone] Fax: [phone]    Primary Care Provider: Tristin Ling MD    Primary Insurance: [id number]  REP  Secondary Insurance:     DISCHARGE DETAILS:               Other Referral/Resources/Interventions Provided:  Interventions: Other (Specify), HHA  Referral Comments: Met with pt  and wife Urmila at bedside, discussed rec for HHA for SN for insulin teaching. Both pt and wife refusing VNA, pt's wife has a sister on insulin and can reinforce how to use in addition to being taught here. Pt states he is comfortbale with checking his blood sugars at home and does not need any testing supplies as she has at home. Updated nursing, teaching to be done tomorrow by educator. pt prefers scripts to be sent to [facility].         Treatment Team Recommendation: Home with Home Health Care  Discharge Destination Plan:: Home  Transport at Discharge : Family                             IMM Given (Date):: 02/21/24  IMM Given to:: Patient  Family notified:: Reviewed IMM with pt, questiions answered, copy of IMM given to the pt and original sent ot medical records.pt agreeable to dc to home today/tomorrow

## 2024-02-21 NOTE — ASSESSMENT & PLAN NOTE
Lab Results   Component Value Date    HGBA1C 7.7 (H) 09/07/2023       Recent Labs     02/21/24  0153 02/21/24  0416 02/21/24  0614 02/21/24  0752   POCGLU 147* 135 153* 162*         Blood Sugar Average: Last 72 hrs:  (P) [id number]  Diabetic diet as well as sliding scale insulin with meals  Will start patient on 5 units of Lantus at night 2/18 and titrate up while patient is receiving steroids  Lantus to 30 units twice daily

## 2024-02-21 NOTE — PROGRESS NOTES
[facility]  Progress Note  Name: Delphine Nelson I  MRN: [id number]  Unit/Bed#: [facility] 732-01 I Date of Admission: 2/10/2024   Date of Service: 2/21/2024 I Hospital Day: 11  Addendum: Extensive discussion with patient's wife.  Will complete last dose of Solu-Medrol today.  Wife expressed concerned about uncontrolled blood sugars.  We discussed trying to keep blood sugars in the low 200s.  Patient is off of insulin drip.  Will observe on Lantus 30 twice daily for now.  Monitor blood sugars closely.  Will hold on discharge today in favor of monitoring blood sugars on pulse steroids.  Tentative discharge tomorrow.  Assessment/Plan   * New onset seizure (HCC)  Assessment & Plan  Continue IV Keppra.  F/U CT chest/abdomen/pelvis - Unremarkable   F/U LP-unremarkable  Echo with preserved LVEF  Lab work unremarkable to date  Appreciate neurology and neurosurgery recommendations  Patient had diagnostic angiogram done by neurosurgery on February 15  Left MCA irregularity with areas of narrowing. Findings are nonspecific and may reflect either an atherosclerotic or inflammatory vasculopathy.   Patient opted not to get brain biopsy and instead start IV steroids  As per neurology: Patient will get 1 g of IVMP for 5 days which started on February 17, 2024  After IVMP course start oral prednisone taper:  60 mg daily x1mo  Taper by 10mg per month  Start PJP ppx w/ TMP-SMX 160mg/800mg three times weekly until prednisone dose lower than 20mg/d  Continue PTA protonix 40mg daily for GI protection  Closely monitor BP and BG; may need OTP adjustment to DM regimen    KRISTIN (acute kidney injury) (HCC)  Assessment & Plan  Back to baseline  There are not many renal function measurements in the outpatient setting however we may surmise that baseline renal function is around 1.0-1.4  Resume lisinopril 10 mg daily      Rash  Assessment & Plan  Pruritic, nonpainful, erythematous blanching rash on the extremities  "as well as trunk  Reports he has had this rash that comes and goes intermittently over the last 7 years   Scheduled Tylenol and Benadryl continued, this is what patient takes at home to treat this rash  Derm evaluated patient and punch biopsy pending.  Appreciate their expertise  Follow-up punch biopsy results-reviewed results with dermatology February 15, 2024 by prior provider  Urticarial reaction vs hypersensitivity reaction vs eczema  Little increased mucin, but pathologist strongly disfavored a mucin deposition disease  Anticipating significant improvement with systemic steroids    Respiratory disease  Assessment & Plan  Reported history of \"asthma\" although not on any PTA inhalers  Portable chest x-ray in the ER at [place] with low lung volumes with vascular crowding as well as mild opacity at the medial left base, likely atelectasis, but pneumonia/aspiration not excluded in the appropriate clinical setting.  No significant pathology noted on CT chest abdomen pelvis    Cerebral amyloid angiopathy   Assessment & Plan  Noted on MRI  Neurosurgery performed diagnostic angio on February 15  Patient on steroid treatment    Type 2 diabetes mellitus with stage 3a chronic kidney disease, without long-term current use of insulin ([facility])  Assessment & Plan  Lab Results   Component Value Date    HGBA1C 7.7 (H) 09/07/2023       Recent Labs     02/21/24  0153 02/21/24  0416 02/21/24  0614 02/21/24  0752   POCGLU 147* 135 153* 162*         Blood Sugar Average: Last 72 hrs:  (P) [id number]  Diabetic diet as well as sliding scale insulin with meals  Will start patient on 5 units of Lantus at night 2/18 and titrate up while patient is receiving steroids  Lantus to 30 units twice daily      Hypercholesterolemia  Assessment & Plan  Holding atorvastatin    Essential hypertension  Assessment & Plan  Continue PTA Norvasc, lisinopril   Blood pressure at goal  Blood pressure anticipated to increase with steroids           VTE " Pharmacologic Prophylaxis:   Pharmacologic: Heparin  Mechanical VTE Prophylaxis in Place: No        Time Spent for Care:  55 .  More than 50% of total time spent on counseling and coordination of care as described above.    Current Length of Stay: 11 day(s)    Current Patient Status: Inpatient       Discharge Plan: Tentative discharge tomorrow    Code Status: Level 1 - Full Code      Subjective:   No acute distress    Objective:     Vitals:   Temp (24hrs), Av.8 °F (36.6 °C), Min:97.5 °F (36.4 °C), Max:98.2 °F (36.8 °C)    Temp:  [97.5 °F (36.4 °C)-98.2 °F (36.8 °C)] 97.5 °F (36.4 °C)  HR:  [66-82] 66  Resp:  [16-18] 17  BP: (134-155)/(79-85) 149/85  SpO2:  [93 %-98 %] 97 %  Body mass index is 28.19 kg/m².     Input and Output Summary (last 24 hours):       Intake/Output Summary (Last 24 hours) at 2024 1004  Last data filed at 2024 0600  Gross per 24 hour   Intake 303.37 ml   Output 0 ml   Net 303.37 ml       Physical Exam:     Physical Exam  HENT:      Head: Normocephalic and atraumatic.   Cardiovascular:      Rate and Rhythm: Normal rate and regular rhythm.      Heart sounds: No murmur heard.  Pulmonary:      Effort: Pulmonary effort is normal.      Breath sounds: Normal breath sounds.   Abdominal:      General: Abdomen is flat.      Palpations: Abdomen is soft.   Musculoskeletal:         General: No swelling. Normal range of motion.   Neurological:      General: No focal deficit present.      Mental Status: He is oriented to person, place, and time.   Psychiatric:         Mood and Affect: Mood normal.         Additional Data:     Labs:    Results from last 7 days   Lab Units 24  0651 24  0548 24  0900   WBC Thousand/uL 18.55*   < > 11.48*   HEMOGLOBIN g/dL 13.3   < > 13.5   HEMATOCRIT % 41.1   < > 41.8   PLATELETS Thousands/uL 397*   < > 342   LYMPHO PCT %  --   --  19   MONO PCT %  --   --  1*   EOS PCT %  --   --  11*    < > = values in this interval not displayed.     Results  from last 7 days   Lab Units 02/20/24  0651   POTASSIUM mmol/L 4.6   CHLORIDE mmol/L 99   CO2 mmol/L 27   BUN mg/dL 24   CREATININE mg/dL 1.12   CALCIUM mg/dL 9.0   ALK PHOS U/L 85   ALT U/L 42   AST U/L 22     Results from last 7 days   Lab Units 02/15/24  0533   INR  1.00       * I Have Reviewed All Lab Data Listed Above.  * Additional Pertinent Lab Tests Reviewed: All Labs Within Last 24 Hours Reviewed      Recent Cultures (last 7 days):           Last 24 Hours Medication List:   Current Facility-Administered Medications   Medication Dose Route Frequency Provider Last Rate    acetaminophen  650 mg Oral Q6H PRN Andrea Ibarra      amLODIPine  5 mg Oral Daily Debra Adler PA-C      diphenhydrAMINE  50 mg Oral Daily Andrea Ibarra      diphenhydrAMINE-zinc acetate   Topical TID PRN Inez Mijares PA-C      heparin (porcine)  5,000 Units Subcutaneous Q8H [facility] Tacos Jean MD      hydrocortisone   Topical 4x Daily PRN Inez Mijares PA-C      insulin glargine  30 Units Subcutaneous Q12H [facility] Cristine Ruano DO      levETIRAcetam  1,000 mg Oral Q12H [facility] Tacos Jean MD      lisinopril  10 mg Oral Daily Tacos Jean MD      LORazepam  1 mg Intravenous Q6H PRN Fabiana Cutler DO      methylPREDNISolone sodium succinate  1,000 mg Intravenous Daily Tacos Jean MD 1,000 mg (02/20/24 1703)    ondansetron  4 mg Intravenous Q6H PRN Giovanny Arzola DO      pantoprazole  40 mg Oral Early Morning Giovanny Arzola DO      sulfamethoxazole-trimethoprim  1 tablet Oral Once per day on Monday Wednesday Friday Tacos Jean MD          Today, Patient Was Seen By: Cristine Ruano DO    ** Please Note: Dictation voice to text software may have been used in the creation of this document. **

## 2024-02-21 NOTE — ASSESSMENT & PLAN NOTE
Back to baseline  There are not many renal function measurements in the outpatient setting however we may surmise that baseline renal function is around 1.0-1.4  Resume lisinopril 10 mg daily     Ayad

## 2024-02-21 NOTE — ASSESSMENT & PLAN NOTE
"Reported history of \"asthma\" although not on any PTA inhalers  Portable chest x-ray in the ER at [place] with low lung volumes with vascular crowding as well as mild opacity at the medial left base, likely atelectasis, but pneumonia/aspiration not excluded in the appropriate clinical setting.  No significant pathology noted on CT chest abdomen pelvis  "

## 2024-02-21 NOTE — ASSESSMENT & PLAN NOTE
Continue IV Keppra.  F/U CT chest/abdomen/pelvis - Unremarkable   F/U LP-unremarkable  Echo with preserved LVEF  Lab work unremarkable to date  Appreciate neurology and neurosurgery recommendations  Patient had diagnostic angiogram done by neurosurgery on February 15  Left MCA irregularity with areas of narrowing. Findings are nonspecific and may reflect either an atherosclerotic or inflammatory vasculopathy.   Patient opted not to get brain biopsy and instead start IV steroids  As per neurology: Patient will get 1 g of IVMP for 5 days which started on February 17, 2024  After IVMP course start oral prednisone taper:  60 mg daily x1mo  Taper by 10mg per month  Start PJP ppx w/ TMP-SMX 160mg/800mg three times weekly until prednisone dose lower than 20mg/d  Continue PTA protonix 40mg daily for GI protection  Closely monitor BP and BG; may need OTP adjustment to DM regimen

## 2024-02-22 VITALS
RESPIRATION RATE: 17 BRPM | HEART RATE: 79 BPM | WEIGHT: 188 LBS | HEIGHT: 68 IN | SYSTOLIC BLOOD PRESSURE: 130 MMHG | DIASTOLIC BLOOD PRESSURE: 76 MMHG | BODY MASS INDEX: 28.49 KG/M2 | TEMPERATURE: 98.1 F | OXYGEN SATURATION: 96 %

## 2024-02-22 LAB
GLUCOSE SERPL-MCNC: 286 MG/DL (ref 65–140)
GLUCOSE SERPL-MCNC: 370 MG/DL (ref 65–140)

## 2024-02-22 PROCEDURE — 82948 REAGENT STRIP/BLOOD GLUCOSE: CPT

## 2024-02-22 RX ORDER — CLONAZEPAM 1 MG/1
1 TABLET, ORALLY DISINTEGRATING ORAL 2 TIMES DAILY PRN
Qty: 20 TABLET | Refills: 0 | Status: SHIPPED | OUTPATIENT
Start: 2024-02-22 | End: 2024-02-22

## 2024-02-22 RX ORDER — CLONAZEPAM 1 MG/1
1 TABLET, ORALLY DISINTEGRATING ORAL 2 TIMES DAILY PRN
Qty: 20 TABLET | Refills: 0 | Status: SHIPPED | OUTPATIENT
Start: 2024-02-22 | End: 2024-03-03

## 2024-02-22 RX ORDER — INSULIN LISPRO 100 [IU]/ML
5 INJECTION, SOLUTION INTRAVENOUS; SUBCUTANEOUS
Qty: 15 ML | Refills: 0 | Status: SHIPPED | OUTPATIENT
Start: 2024-02-22

## 2024-02-22 RX ORDER — INSULIN GLARGINE 100 [IU]/ML
30 INJECTION, SOLUTION SUBCUTANEOUS 2 TIMES DAILY
Qty: 30 ML | Refills: 0 | Status: SHIPPED | OUTPATIENT
Start: 2024-02-22

## 2024-02-22 RX ORDER — PREDNISONE 10 MG/1
TABLET ORAL DAILY
Qty: 630 TABLET | Refills: 0 | Status: SHIPPED | OUTPATIENT
Start: 2024-02-22 | End: 2024-08-20

## 2024-02-22 RX ORDER — LEVETIRACETAM 1000 MG/1
1000 TABLET ORAL EVERY 12 HOURS SCHEDULED
Qty: 60 TABLET | Refills: 0 | Status: SHIPPED | OUTPATIENT
Start: 2024-02-22

## 2024-02-22 RX ORDER — LEVETIRACETAM 1000 MG/1
1000 TABLET ORAL EVERY 12 HOURS SCHEDULED
Qty: 60 TABLET | Refills: 0 | Status: SHIPPED | OUTPATIENT
Start: 2024-02-22 | End: 2024-02-22

## 2024-02-22 RX ORDER — SULFAMETHOXAZOLE AND TRIMETHOPRIM 800; 160 MG/1; MG/1
1 TABLET ORAL 3 TIMES WEEKLY
Qty: 65 TABLET | Refills: 0 | Status: SHIPPED | OUTPATIENT
Start: 2024-02-23 | End: 2024-07-22

## 2024-02-22 RX ADMIN — PANTOPRAZOLE SODIUM 40 MG: 40 TABLET, DELAYED RELEASE ORAL at 05:07

## 2024-02-22 RX ADMIN — HEPARIN SODIUM 5000 UNITS: 5000 INJECTION INTRAVENOUS; SUBCUTANEOUS at 05:07

## 2024-02-22 RX ADMIN — AMLODIPINE BESYLATE 5 MG: 5 TABLET ORAL at 09:19

## 2024-02-22 RX ADMIN — INSULIN GLARGINE 30 UNITS: 100 INJECTION, SOLUTION SUBCUTANEOUS at 09:19

## 2024-02-22 RX ADMIN — LISINOPRIL 10 MG: 10 TABLET ORAL at 09:19

## 2024-02-22 RX ADMIN — DIPHENHYDRAMINE HCL 50 MG: 25 TABLET ORAL at 09:19

## 2024-02-22 RX ADMIN — INSULIN LISPRO 5 UNITS: 100 INJECTION, SOLUTION INTRAVENOUS; SUBCUTANEOUS at 09:20

## 2024-02-22 RX ADMIN — INSULIN LISPRO 5 UNITS: 100 INJECTION, SOLUTION INTRAVENOUS; SUBCUTANEOUS at 12:10

## 2024-02-22 RX ADMIN — LEVETIRACETAM 1000 MG: 500 TABLET, FILM COATED ORAL at 09:19

## 2024-02-23 ENCOUNTER — TELEPHONE (OUTPATIENT)
Age: 74
End: 2024-02-23

## 2024-02-23 DIAGNOSIS — N18.31 TYPE 2 DIABETES MELLITUS WITH STAGE 3A CHRONIC KIDNEY DISEASE, WITHOUT LONG-TERM CURRENT USE OF INSULIN (HCC): Primary | ICD-10-CM

## 2024-02-23 DIAGNOSIS — E11.22 TYPE 2 DIABETES MELLITUS WITH STAGE 3A CHRONIC KIDNEY DISEASE, WITHOUT LONG-TERM CURRENT USE OF INSULIN (HCC): Primary | ICD-10-CM

## 2024-02-23 RX ORDER — PEN NEEDLE, DIABETIC 33 GX5/32"
5 NEEDLE, DISPOSABLE MISCELLANEOUS DAILY
Qty: 200 EACH | Refills: 6 | Status: SHIPPED | OUTPATIENT
Start: 2024-02-23

## 2024-02-23 NOTE — TELEPHONE ENCOUNTER
Humalog and Lantus were both prescribed at the hospital but they forgot to call in the script for the kwik pen needle. They only have a few and is using 5 per day so they would like it sent in today.

## 2024-03-01 ENCOUNTER — TELEPHONE (OUTPATIENT)
Age: 74
End: 2024-03-01

## 2024-03-01 NOTE — TELEPHONE ENCOUNTER
Fyi patient called has new meds since discharge of hospital stay. Will discuss on Monday with PCP at appt 3/4

## 2024-03-03 PROBLEM — N17.9 AKI (ACUTE KIDNEY INJURY) (HCC): Status: RESOLVED | Noted: 2024-02-12 | Resolved: 2024-03-03

## 2024-03-03 PROBLEM — N18.31 STAGE 3A CHRONIC KIDNEY DISEASE (HCC): Status: RESOLVED | Noted: 2022-09-14 | Resolved: 2024-03-03

## 2024-03-03 PROBLEM — R21 RASH: Status: RESOLVED | Noted: 2024-02-10 | Resolved: 2024-03-03

## 2024-03-04 ENCOUNTER — TELEPHONE (OUTPATIENT)
Dept: FAMILY MEDICINE CLINIC | Facility: CLINIC | Age: 74
End: 2024-03-04

## 2024-03-05 ENCOUNTER — OFFICE VISIT (OUTPATIENT)
Dept: DERMATOLOGY | Facility: CLINIC | Age: 74
End: 2024-03-05
Payer: COMMERCIAL

## 2024-03-05 VITALS — HEIGHT: 68 IN | BODY MASS INDEX: 28.59 KG/M2

## 2024-03-05 DIAGNOSIS — L50.9 URTICARIA: Primary | ICD-10-CM

## 2024-03-05 DIAGNOSIS — Z48.02 ENCOUNTER FOR REMOVAL OF SUTURES: ICD-10-CM

## 2024-03-05 PROCEDURE — 99214 OFFICE O/P EST MOD 30 MIN: CPT | Performed by: DERMATOLOGY

## 2024-03-05 RX ORDER — PEN NEEDLE, DIABETIC 32GX 5/32"
NEEDLE, DISPOSABLE MISCELLANEOUS
COMMUNITY
Start: 2024-02-23

## 2024-03-05 RX ORDER — SITAGLIPTIN 100 MG/1
TABLET, FILM COATED ORAL
COMMUNITY
Start: 2024-03-03

## 2024-03-05 NOTE — PATIENT INSTRUCTIONS
URTICARIA       Assessment and Plan:  Based on a thorough discussion of this condition and the management approach to it (including a comprehensive discussion of the known risks, side effects and potential benefits of treatment), the patient (family) agrees to implement the following specific plan:  Start Triamcinolone 0.1% ointment topically twice a day for 2 weeks then as needed for itching   Start Hydroxyzine 25 mg by mouth every 8 hours for itching (avoid driving while on medication.    Sutures removed today   Advised to consult with Neurologist about oral Prednisone

## 2024-03-05 NOTE — PROGRESS NOTES
"[facility]atology [facility] Note     Patient Name: Delphine Nelson  Encounter Date: 3/5/2024     Have you been cared for by a [facility] Dermatologist in the last 3 years and, if so, which description applies to you?    NO.   I am considered a \"new\" patient and must complete all patient intake questions. I am MALE/not capable of bearing children.    REVIEW OF SYSTEMS:  Have you recently had or currently have any of the following? Recent fever or chills? No  Any non-healing wound? No   PAST MEDICAL HISTORY:  Have you personally ever had or currently have any of the following?  If \"YES,\" then please provide more detail. Skin cancer (such as Melanoma, Basal Cell Carcinoma, Squamous Cell Carcinoma?  No  Tuberculosis, HIV/AIDS, Hepatitis B or C: No  Radiation Treatment No   HISTORY OF IMMUNOSUPPRESSION:   Do you have a history of any of the following:  Systemic Immunosuppression such as Diabetes, Biologic or Immunotherapy, Chemotherapy, Organ Transplantation, Bone Marrow Transplantation?  YES, diabetes      Answering \"YES\" requires the addition of the dotphrase \"IMMUNOSUPPRESSED\" as the first diagnosis of the patient's visit.   FAMILY HISTORY:  Any \"first degree relatives\" (parent, brother, sister, or child) with the following?    Skin Cancer, Pancreatic or Other Cancer? No   PATIENT EXPERIENCE:    Do you want the Dermatologist to perform a COMPLETE skin exam today including a clinical examination under the \"bra and underwear\" areas?  Yes  If necessary, do we have your permission to call and leave a detailed message on your Preferred Phone number that includes your specific medical information?  Yes      Allergies   Allergen Reactions    Compazine [Prochlorperazine] Tongue Swelling    Zithromax [Azithromycin] Other (See Comments)     .      Current Outpatient Medications:     BD Pen Needle Dominga 2nd Gen 32G X 4 MM MISC, USE 5 EACH IN THE MORNING, Disp: , Rfl:     Januvia 100 MG tablet, , Disp: , Rfl:     metFORMIN " (GLUCOPHAGE) 500 mg tablet, , Disp: , Rfl:     amLODIPine (NORVASC) 5 mg tablet, Take 1 tablet (5 mg total) by mouth daily, Disp: 90 tablet, Rfl: 3    Ascorbic Acid (VITAMIN C PO), Take by mouth, Disp: , Rfl:     aspirin 81 mg chewable tablet, Chew 81 mg daily, Disp: , Rfl:     atorvastatin (LIPITOR) 40 mg tablet, TAKE 1 TABLET BY MOUTH EVERY DAY, Disp: 90 tablet, Rfl: 1    Choline Fenofibrate (Fenofibric Acid) 135 MG CPDR, Take 1 capsule (135 mg total) by mouth daily, Disp: 90 capsule, Rfl: 3    clonazePAM (KlonoPIN) 1 MG disintegrating tablet, Take 1 tablet (1 mg total) by mouth 2 (two) times a day as needed for seizures for up to 10 days As needed for seizure, Disp: 20 tablet, Rfl: 0    clotrimazole-betamethasone (LOTRISONE) 1-0.05 % cream, Apply topically 2 (two) times a day, Disp: 45 g, Rfl: 5    Cyanocobalamin (VITAMIN B12 PO), Take by mouth, Disp: , Rfl:     Insulin Glargine Solostar (Lantus SoloStar) 100 UNIT/ML SOPN, Inject 0.3 mL (30 Units total) under the skin 2 (two) times a day, Disp: 30 mL, Rfl: 0    insulin lispro (HumaLOG KwikPen) 100 units/mL injection pen, Inject 5 Units under the skin 3 (three) times a day with meals, Disp: 15 mL, Rfl: 0    Insulin Pen Needle (Pen Needles) 33G X 4 MM MISC, Use 5 each in the morning, Disp: 200 each, Rfl: 6    levETIRAcetam (KEPPRA) 1000 MG tablet, Take 1 tablet (1,000 mg total) by mouth every 12 (twelve) hours, Disp: 60 tablet, Rfl: 0    lisinopril (ZESTRIL) 10 mg tablet, Take 1 tablet (10 mg total) by mouth daily, Disp: 90 tablet, Rfl: 3    omeprazole (PriLOSEC) 40 MG capsule, Take 1 capsule (40 mg total) by mouth daily before breakfast, Disp: 90 capsule, Rfl: 3    OneTouch Delica Lancets 33G MISC, by Does not apply route 3 (three) times a week onetouch delica lancets 33G test 3 times per week, Disp: 100 each, Rfl: 3    predniSONE 10 mg tablet, Take 6 tablets (60 mg total) by mouth daily for 30 days, THEN 5 tablets (50 mg total) daily for 30 days, THEN 4 tablets  (40 mg total) daily for 30 days, THEN 3 tablets (30 mg total) daily for 30 days, THEN 2 tablets (20 mg total) daily for 30 days, THEN 1 tablet (10 mg total) daily., Disp: 630 tablet, Rfl: 0    sulfamethoxazole-trimethoprim (BACTRIM DS) 800-160 mg per tablet, Take 1 tablet by mouth 3 (three) times a week, Disp: 65 tablet, Rfl: 0          Whom besides the patient is providing clinical information about today's encounter?   NO ADDITIONAL HISTORIAN (patient alone provided history)    Physical Exam and Assessment/Plan by Diagnosis:    URTICARIAL HYPERSENSITIVITY REACTION   Physical Exam:  Anatomic Location Affected:  BL upper and lower extremities, palms, soles, trunk  Morphological Description:  Desquamation on palms, soles, lower legs. Minimal active primary lesions elsewhere   Pertinent Positives:  Pertinent Negatives:      Component    Case Report   Surgical Pathology Report                         Case: [id number]                                   Authorizing Provider:  Kourtney Chavez MD            Collected:           02/10/2024 1542               Ordering Location:     [facility]      Received:            02/10/2024 [address] [place] [address] 7                                                               Pathologist:           Ynes Dumont MD                                                           Specimens:   A) - Skin, Other, A: right inner thigh                                                              B) - Skin, Other, B; right inner thigh                                                    Final Diagnosis   A. Skin, right inner thigh, punch biopsy:     Focal epidermal and follicular spongiosis with vascular ectasia and perivascular mixed inflammatory infiltrate comprised of numerous intravascular neutrophils and scattered eosinophils (see note).     Note: In the appropriate clinical context, the histopathologic findings are compatible with an urticarial  dermatosis/urticarial hypersensitivity reaction; the histopathologic differential diagnosis includes an eczematous process (including a follicular variant) and allergic contact dermatitis. Mucin deposition is increased in the superficial dermis (best seen on colloidal iron stain), which is a nonspecific finding,1 though a concurrent localized variant of lichen myxedematosus cannot be fully excluded (though is not favored); findings of scleromyxedema are not seen. Clinical pathologic correlation is advised. Pathogenic microorganisms are not seen with PAS stain. Amyloid deposition is not seen with Congo red stain. Multiple levels examined. If the rash were to progress/persist despite therapy, additional sampling should be sought to exclude development of a more significant disease.   References:  Joseph FORTUNE, George ALMONTE. J Cutan Pathol. 2015 Oct;42(10):722-9. PMID: [id number].           B. Skin, right inner thigh, immunofluorescence:     IgG: negative  IgM: negative  IgA: negative  C3: negative  Fibrinogen: negative       Impression: The direct immunofluorescence findings are negative/non-diagnostic.   Electronically signed by Ynes Dumont MD on 2/15/2024 at 10:39 AM   Additional Information    All reported additional testing was performed with appropriately reactive controls.  These tests were developed and their performance characteristics determined by [facility] or appropriate performing facility, though some tests may be performed on tissues which have not been validated for performance characteristics (such as staining performed on alcohol exposed cell blocks and decalcified tissues).  Results should be interpreted with caution and in the context of the patients’ clinical condition. These tests may not be cleared or approved by the U.S. Food and Drug Administration, though the FDA has determined that such clearance or approval is not necessary. These tests are used for clinical purposes and  "they should not be regarded as investigational or for research. This laboratory has been approved by [place] 88, designated as a high-complexity laboratory and is qualified to perform these tests.  .   Gross Description    A. The specimen is received in formalin, labeled with the patient's name and hospital number, and is designated \" right inner thigh\".  The specimen consists of a tan portion of skin measuring 0.4 cm in diameter, excised to a depth of 0.5 cm.  The skin surface appears grossly unremarkable.  The margin of resection is inked green.  The skin surface is inked red.  The specimen is bisected.  Entirely submitted. One cassette.  Between sponges.  B. The specimen is received in Antwan's solution, labeled with the patient's name and hospital number, and is designated \" right inner thigh\".  The specimen consists of a tan fragment of skin.  Gross examination only.  The specimen is forwarded to histology for direct immunofluorescence testing.     Note: The estimated total formalin fixation time based upon information provided by the submitting clinician and the standard processing schedule is under 72 hours.     [facility]   Clinical Information    ATTENTION:  DERMPATH GROUP    SPECIMEN A; Skin; Anatomic Location: right inner thigh; Procedure/Protocol: Skin Specimen (submit in FORMALIN):Punch Biopsy (when a punch biopsy tool is used; simple closure is included) (CPT 58626; each additional punch biopsy is CPT 08643)  73 y.o. year old  Male with a Morphological Description:lichenoid papular eruption  Differential Diagnosis and/or Specific Clinical Question: fever, new onset seizure, and chronic papular lichenoid rash. Ddx is broad and includes amyloid, mucinosis, and especially infectious etiologies (EBV, CMV, HIV, leprosy, tuberculids, trichinosis, neurocysticercosis)    SPECIMEN B; Skin; Anatomic Location: right inner thigh; Procedure/Protocol: DIRECT IMMUNOFLUORESCENCE (submit only in Antwan's media or Brain' media " or Normal Saline (must be sent STAT); do NOT submit in formalin)  73 y.o. year old  Male with a Morphological Description:papular lichenoid eruption  Differential Diagnosis and/or Specific Clinical Question:fever, new onset seizure, and chronic papular lichenoid rash. Ddx is broad and includes amyloid, mucinosis, and especially infectious etiologies (EBV, CMV, HIV, leprosy, tuberculids, trichinosis, neurocysticercosis)   Resulting Agency [facility]          Additional History of Present Condition:  Went to ER on 2/10 for seizure. Seen by derm for pruritic papular diffuse rash ongoing for 7 years with concern for possible mucinosis or scleromyxedema or amyloid. Biopsy compatible with urticarial dermatosis/urticarial hypersensitivity reaction with negative DIF. Today patient states pruritus began one month ago and desquamation began on admission to hospital. States he has tried moisturizing creams and was put on prednisone by neurology but still cannot sleep from itch. Denies any new medications, changes to medications, or exposures. Denies illness preceding onset of peeling of hansd and feet. SPEP and UPEP normal. CMV, HIV negative. Itch is his primary issue. States he never got triamcinolone as recommended by inpatient team. Patient is on long term steroid taper prescribed by neurology. On his angiogram, findings c/w possible inflammatory vasculopathy noted and patient declined brain biopsy in favor of long term steroid taper. Currently on 60 mg per day of prednisone with plan to decrease by 10 mg every 30 days.    Assessment and Plan: Unclear etiology. Today has primarily just subacute appearing exfoliation of his palms and soles with no prior infectious trigger to explain this. No urticarial lesions on exam, or findings c/w scleromyxedema or mucinosis. Overall, findings appear to be subacute and c/w the typical desquamation we often see after significant inflammation. Suspect the steroids are likely helping his  skin. Prior photos reviewed which showed much more acute papular lesions diffusely with erythema of palms and soles. No findings suggestive of vasculopathy on skin, and I am unsure how to connect the skin rash to his new onset seizure or possibly inflammatory vasculopathy.      Based on a thorough discussion of this condition and the management approach to it (including a comprehensive discussion of the known risks, side effects and potential benefits of treatment), the patient (family) agrees to implement the following specific plan:  Start Triamcinolone 0.1% ointment topically twice a day; recommend soak and smear qhs  Start Hydroxyzine 25 mg by mouth every 8 hours for itching (avoid driving while on medication)  Call in one week if itch not improved  Sutures removed today   If symptoms persist/progress, would recommend repeat biopsy if any active lesions are present  Follow up in 1 month    Gary Gonzalez MD  PGY-II Dermatology Resident     Suture removal    Date/Time: 3/5/2024 9:30 AM    Performed by: Shanika Jones  Authorized by: Candy Scott MD  Universal Protocol:  Consent: Verbal consent obtained.  Consent given by: patient  Timeout called at: 3/5/2024 10:41 AM.  Patient understanding: patient states understanding of the procedure being performed  Patient consent: the patient's understanding of the procedure matches consent given  Procedure consent: procedure consent matches procedure scheduled  Relevant documents: relevant documents present and verified      Patient location:  Clinic  Location:     Laterality:  Right    Location:  Lower extremity    Lower extremity location: right thigh.  Procedure details:     Tools used:  Suture removal kit    Wound appearance:  No sign(s) of infection and good wound healing    Number of sutures removed:  2  Post-procedure details:     Patient tolerance of procedure:  Tolerated well, no immediate complications     Scribe Attestation      I,:  Shanika Jones am  acting as a scribe while in the presence of the attending physician.:       I,:  Candy Scott MD personally performed the services described in this documentation    as scribed in my presence.:

## 2024-03-06 ENCOUNTER — TELEPHONE (OUTPATIENT)
Dept: DERMATOLOGY | Facility: CLINIC | Age: 74
End: 2024-03-06

## 2024-03-06 RX ORDER — TRIAMCINOLONE ACETONIDE 1 MG/G
OINTMENT TOPICAL 2 TIMES DAILY
Qty: 453.6 G | Refills: 2 | Status: SHIPPED | OUTPATIENT
Start: 2024-03-06 | End: 2024-03-20

## 2024-03-06 RX ORDER — HYDROXYZINE HYDROCHLORIDE 25 MG/1
25 TABLET, FILM COATED ORAL 3 TIMES DAILY
Qty: 90 TABLET | Refills: 0 | Status: SHIPPED | OUTPATIENT
Start: 2024-03-06 | End: 2024-03-06

## 2024-03-06 RX ORDER — HYDROXYZINE HYDROCHLORIDE 25 MG/1
25 TABLET, FILM COATED ORAL 3 TIMES DAILY
Qty: 90 TABLET | Refills: 0 | Status: SHIPPED | OUTPATIENT
Start: 2024-03-06 | End: 2024-04-05

## 2024-03-06 RX ORDER — TRIAMCINOLONE ACETONIDE 1 MG/G
OINTMENT TOPICAL 2 TIMES DAILY
Qty: 453.6 G | Refills: 2 | Status: SHIPPED | OUTPATIENT
Start: 2024-03-06 | End: 2024-03-06

## 2024-03-06 NOTE — TELEPHONE ENCOUNTER
Patient received a call from our office he did not received any voice mail and what's to know why the office called him .   Keon sent a voice message just to let him know that the medication was sent to [facility] . I advised patient to contact the pharmacy and pick it up .

## 2024-03-06 NOTE — TELEPHONE ENCOUNTER
Telephone call from patient that he was seen in office yesterday and some medications were supposed to be sent to the pharmacy for him, but they were not.     After reading the office note it looks like he was supposed to have Triamcinolone cream and Hydroxyzine sent to his pharmacy. He would like it sent over today if possible.     He would like it sent to [id number] [place] - [address] [facility] [phone]    Please advise and let patient know when it was sent in because he will be walking to the pharmacy due to no transportation.

## 2024-03-10 NOTE — ASSESSMENT & PLAN NOTE
LDL 82 and LFTs normal in Sept 2023. Restart atorvastatin 40 mg qhs. Advised pt to follow a low cholesterol diet and to exercise on a regular basis.

## 2024-03-10 NOTE — ASSESSMENT & PLAN NOTE
Patient was admitted from 2/8 to 2/22 following new onset seizure. Patient had LP and MRI brain. 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 Patient was seen by Neurology and started on prednisone taper. Patient needs to schedule follow-up appointment with them.

## 2024-03-10 NOTE — ASSESSMENT & PLAN NOTE
A1C done today and was 9.6. Patient was started on insulin in February 2024 due to being on prednisone. Patient hasn't been taking his medications and needs to restart them. Advised pt to follow a low carb diet and to exercise on a regular basis. Had eye exam recently by Dr Child. Foot exam done today.    Lab Results   Component Value Date    HGBA1C 7.7 (H) 09/07/2023

## 2024-03-10 NOTE — ASSESSMENT & PLAN NOTE
Blood pressure high but patient not taking his medications. Restart amlodipine 5 mg qd and lisinopril 10 mg qd. Pt advised to continue low Na diet and to exercise on a regular basis.

## 2024-03-10 NOTE — ASSESSMENT & PLAN NOTE
Had colonoscopy in February 2020 by Dr Pryor and pt had 9 polyps. Pt advised again to schedule follow-up colonoscopy.

## 2024-03-10 NOTE — ASSESSMENT & PLAN NOTE
Patient was started on Keppra 1000 mg twice a day in February 2024. Patient had EEG on 2/12/24 and was abnormal. Continue management per Neurology.

## 2024-03-11 ENCOUNTER — OFFICE VISIT (OUTPATIENT)
Dept: FAMILY MEDICINE CLINIC | Facility: CLINIC | Age: 74
End: 2024-03-11
Payer: COMMERCIAL

## 2024-03-11 VITALS
OXYGEN SATURATION: 97 % | DIASTOLIC BLOOD PRESSURE: 92 MMHG | BODY MASS INDEX: 28.64 KG/M2 | RESPIRATION RATE: 20 BRPM | HEIGHT: 68 IN | HEART RATE: 100 BPM | SYSTOLIC BLOOD PRESSURE: 142 MMHG | WEIGHT: 189 LBS

## 2024-03-11 DIAGNOSIS — K63.5 POLYP OF COLON, UNSPECIFIED PART OF COLON, UNSPECIFIED TYPE: ICD-10-CM

## 2024-03-11 DIAGNOSIS — I10 ESSENTIAL HYPERTENSION: ICD-10-CM

## 2024-03-11 DIAGNOSIS — K21.9 GASTROESOPHAGEAL REFLUX DISEASE, UNSPECIFIED WHETHER ESOPHAGITIS PRESENT: ICD-10-CM

## 2024-03-11 DIAGNOSIS — I68.0 CEREBRAL AMYLOID ANGIOPATHY: Primary | ICD-10-CM

## 2024-03-11 DIAGNOSIS — E11.22 TYPE 2 DIABETES MELLITUS WITH STAGE 3A CHRONIC KIDNEY DISEASE, WITHOUT LONG-TERM CURRENT USE OF INSULIN (HCC): ICD-10-CM

## 2024-03-11 DIAGNOSIS — E85.4 CEREBRAL AMYLOID ANGIOPATHY: Primary | ICD-10-CM

## 2024-03-11 DIAGNOSIS — R56.9 NEW ONSET SEIZURE (HCC): ICD-10-CM

## 2024-03-11 DIAGNOSIS — E78.00 HYPERCHOLESTEROLEMIA: ICD-10-CM

## 2024-03-11 DIAGNOSIS — N18.31 TYPE 2 DIABETES MELLITUS WITH STAGE 3A CHRONIC KIDNEY DISEASE, WITHOUT LONG-TERM CURRENT USE OF INSULIN (HCC): ICD-10-CM

## 2024-03-11 LAB
CREAT UR-MCNC: 118 MG/DL
MICROALBUMIN UR-MCNC: 62.1 MG/L
MICROALBUMIN/CREAT 24H UR: 53 MG/G CREATININE (ref 0–30)
SL AMB POCT HEMOGLOBIN AIC: 9.6 (ref ?–6.5)

## 2024-03-11 PROCEDURE — 99215 OFFICE O/P EST HI 40 MIN: CPT | Performed by: FAMILY MEDICINE

## 2024-03-11 PROCEDURE — 82043 UR ALBUMIN QUANTITATIVE: CPT | Performed by: FAMILY MEDICINE

## 2024-03-11 PROCEDURE — 82570 ASSAY OF URINE CREATININE: CPT | Performed by: FAMILY MEDICINE

## 2024-03-11 PROCEDURE — 83036 HEMOGLOBIN GLYCOSYLATED A1C: CPT | Performed by: FAMILY MEDICINE

## 2024-03-11 NOTE — PROGRESS NOTES
Assessment/Plan:         Problem List Items Addressed This Visit        Digestive    GERD (gastroesophageal reflux disease)     Stable. Continue omeprazole 40 mg daily and GERD diet.          Colon polyps     Had colonoscopy in February 2020 by Dr Pryor and pt had 9 polyps. Pt advised again to schedule follow-up colonoscopy.             Endocrine    Type 2 diabetes mellitus with stage 3a chronic kidney disease, without long-term current use of insulin ([facility])     A1C done today and was 9.6. Patient was started on insulin in February 2024 due to being on prednisone. Patient hasn't been taking his medications and needs to restart them. Advised pt to follow a low carb diet and to exercise on a regular basis. Had eye exam recently by Dr Child. Foot exam done today.    Lab Results   Component Value Date    HGBA1C 7.7 (H) 09/07/2023          Relevant Orders    Albumin / creatinine urine ratio    POCT hemoglobin A1c (Completed)       Cardiovascular and Mediastinum    Essential hypertension     Blood pressure high but patient not taking his medications. Restart amlodipine 5 mg qd and lisinopril 10 mg qd. Pt advised to continue low Na diet and to exercise on a regular basis.          Cerebral amyloid angiopathy  - Primary     Patient was admitted from 2/8 to 2/22 following new onset seizure. Patient had LP and MRI brain. 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 Patient was seen by Neurology and started on prednisone taper. Patient needs to schedule follow-up appointment with them.          Relevant Orders    Ambulatory Referral to Neurology       Other    New onset seizure (HCC)     Patient was started on Keppra 1000 mg twice a day in February 2024. Patient had EEG on 2/12/24 and was abnormal. Continue management per Neurology.          Relevant Orders    Ambulatory Referral to Neurology    Hypercholesterolemia     LDL 82 and LFTs normal in Sept  2023. Restart atorvastatin 40 mg qhs. Advised pt to follow a low cholesterol diet and to exercise on a regular basis.               Subjective:      Patient ID: Delphine Nelson is a 73 y.o. male.    Patient here for follow-up hospital stay. Patient had  new seizure and was admitted to the hospital from 2/10 to 2/22. Had MRI and showed cerebral amyloid angiopathy. Patient was started on prednisone 60 mg daily and will taper by 10 mg every month. Patient needs to schedule follow-up with Neurology. Patient also put on keppra 1000 mg twice a day. Patient was also started on insulin but hasn't been taking that or other DM medications. Not taking blood pressure medications either.         The following portions of the patient's history were reviewed and updated as appropriate:   Past Medical History:  He has a past medical history of Diabetes mellitus (HCC), ED (erectile dysfunction), GERD (gastroesophageal reflux disease), Hypercholesterolemia, Hypertension, and Migraines.,  _______________________________________________________________________  Medical Problems:  does not have any pertinent problems on file.,  _______________________________________________________________________  Past Surgical History:   has a past surgical history that includes Colonoscopy (08/01/2016); No past surgeries; FL lumbar puncture diagnostic (2/12/2024); and IR cerebral angiography (2/15/2024).,  _______________________________________________________________________  Family History:  family history includes Asthma in his father; Diabetes in his brother; Heart disease in his mother; Kidney disease in his brother.,  _______________________________________________________________________  Social History:   reports that he has never smoked. He has never used smokeless tobacco. He reports that he does not drink alcohol and does not use drugs.,  _______________________________________________________________________  Allergies:  is allergic to  compazine [prochlorperazine] and zithromax [azithromycin]..  _______________________________________________________________________  Current Outpatient Medications   Medication Sig Dispense Refill   • BD Pen Needle Dominga 2nd Gen 32G X 4 MM MISC USE 5 EACH IN THE MORNING     • Insulin Pen Needle (Pen Needles) 33G X 4 MM MISC Use 5 each in the morning 200 each 6   • levETIRAcetam (KEPPRA) 1000 MG tablet Take 1 tablet (1,000 mg total) by mouth every 12 (twelve) hours 60 tablet 0   • omeprazole (PriLOSEC) 40 MG capsule Take 1 capsule (40 mg total) by mouth daily before breakfast 90 capsule 3   • OneTouch Delica Lancets 33G MISC by Does not apply route 3 (three) times a week onetouch delica lancets 33G test 3 times per week 100 each 3   • predniSONE 10 mg tablet Take 6 tablets (60 mg total) by mouth daily for 30 days, THEN 5 tablets (50 mg total) daily for 30 days, THEN 4 tablets (40 mg total) daily for 30 days, THEN 3 tablets (30 mg total) daily for 30 days, THEN 2 tablets (20 mg total) daily for 30 days, THEN 1 tablet (10 mg total) daily. 630 tablet 0   • amLODIPine (NORVASC) 5 mg tablet Take 1 tablet (5 mg total) by mouth daily (Patient not taking: Reported on 3/11/2024) 90 tablet 3   • Ascorbic Acid (VITAMIN C PO) Take by mouth (Patient not taking: Reported on 3/11/2024)     • aspirin 81 mg chewable tablet Chew 81 mg daily (Patient not taking: Reported on 3/11/2024)     • atorvastatin (LIPITOR) 40 mg tablet TAKE 1 TABLET BY MOUTH EVERY DAY (Patient not taking: Reported on 3/11/2024) 90 tablet 1   • Choline Fenofibrate (Fenofibric Acid) 135 MG CPDR Take 1 capsule (135 mg total) by mouth daily (Patient not taking: Reported on 3/11/2024) 90 capsule 3   • clonazePAM (KlonoPIN) 1 MG disintegrating tablet Take 1 tablet (1 mg total) by mouth 2 (two) times a day as needed for seizures for up to 10 days As needed for seizure 20 tablet 0   • clotrimazole-betamethasone (LOTRISONE) 1-0.05 % cream Apply topically 2 (two) times a  day (Patient not taking: Reported on 3/11/2024) 45 g 5   • Cyanocobalamin (VITAMIN B12 PO) Take by mouth (Patient not taking: Reported on 3/11/2024)     • hydrOXYzine HCL (ATARAX) 25 mg tablet Take 1 tablet (25 mg total) by mouth 3 (three) times a day Take by mouth every 8 hours for itching. CAN MAKE YOU DROWSY> DO NOT DRIVE (Patient not taking: Reported on 3/11/2024) 90 tablet 0   • Insulin Glargine Solostar (Lantus SoloStar) 100 UNIT/ML SOPN Inject 0.3 mL (30 Units total) under the skin 2 (two) times a day (Patient not taking: Reported on 3/11/2024) 30 mL 0   • insulin lispro (HumaLOG KwikPen) 100 units/mL injection pen Inject 5 Units under the skin 3 (three) times a day with meals (Patient not taking: Reported on 3/11/2024) 15 mL 0   • Januvia 100 MG tablet  (Patient not taking: Reported on 3/11/2024)     • lisinopril (ZESTRIL) 10 mg tablet Take 1 tablet (10 mg total) by mouth daily (Patient not taking: Reported on 3/11/2024) 90 tablet 3   • metFORMIN (GLUCOPHAGE) 500 mg tablet  (Patient not taking: Reported on 3/11/2024)     • sulfamethoxazole-trimethoprim (BACTRIM DS) 800-160 mg per tablet Take 1 tablet by mouth 3 (three) times a week (Patient not taking: Reported on 3/11/2024) 65 tablet 0   • triamcinolone (KENALOG) 0.1 % ointment Apply topically 2 (two) times a day for 14 days Apply BID for up to 2 weeks to affected skin on neck down prn flares then take one week break and can repeat regimen prn flares. Do not apply to groin, skin folds, face. (Patient not taking: Reported on 3/11/2024) 453.6 g 2     No current facility-administered medications for this visit.     _______________________________________________________________________  Review of Systems   Constitutional:  Negative for fatigue and unexpected weight change.   Respiratory:  Negative for cough and shortness of breath.    Cardiovascular:  Negative for chest pain.   Gastrointestinal:  Negative for abdominal pain, constipation, diarrhea and  "vomiting.   Musculoskeletal:  Negative for arthralgias.   Neurological:  Negative for dizziness and headaches.   Psychiatric/Behavioral:  Negative for dysphoric mood. The patient is not nervous/anxious.          Objective:  Vitals:    03/11/24 0840   BP: 142/92   BP Location: Left arm   Patient Position: Sitting   Cuff Size: Standard   Pulse: 100   Resp: 20   SpO2: 97%   Weight: 85.7 kg (189 lb)   Height: 5' 8\" (1.727 m)     Body mass index is 28.74 kg/m².     Physical Exam  Vitals and nursing note reviewed.   Constitutional:       Appearance: Normal appearance. He is well-developed and normal weight.   Neck:      Thyroid: No thyromegaly.   Cardiovascular:      Rate and Rhythm: Normal rate and regular rhythm.      Pulses: no weak pulses.      Heart sounds: Normal heart sounds. No murmur heard.  Pulmonary:      Effort: Pulmonary effort is normal. No respiratory distress.      Breath sounds: Normal breath sounds. No wheezing.   Musculoskeletal:      Cervical back: Normal range of motion and neck supple.      Right lower leg: No edema.      Left lower leg: No edema.   Feet:      Right foot:      Skin integrity: Dry skin present. No ulcer, skin breakdown, erythema, warmth or callus.      Left foot:      Skin integrity: Dry skin present. No ulcer, skin breakdown, erythema, warmth or callus.   Lymphadenopathy:      Cervical: No cervical adenopathy.   Neurological:      Mental Status: He is alert and oriented to person, place, and time.      Cranial Nerves: No cranial nerve deficit.   Psychiatric:         Mood and Affect: Mood normal.         Behavior: Behavior normal.         Thought Content: Thought content normal.         Judgment: Judgment normal.      Patient's shoes and socks removed.    Right Foot/Ankle   Right Foot Inspection  Skin Exam: skin normal, skin intact and dry skin. No warmth, no callus, no erythema, no maceration, no abnormal color, no pre-ulcer, no ulcer and no callus.     Sensory   Proprioception: " intact      Vascular  Capillary refills: < 3 seconds      Left Foot/Ankle  Left Foot Inspection  Skin Exam: skin normal, skin intact and dry skin. No warmth, no erythema, no maceration, normal color, no pre-ulcer, no ulcer and no callus.     Sensory   Proprioception: intact      Vascular  Capillary refills: < 3 seconds      Assign Risk Category  No deformity present  No loss of protective sensation  No weak pulses  Risk: 0

## 2024-03-19 ENCOUNTER — TELEPHONE (OUTPATIENT)
Dept: ADMINISTRATIVE | Facility: OTHER | Age: 74
End: 2024-03-19

## 2024-03-19 ENCOUNTER — TELEPHONE (OUTPATIENT)
Age: 74
End: 2024-03-19

## 2024-03-19 NOTE — TELEPHONE ENCOUNTER
----- Message from Anastasiia Pham sent at 3/18/2024 11:43 AM EDT -----  Regarding: care gap request  03/18/24 11:43 AM    Hello, our patient attached above has had Diabetic Eye Exam completed/performed. Please assist in updating the patient chart by making an External outreach to dr rothman  facility located in [place]. The date of service is 2023.    Thank you,  Anastasiia Pham  [facility]

## 2024-03-19 NOTE — TELEPHONE ENCOUNTER
Upon review of the In Basket request and the patient's chart, initial outreach has been made via fax to facility. Please see Contacts section for details.     Thank you  Andrea Waterman

## 2024-03-19 NOTE — LETTER
Diabetic Eye Exam Form    Date Requested: 24  Patient: Delphine Nelson  Patient : [id number]   Referring Provider: Tristin Ling MD      DIABETIC Eye Exam Date _______________________________      Type of Exam MUST be documented for Diabetic Eye Exams. Please CHECK ONE.     Retinal Exam       Dilated Retinal Exam       OCT       Optomap-Iris Exam      Fundus Photography       Left Eye - Please check Retinopathy or No Retinopathy        Exam did show retinopathy    Exam did not show retinopathy       Right Eye - Please check Retinopathy or No Retinopathy       Exam did show retinopathy    Exam did not show retinopathy       Comments __________________________________________________________    Practice Providing Exam ______________________________________________    Exam Performed By (print name) _______________________________________      Provider Signature ___________________________________________________      These reports are needed for  compliance.  Please fax this completed form and a copy of the Diabetic Eye Exam report to our office located at [address] [place] [address] as soon as possible via Fax [phone] attention Andrea: Phone [phone]  We thank you for your assistance in treating our mutual patient.

## 2024-03-21 ENCOUNTER — TELEPHONE (OUTPATIENT)
Age: 74
End: 2024-03-21

## 2024-03-21 NOTE — TELEPHONE ENCOUNTER
I called the patient and explained we got their message about transport. I spoke with [employer] team and arranged for 8:50 AM pickup from home address to bring to [facility] office.

## 2024-03-21 NOTE — TELEPHONE ENCOUNTER
Patients GI provider: NAVNEET Cevallos    Number to return call: [phone]    Reason for call: Pt calling to request ride to apt, doc stated pt is not allowed to drive for 6months. Please help to sched and call pt to confirm details.    Scheduled procedure/appointment date if applicable: Apt 03/27/2024

## 2024-03-22 DIAGNOSIS — I68.0 CEREBRAL AMYLOID ANGIOPATHY  (HCC): ICD-10-CM

## 2024-03-22 DIAGNOSIS — E85.4 CEREBRAL AMYLOID ANGIOPATHY  (HCC): ICD-10-CM

## 2024-03-22 RX ORDER — LEVETIRACETAM 1000 MG/1
1000 TABLET ORAL EVERY 12 HOURS SCHEDULED
Qty: 60 TABLET | Refills: 5 | Status: SHIPPED | OUTPATIENT
Start: 2024-03-22 | End: 2024-03-22

## 2024-03-22 RX ORDER — LEVETIRACETAM 1000 MG/1
TABLET ORAL
Qty: 180 TABLET | Refills: 1 | Status: SHIPPED | OUTPATIENT
Start: 2024-03-22

## 2024-03-22 NOTE — TELEPHONE ENCOUNTER
Patient called to check status of refill for leveciracetam. Patient made aware medication was refilled on 03/22/24 for 90 days with 1 refill at [facility]. Patient instructed to contact the pharmacy to obtain refills of medication. Patient verbalized understanding.

## 2024-03-27 ENCOUNTER — CONSULT (OUTPATIENT)
Dept: GASTROENTEROLOGY | Facility: CLINIC | Age: 74
End: 2024-03-27
Payer: COMMERCIAL

## 2024-03-27 VITALS
BODY MASS INDEX: 29.55 KG/M2 | DIASTOLIC BLOOD PRESSURE: 70 MMHG | HEIGHT: 68 IN | TEMPERATURE: 98 F | SYSTOLIC BLOOD PRESSURE: 120 MMHG | WEIGHT: 195 LBS

## 2024-03-27 DIAGNOSIS — I68.0 CEREBRAL AMYLOID ANGIOPATHY  (HCC): ICD-10-CM

## 2024-03-27 DIAGNOSIS — K21.9 GASTROESOPHAGEAL REFLUX DISEASE, UNSPECIFIED WHETHER ESOPHAGITIS PRESENT: ICD-10-CM

## 2024-03-27 DIAGNOSIS — R14.0 BLOATING: ICD-10-CM

## 2024-03-27 DIAGNOSIS — E85.4 CEREBRAL AMYLOID ANGIOPATHY  (HCC): ICD-10-CM

## 2024-03-27 DIAGNOSIS — Z86.010 HISTORY OF COLON POLYPS: Primary | ICD-10-CM

## 2024-03-27 PROCEDURE — 99203 OFFICE O/P NEW LOW 30 MIN: CPT | Performed by: PHYSICIAN ASSISTANT

## 2024-03-27 NOTE — PROGRESS NOTES
[facility] - Outpatient Follow-up Note  Delphine Nelson 73 y.o. male MRN: [id number]  Encounter: [id number]          ASSESSMENT AND PLAN:      1. History of colon polyps  2. 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 Patient was evaluated by neurology and started on a prednisone taper as well as antiseizure medication.     He is due for colonoscopy, however this is nonurgent as he has no alarm symptoms. We would advise following up with neurology to ensure he is cleared and stable to undergo procedure. Patient's son will schedule an appointment and call our office afterward to schedule the colonoscopy. It would be best to obtain neurology clearance prior to performing the procedure.    3. Bloating  Likely due to lactose intolerance, encouraged using Lactaid milk or almond milk instead of cows milk.  He can use simethicone as needed for gas and bloating.    4. Gastroesophageal reflux disease, unspecified whether esophagitis present  His reflux is stable on omeprazole. Patient states he had an upper GI many years ago. He denies difficulty swallowing. No alarm symptoms. Continue omeprazole 40 mg daily as well as GERD diet and lifestyle modifications.    Follow-up as needed  ____________________________________________________________________    SUBJECTIVE: 73-year-old male with history of colon polyps referred by PCP for colonoscopy.  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  Patient was evaluated by neurology and started on a prednisone taper as well as antiseizure medication.  He has not  yet followed up with neurology.    He has history of colon polyps and had colonoscopy in August 2020 with removal of 9 polyps, diverticulosis, and internal hemorrhoids. Advised repeat in 3 years.    His only complaint is intermittent bloating related to lactose intake.  He feels bloated after he eats cereal with milk and has cream in his tea.  He denies abdominal pain, diarrhea, constipation, bloody or black stools, abnormal weight loss.  His reflux is controlled with omeprazole daily.  He denies difficulty swallowing.      REVIEW OF SYSTEMS IS OTHERWISE NEGATIVE.      Historical Information   Past Medical History:   Diagnosis Date    Diabetes mellitus (HCC)     ED (erectile dysfunction)     GERD (gastroesophageal reflux disease)     Hypercholesterolemia     Hypertension     Migraines      Past Surgical History:   Procedure Laterality Date    COLONOSCOPY  08/01/2016    FL LUMBAR PUNCTURE DIAGNOSTIC  2/12/2024    IR CEREBRAL ANGIOGRAPHY  2/15/2024    NO PAST SURGERIES       Social History   Social History     Substance and Sexual Activity   Alcohol Use Never     Social History     Substance and Sexual Activity   Drug Use Never     Social History     Tobacco Use   Smoking Status Never   Smokeless Tobacco Never     Family History   Problem Relation Age of Onset    Heart disease Mother     Asthma Father     Kidney disease Brother     Diabetes Brother        Meds/Allergies       Current Outpatient Medications:     amLODIPine (NORVASC) 5 mg tablet    Ascorbic Acid (VITAMIN C PO)    aspirin 81 mg chewable tablet    atorvastatin (LIPITOR) 40 mg tablet    BD Pen Needle Dominga 2nd Gen 32G X 4 MM MISC    Choline Fenofibrate (Fenofibric Acid) 135 MG CPDR    clonazePAM (KlonoPIN) 1 MG disintegrating tablet    clotrimazole-betamethasone (LOTRISONE) 1-0.05 % cream    Cyanocobalamin (VITAMIN B12 PO)    hydrOXYzine HCL (ATARAX) 25 mg tablet    Insulin Glargine Solostar (Lantus SoloStar) 100 UNIT/ML SOPN    insulin lispro (HumaLOG  "KwikPen) 100 units/mL injection pen    Insulin Pen Needle (Pen Needles) 33G X 4 MM MISC    Januvia 100 MG tablet    levETIRAcetam (KEPPRA) 1000 MG tablet    lisinopril (ZESTRIL) 10 mg tablet    metFORMIN (GLUCOPHAGE) 500 mg tablet    omeprazole (PriLOSEC) 40 MG capsule    OneTouch Delica Lancets 33G MISC    predniSONE 10 mg tablet    sulfamethoxazole-trimethoprim (BACTRIM DS) 800-160 mg per tablet    triamcinolone (KENALOG) 0.1 % ointment    Allergies   Allergen Reactions    Compazine [Prochlorperazine] Tongue Swelling    Zithromax [Azithromycin] Other (See Comments)     .           Objective     Blood pressure 120/70, temperature 98 °F (36.7 °C), temperature source Tympanic, height 5' 8\" (1.727 m), weight 88.5 kg (195 lb). Body mass index is 29.65 kg/m².      PHYSICAL EXAM:      General Appearance:   Alert, cooperative, no distress   HEENT:   Normocephalic, atraumatic, anicteric.     Neck:  Supple, symmetrical, trachea midline   Lungs:   Clear to auscultation bilaterally; no rales, rhonchi or wheezing; respirations unlabored    Heart::   Regular rate and rhythm; no murmur, rub, or gallop.   Abdomen:   Soft, non-tender, non-distended; normal bowel sounds; no masses, no organomegaly    Genitalia:   Deferred    Rectal:   Deferred    Extremities:  No cyanosis, clubbing or edema    Pulses:  2+ and symmetric    Skin:  No jaundice, rashes, or lesions    Lymph nodes:  No palpable cervical lymphadenopathy        Lab Results:   No visits with results within 1 Day(s) from this visit.   Latest known visit with results is:   Office Visit on 03/11/2024   Component Date Value    Hemoglobin A1C 03/11/2024 9.6 (A)     Creatinine, Ur 03/11/2024 118.0     Albumin,U,Random 03/11/2024 62.1 (H)     Albumin Creat Ratio 03/11/2024 53 (H)          Radiology Results:   No results found.   "

## 2024-04-01 DIAGNOSIS — I68.0 CEREBRAL AMYLOID ANGIOPATHY  (HCC): ICD-10-CM

## 2024-04-01 DIAGNOSIS — E85.4 CEREBRAL AMYLOID ANGIOPATHY  (HCC): ICD-10-CM

## 2024-04-01 RX ORDER — INSULIN LISPRO 100 [IU]/ML
5 INJECTION, SOLUTION INTRAVENOUS; SUBCUTANEOUS
Qty: 15 ML | Refills: 0 | Status: SHIPPED | OUTPATIENT
Start: 2024-04-01

## 2024-04-01 NOTE — TELEPHONE ENCOUNTER
Reason for call:   [x] Refill   [] Prior Auth  [] Other:     Office:   [x] PCP/Provider -   [] Specialty/Provider -     Medication:   Insulin Lispro (humalog KwikPen) 100 units/ml- inject 5 units under the skin 3 times daily with meals      Pharmacy: [facility]    Does the patient have enough for 3 days?   [] Yes   [x] No - Send as HP to POD

## 2024-04-02 ENCOUNTER — TELEPHONE (OUTPATIENT)
Dept: NEUROLOGY | Facility: CLINIC | Age: 74
End: 2024-04-02

## 2024-04-02 NOTE — TELEPHONE ENCOUNTER
Hello,     Can you please advise which Speciality/Team and if patient can be seen by an Resident, AP and or Attending  only?    Thank you for your time,     Ondina HAYNES/ DAISY DURAN/ New onset seizure     DC- HOME- 2/22/2024      
No

## 2024-04-04 ENCOUNTER — OFFICE VISIT (OUTPATIENT)
Dept: DERMATOLOGY | Facility: CLINIC | Age: 74
End: 2024-04-04
Payer: COMMERCIAL

## 2024-04-04 ENCOUNTER — TELEPHONE (OUTPATIENT)
Dept: ADMINISTRATIVE | Facility: OTHER | Age: 74
End: 2024-04-04

## 2024-04-04 DIAGNOSIS — L50.9 URTICARIA: Primary | ICD-10-CM

## 2024-04-04 DIAGNOSIS — B35.3 TINEA PEDIS OF BOTH FEET: ICD-10-CM

## 2024-04-04 PROCEDURE — 99214 OFFICE O/P EST MOD 30 MIN: CPT | Performed by: STUDENT IN AN ORGANIZED HEALTH CARE EDUCATION/TRAINING PROGRAM

## 2024-04-04 NOTE — TELEPHONE ENCOUNTER
Upon review of the In Basket request we have found this is a duplicate request and no further action is needed. This request is currently being processed and documentation is being completed in the initial request. This message will now be closed.      Any additional questions or concerns should be emailed to the Practice Liaisons via the appropriate education email address, please do not reply via In Basket.    Thank you  Misael Baig MA

## 2024-04-04 NOTE — TELEPHONE ENCOUNTER
----- Message from Anastasiia Pham sent at 4/3/2024 10:38 AM EDT -----  Regarding: care gap request  04/03/24 10:38 AM    Hello, our patient attached above has had Diabetic Eye Exam completed/performed. Please assist in updating the patient chart by making an External outreach to dr rothman facility located in [facility]. The date of service is within the last 6 months.    Thank you,  Anastasiia Pham  [facility]

## 2024-04-04 NOTE — TELEPHONE ENCOUNTER
As a follow-up, a second attempt has been made for outreach via fax to facility. Please see Contacts section for details.    Thank you  Andrea Waterman

## 2024-04-04 NOTE — PROGRESS NOTES
"[facility] Note     Patient Name: Delphine Nelson  Encounter Date: 4/4/24     Have you been cared for by a [facility] Dermatologist in the last 3 years and, if so, which description applies to you?    Yes.  I have been here within the last 3 years, and my medical history has NOT changed since that time.  I am MALE/not capable of bearing children.    REVIEW OF SYSTEMS:  Have you recently had or currently have any of the following? No changes in my recent health.   PAST MEDICAL HISTORY:  Have you personally ever had or currently have any of the following?  If \"YES,\" then please provide more detail. No changes in my medical history.   HISTORY OF IMMUNOSUPPRESSION: Do you have a history of any of the following:  Systemic Immunosuppression such as Diabetes, Biologic or Immunotherapy, Chemotherapy, Organ Transplantation, Bone Marrow Transplantation?  YES, Diabetes     Answering \"YES\" requires the addition of the dotphrase \"IMMUNOSUPPRESSED\" as the first diagnosis of the patient's visit.   FAMILY HISTORY:  Any \"first degree relatives\" (parent, brother, sister, or child) with the following?    No changes in my family's known health.   PATIENT EXPERIENCE:    Do you want the Dermatologist to perform a COMPLETE skin exam today including a clinical examination under the \"bra and underwear\" areas?  Yes  If necessary, do we have your permission to call and leave a detailed message on your Preferred Phone number that includes your specific medical information?  Yes      Allergies   Allergen Reactions    Compazine [Prochlorperazine] Tongue Swelling    Zithromax [Azithromycin] Other (See Comments)     .    Contrast [Iodinated Contrast Media] Hives     Possible hypersensitivity reaction      Current Outpatient Medications:     BD Pen Needle Dominga 2nd Gen 32G X 4 MM MISC, USE 5 EACH IN THE MORNING, Disp: , Rfl:     insulin lispro (HumaLOG KwikPen) 100 units/mL injection pen, Inject 5 Units under the skin 3 (three) " times a day with meals, Disp: 15 mL, Rfl: 0    Insulin Pen Needle (Pen Needles) 33G X 4 MM MISC, Use 5 each in the morning, Disp: 200 each, Rfl: 6    levETIRAcetam (KEPPRA) 1000 MG tablet, TAKE 1 TABLET(1000 MG) BY MOUTH EVERY 12 HOURS, Disp: 180 tablet, Rfl: 1    omeprazole (PriLOSEC) 40 MG capsule, Take 1 capsule (40 mg total) by mouth daily before breakfast, Disp: 90 capsule, Rfl: 3    OneTouch Delica Lancets 33G MISC, by Does not apply route 3 (three) times a week onetouch delica lancets 33G test 3 times per week, Disp: 100 each, Rfl: 3    predniSONE 10 mg tablet, Take 6 tablets (60 mg total) by mouth daily for 30 days, THEN 5 tablets (50 mg total) daily for 30 days, THEN 4 tablets (40 mg total) daily for 30 days, THEN 3 tablets (30 mg total) daily for 30 days, THEN 2 tablets (20 mg total) daily for 30 days, THEN 1 tablet (10 mg total) daily., Disp: 630 tablet, Rfl: 0    triamcinolone (KENALOG) 0.1 % ointment, Apply topically 2 (two) times a day for 14 days Apply BID for up to 2 weeks to affected skin on neck down prn flares then take one week break and can repeat regimen prn flares. Do not apply to groin, skin folds, face., Disp: 453.6 g, Rfl: 2    amLODIPine (NORVASC) 5 mg tablet, Take 1 tablet (5 mg total) by mouth daily (Patient not taking: Reported on 3/11/2024), Disp: 90 tablet, Rfl: 3    Ascorbic Acid (VITAMIN C PO), Take by mouth (Patient not taking: Reported on 3/11/2024), Disp: , Rfl:     aspirin 81 mg chewable tablet, Chew 81 mg daily (Patient not taking: Reported on 3/11/2024), Disp: , Rfl:     atorvastatin (LIPITOR) 40 mg tablet, TAKE 1 TABLET BY MOUTH EVERY DAY (Patient not taking: Reported on 3/11/2024), Disp: 90 tablet, Rfl: 1    Choline Fenofibrate (Fenofibric Acid) 135 MG CPDR, Take 1 capsule (135 mg total) by mouth daily (Patient not taking: Reported on 3/11/2024), Disp: 90 capsule, Rfl: 3    clonazePAM (KlonoPIN) 1 MG disintegrating tablet, Take 1 tablet (1 mg total) by mouth 2 (two) times a  day as needed for seizures for up to 10 days As needed for seizure, Disp: 20 tablet, Rfl: 0    clotrimazole-betamethasone (LOTRISONE) 1-0.05 % cream, Apply topically 2 (two) times a day (Patient not taking: Reported on 3/11/2024), Disp: 45 g, Rfl: 5    Cyanocobalamin (VITAMIN B12 PO), Take by mouth (Patient not taking: Reported on 3/11/2024), Disp: , Rfl:     hydrOXYzine HCL (ATARAX) 25 mg tablet, Take 1 tablet (25 mg total) by mouth 3 (three) times a day Take by mouth every 8 hours for itching. CAN MAKE YOU DROWSY> DO NOT DRIVE (Patient not taking: Reported on 3/11/2024), Disp: 90 tablet, Rfl: 0    Insulin Glargine Solostar (Lantus SoloStar) 100 UNIT/ML SOPN, Inject 0.3 mL (30 Units total) under the skin 2 (two) times a day (Patient not taking: Reported on 3/11/2024), Disp: 30 mL, Rfl: 0    Januvia 100 MG tablet, , Disp: , Rfl:     lisinopril (ZESTRIL) 10 mg tablet, Take 1 tablet (10 mg total) by mouth daily (Patient not taking: Reported on 3/11/2024), Disp: 90 tablet, Rfl: 3    metFORMIN (GLUCOPHAGE) 500 mg tablet, , Disp: , Rfl:     sulfamethoxazole-trimethoprim (BACTRIM DS) 800-160 mg per tablet, Take 1 tablet by mouth 3 (three) times a week (Patient not taking: Reported on 3/11/2024), Disp: 65 tablet, Rfl: 0          Whom besides the patient is providing clinical information about today's encounter?   NO ADDITIONAL HISTORIAN (patient alone provided history)    Physical Exam and Assessment/Plan by Diagnosis:    URTICARIAL HYPERSENSITIVITY REACTION   Physical Exam:  Anatomic Location Affected:  BL upper and lower extremities, palms, soles, trunk  Morphological Description:  clear on exam today   Pertinent Positives:  Pertinent Negatives:      Additional History of Present Condition: Patient reports no active lesions today. He has been using Triamcinolone 0.1% ointment.     Assessment and Plan:  Based on a thorough discussion of this condition and the management approach to it (including a comprehensive discussion  "of the known risks, side effects and potential benefits of treatment), the patient (family) agrees to implement the following specific plan:  Allergy list updated to include hives/hypersensitivity reaction after IV contrast.   Rash resolved. Educated no need to continue triamcinolone given resolution.     SUSPECTED TINEA PEDIS (\"ATHLETE'S FOOT\") w/ ONYCHOMYCOSIS     Physical Exam:  Anatomic Location Affected:  Bilateral feet  Morphological Description:  desquamation between the toes and thickened yellow toenails  Pertinent Positives:  Pertinent Negatives:    Additional History of Present Condition:  Present on exam.     Assessment and Plan:  Based on a thorough discussion of this condition and the management approach to it (including a comprehensive discussion of the known risks, side effects and potential benefits of treatment), the patient (family) agrees to implement the following specific plan:  Start using Ciclopirox nail polish daily. Educated to apply to nails and adjacent skin nightly and to remove with alcohol every 7-8 days. Educated on need to use for at least 1 year and that improvement would take many months to see given time it takes for toenails to grow.  Start using Ciclopirox cream. Educated to use twice daily for 3 weeks and then 1-2 times a week thereafter  Discussed oral terbinafine however patient will defer at this time    Tinea Pedis  Tinea pedis is a fungal infection of the foot and is in fact the most common fungal infection. Tinea pedis is caused by dermatophyte fungi with the three most common being Trichophyton (T.) rubrum, T. interdigitale and Epidermophyton floccosum.    Tinea pedis most commonly involves the interdigital spaces, known as \"athlete's foot.\" Other typical sites include the toenails, groin, and palms of the hands.  There are four major manifestations of tinea pedis including chronic hyperkeratotic, chronic intertriginous, acute ulcerative and vesicobullous. Signs and " "symptoms include:   Itchiness, redness, and scaling between the toes  Scales covering the soles and sides of the feet  Blisters over the inner aspect of the feet    It is particularly common in hot, tropical, and urban environments where sweating in the feet facilitate fungal growth. Risk factors for development include:  Occlusive footwear  Excessive swearing  Diabetes or other underlying immunosuppression   Poor peripheral circulation     The diagnosis of tinea pedis can usually be made via good history and physical exam due to its characteristic clinical features. Diagnosis can be confirmed by examining skin scrapings under the microscope. Cultures are occasionally done but may not be necessary if fungi are seen under microscopy.     Other diagnoses to consider if patients do not respond to therapy include psoriasis, contact dermatitis, and eczema.    Tinea pedis can be treated with topical antifungal drugs applied to affected areas on a repeated basis (usually 2 twice a day) for 2 to 4 weeks. Common topical medications include topical ketoconazole, allylamines, butenafine, ciclopirox, and tolnaftate.  In cases that do not respond to topical therapy, oral antifungal agents may be used which include terbinafine, itraconazole, fluconazole and griseofulvin. These oral agents are also used to treat tinea capitis (fungal infection of the scalp) and onychomycosis (fungal infection of the nails).  Those with pre-existing liver problems are usually screened for liver function prior to starting oral terbinafine.     Tinea pedis can be prevented by making sure feet are clean and dry with protective footwear worn in communal facilities. Other recommendations are:  Using drying foot powders when wearing occlusive shoes   Thoroughly dry shoes and boots prior to wearing them   Making sure to clean contaminated bathroom floors with bleach   Treatment of family members and other close contacts    TODD (\"SKIN " "TAG\")    Physical Exam:  Anatomic Location Affected:  Neck and Buttocks  Morphological Description:  brown and skin colored papules  Pertinent Positives:  Pertinent Negatives:    Additional History of Present Condition:  Present on exam    Assessment and Plan:  Based on a thorough discussion of this condition and the management approach to it (including a comprehensive discussion of the known risks, side effects and potential benefits of treatment), the patient (family) agrees to implement the following specific plan:  Discussed that removal would be cosmetic. Quoted price of $150 for up to 10.     Skin tags are common, soft, harmless skin lesions that are also called, in the appropriate settings, papillomas, fibroepithelial polyps, and soft fibromas.  They are made up of loosely arranged collagen fibers and blood vessels surrounded by a thickened or thinned-out epidermis.    Skin tags tend to develop in both men and women as we grow older.  They are usually found on the skin folds (neck, armpits, groin).  It is not known what specifically causes skin tags.  Certain factors, though, do appear to play a role:  Chaffing and irritation from skin rubbing together  High levels of growth factors (as seen, for example, in pregnancy or in acromegaly/gigantism)  Insulin resistance  Human papillomavirus (wart virus)    We discussed that most skin tags do not need to be treated unless they are specifically causing the patient physical distress or limitation or pose a risk for a larger problem such as an infection that forms secondary to excoriation or chronic irritation.    We had a thorough discussion of treatment options and specific risks (including that any procedural treatment may not be covered by insurance and would then be the patient's responsibility) and benefits/alternatives including but not limited to the following:  Cryotherapy (freezing)  Shave removal  Surgical excision (snip excision with " scissors)  Electrosurgery  Ligation (we do not do this procedure and counseled against it due to risk of tissue necrosis and infection)            Scribe Attestation      I,:  Benny Delacruz am acting as a scribe while in the presence of the attending physician.:       I,:  Ezequiel Hernandez MD personally performed the services described in this documentation    as scribed in my presence.:           Patient was seen and discussed with MD Andrea Batista DO   PGY-IV Dermatology Resident

## 2024-04-04 NOTE — PATIENT INSTRUCTIONS
What is skin cancer?  Skin cancer is unfortunately very common. That's why we are here to help you on your journey to healthy happy skin! There are two main types of skin cancer: melanoma and non-melanoma skin cancer. Melanoma is a form of skin cancer that often arises within an existing nevus or mole. However, this is not always the case. Melanoma can arise anywhere (not only where you have moles right now). Melanoma can run in families, so letting us know about your family history is important. Non-melanoma skin cancer is the most common type of cancer in the [place]. The two main types of non-melanoma skin cancers are basal cell carcinomas (BCC) and squamous cell carcinoma (SCC). These cancers tend to be less aggressive than melanomas but are still important to look for and treat.    What can I do to prevent skin cancer?  One of the largest risk factors for skin cancer is sun exposure or UV radiation. Therefore, sun protection is louie! Here are some great tips for protecting yourself!  Try to avoid direct sun exposure during peak sun hours (10 AM to 2 PM)  Remember you get A LOT of sun even under cloud coverage and through care windows!  When choosing a sunscreen, look for one that says “broad spectrum” sunscreen. This means it protects you from more of the harmful UV rays.   Choose a sunscreen that is SPF 30 or greater for best protection.   Apply sunscreen to all sun-exposed skin and reapply every 2 hours.   Consider sun protective clothing! Great additions to your sun protective clothing wardrobe include broad brimmed hats, sunglasses, UPF clothing.  Avoid tanning salons. These have been shown to be very harmful in terms of your risk of skin cancer.   Avoid “base tans”. We now know that tans are dangerous (not just sun burns). If you want to have a tan for a trip, consider a spray tan!    Should I check my skin at home between my dermatology appointments?  Yes! It's always a great idea to look at your  skin on a regular basis. Here are some things to look for when monitoring your skin.   For melanoma, look for the ABCDE's!  A = Asymmetry. Look for a spot where one half does not match the other!  B = Borders. Look for a spot that has jagged, ragged or irregular borders.  C = Color. Look for a spot that is not evenly colored and often includes multiple colors, especially true black, red, white, blue, grey.   D = Diameter. Look for a spot that is larger than the size of a pencil eraser.  E = Evolution. If you ever have a spot that is changing in shape, color, size or symptoms (becomes itchy, painful or starts to bleed), always call us!  For non-melanoma skin cancers, look for a new, pink spot that is not going away, especially one that is itchy, painful or bleeding.     What should I do if I see a spot that is concerning for melanoma or non-melanoma skin cancer?  If you are ever concerned, call us! Do not wait for your next appointment. We want to help!

## 2024-04-09 ENCOUNTER — TELEPHONE (OUTPATIENT)
Age: 74
End: 2024-04-09

## 2024-04-09 NOTE — TELEPHONE ENCOUNTER
Pt was calling to confirm next appt with Dr. Ling, did not realize he missed one on 4/4.  Rescheduled for 4/26.  He also has been trying to reach Dr. Cristine Ruano, who he saw in the hospital- [phone].  NFA

## 2024-04-09 NOTE — TELEPHONE ENCOUNTER
PT called Dr Cristine Ruano [facility] neurology and he stated they are not seeing new patients only patients in the hospital so if we can please put in a referral for a new neurologist. Then please call pt so he can schedule, he been waiting a few months to get a hold of a spec after discharge

## 2024-04-10 DIAGNOSIS — E85.4 CEREBRAL AMYLOID ANGIOPATHY  (HCC): Primary | ICD-10-CM

## 2024-04-10 DIAGNOSIS — I68.0 CEREBRAL AMYLOID ANGIOPATHY  (HCC): Primary | ICD-10-CM

## 2024-04-10 NOTE — TELEPHONE ENCOUNTER
Pt advised to call [facility] neurology and schedule appt with any doctor first available

## 2024-04-26 ENCOUNTER — OFFICE VISIT (OUTPATIENT)
Dept: FAMILY MEDICINE CLINIC | Facility: CLINIC | Age: 74
End: 2024-04-26
Payer: COMMERCIAL

## 2024-04-26 VITALS
HEART RATE: 97 BPM | TEMPERATURE: 98 F | SYSTOLIC BLOOD PRESSURE: 122 MMHG | WEIGHT: 199 LBS | OXYGEN SATURATION: 97 % | HEIGHT: 68 IN | BODY MASS INDEX: 30.16 KG/M2 | DIASTOLIC BLOOD PRESSURE: 78 MMHG | RESPIRATION RATE: 16 BRPM

## 2024-04-26 DIAGNOSIS — K21.9 GASTROESOPHAGEAL REFLUX DISEASE, UNSPECIFIED WHETHER ESOPHAGITIS PRESENT: ICD-10-CM

## 2024-04-26 DIAGNOSIS — K63.5 POLYP OF COLON, UNSPECIFIED PART OF COLON, UNSPECIFIED TYPE: ICD-10-CM

## 2024-04-26 DIAGNOSIS — R56.9 NEW ONSET SEIZURE (HCC): ICD-10-CM

## 2024-04-26 DIAGNOSIS — E78.00 HYPERCHOLESTEROLEMIA: ICD-10-CM

## 2024-04-26 DIAGNOSIS — I10 ESSENTIAL HYPERTENSION: Primary | ICD-10-CM

## 2024-04-26 DIAGNOSIS — I68.0 CEREBRAL AMYLOID ANGIOPATHY  (HCC): ICD-10-CM

## 2024-04-26 DIAGNOSIS — R60.0 BILATERAL LEG EDEMA: ICD-10-CM

## 2024-04-26 DIAGNOSIS — N18.31 TYPE 2 DIABETES MELLITUS WITH STAGE 3A CHRONIC KIDNEY DISEASE, WITHOUT LONG-TERM CURRENT USE OF INSULIN (HCC): ICD-10-CM

## 2024-04-26 DIAGNOSIS — E85.4 CEREBRAL AMYLOID ANGIOPATHY  (HCC): ICD-10-CM

## 2024-04-26 DIAGNOSIS — E11.22 TYPE 2 DIABETES MELLITUS WITH STAGE 3A CHRONIC KIDNEY DISEASE, WITHOUT LONG-TERM CURRENT USE OF INSULIN (HCC): ICD-10-CM

## 2024-04-26 PROCEDURE — 99215 OFFICE O/P EST HI 40 MIN: CPT | Performed by: FAMILY MEDICINE

## 2024-04-26 PROCEDURE — G2211 COMPLEX E/M VISIT ADD ON: HCPCS | Performed by: FAMILY MEDICINE

## 2024-04-26 RX ORDER — BLOOD SUGAR DIAGNOSTIC
STRIP MISCELLANEOUS
Qty: 100 EACH | Refills: 3 | Status: SHIPPED | OUTPATIENT
Start: 2024-04-26

## 2024-04-26 RX ORDER — BLOOD-GLUCOSE METER
KIT MISCELLANEOUS
Qty: 1 KIT | Refills: 0 | Status: SHIPPED | OUTPATIENT
Start: 2024-04-26

## 2024-04-26 RX ORDER — LANCETS 33 GAUGE
EACH MISCELLANEOUS
Qty: 100 EACH | Refills: 3 | Status: SHIPPED | OUTPATIENT
Start: 2024-04-26

## 2024-04-26 NOTE — ASSESSMENT & PLAN NOTE
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 Patient was seen by Neurology and started on prednisone taper. Currently takes 40 mg daily. Patient needs to schedule follow-up appointment with them.

## 2024-04-26 NOTE — ASSESSMENT & PLAN NOTE
Patient has had it for 3 days. No other symptoms. Will check labs and to consider stopping amlodipine.

## 2024-04-26 NOTE — ASSESSMENT & PLAN NOTE
Continue atorvastatin 40 mg qhs. Advised pt to follow a low cholesterol diet and to exercise on a regular basis.

## 2024-04-26 NOTE — PROGRESS NOTES
Depression Screening and Follow-up Plan: Patient was screened for depression during today's encounter. They screened negative with a PHQ-2 score of 0.    Assessment/Plan:         Problem List Items Addressed This Visit        Cardiovascular and Mediastinum    Essential hypertension - Primary     Blood pressure better. Continue amlodipine 5 mg qd and lisinopril 10 mg qd. Pt advised to continue low Na diet and to exercise on a regular basis.          Relevant Orders    Comprehensive metabolic panel    Lipid Panel with Direct LDL reflex    Cerebral amyloid angiopathy  (HCC)     MRI done in February 2024 showed inflammatory cerebral amyloid angiopathy with a subacute cortical microbleed in the left frontal cortex. Patient was seen by Neurology and started on prednisone taper. Currently takes 40 mg daily. Patient needs to schedule follow-up appointment with them.             Digestive    GERD (gastroesophageal reflux disease)     Stable. Continue omeprazole 40 mg daily and GERD diet.          Colon polyps     Had colonoscopy in February 2020 by Dr Pryor and pt had 9 polyps. Pt saw Gastroenterology and needs follow-up colonoscopy but waiting until he is cleared by Neurology.             Endocrine    Type 2 diabetes mellitus with stage 3a chronic kidney disease, without long-term current use of insulin (HCC)     A1C 9.6 in March 2024. Patient was started on insulin in February 2024 due to being on prednisone. Continue januvia 100 mg daily and metformin 1000 mg twice a day. Advised pt to follow a low carb diet and to exercise on a regular basis. Had eye exam recently by Dr Child. Foot exam done in March 2024. Urine albumin/creatinine ratio done in March 2024..    Lab Results   Component Value Date    HGBA1C 9.6 (A) 03/11/2024          Relevant Medications    Blood Glucose Monitoring Suppl (OneTouch Verio Reflect) w/Device KIT    glucose blood (OneTouch Verio) test strip    OneTouch Delica Lancets 33G MISC    Other  Relevant Orders    Comprehensive metabolic panel    Lipid Panel with Direct LDL reflex       Surgery/Wound/Pain    Bilateral leg edema     Patient has had it for 3 days. No other symptoms. Will check labs and to consider stopping amlodipine.          Relevant Orders    B-Type Natriuretic Peptide(BNP)    TSH, 3rd generation with Free T4 reflex       Neurology/Sleep    New onset seizure (HCC)     Patient was started on Keppra 1000 mg twice a day in February 2024. Patient had EEG on 2/12/24 and was abnormal. Continue management per Neurology.             Other    Hypercholesterolemia     Continue atorvastatin 40 mg qhs. Advised pt to follow a low cholesterol diet and to exercise on a regular basis.          Relevant Orders    Comprehensive metabolic panel    Lipid Panel with Direct LDL reflex         Subjective:      Patient ID: Delphine Nelson is a 73 y.o. male.    Patient here for follow-up Hypertension, Hyperlipidemia, DM, Cerebral Amyloid, Angiopathy, Seizure disorder, GERD, Colon Polyps. Patient doing ok. No chest pain or shortness of breath. No headaches. No recent seizures. Needs to follow-up with Neurology. Blood pressure has been ok. Sugars still high. Patient saw Gastroenterology and holding off on colonoscopy until Neurology status ok. Patient also has swelling of legs for past 3 days.         The following portions of the patient's history were reviewed and updated as appropriate:   Past Medical History:  He has a past medical history of Diabetes mellitus (HCC), ED (erectile dysfunction), GERD (gastroesophageal reflux disease), Hypercholesterolemia, Hypertension, and Migraines.,  _______________________________________________________________________  Medical Problems:  does not have any pertinent problems on file.,  _______________________________________________________________________  Past Surgical History:   has a past surgical history that includes Colonoscopy (08/01/2016); No past surgeries; [place]  lumbar puncture diagnostic (2/12/2024); and IR cerebral angiography (2/15/2024).,  _______________________________________________________________________  Family History:  family history includes Asthma in his father; Diabetes in his brother; Heart disease in his mother; Kidney disease in his brother.,  _______________________________________________________________________  Social History:   reports that he has never smoked. He has never used smokeless tobacco. He reports that he does not drink alcohol and does not use drugs.,  _______________________________________________________________________  Allergies:  is allergic to compazine [prochlorperazine], zithromax [azithromycin], and contrast [iodinated contrast media]..  _______________________________________________________________________  Current Outpatient Medications   Medication Sig Dispense Refill   • amLODIPine (NORVASC) 5 mg tablet Take 1 tablet (5 mg total) by mouth daily 90 tablet 3   • Ascorbic Acid (VITAMIN C PO) Take by mouth     • aspirin 81 mg chewable tablet Chew 81 mg daily     • atorvastatin (LIPITOR) 40 mg tablet TAKE 1 TABLET BY MOUTH EVERY DAY 90 tablet 1   • BD Pen Needle Dom[place]a 2nd Gen 32G X 4 MM MISC USE 5 EACH IN THE MORNING     • Blood Glucose Monitoring Suppl (OneTouch Verio Reflect) w/Device KIT Check blood sugars once daily. Please substitute with appropriate alternative as covered by patient's insurance. Dx: E11.65 1 kit 0   • Choline Fenofibrate (Fenofibric Acid) 135 MG CPDR Take 1 capsule (135 mg total) by mouth daily 90 capsule 3   • ciclopirox (LOPROX) 0.77 % cream Apply topically 2 (two) times a day 90 g 2   • Ciclopirox 8 % KIT Please apply daily. Wash toenail in 7 days and repeat cycle. 34.6 mL 2   • clotrimazole-betamethasone (LOTRISONE) 1-0.05 % cream Apply topically 2 (two) times a day 45 g 5   • Cyanocobalamin (VITAMIN B12 PO) Take by mouth     • glucose blood (OneTouch Verio) test strip Check blood sugars once daily.  Please substitute with appropriate alternative as covered by patient's insurance. Dx: E11.65 100 each 3   • hydrOXYzine HCL (ATARAX) 25 mg tablet Take 1 tablet (25 mg total) by mouth 3 (three) times a day Take by mouth every 8 hours for itching. CAN MAKE YOU DROWSY> DO NOT DRIVE 90 tablet 0   • Insulin Glargine Solostar (Lantus SoloStar) 100 UNIT/ML SOPN Inject 0.3 mL (30 Units total) under the skin 2 (two) times a day 30 mL 0   • insulin lispro (HumaLOG KwikPen) 100 units/mL injection pen Inject 5 Units under the skin 3 (three) times a day with meals 15 mL 0   • Insulin Pen Needle (Pen Needles) 33G X 4 MM MISC Use 5 each in the morning 200 each 6   • Januvia 100 MG tablet      • levETIRAcetam (KEPPRA) 1000 MG tablet TAKE 1 TABLET(1000 MG) BY MOUTH EVERY 12 HOURS 180 tablet 1   • lisinopril (ZESTRIL) 10 mg tablet Take 1 tablet (10 mg total) by mouth daily 90 tablet 3   • metFORMIN (GLUCOPHAGE) 500 mg tablet      • omeprazole (PriLOSEC) 40 MG capsule Take 1 capsule (40 mg total) by mouth daily before breakfast 90 capsule 3   • OneTouch Delica Lancets 33G MISC by Does not apply route 3 (three) times a week onetouch delica lancets 33G test 3 times per week 100 each 3   • OneTouch Delica Lancets 33G MISC Check blood sugars once daily. Please substitute with appropriate alternative as covered by patient's insurance. Dx: E11.65 100 each 3   • predniSONE 10 mg tablet Take 6 tablets (60 mg total) by mouth daily for 30 days, THEN 5 tablets (50 mg total) daily for 30 days, THEN 4 tablets (40 mg total) daily for 30 days, THEN 3 tablets (30 mg total) daily for 30 days, THEN 2 tablets (20 mg total) daily for 30 days, THEN 1 tablet (10 mg total) daily. 630 tablet 0   • triamcinolone (KENALOG) 0.1 % ointment Apply topically 2 (two) times a day for 14 days Apply BID for up to 2 weeks to affected skin on neck down prn flares then take one week break and can repeat regimen prn flares. Do not apply to groin, skin folds, face. 453.6 g  "2   • clonazePAM (KlonoPIN) 1 MG disintegrating tablet Take 1 tablet (1 mg total) by mouth 2 (two) times a day as needed for seizures for up to 10 days As needed for seizure 20 tablet 0   • sulfamethoxazole-trimethoprim (BACTRIM DS) 800-160 mg per tablet Take 1 tablet by mouth 3 (three) times a week (Patient not taking: Reported on 4/26/2024) 65 tablet 0     No current facility-administered medications for this visit.     _______________________________________________________________________  Review of Systems   Constitutional:  Positive for fatigue. Negative for unexpected weight change.   Respiratory:  Negative for cough and shortness of breath.    Cardiovascular:  Positive for leg swelling. Negative for chest pain.   Gastrointestinal:  Negative for abdominal pain, constipation, diarrhea and vomiting.   Musculoskeletal:  Negative for arthralgias.   Neurological:  Negative for dizziness and headaches.   Psychiatric/Behavioral:  Negative for dysphoric mood. The patient is not nervous/anxious.          Objective:  Vitals:    04/26/24 1032   BP: 122/78   BP Location: Left arm   Patient Position: Sitting   Cuff Size: Standard   Pulse: 97   Resp: 16   Temp: 98 °F (36.7 °C)   TempSrc: Tympanic   SpO2: 97%   Weight: 90.3 kg (199 lb)   Height: 5' 8\" (1.727 m)     Body mass index is 30.26 kg/m².     Physical Exam  Vitals and nursing note reviewed.   Constitutional:       Appearance: Normal appearance. He is well-developed and normal weight.   Neck:      Thyroid: No thyromegaly.   Cardiovascular:      Rate and Rhythm: Normal rate and regular rhythm.      Heart sounds: Normal heart sounds. No murmur heard.  Pulmonary:      Effort: Pulmonary effort is normal. No respiratory distress.      Breath sounds: Normal breath sounds. No wheezing.   Musculoskeletal:      Cervical back: Normal range of motion and neck supple.      Right lower leg: Edema present.      Left lower leg: Edema present.   Lymphadenopathy:      Cervical: No " cervical adenopathy.   Neurological:      Mental Status: He is alert and oriented to person, place, and time.      Cranial Nerves: No cranial nerve deficit.   Psychiatric:         Mood and Affect: Mood normal.         Behavior: Behavior normal.         Thought Content: Thought content normal.         Judgment: Judgment normal.

## 2024-04-26 NOTE — ASSESSMENT & PLAN NOTE
Had colonoscopy in February 2020 by Dr Pryor and pt had 9 polyps. Pt saw Gastroenterology and needs follow-up colonoscopy but waiting until he is cleared by Neurology.

## 2024-04-26 NOTE — ASSESSMENT & PLAN NOTE
A1C 9.6 in March 2024. Patient was started on insulin in February 2024 due to being on prednisone. Continue januvia 100 mg daily and metformin 1000 mg twice a day. Advised pt to follow a low carb diet and to exercise on a regular basis. Had eye exam recently by Dr Child. Foot exam done in March 2024. Urine albumin/creatinine ratio done in March 2024..    Lab Results   Component Value Date    HGBA1C 9.6 (A) 03/11/2024

## 2024-04-26 NOTE — ASSESSMENT & PLAN NOTE
Blood pressure better. Continue amlodipine 5 mg qd and lisinopril 10 mg qd. Pt advised to continue low Na diet and to exercise on a regular basis.

## 2024-04-29 ENCOUNTER — APPOINTMENT (OUTPATIENT)
Dept: LAB | Facility: HOSPITAL | Age: 74
End: 2024-04-29
Payer: COMMERCIAL

## 2024-04-29 LAB
ALBUMIN SERPL BCP-MCNC: 4 G/DL (ref 3.5–5)
ALP SERPL-CCNC: 39 U/L (ref 34–104)
ALT SERPL W P-5'-P-CCNC: 37 U/L (ref 7–52)
ANION GAP SERPL CALCULATED.3IONS-SCNC: 8 MMOL/L (ref 4–13)
AST SERPL W P-5'-P-CCNC: 20 U/L (ref 13–39)
BILIRUB SERPL-MCNC: 0.56 MG/DL (ref 0.2–1)
BNP SERPL-MCNC: 46 PG/ML (ref 0–100)
BUN SERPL-MCNC: 19 MG/DL (ref 5–25)
CALCIUM SERPL-MCNC: 9.1 MG/DL (ref 8.4–10.2)
CHLORIDE SERPL-SCNC: 103 MMOL/L (ref 96–108)
CHOLEST SERPL-MCNC: 146 MG/DL
CO2 SERPL-SCNC: 30 MMOL/L (ref 21–32)
CREAT SERPL-MCNC: 0.99 MG/DL (ref 0.6–1.3)
GFR SERPL CREATININE-BSD FRML MDRD: 76 ML/MIN/1.73SQ M
GLUCOSE P FAST SERPL-MCNC: 66 MG/DL (ref 65–99)
HDLC SERPL-MCNC: 72 MG/DL
LDLC SERPL CALC-MCNC: 65 MG/DL (ref 0–100)
POTASSIUM SERPL-SCNC: 5 MMOL/L (ref 3.5–5.3)
PROT SERPL-MCNC: 6.3 G/DL (ref 6.4–8.4)
SODIUM SERPL-SCNC: 141 MMOL/L (ref 135–147)
TRIGL SERPL-MCNC: 43 MG/DL
TSH SERPL DL<=0.05 MIU/L-ACNC: 1.1 UIU/ML (ref 0.45–4.5)

## 2024-04-29 PROCEDURE — 80053 COMPREHEN METABOLIC PANEL: CPT | Performed by: FAMILY MEDICINE

## 2024-04-29 PROCEDURE — 36415 COLL VENOUS BLD VENIPUNCTURE: CPT | Performed by: FAMILY MEDICINE

## 2024-04-29 PROCEDURE — 80061 LIPID PANEL: CPT | Performed by: FAMILY MEDICINE

## 2024-04-29 PROCEDURE — 84443 ASSAY THYROID STIM HORMONE: CPT | Performed by: FAMILY MEDICINE

## 2024-04-29 PROCEDURE — 83880 ASSAY OF NATRIURETIC PEPTIDE: CPT | Performed by: FAMILY MEDICINE

## 2024-05-10 ENCOUNTER — TELEPHONE (OUTPATIENT)
Dept: FAMILY MEDICINE CLINIC | Facility: CLINIC | Age: 74
End: 2024-05-10

## 2024-05-10 DIAGNOSIS — N18.31 TYPE 2 DIABETES MELLITUS WITH STAGE 3A CHRONIC KIDNEY DISEASE, WITHOUT LONG-TERM CURRENT USE OF INSULIN (HCC): ICD-10-CM

## 2024-05-10 DIAGNOSIS — E11.22 TYPE 2 DIABETES MELLITUS WITH STAGE 3A CHRONIC KIDNEY DISEASE, WITHOUT LONG-TERM CURRENT USE OF INSULIN (HCC): ICD-10-CM

## 2024-05-10 DIAGNOSIS — E11.9 DIABETES MELLITUS WITHOUT COMPLICATION (HCC): ICD-10-CM

## 2024-05-10 RX ORDER — BLOOD-GLUCOSE METER
KIT MISCELLANEOUS
Qty: 1 KIT | Refills: 0 | Status: SHIPPED | OUTPATIENT
Start: 2024-05-10

## 2024-05-10 RX ORDER — BLOOD SUGAR DIAGNOSTIC
STRIP MISCELLANEOUS
Qty: 100 EACH | Refills: 3 | Status: SHIPPED | OUTPATIENT
Start: 2024-05-10

## 2024-05-10 RX ORDER — LANCETS 33 GAUGE
EACH MISCELLANEOUS 3 TIMES WEEKLY
Qty: 100 EACH | Refills: 3 | Status: SHIPPED | OUTPATIENT
Start: 2024-05-10

## 2024-05-10 RX ORDER — BLOOD SUGAR DIAGNOSTIC
STRIP MISCELLANEOUS
Qty: 100 EACH | Refills: 3 | Status: CANCELLED | OUTPATIENT
Start: 2024-05-10

## 2024-05-10 NOTE — TELEPHONE ENCOUNTER
Pt is out of his glucose strips and [facility]ma[facility] told him he needs to contact his Dr.  I show we sent a script for those and a device on 4/26 and the strips have refills.  Pt said he never picked up and Armaan does not have it.  He asked us to please resend

## 2024-05-17 DIAGNOSIS — E85.4 CEREBRAL AMYLOID ANGIOPATHY  (HCC): ICD-10-CM

## 2024-05-17 DIAGNOSIS — I68.0 CEREBRAL AMYLOID ANGIOPATHY  (HCC): ICD-10-CM

## 2024-05-17 NOTE — TELEPHONE ENCOUNTER
Pt stated that the pharmacy is waiting for this script so they can fill it for him.    Reason for call:   [x] Refill   [] Prior Auth  [] Other:     Office:   [x] PCP/Provider - Tristin Ling MD   [] Specialty/Provider -     Medication: Insulin Glargine Solostar (Lantus SoloStar) 100 UNIT/ML SOPN     Dose/Frequency: Inject 0.3 mL (30 Units total) under the skin 2 (two) times a day     Quantity: 30 mL     Pharmacy: [facility]/pharmacy #[id number] [address]     Does the patient have enough for 3 days?   [] Yes   [x] No - Send as HP to POD

## 2024-05-20 RX ORDER — INSULIN GLARGINE 100 [IU]/ML
30 INJECTION, SOLUTION SUBCUTANEOUS 2 TIMES DAILY
Qty: 30 ML | Refills: 0 | Status: SHIPPED | OUTPATIENT
Start: 2024-05-20

## 2024-05-30 DIAGNOSIS — N18.31 TYPE 2 DIABETES MELLITUS WITH STAGE 3A CHRONIC KIDNEY DISEASE, WITHOUT LONG-TERM CURRENT USE OF INSULIN (HCC): Primary | ICD-10-CM

## 2024-05-30 DIAGNOSIS — E11.22 TYPE 2 DIABETES MELLITUS WITH STAGE 3A CHRONIC KIDNEY DISEASE, WITHOUT LONG-TERM CURRENT USE OF INSULIN (HCC): Primary | ICD-10-CM

## 2024-05-30 RX ORDER — BLOOD SUGAR DIAGNOSTIC
1 STRIP MISCELLANEOUS DAILY
COMMUNITY
End: 2024-05-30 | Stop reason: SDUPTHER

## 2024-05-30 RX ORDER — LANCETS
EACH MISCELLANEOUS
Qty: 100 EACH | Refills: 3 | Status: SHIPPED | OUTPATIENT
Start: 2024-05-30

## 2024-05-30 RX ORDER — LANCETS
1 EACH MISCELLANEOUS DAILY
COMMUNITY

## 2024-05-30 RX ORDER — BLOOD SUGAR DIAGNOSTIC
1 STRIP MISCELLANEOUS DAILY
Qty: 100 STRIP | Refills: 3 | Status: SHIPPED | OUTPATIENT
Start: 2024-05-30

## 2024-06-06 NOTE — TELEPHONE ENCOUNTER
As a final attempt, a third outreach has been made via telephone call to facility. Please see Contacts section for details. This encounter will be closed and completed by end of day. Should we receive the requested information because of previous outreach attempts, the requested patient's chart will be updated appropriately.     Thank you  Andrea Waterman

## 2024-06-06 NOTE — TELEPHONE ENCOUNTER
Upon review of the In Basket request we were able to locate, review, and update the patient chart as requested for Diabetic Eye Exam.    Any additional questions or concerns should be emailed to the Practice Liaisons via the appropriate education email address, please do not reply via In Basket.    Thank you  Andrea Waterman   [facility]

## 2024-06-08 DIAGNOSIS — I68.0 CEREBRAL AMYLOID ANGIOPATHY  (HCC): ICD-10-CM

## 2024-06-08 DIAGNOSIS — E85.4 CEREBRAL AMYLOID ANGIOPATHY  (HCC): ICD-10-CM

## 2024-06-08 RX ORDER — INSULIN LISPRO 100 [IU]/ML
INJECTION, SOLUTION INTRAVENOUS; SUBCUTANEOUS
Qty: 15 ML | Refills: 3 | Status: SHIPPED | OUTPATIENT
Start: 2024-06-08

## 2024-06-19 ENCOUNTER — OFFICE VISIT (OUTPATIENT)
Age: 74
End: 2024-06-19
Payer: COMMERCIAL

## 2024-06-19 VITALS
WEIGHT: 209 LBS | BODY MASS INDEX: 31.67 KG/M2 | HEART RATE: 86 BPM | OXYGEN SATURATION: 99 % | HEIGHT: 68 IN | SYSTOLIC BLOOD PRESSURE: 136 MMHG | DIASTOLIC BLOOD PRESSURE: 80 MMHG

## 2024-06-19 DIAGNOSIS — R56.9 NEW ONSET SEIZURE (HCC): ICD-10-CM

## 2024-06-19 DIAGNOSIS — I68.0 CEREBRAL AMYLOID ANGIOPATHY  (HCC): Primary | ICD-10-CM

## 2024-06-19 DIAGNOSIS — E85.4 CEREBRAL AMYLOID ANGIOPATHY  (HCC): Primary | ICD-10-CM

## 2024-06-19 PROCEDURE — 99215 OFFICE O/P EST HI 40 MIN: CPT | Performed by: PSYCHIATRY & NEUROLOGY

## 2024-06-19 RX ORDER — GLIPIZIDE 5 MG/1
5 TABLET, FILM COATED, EXTENDED RELEASE ORAL DAILY
COMMUNITY
Start: 2024-05-05

## 2024-06-19 NOTE — PROGRESS NOTES
NEUROLOGY OUTPATIENT - NEW PATIENT VISIT NOTE        NAME: Delphine Nelson  MRN: [id number]  : 1950     TODAY'S DATE: 24         HISTORY OF PRESENT ILLNESS (HPI):    Mr. Nelson is a 73 y.o. male presenting with cerebral amyloid angiopathy/questionable vasculitis    2024-patient presented to [facility] with a witnessed seizure at [facility].  On arrival of the EMS noted that the patient was posturing and he was given 4 mg of Versed.  He was then taken to the ER where he was combative and postictal confusion.  Blood sugar was in the 200s.  Apparently the patient did not have any prior history of seizures he was started on 1 g of Keppra.  At that time he was also noticed to have pruritic, unknown painful erythematous rash on his extremities which have been happening intermittently for the last 7 years.  The patient underwent CT head which was showing a left hypodensity in the right parieto-occipital/left frontal region.  CTA head and neck showed focal areas of vasogenic edema concerning for possible amyloid angiopathy while the MRI of the brain was showing findings consistent with cerebral amyloid angiopathy with subacute cortical microbleed's in the left frontal region.  The patient also underwent transthoracic echo which was showing preserved EF with mild AV stenosis.  The routine EEG was showing intermittent left frontal/temporal delta activities with focal area of neuronal dysfunction.  The patient then underwent a spinal tap which was showing normal protein, meningeal encephalitis panel was negative.  The patient also underwent diagnostic cerebral angiogram which was showing areas of narrowing in the left MCA territory but were nonspecific and either could be coming from atherosclerotic disease versus inflammatory vasculopathy.   There was a possible plan of getting the brain biopsy but the patient declined to get the brain biopsy done.  The patient then underwent 5 days of IV Solu-Medrol and  "the plan was to start him on prednisone 60 mg which would be tapered down by 10 mg every month.    Since discharge patient reports, denies having any new symptoms. He is complaining of some swelling in the face and the leg. N new numbness or tingling, no weakness, no speech complaints, no memory complaints, no problem with walking. He does feel that he is tired as he has been walking for long distance as he is not able to drive. No new seizures. He has been using Keppra 1000 mg BID.         CURRENT OUTPATIENT MEDICATIONS:    Delphine Nelson has a current medication list which includes the following prescription(s): accu-chek softclix lancets, accu-chek softclix lancets, amlodipine, ascorbic acid, aspirin, atorvastatin, bd pen needle priscilla 2nd gen, fenofibric acid, ciclopirox, ciclopirox, clonazepam, clotrimazole-betamethasone, cyanocobalamin, glipizide, accu-chek shirin plus, hydroxyzine hcl, insulin glargine solostar, insulin lispro, pen needles, januvia, levetiracetam, lisinopril, metformin, omeprazole, onetouch delica lancets 33g, prednisone, triamcinolone, and sulfamethoxazole-trimethoprim.       PHYSICAL EXAMINATION:   VITAL SIGNS:  height is 5' 8\" (1.727 m) and weight is 94.8 kg (209 lb). His blood pressure is 136/80 and his pulse is 86. His oxygen saturation is 99%.        NEUROLOGICAL EXAMINATION:    MENTAL STATUS EXAM: Alert, oriented to time place and person     CRANIAL NERVE EXAMINATION:  I Not tested   II Normal visual fields to finger counting.   II, Ill,  IV, VI Pupils were symmetric, briskly reactive. No afferent pupillary defect.  Eye movements are full without nystagmus. No ptosis.   V Facial sensation were intact   VII Facial movements were symmetrical    VIII Hearing was intact   IX, X Intact   XI Shoulder shrug and head turn was normal   XII Tongue protrusion is in the mid line.          MOTOR EXAM:        Arm Right  Left Leg Right  Left   Deltoid 5/5 5/5 lliopsoas 5/5 5/5   Biceps 5/5 5/5 Quads " "5/5 5/5   Triceps 5/5 5/5 Hamstrings 5/5 5/5   Wrist Extension 5/5 5/5 Ankle Dorsi Flexion 5/5 5/5   Wrist Flexion 5/5 5/5 Ankle Plantar Flexion 5/5 5/5               DEEP TENDON REFLEXES:     Brachioradialis Biceps Triceps Patellar Achilles   Right 3+ 3+ 3+ 3+ 2+   Left 2+ 2+ 2+ 2+ 1+            SENSORY EXAMINATION:   Sensation to dull touch Intact  Sensation to temperature Intact    COORDINATION:   Normal finger to nose testing  Finger tapping test was normal    GAIT/STATION:   normal        DIAGNOSTIC STUDIES:   PERTINENT LABS:     Lab Results   Component Value Date    WBC 18.55 (H) 02/20/2024     Lab Results   Component Value Date    HGB 13.3 02/20/2024     Lab Results   Component Value Date     (H) 02/20/2024     Lab Results   Component Value Date    LYMPHSABS 0.75 02/10/2024     No results found for: \"LABLYMP\"  Lab Results   Component Value Date    EOSABS 1.26 (H) 02/17/2024     No components found for: \"NEUTROPHILS\"    No results found for: \"LABMONO\"         No results found for: \"LABGLUC\"  Lab Results   Component Value Date    CREATININE 0.99 04/29/2024     Lab Results   Component Value Date    AST 20 04/29/2024     Lab Results   Component Value Date    ALT 37 04/29/2024     Lab Results   Component Value Date    ALKPHOS 39 04/29/2024     Lab Results   Component Value Date    AST 20 04/29/2024        No results found for: \"FOLATE\"No components found for: \"GLUCOSECSF\"  No components found for: \"CSFINTERP\"  Lab Results   Component Value Date    RBCCSF 0 02/12/2024     Lab Results   Component Value Date    WBCCSF 3 02/12/2024     No results found for: \"IGGSYNRATE\"  No components found for: \"IGGINDEX\"  Lab Results   Component Value Date    PROTEINCSF 36 02/12/2024     C.NEOFORMANS/GATTII  Not Detected Not Detected   CYTOMEGALOVIRUS  Not Detected Not Detected   ENTEROVIRUS  Not Detected Not Detected   E.COLI K1  Not Detected Not Detected   H.INFLUENZAE  Not Detected Not Detected   H.SIMPLEX 1  Not Detected " Not Detected   H.SIMPLEX 2  Not Detected Not Detected   HERPES VIRUS 6  Not Detected Not Detected   PARECHOVIRUS  Not Detected Not Detected   L.MONOCYTOGENES  Not Detected Not Detected   N.MENINGITIDIS  Not Detected Not Detected   S.AGALACTIAE  Not Detected Not Detected   S.PNEUMONIAE  Not Detected Not Detected   V.ZOSTER  Not Detected        NEUROIMAGING:  Results for orders placed during the hospital encounter of 02/10/24    MRI brain w wo contrast    Impression  Findings again seen most consistent with inflammatory amyloid angiopathy with vasogenic edema and petechial hemorrhages most pronounced in the left frontal and right posterior temporal/occipital regions.  1 or 2 new tiny foci of restricted diffusion in the left parietal lobe may be related to microhemorrhages versus tiny infarcts. Otherwise no change.  Stable mild to moderate stenosis in the left M1 segment.  Vessel wall imaging limited due to motion and venous contamination.    Workstation performed: [id number]      Results for orders placed during the hospital encounter of 02/08/24    MRI brain w wo contrast    Impression  1.  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 Recommend 6-8-week follow-up MRI after treatment to assess interval change.  2.  Mild chronic microangiopathy.            The study was marked in EPIC for immediate notification.    Workstation performed: [id number]        CTH revealed:   Hypodensity within the left frontal and right parietal occipital lobes, contrast-enhanced MRI of the brain recommended for further characterization to exclude underlying mass lesions with associated vasogenic edema.   CTA H/N:   No hemodynamically significant stenosis in the major arteries of the neck.  No irregularity of the M1 segment of the left middle cerebral artery possibly representing angiitis/vasculitis or other vasculopathy.  Focal areas of vasogenic edema  "better characterized on the recent brain MRI, ascribed to represent acute amyloid angiitis/acute inflammatory cerebral amyloid angiopathy. Other etiologies not excluded.     Echo: EF 60-65%, mild AV stenosis  CTA CAP w: No acute abnormality or suspicious mass Cholelithiasis. Hepatic steatosis.  Routine EEG: Intermittent left fronto-temporal delta activities suggest a focal area of neuronal dysfunction in that region. Background activities are overall too slow suggesting mild diffuse cerebral dysfunction of nonspecific etiology.     IR angiography 2/15/24:  \"Left MCA irregularity with areas of narrowing. Findings are nonspecific and may reflect either an atherosclerotic or inflammatory vasculopathy. \"                        ASSESSMENT AND PLAN:    Mr. Nelson is a 73 y.o.male  presenting with establishing care regarding his cerebral amyloid angiopathy versus possible vasculitis. Patient  has a past medical history of Diabetes mellitus (HCC), ED (erectile dysfunction), GERD (gastroesophageal reflux disease), Hypercholesterolemia, Hypertension, and Migraines.. Neurological exam is concerning for some brisk reflexes in the right lower and upper extremity but otherwise nonfocal.  Likely diagnosis at this time seems to be cerebral amyloid angiopathy versus vasculitis but again for both these conditions would typically start with steroids and afterwards we do typically recommend steroid sparing agents/immunosuppressants.  Patient is likely having side effects from the steroids including the swelling of the face along with the right lower extremity swelling.  His hemoglobin A1c is also out of control.  I would recommend getting an MRI of the brain with and without contrast to check for any interval progression of the cerebral amyloid angiopathy but I did suggest to the patient that we can probably start him on CellCept.  I also discussed about the side effects profile including the risk of skin cancer and infections.  Once " we get the MRI of the brain I would like to see them in the clinic and then discuss about starting the CellCept     1. Cerebral amyloid angiopathy  (HCC)  - MRI brain with and without contrast; Future  - BUN; Future  - Creatinine, serum; Future      New onset of seizures  No new episodes of seizures.  Likely etiology seems to be the amyloid angiopathy with inflammation?  Versus from petechial hemorrhage versus cortical stroke    Discussed with the patient that he needs to be on Keppra 1000 mg twice a day.  He is not able to drive for at least 6 months as per the [facility] law once his 6 months are up from the date of the seizure we are happy to send the medical records to the DMV for further decisions in regards to his driving license;  granted that he does not have any more seizures    Return in about 2 months (around 8/19/2024), or 60 min follow up please.       The management plan was discussed in detail with the patient and all questions were answered.     Mr. Nelson was encouraged to contact our office with any questions or concerns and to contact the clinic or go to the nearest emergency room if symptoms change or worsen.       I have spent a total of 62 min in reviewing and/or ordering tests, medications, or procedures, performing an examination or evaluation, reviewing pertinent history, counseling and educating the patient, referring and/or communicating with other health care professionals, documenting in the EMR and general coordination of care of Mr. Nelson  today.           Sixto Witt MD.   Staff Neurologist,   Neuroimmunology and Neuroinfectious disease  06/19/24     This report has been created through the use of voice recognition/text compilation software.  Typographical and content errors may occur with this process.  While efforts are made to detect and correct such errors, in some cases errors will persist.  For this reason, wording in this document should be considered in  the proper context and not strictly verbatim.  If, when reviewing the document, an error is discovered, please let the office know at [phone]

## 2024-06-27 ENCOUNTER — TELEPHONE (OUTPATIENT)
Age: 74
End: 2024-06-27

## 2024-06-27 ENCOUNTER — APPOINTMENT (OUTPATIENT)
Dept: LAB | Facility: HOSPITAL | Age: 74
End: 2024-06-27
Payer: COMMERCIAL

## 2024-06-27 LAB
BUN SERPL-MCNC: 18 MG/DL (ref 5–25)
CREAT SERPL-MCNC: 1.17 MG/DL (ref 0.6–1.3)
GFR SERPL CREATININE-BSD FRML MDRD: 62 ML/MIN/1.73SQ M

## 2024-06-27 PROCEDURE — 82565 ASSAY OF CREATININE: CPT | Performed by: PSYCHIATRY & NEUROLOGY

## 2024-06-27 PROCEDURE — 84520 ASSAY OF UREA NITROGEN: CPT | Performed by: PSYCHIATRY & NEUROLOGY

## 2024-06-27 PROCEDURE — 36415 COLL VENOUS BLD VENIPUNCTURE: CPT | Performed by: PSYCHIATRY & NEUROLOGY

## 2024-06-27 NOTE — TELEPHONE ENCOUNTER
Pt called in regards on lab work that needs to be done by DR. Witt.  Cerebral amyloid angiopathy  (HCC) [E85.4, I68.0]      Warm tranfers to [place] to reschedule his MRI appt on July 4th.  .    Thank you    No

## 2024-07-05 DIAGNOSIS — E85.4 CEREBRAL AMYLOID ANGIOPATHY  (HCC): ICD-10-CM

## 2024-07-05 DIAGNOSIS — I68.0 CEREBRAL AMYLOID ANGIOPATHY  (HCC): ICD-10-CM

## 2024-07-05 RX ORDER — INSULIN GLARGINE 100 [IU]/ML
30 INJECTION, SOLUTION SUBCUTANEOUS 2 TIMES DAILY
Qty: 30 ML | Refills: 3 | Status: SHIPPED | OUTPATIENT
Start: 2024-07-05

## 2024-07-05 RX ORDER — INSULIN LISPRO 100 [IU]/ML
5 INJECTION, SOLUTION INTRAVENOUS; SUBCUTANEOUS
Qty: 15 ML | Refills: 3 | Status: SHIPPED | OUTPATIENT
Start: 2024-07-05

## 2024-07-08 ENCOUNTER — NURSE TRIAGE (OUTPATIENT)
Age: 74
End: 2024-07-08

## 2024-07-08 ENCOUNTER — TELEPHONE (OUTPATIENT)
Age: 74
End: 2024-07-08

## 2024-07-08 NOTE — TELEPHONE ENCOUNTER
Regarding: Leg swelling/Toe Injury  ----- Message from Jude BHATTI sent at 7/8/2024  2:11 PM EDT -----  Patient called stating is dealing with a lot of swelling in both calves and feet. Patient states has no pain, is just getting bigger. Patient also states hit toe on right foot two days ago and it looks like nail is falling off. Patient states there was some bleeding, put antibiotic cream and Band-Aid on, bleeding has stopped.

## 2024-07-08 NOTE — TELEPHONE ENCOUNTER
Patient states he only has a couple days of the medication left and is concerned with being without the medication. Patient is aware that prior auth process can take 7-21 business days.     Reason for call:   [x] Prior Auth  [] Other:     Caller:  [x] Patient  [] Pharmacy  Name:   Address:   Callback Number:     Medication: insulin lispro (HumaLOG) 100 units/mL injection pen     Dose/Frequency: Inject 5 Units under the skin 3 (three) times a day with meals     Quantity: 15 mL    Ordering Provider:   [x] PCP/Provider -Dr. Anuradha MD   [] Speciality/Provider -

## 2024-07-08 NOTE — TELEPHONE ENCOUNTER
Spoke to pt and offered to go to the ER. He has no pain but feet and legs are getting bigger with the swelling. His clothes are tighter  he stated. He stated you were not concerned with his swelling before and it is getting worse so he doesn't feel he needs to go to the ER. I scheduled him for tomorrow at 3:45pm

## 2024-07-09 ENCOUNTER — OFFICE VISIT (OUTPATIENT)
Dept: FAMILY MEDICINE CLINIC | Facility: CLINIC | Age: 74
End: 2024-07-09
Payer: COMMERCIAL

## 2024-07-09 VITALS
HEART RATE: 90 BPM | SYSTOLIC BLOOD PRESSURE: 120 MMHG | WEIGHT: 215 LBS | OXYGEN SATURATION: 98 % | BODY MASS INDEX: 32.58 KG/M2 | RESPIRATION RATE: 20 BRPM | DIASTOLIC BLOOD PRESSURE: 72 MMHG | HEIGHT: 68 IN

## 2024-07-09 DIAGNOSIS — E11.22 TYPE 2 DIABETES MELLITUS WITH STAGE 3A CHRONIC KIDNEY DISEASE, WITHOUT LONG-TERM CURRENT USE OF INSULIN (HCC): ICD-10-CM

## 2024-07-09 DIAGNOSIS — I10 ESSENTIAL HYPERTENSION: ICD-10-CM

## 2024-07-09 DIAGNOSIS — N18.31 TYPE 2 DIABETES MELLITUS WITH STAGE 3A CHRONIC KIDNEY DISEASE, WITHOUT LONG-TERM CURRENT USE OF INSULIN (HCC): ICD-10-CM

## 2024-07-09 DIAGNOSIS — R60.0 BILATERAL LEG EDEMA: Primary | ICD-10-CM

## 2024-07-09 DIAGNOSIS — E78.00 HYPERCHOLESTEROLEMIA: ICD-10-CM

## 2024-07-09 LAB — SL AMB POCT HEMOGLOBIN AIC: 6 (ref ?–6.5)

## 2024-07-09 PROCEDURE — 83036 HEMOGLOBIN GLYCOSYLATED A1C: CPT | Performed by: FAMILY MEDICINE

## 2024-07-09 PROCEDURE — 3066F NEPHROPATHY DOC TX: CPT | Performed by: FAMILY MEDICINE

## 2024-07-09 PROCEDURE — 99214 OFFICE O/P EST MOD 30 MIN: CPT | Performed by: FAMILY MEDICINE

## 2024-07-09 NOTE — ASSESSMENT & PLAN NOTE
Patient has had it for months. Had labs done and normal. Could be from amlodipine. Will stop it and recheck in 3 weeks. Patient to also follow low Na diet.

## 2024-07-09 NOTE — ASSESSMENT & PLAN NOTE
LDL 65 in April 2024. Continue atorvastatin 40 mg qhs. Advised pt to follow a low cholesterol diet and to exercise on a regular basis.

## 2024-07-09 NOTE — ASSESSMENT & PLAN NOTE
A1C done today and was 6.0. Patient was started on insulin in February 2024 due to being on prednisone. Continue januvia 100 mg daily and metformin 1000 mg twice a day. Advised pt to follow a low carb diet and to exercise on a regular basis. Had eye exam in August 2023. Foot exam done in March 2024. Urine albumin/creatinine ratio done in March 2024..    Lab Results   Component Value Date    HGBA1C 9.6 (A) 03/11/2024

## 2024-07-09 NOTE — ASSESSMENT & PLAN NOTE
Blood pressure ok but will stop amlodipine due to leg swelling. Continue lisinopril 10 mg qd. Pt advised to continue low Na diet and to exercise on a regular basis.

## 2024-07-09 NOTE — PROGRESS NOTES
Ambulatory Visit  Name: Delphine Nelson      : 1950      MRN: [id number]  Encounter Provider: Tristin Ling MD  Encounter Date: 2024   Encounter department: [facility] MEDICINE    Assessment & Plan   1. Bilateral leg edema  Assessment & Plan:  Patient has had it for months. Had labs done and normal. Could be from amlodipine. Will stop it and recheck in 3 weeks. Patient to also follow low Na diet.   2. Essential hypertension  Assessment & Plan:  Blood pressure ok but will stop amlodipine due to leg swelling. Continue lisinopril 10 mg qd. Pt advised to continue low Na diet and to exercise on a regular basis.   3. Hypercholesterolemia  Assessment & Plan:  LDL 65 in 2024. Continue atorvastatin 40 mg qhs. Advised pt to follow a low cholesterol diet and to exercise on a regular basis.   4. Type 2 diabetes mellitus with stage 3a chronic kidney disease, without long-term current use of insulin (HCC)  Assessment & Plan:  A1C done today and was 6.0. Patient was started on insulin in 2024 due to being on prednisone. Continue januvia 100 mg daily and metformin 1000 mg twice a day. Advised pt to follow a low carb diet and to exercise on a regular basis. Had eye exam in 2023. Foot exam done in 2024. Urine albumin/creatinine ratio done in 2024..    Lab Results   Component Value Date    HGBA1C 9.6 (A) 2024     Orders:  -     POCT hemoglobin A1c      Depression Screening and Follow-up Plan: Patient was screened for depression during today's encounter. They screened negative with a PHQ-2 score of 0.        History of Present Illness     Patient here for leg swelling. Patient has had it for months but worse recently. Had labs done and were normal. Patient has been following low Na diet. Patient has been on amlodipine 5 mg daily for blood pressure. No chest pain or shortness of breath. No other complaints. Patient is still on prednisone 20 mg daily.       Review of  Systems   Constitutional:  Positive for fatigue. Negative for unexpected weight change.   Respiratory:  Negative for cough and shortness of breath.    Cardiovascular:  Positive for leg swelling. Negative for chest pain.   Gastrointestinal:  Negative for abdominal pain, constipation, diarrhea and vomiting.   Musculoskeletal:  Negative for arthralgias.   Neurological:  Negative for dizziness and headaches.   Psychiatric/Behavioral:  Negative for dysphoric mood. The patient is not nervous/anxious.      Past Medical History:   Diagnosis Date   • Diabetes mellitus (HCC)    • ED (erectile dysfunction)    • GERD (gastroesophageal reflux disease)    • Hypercholesterolemia    • Hypertension    • Migraines      Past Surgical History:   Procedure Laterality Date   • COLONOSCOPY  08/01/2016   • FL LUMBAR PUNCTURE DIAGNOSTIC  2/12/2024   • IR CEREBRAL ANGIOGRAPHY  2/15/2024   • NO PAST SURGERIES       Family History   Problem Relation Age of Onset   • Heart disease Mother    • Asthma Father    • Kidney disease Brother    • Diabetes Brother      Social History     Tobacco Use   • Smoking status: Never   • Smokeless tobacco: Never   Vaping Use   • Vaping status: Never Used   Substance and Sexual Activity   • Alcohol use: Never   • Drug use: Never   • Sexual activity: Not Currently     Partners: Female     Current Outpatient Medications on File Prior to Visit   Medication Sig   • Accu-Chek Softclix Lancets lancets Use 1 each in the morning Use as instructed   • Accu-Chek Softclix Lancets lancets Test once daily   • amLODIPine (NORVASC) 5 mg tablet Take 1 tablet (5 mg total) by mouth daily   • Ascorbic Acid (VITAMIN C PO) Take by mouth   • aspirin 81 mg chewable tablet Chew 81 mg daily   • atorvastatin (LIPITOR) 40 mg tablet TAKE 1 TABLET BY MOUTH EVERY DAY   • BD Pen Needle Dominga 2nd Gen 32G X 4 MM MISC USE 5 EACH IN THE MORNING   • Choline Fenofibrate (Fenofibric Acid) 135 MG CPDR Take 1 capsule (135 mg total) by mouth daily   •  ciclopirox (LOPROX) 0.77 % cream Apply topically 2 (two) times a day   • Ciclopirox 8 % KIT Please apply daily. Wash toenail in 7 days and repeat cycle.   • clotrimazole-betamethasone (LOTRISONE) 1-0.05 % cream Apply topically 2 (two) times a day   • Cyanocobalamin (VITAMIN B12 PO) Take by mouth   • glipiZIDE (GLUCOTROL XL) 5 mg 24 hr tablet Take 5 mg by mouth daily   • glucose blood (Accu-Chek Kate Plus) test strip Use 1 each in the morning Use as instructed   • Insulin Glargine Solostar (Lantus SoloStar) 100 UNIT/ML SOPN Inject 0.3 mL (30 Units total) under the skin 2 (two) times a day   • insulin lispro (HumaLOG) 100 units/mL injection pen Inject 5 Units under the skin 3 (three) times a day with meals   • Insulin Pen Needle (Pen Needles) 33G X 4 MM MISC Use 5 each in the morning   • Januvia 100 MG tablet    • levETIRAcetam (KEPPRA) 1000 MG tablet TAKE 1 TABLET(1000 MG) BY MOUTH EVERY 12 HOURS   • lisinopril (ZESTRIL) 10 mg tablet Take 1 tablet (10 mg total) by mouth daily   • metFORMIN (GLUCOPHAGE) 500 mg tablet    • omeprazole (PriLOSEC) 40 MG capsule Take 1 capsule (40 mg total) by mouth daily before breakfast   • OneTouch Delica Lancets 33G MISC Use 3 (three) times a week onetouch delica lancets 33G test 3 times per week   • predniSONE 10 mg tablet Take 6 tablets (60 mg total) by mouth daily for 30 days, THEN 5 tablets (50 mg total) daily for 30 days, THEN 4 tablets (40 mg total) daily for 30 days, THEN 3 tablets (30 mg total) daily for 30 days, THEN 2 tablets (20 mg total) daily for 30 days, THEN 1 tablet (10 mg total) daily.   • clonazePAM (KlonoPIN) 1 MG disintegrating tablet Take 1 tablet (1 mg total) by mouth 2 (two) times a day as needed for seizures for up to 10 days As needed for seizure (Patient not taking: Reported on 7/9/2024)   • hydrOXYzine HCL (ATARAX) 25 mg tablet Take 1 tablet (25 mg total) by mouth 3 (three) times a day Take by mouth every 8 hours for itching. CAN MAKE YOU DROWSY> DO NOT  "DRIVE (Patient not taking: Reported on 7/9/2024)   • sulfamethoxazole-trimethoprim (BACTRIM DS) 800-160 mg per tablet Take 1 tablet by mouth 3 (three) times a week (Patient not taking: Reported on 4/26/2024)   • triamcinolone (KENALOG) 0.1 % ointment Apply topically 2 (two) times a day for 14 days Apply BID for up to 2 weeks to affected skin on neck down prn flares then take one week break and can repeat regimen prn flares. Do not apply to groin, skin folds, face. (Patient not taking: Reported on 7/9/2024)     Allergies   Allergen Reactions   • Compazine [Prochlorperazine] Tongue Swelling   • Zithromax [Azithromycin] Other (See Comments)     .   • Contrast [Iodinated Contrast Media] Hives     Possible hypersensitivity reaction     Immunization History   Administered Date(s) Administered   • COVID-19 PFIZER VACCINE 0.3 ML IM 05/12/2021, 06/05/2021, 01/09/2022   • COVID-19 Pfizer Vac BIVALENT Cedric-sucrose 12 Yr+ IM 09/14/2022   • Influenza, high dose seasonal 0.7 mL 12/30/2020, 10/15/2021, 09/14/2022, 09/29/2023   • Pneumococcal Conjugate 13-Valent 10/03/2017   • Pneumococcal Polysaccharide PPV23 08/26/2020   • Tdap 06/08/2017   • influenza, trivalent, adjuvanted 10/22/2019     Objective     /72 (BP Location: Left arm, Patient Position: Sitting, Cuff Size: Large)   Pulse 90   Resp 20   Ht 5' 8\" (1.727 m)   Wt 97.5 kg (215 lb)   SpO2 98%   BMI 32.69 kg/m²     Physical Exam  Vitals and nursing note reviewed.   Constitutional:       Appearance: Normal appearance.   Neck:      Vascular: No carotid bruit.   Cardiovascular:      Rate and Rhythm: Normal rate and regular rhythm.      Heart sounds: Normal heart sounds. No murmur heard.  Pulmonary:      Effort: Pulmonary effort is normal.      Breath sounds: Normal breath sounds. No wheezing.   Musculoskeletal:      Cervical back: Normal range of motion and neck supple. No muscular tenderness.      Right lower leg: Edema present.      Left lower leg: Edema present. "   Lymphadenopathy:      Cervical: No cervical adenopathy.   Neurological:      Mental Status: He is alert.   Psychiatric:         Mood and Affect: Mood normal.         Behavior: Behavior normal.         Thought Content: Thought content normal.         Judgment: Judgment normal.

## 2024-07-13 ENCOUNTER — HOSPITAL ENCOUNTER (OUTPATIENT)
Dept: MRI IMAGING | Facility: HOSPITAL | Age: 74
Discharge: HOME | End: 2024-07-13
Attending: PSYCHIATRY & NEUROLOGY
Payer: COMMERCIAL

## 2024-07-13 DIAGNOSIS — I68.0 CEREBRAL AMYLOID ANGIOPATHY  (HCC): ICD-10-CM

## 2024-07-13 DIAGNOSIS — E85.4 CEREBRAL AMYLOID ANGIOPATHY  (HCC): ICD-10-CM

## 2024-07-13 PROCEDURE — A9585 GADOBUTROL INJECTION: HCPCS | Performed by: PSYCHIATRY & NEUROLOGY

## 2024-07-13 PROCEDURE — 70553 MRI BRAIN STEM W/O & W/DYE: CPT

## 2024-07-13 RX ORDER — GADOBUTROL 604.72 MG/ML
9 INJECTION INTRAVENOUS
Status: COMPLETED | OUTPATIENT
Start: 2024-07-13 | End: 2024-07-13

## 2024-07-13 RX ADMIN — GADOBUTROL 9 ML: 604.72 INJECTION INTRAVENOUS at 12:54

## 2024-07-16 NOTE — TELEPHONE ENCOUNTER
PA for insulin lispro (HumaLOG) 100 units/mL injection pen  Approved     Date(s) approved until 12-        Patient advised by          [x] Next [employer] Message  [] Phone call   []LMOM  []L/M to call office as no active Communication consent on file  []Unable to leave detailed message as VM not approved on Communication consent       Pharmacy advised by    [x]Fax  []Phone call    Approval letter scanned into Media Yes

## 2024-07-16 NOTE — TELEPHONE ENCOUNTER
PA for insulin lispro (HumaLOG) 100 units/mL injection pen    Submitted via    [x]CMM-KEY [id number]   []Surescripts-Case ID #   []Faxed to plan   []Other website   []Phone call Case ID #     Office notes sent, clinical questions answered. Awaiting determination    Turnaround time for your insurance to make a decision on your Prior Authorization can take 7-21 business days.

## 2024-07-22 ENCOUNTER — TELEPHONE (OUTPATIENT)
Age: 74
End: 2024-07-22

## 2024-07-22 NOTE — TELEPHONE ENCOUNTER
Called spoke to patient had to r/s appointment due to provider not in office new URG appointment f/u given as pt needs to see dr for 9/18/24. Pt req to be placed on wait list./

## 2024-08-02 ENCOUNTER — OFFICE VISIT (OUTPATIENT)
Dept: FAMILY MEDICINE CLINIC | Facility: CLINIC | Age: 74
End: 2024-08-02
Payer: COMMERCIAL

## 2024-08-02 VITALS
DIASTOLIC BLOOD PRESSURE: 70 MMHG | HEART RATE: 83 BPM | TEMPERATURE: 97.9 F | RESPIRATION RATE: 18 BRPM | BODY MASS INDEX: 36.07 KG/M2 | WEIGHT: 238 LBS | SYSTOLIC BLOOD PRESSURE: 138 MMHG | HEIGHT: 68 IN | OXYGEN SATURATION: 97 %

## 2024-08-02 DIAGNOSIS — I68.0 CEREBRAL AMYLOID ANGIOPATHY  (HCC): ICD-10-CM

## 2024-08-02 DIAGNOSIS — E78.00 PURE HYPERCHOLESTEROLEMIA: ICD-10-CM

## 2024-08-02 DIAGNOSIS — N18.31 TYPE 2 DIABETES MELLITUS WITH STAGE 3A CHRONIC KIDNEY DISEASE, WITHOUT LONG-TERM CURRENT USE OF INSULIN (HCC): ICD-10-CM

## 2024-08-02 DIAGNOSIS — R60.0 BILATERAL LEG EDEMA: ICD-10-CM

## 2024-08-02 DIAGNOSIS — E11.22 TYPE 2 DIABETES MELLITUS WITH STAGE 3A CHRONIC KIDNEY DISEASE, WITHOUT LONG-TERM CURRENT USE OF INSULIN (HCC): ICD-10-CM

## 2024-08-02 DIAGNOSIS — I10 ESSENTIAL HYPERTENSION: Primary | ICD-10-CM

## 2024-08-02 DIAGNOSIS — E85.4 CEREBRAL AMYLOID ANGIOPATHY  (HCC): ICD-10-CM

## 2024-08-02 DIAGNOSIS — E78.00 HYPERCHOLESTEROLEMIA: ICD-10-CM

## 2024-08-02 PROBLEM — J98.9 RESPIRATORY DISEASE: Status: RESOLVED | Noted: 2024-02-10 | Resolved: 2024-08-02

## 2024-08-02 PROCEDURE — 99214 OFFICE O/P EST MOD 30 MIN: CPT | Performed by: FAMILY MEDICINE

## 2024-08-02 PROCEDURE — 1160F RVW MEDS BY RX/DR IN RCRD: CPT | Performed by: FAMILY MEDICINE

## 2024-08-02 PROCEDURE — 1159F MED LIST DOCD IN RCRD: CPT | Performed by: FAMILY MEDICINE

## 2024-08-02 PROCEDURE — G2211 COMPLEX E/M VISIT ADD ON: HCPCS | Performed by: FAMILY MEDICINE

## 2024-08-02 RX ORDER — FENOFIBRIC ACID 135 MG/1
1 CAPSULE, DELAYED RELEASE ORAL DAILY
Qty: 90 CAPSULE | Refills: 3 | Status: SHIPPED | OUTPATIENT
Start: 2024-08-02

## 2024-08-02 NOTE — ASSESSMENT & PLAN NOTE
No better off amlodipine. Swelling is likely due to prednisone and weight gain. Patient going to stop prednisone in August 2024 and will titrate insulin down to help with weight loss.

## 2024-08-02 NOTE — ASSESSMENT & PLAN NOTE
Blood pressure ok. Continue lisinopril 10 mg qd. Pt advised to continue low Na diet and to exercise on a regular basis.

## 2024-08-02 NOTE — PROGRESS NOTES
Ambulatory Visit  Name: Delphine Nelson      : 1950      MRN: [id number]  Encounter Provider: Tristin Ling MD  Encounter Date: 2024   Encounter department: [facility] MEDICINE    Assessment & Plan   1. Essential hypertension  Assessment & Plan:  Blood pressure ok. Continue lisinopril 10 mg qd. Pt advised to continue low Na diet and to exercise on a regular basis.   2. Hypercholesterolemia  Assessment & Plan:  LDL was 65 in 2024. Continue atorvastatin 40 mg qhs. Advised pt to follow a low cholesterol diet and to exercise on a regular basis.   3. Type 2 diabetes mellitus with stage 3a chronic kidney disease, without long-term current use of insulin (HCC)  Assessment & Plan:  A1C 6.0 in 2024. Patient was started on insulin in 2024 due to being on prednisone. Sugars have been low and prednisone decreased and going to stop it in 2024. Will lower lantus to 20 U twice a day and stop humalog. Patient to call if sugars go up on lower dose. Continue januvia 100 mg daily and metformin 1000 mg twice a day. Advised pt to follow a low carb diet and to exercise on a regular basis. Had eye exam in 2023. Foot exam done in 2024. Urine albumin/creatinine ratio done in 2024..    Lab Results   Component Value Date    HGBA1C 6.0 2024     4. Bilateral leg edema  Assessment & Plan:  No better off amlodipine. Swelling is likely due to prednisone and weight gain. Patient going to stop prednisone in 2024 and will titrate insulin down to help with weight loss.   5. 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 Patient was seen by Neurology and started on prednisone taper. Currently takes 10 mg daily. Patient had MRI in 2024 and improved. Patient sees Neurology in 2024 for follow-up.   6. Pure  hypercholesterolemia  -     Choline Fenofibrate (Fenofibric Acid) 135 MG CPDR; Take 1 capsule (135 mg total) by mouth daily         History of Present Illness     Patient here for follow-up Hypertension, Hyperlipidemia, Leg edema, DM. Patient doing ok. Still has leg swelling and gaining weight. Patient down to 10 mg daily of prednisone and sugars low. Patient had MRI brain and was better. Sees Neurology in September for follow-up.       Review of Systems   Constitutional:  Negative for fatigue and unexpected weight change.   Respiratory:  Negative for cough and shortness of breath.    Cardiovascular:  Positive for leg swelling. Negative for chest pain.   Gastrointestinal:  Negative for abdominal pain, constipation, diarrhea and vomiting.   Musculoskeletal:  Negative for arthralgias.   Neurological:  Negative for dizziness and headaches.   Psychiatric/Behavioral:  Negative for dysphoric mood. The patient is not nervous/anxious.      Past Medical History:   Diagnosis Date   • Diabetes mellitus (HCC)    • ED (erectile dysfunction)    • GERD (gastroesophageal reflux disease)    • Hypercholesterolemia    • Hypertension    • Migraines      Past Surgical History:   Procedure Laterality Date   • COLONOSCOPY  08/01/2016   • FL LUMBAR PUNCTURE DIAGNOSTIC  2/12/2024   • IR CEREBRAL ANGIOGRAPHY  2/15/2024   • NO PAST SURGERIES       Family History   Problem Relation Age of Onset   • Heart disease Mother    • Asthma Father    • Kidney disease Brother    • Diabetes Brother      Social History     Tobacco Use   • Smoking status: Never     Passive exposure: Never   • Smokeless tobacco: Never   Vaping Use   • Vaping status: Never Used   Substance and Sexual Activity   • Alcohol use: Never   • Drug use: Never   • Sexual activity: Not Currently     Partners: Female     Current Outpatient Medications on File Prior to Visit   Medication Sig   • Accu-Chek Softclix Lancets lancets Use 1 each in the morning Use as instructed   • Accu-Chek  Softclix Lancets lancets Test once daily   • Ascorbic Acid (VITAMIN C PO) Take by mouth   • aspirin 81 mg chewable tablet Chew 81 mg daily   • atorvastatin (LIPITOR) 40 mg tablet TAKE 1 TABLET BY MOUTH EVERY DAY   • BD Pen Needle Dominga 2nd Gen 32G X 4 MM MISC USE 5 EACH IN THE MORNING   • ciclopirox (LOPROX) 0.77 % cream Apply topically 2 (two) times a day   • clotrimazole-betamethasone (LOTRISONE) 1-0.05 % cream Apply topically 2 (two) times a day   • glipiZIDE (GLUCOTROL XL) 5 mg 24 hr tablet Take 5 mg by mouth daily   • glucose blood (Accu-Chek Kate Plus) test strip Use 1 each in the morning Use as instructed   • Insulin Glargine Solostar (Lantus SoloStar) 100 UNIT/ML SOPN Inject 0.3 mL (30 Units total) under the skin 2 (two) times a day   • insulin lispro (HumaLOG) 100 units/mL injection pen Inject 5 Units under the skin 3 (three) times a day with meals   • Insulin Pen Needle (Pen Needles) 33G X 4 MM MISC Use 5 each in the morning   • Januvia 100 MG tablet    • levETIRAcetam (KEPPRA) 1000 MG tablet TAKE 1 TABLET(1000 MG) BY MOUTH EVERY 12 HOURS   • lisinopril (ZESTRIL) 10 mg tablet Take 1 tablet (10 mg total) by mouth daily   • metFORMIN (GLUCOPHAGE) 500 mg tablet    • omeprazole (PriLOSEC) 40 MG capsule Take 1 capsule (40 mg total) by mouth daily before breakfast   • OneTouch Delica Lancets 33G MISC Use 3 (three) times a week onetouch delica lancets 33G test 3 times per week   • predniSONE 10 mg tablet Take 6 tablets (60 mg total) by mouth daily for 30 days, THEN 5 tablets (50 mg total) daily for 30 days, THEN 4 tablets (40 mg total) daily for 30 days, THEN 3 tablets (30 mg total) daily for 30 days, THEN 2 tablets (20 mg total) daily for 30 days, THEN 1 tablet (10 mg total) daily.   • [DISCONTINUED] Choline Fenofibrate (Fenofibric Acid) 135 MG CPDR Take 1 capsule (135 mg total) by mouth daily   • amLODIPine (NORVASC) 5 mg tablet Take 1 tablet (5 mg total) by mouth daily (Patient not taking: Reported on  "8/2/2024)   • Ciclopirox 8 % KIT Please apply daily. Wash toenail in 7 days and repeat cycle. (Patient not taking: Reported on 8/2/2024)   • clonazePAM (KlonoPIN) 1 MG disintegrating tablet Take 1 tablet (1 mg total) by mouth 2 (two) times a day as needed for seizures for up to 10 days As needed for seizure (Patient not taking: Reported on 7/9/2024)   • Cyanocobalamin (VITAMIN B12 PO) Take by mouth (Patient not taking: Reported on 8/2/2024)   • hydrOXYzine HCL (ATARAX) 25 mg tablet Take 1 tablet (25 mg total) by mouth 3 (three) times a day Take by mouth every 8 hours for itching. CAN MAKE YOU DROWSY> DO NOT DRIVE (Patient not taking: Reported on 7/9/2024)   • triamcinolone (KENALOG) 0.1 % ointment Apply topically 2 (two) times a day for 14 days Apply BID for up to 2 weeks to affected skin on neck down prn flares then take one week break and can repeat regimen prn flares. Do not apply to groin, skin folds, face. (Patient not taking: Reported on 7/9/2024)     Allergies   Allergen Reactions   • Compazine [Prochlorperazine] Tongue Swelling   • Zithromax [Azithromycin] Other (See Comments)     .   • Contrast [Iodinated Contrast Media] Hives     Possible hypersensitivity reaction     Immunization History   Administered Date(s) Administered   • COVID-19 PFIZER VACCINE 0.3 ML IM 05/12/2021, 06/05/2021, 01/09/2022   • COVID-19 Pfizer Vac BIVALENT Cedirc-sucrose 12 Yr+ IM 09/14/2022   • Influenza, high dose seasonal 0.7 mL 12/30/2020, 10/15/2021, 09/14/2022, 09/29/2023   • Pneumococcal Conjugate 13-Valent 10/03/2017   • Pneumococcal Polysaccharide PPV23 08/26/2020   • Tdap 06/08/2017   • influenza, trivalent, adjuvanted 10/22/2019     Objective     /70 (BP Location: Left arm, Patient Position: Sitting, Cuff Size: Standard)   Pulse 83   Temp 97.9 °F (36.6 °C) (Tympanic)   Resp 18   Ht 5' 8\" (1.727 m)   Wt 108 kg (238 lb)   SpO2 97%   BMI 36.19 kg/m²     Physical Exam  Vitals and nursing note reviewed. "   Constitutional:       Appearance: Normal appearance. He is normal weight.   Neck:      Vascular: No carotid bruit.   Cardiovascular:      Rate and Rhythm: Normal rate and regular rhythm.      Heart sounds: Normal heart sounds. No murmur heard.  Pulmonary:      Effort: Pulmonary effort is normal.      Breath sounds: Normal breath sounds. No wheezing.   Musculoskeletal:      Cervical back: Normal range of motion and neck supple. No muscular tenderness.      Right lower leg: Edema present.      Left lower leg: Edema present.   Lymphadenopathy:      Cervical: No cervical adenopathy.   Neurological:      Mental Status: He is alert.   Psychiatric:         Mood and Affect: Mood normal.         Behavior: Behavior normal.         Thought Content: Thought content normal.         Judgment: Judgment normal.

## 2024-08-02 NOTE — ASSESSMENT & PLAN NOTE
A1C 6.0 in July 2024. Patient was started on insulin in February 2024 due to being on prednisone. Sugars have been low and prednisone decreased and going to stop it in August 2024. Will lower lantus to 20 U twice a day and stop humalog. Patient to call if sugars go up on lower dose. Continue januvia 100 mg daily and metformin 1000 mg twice a day. Advised pt to follow a low carb diet and to exercise on a regular basis. Had eye exam in August 2023. Foot exam done in March 2024. Urine albumin/creatinine ratio done in March 2024..    Lab Results   Component Value Date    HGBA1C 6.0 07/09/2024

## 2024-08-02 NOTE — ASSESSMENT & PLAN NOTE
LDL was 65 in April 2024. Continue atorvastatin 40 mg qhs. Advised pt to follow a low cholesterol diet and to exercise on a regular basis.

## 2024-08-02 NOTE — ASSESSMENT & PLAN NOTE
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 Patient was seen by Neurology and started on prednisone taper. Currently takes 10 mg daily. Patient had MRI in July 2024 and improved. Patient sees Neurology in September 2024 for follow-up.

## 2024-08-06 ENCOUNTER — TELEPHONE (OUTPATIENT)
Dept: FAMILY MEDICINE CLINIC | Facility: CLINIC | Age: 74
End: 2024-08-06

## 2024-08-06 DIAGNOSIS — E78.00 HYPERCHOLESTEROLEMIA: Primary | ICD-10-CM

## 2024-08-06 RX ORDER — FENOFIBRATE 145 MG/1
145 TABLET, COATED ORAL DAILY
Qty: 90 TABLET | Refills: 2 | Status: SHIPPED | OUTPATIENT
Start: 2024-08-06

## 2024-08-06 NOTE — TELEPHONE ENCOUNTER
Fax from [facility] on 25th, requesting a drug change, as Fenofibric 135mg is not covered by his insurance plan, but Fenofibrate.

## 2024-08-11 DIAGNOSIS — E78.00 PURE HYPERCHOLESTEROLEMIA: ICD-10-CM

## 2024-08-12 RX ORDER — ATORVASTATIN CALCIUM 40 MG/1
40 TABLET, FILM COATED ORAL DAILY
Qty: 100 TABLET | Refills: 1 | Status: SHIPPED | OUTPATIENT
Start: 2024-08-12

## 2024-08-23 ENCOUNTER — RA CDI HCC (OUTPATIENT)
Dept: OTHER | Facility: HOSPITAL | Age: 74
End: 2024-08-23

## 2024-08-23 NOTE — PROGRESS NOTES
HCC coding opportunities          Chart Reviewed number of suggestions sent to Provider: 2  E11.36  E11.65  E66.01  I12.9     Patients Insurance     Medicare Insurance: [facility] Medicare Advantage

## 2024-08-30 ENCOUNTER — OFFICE VISIT (OUTPATIENT)
Dept: FAMILY MEDICINE CLINIC | Facility: CLINIC | Age: 74
End: 2024-08-30
Payer: COMMERCIAL

## 2024-08-30 VITALS
BODY MASS INDEX: 35.01 KG/M2 | HEIGHT: 68 IN | OXYGEN SATURATION: 98 % | RESPIRATION RATE: 20 BRPM | HEART RATE: 100 BPM | SYSTOLIC BLOOD PRESSURE: 146 MMHG | WEIGHT: 231 LBS | DIASTOLIC BLOOD PRESSURE: 82 MMHG

## 2024-08-30 DIAGNOSIS — I68.0 CEREBRAL AMYLOID ANGIOPATHY  (HCC): ICD-10-CM

## 2024-08-30 DIAGNOSIS — E11.22 TYPE 2 DIABETES MELLITUS WITH STAGE 3A CHRONIC KIDNEY DISEASE, WITHOUT LONG-TERM CURRENT USE OF INSULIN (HCC): ICD-10-CM

## 2024-08-30 DIAGNOSIS — K63.5 POLYP OF COLON, UNSPECIFIED PART OF COLON, UNSPECIFIED TYPE: ICD-10-CM

## 2024-08-30 DIAGNOSIS — E85.4 CEREBRAL AMYLOID ANGIOPATHY  (HCC): ICD-10-CM

## 2024-08-30 DIAGNOSIS — N18.31 TYPE 2 DIABETES MELLITUS WITH STAGE 3A CHRONIC KIDNEY DISEASE, WITHOUT LONG-TERM CURRENT USE OF INSULIN (HCC): ICD-10-CM

## 2024-08-30 DIAGNOSIS — I10 ESSENTIAL HYPERTENSION: Primary | ICD-10-CM

## 2024-08-30 DIAGNOSIS — R60.0 BILATERAL LEG EDEMA: ICD-10-CM

## 2024-08-30 DIAGNOSIS — E78.00 HYPERCHOLESTEROLEMIA: ICD-10-CM

## 2024-08-30 DIAGNOSIS — L23.9 ALLERGIC DERMATITIS: ICD-10-CM

## 2024-08-30 DIAGNOSIS — E66.01 SEVERE OBESITY (BMI 35.0-39.9) WITH COMORBIDITY (HCC): ICD-10-CM

## 2024-08-30 PROCEDURE — 99214 OFFICE O/P EST MOD 30 MIN: CPT | Performed by: FAMILY MEDICINE

## 2024-08-30 PROCEDURE — G2211 COMPLEX E/M VISIT ADD ON: HCPCS | Performed by: FAMILY MEDICINE

## 2024-08-30 RX ORDER — LISINOPRIL 20 MG/1
20 TABLET ORAL DAILY
Qty: 90 TABLET | Refills: 2 | Status: SHIPPED | OUTPATIENT
Start: 2024-08-30

## 2024-08-30 RX ORDER — HYDROXYZINE HYDROCHLORIDE 25 MG/1
25 TABLET, FILM COATED ORAL EVERY 6 HOURS PRN
Qty: 60 TABLET | Refills: 1 | Status: SHIPPED | OUTPATIENT
Start: 2024-08-30

## 2024-08-30 NOTE — PROGRESS NOTES
Ambulatory Visit  Name: Delphine Nelson      : 1950      MRN: [id number]  Encounter Provider: Tristin Ling MD  Encounter Date: 2024   Encounter department: [facility] MEDICINE    Assessment & Plan   1. Essential hypertension  Assessment & Plan:  Blood pressure higher. Continue lisinopril and increase to 20 mg qd. Pt advised to continue low Na diet and to exercise on a regular basis.   Orders:  -     lisinopril (ZESTRIL) 20 mg tablet; Take 1 tablet (20 mg total) by mouth daily  2. Hypercholesterolemia  Assessment & Plan:  LDL was 65 in 2024. Continue atorvastatin 40 mg qhs. Advised pt to follow a low cholesterol diet and to exercise on a regular basis.   3. Type 2 diabetes mellitus with stage 3a chronic kidney disease, without long-term current use of insulin (HCC)  Assessment & Plan:  A1C was 6.0 in 2024. Lantus was lowered to 20 U twice a day and Humalog was stopped. Sugars still below 100 fasting. Will lower lantus to 10 U twice a day for 1 week and if sugars still good, then patient can sop lantus. Continue januvia 100 mg daily and metformin 1000 mg twice a day. Advised pt to follow a low carb diet and to exercise on a regular basis. Had eye exam in 2023. Foot exam done in 2024. Urine albumin/creatinine ratio done in 2024..    Lab Results   Component Value Date    HGBA1C 6.0 2024     4. Bilateral leg edema  Assessment & Plan:  Swelling is likely due to prednisone and weight gain. Patient stopped prednisone and insulin dose decreased.  5. 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 Patient was seen by Neurology and started on prednisone taper. Patient done with prednisone now. Patient had MRI in 2024 and improved. Patient sees Neurology in 2024 for follow-up.   6. Polyp of colon,  unspecified part of colon, unspecified type  Assessment & Plan:  Had colonoscopy in February 2020 by Dr Pryor and pt had 9 polyps. Pt saw Gastroenterology and needs follow-up colonoscopy but waiting until he is cleared by Neurology.   7. Allergic dermatitis  Assessment & Plan:  Patient was doing better when on prednisone. Now that he is off prednisone, rash has come back and very itchy. Will refer back to Dermatology and will give hydroxyzine to help with itching.   Orders:  -     hydrOXYzine HCL (ATARAX) 25 mg tablet; Take 1 tablet (25 mg total) by mouth every 6 (six) hours as needed for itching  -     Ambulatory Referral to Dermatology; Future  8. Severe obesity (BMI 35.0-39.9) with comorbidity (HCC)  Assessment & Plan:  Discussed smaller portions, healthy snacks, and regular exercise.        Depression Screening and Follow-up Plan: Patient was screened for depression during today's encounter. They screened negative with a PHQ-2 score of 0.        History of Present Illness     Patient here for follow-up Hypertension, Hyperlipidemia, DM, Leg Edema, Cerebral Amyloid Angiopathy. Patient doing ok. No chest pain or shortness of breath. Patient is off of prednisone now. Legs are still swolllen but patient has lost 7 lbs. Sugars are still good on lower dose of insulin. Patient has recurrence of rash on back and legs and very itchy. Patient has seen Dermatology in the past and no etiology found.       Review of Systems   Constitutional:  Negative for fatigue and unexpected weight change.   Respiratory:  Negative for cough and shortness of breath.    Cardiovascular:  Positive for leg swelling. Negative for chest pain.   Gastrointestinal:  Negative for abdominal pain, constipation, diarrhea and vomiting.   Musculoskeletal:  Negative for arthralgias.   Skin:  Positive for rash.   Neurological:  Negative for dizziness and headaches.   Psychiatric/Behavioral:  Negative for dysphoric mood. The patient is not nervous/anxious.       Past Medical History:   Diagnosis Date   • Diabetes mellitus (HCC)    • ED (erectile dysfunction)    • GERD (gastroesophageal reflux disease)    • Hypercholesterolemia    • Hypertension    • Migraines      Past Surgical History:   Procedure Laterality Date   • COLONOSCOPY  08/01/2016   • FL LUMBAR PUNCTURE DIAGNOSTIC  2/12/2024   • IR CEREBRAL ANGIOGRAPHY  2/15/2024   • NO PAST SURGERIES       Family History   Problem Relation Age of Onset   • Heart disease Mother    • Asthma Father    • Kidney disease Brother    • Diabetes Brother      Social History     Tobacco Use   • Smoking status: Never     Passive exposure: Never   • Smokeless tobacco: Never   Vaping Use   • Vaping status: Never Used   Substance and Sexual Activity   • Alcohol use: Never   • Drug use: Never   • Sexual activity: Not Currently     Partners: Female     Current Outpatient Medications on File Prior to Visit   Medication Sig   • Accu-Chek Softclix Lancets lancets Use 1 each in the morning Use as instructed   • Accu-Chek Softclix Lancets lancets Test once daily   • Ascorbic Acid (VITAMIN C PO) Take by mouth   • aspirin 81 mg chewable tablet Chew 81 mg daily   • atorvastatin (LIPITOR) 40 mg tablet TAKE 1 TABLET BY MOUTH EVERY DAY   • BD Pen Needle Dominga 2nd Gen 32G X 4 MM MISC USE 5 EACH IN THE MORNING   • ciclopirox (LOPROX) 0.77 % cream Apply topically 2 (two) times a day   • clotrimazole-betamethasone (LOTRISONE) 1-0.05 % cream Apply topically 2 (two) times a day   • fenofibrate (TRICOR) 145 mg tablet Take 1 tablet (145 mg total) by mouth daily   • glipiZIDE (GLUCOTROL XL) 5 mg 24 hr tablet Take 5 mg by mouth daily   • glucose blood (Accu-Chek Kate Plus) test strip Use 1 each in the morning Use as instructed   • Insulin Glargine Solostar (Lantus SoloStar) 100 UNIT/ML SOPN Inject 0.3 mL (30 Units total) under the skin 2 (two) times a day   • insulin lispro (HumaLOG) 100 units/mL injection pen Inject 5 Units under the skin 3 (three) times a day  with meals   • Insulin Pen Needle (Pen Needles) 33G X 4 MM MISC Use 5 each in the morning   • Januvia 100 MG tablet    • levETIRAcetam (KEPPRA) 1000 MG tablet TAKE 1 TABLET(1000 MG) BY MOUTH EVERY 12 HOURS   • metFORMIN (GLUCOPHAGE) 500 mg tablet    • omeprazole (PriLOSEC) 40 MG capsule Take 1 capsule (40 mg total) by mouth daily before breakfast   • OneTouch Delica Lancets 33G MISC Use 3 (three) times a week onetouch delica lancets 33G test 3 times per week   • [DISCONTINUED] lisinopril (ZESTRIL) 10 mg tablet Take 1 tablet (10 mg total) by mouth daily   • [DISCONTINUED] amLODIPine (NORVASC) 5 mg tablet Take 1 tablet (5 mg total) by mouth daily (Patient not taking: Reported on 8/2/2024)   • [DISCONTINUED] Ciclopirox 8 % KIT Please apply daily. Wash toenail in 7 days and repeat cycle. (Patient not taking: Reported on 8/2/2024)   • [DISCONTINUED] clonazePAM (KlonoPIN) 1 MG disintegrating tablet Take 1 tablet (1 mg total) by mouth 2 (two) times a day as needed for seizures for up to 10 days As needed for seizure (Patient not taking: Reported on 7/9/2024)   • [DISCONTINUED] Cyanocobalamin (VITAMIN B12 PO) Take by mouth (Patient not taking: Reported on 8/2/2024)   • [DISCONTINUED] hydrOXYzine HCL (ATARAX) 25 mg tablet Take 1 tablet (25 mg total) by mouth 3 (three) times a day Take by mouth every 8 hours for itching. CAN MAKE YOU DROWSY> DO NOT DRIVE (Patient not taking: Reported on 7/9/2024)   • [DISCONTINUED] triamcinolone (KENALOG) 0.1 % ointment Apply topically 2 (two) times a day for 14 days Apply BID for up to 2 weeks to affected skin on neck down prn flares then take one week break and can repeat regimen prn flares. Do not apply to groin, skin folds, face. (Patient not taking: Reported on 7/9/2024)     Allergies   Allergen Reactions   • Compazine [Prochlorperazine] Tongue Swelling   • Zithromax [Azithromycin] Other (See Comments)     .   • Contrast [Iodinated Contrast Media] Hives     Possible hypersensitivity  "reaction     Immunization History   Administered Date(s) Administered   • COVID-19 PFIZER VACCINE 0.3 ML IM 05/12/2021, 06/05/2021, 01/09/2022   • COVID-19 Pfizer Vac BIVALENT Cedric-sucrose 12 Yr+ IM 09/14/2022   • Influenza, high dose seasonal 0.7 mL 12/30/2020, 10/15/2021, 09/14/2022, 09/29/2023   • Pneumococcal Conjugate 13-Valent 10/03/2017   • Pneumococcal Polysaccharide PPV23 08/26/2020   • Tdap 06/08/2017   • influenza, trivalent, adjuvanted 10/22/2019     Objective     /82 (BP Location: Left arm, Patient Position: Sitting, Cuff Size: Large)   Pulse 100   Resp 20   Ht 5' 8\" (1.727 m)   Wt 105 kg (231 lb)   SpO2 98%   BMI 35.12 kg/m²     Physical Exam  Vitals and nursing note reviewed.   Constitutional:       Appearance: Normal appearance. He is normal weight.   Neck:      Vascular: No carotid bruit.   Cardiovascular:      Rate and Rhythm: Normal rate and regular rhythm.      Heart sounds: Normal heart sounds. No murmur heard.  Pulmonary:      Effort: Pulmonary effort is normal.      Breath sounds: Normal breath sounds. No wheezing.   Musculoskeletal:      Cervical back: Normal range of motion and neck supple. No muscular tenderness.      Right lower leg: Edema present.      Left lower leg: Edema present.   Lymphadenopathy:      Cervical: No cervical adenopathy.   Skin:     Comments: Erythematous blotchy rash on abdominal and back and legs, thickened skin on legs   Neurological:      Mental Status: He is alert.   Psychiatric:         Mood and Affect: Mood normal.         Behavior: Behavior normal.         Thought Content: Thought content normal.         Judgment: Judgment normal.         "

## 2024-08-30 NOTE — ASSESSMENT & PLAN NOTE
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 Patient was seen by Neurology and started on prednisone taper. Patient done with prednisone now. Patient had MRI in July 2024 and improved. Patient sees Neurology in September 2024 for follow-up.

## 2024-08-30 NOTE — ASSESSMENT & PLAN NOTE
Patient was doing better when on prednisone. Now that he is off prednisone, rash has come back and very itchy. Will refer back to Dermatology and will give hydroxyzine to help with itching.

## 2024-08-30 NOTE — ASSESSMENT & PLAN NOTE
Swelling is likely due to prednisone and weight gain. Patient stopped prednisone and insulin dose decreased.

## 2024-08-30 NOTE — ASSESSMENT & PLAN NOTE
Blood pressure higher. Continue lisinopril and increase to 20 mg qd. Pt advised to continue low Na diet and to exercise on a regular basis.

## 2024-08-30 NOTE — ASSESSMENT & PLAN NOTE
A1C was 6.0 in July 2024. Lantus was lowered to 20 U twice a day and Humalog was stopped. Sugars still below 100 fasting. Will lower lantus to 10 U twice a day for 1 week and if sugars still good, then patient can sop lantus. Continue januvia 100 mg daily and metformin 1000 mg twice a day. Advised pt to follow a low carb diet and to exercise on a regular basis. Had eye exam in August 2023. Foot exam done in March 2024. Urine albumin/creatinine ratio done in March 2024..    Lab Results   Component Value Date    HGBA1C 6.0 07/09/2024

## 2024-09-01 DIAGNOSIS — I10 ESSENTIAL HYPERTENSION: Primary | ICD-10-CM

## 2024-09-05 ENCOUNTER — HOSPITAL ENCOUNTER (EMERGENCY)
Facility: HOSPITAL | Age: 74
Discharge: HOME | End: 2024-09-05
Attending: INTERNAL MEDICINE
Payer: COMMERCIAL

## 2024-09-05 VITALS
DIASTOLIC BLOOD PRESSURE: 79 MMHG | HEART RATE: 117 BPM | OXYGEN SATURATION: 96 % | RESPIRATION RATE: 15 BRPM | TEMPERATURE: 98.1 F | SYSTOLIC BLOOD PRESSURE: 177 MMHG

## 2024-09-05 DIAGNOSIS — R21 RASH AND NONSPECIFIC SKIN ERUPTION: Primary | ICD-10-CM

## 2024-09-05 PROCEDURE — 99284 EMERGENCY DEPT VISIT MOD MDM: CPT | Performed by: INTERNAL MEDICINE

## 2024-09-05 PROCEDURE — 99283 EMERGENCY DEPT VISIT LOW MDM: CPT

## 2024-09-05 RX ORDER — CALAMINE
LOTION (ML) TOPICAL AS NEEDED
Qty: 180 ML | Refills: 0 | Status: SHIPPED | OUTPATIENT
Start: 2024-09-05

## 2024-09-05 RX ORDER — DIPHENHYDRAMINE HCL 25 MG
50 TABLET ORAL EVERY 6 HOURS
Qty: 20 TABLET | Refills: 0 | Status: SHIPPED | OUTPATIENT
Start: 2024-09-05

## 2024-09-05 RX ORDER — DIPHENHYDRAMINE HCL 25 MG
50 TABLET ORAL ONCE
Status: COMPLETED | OUTPATIENT
Start: 2024-09-05 | End: 2024-09-05

## 2024-09-05 RX ADMIN — DIPHENHYDRAMINE HYDROCHLORIDE 50 MG: 25 TABLET ORAL at 12:37

## 2024-09-05 NOTE — ED PROVIDER NOTES
History  Chief Complaint   Patient presents with    Rash     Pt presents with a generalized raised rash, states it's very itchy and he can't sleep. Was given prednisone, states it worked, but the rash come back     This is a 74 years old came for having itching.  Patient has extensive rash all over his body for almost 2 years.  Patient was followed by dermatologist and allergist, he has skin biopsy in the past.  Patient was told that he has some kind of eczema.  Patient was admitted to [facility] about 1 year ago, and patient has edema so he was on prednisone 70 mg which was tapered slowly for almost 8 months.  Patient is off steroid for 2 months now.  But he still have extensive rash and severe itching.  Patient takes Benadryl with little effect.  Patient is diabetic.  Patient does not take any new medications and he is not on antibiotics right now.  Patient denies any fever.  Patient denies changing detergent or has new close.  Patient has history of multiple allergies.  According to the son he stated that the specialists who saw him does not know why he has this extensive rash and itching.  Patient has no other symptoms.        Prior to Admission Medications   Prescriptions Last Dose Informant Patient Reported? Taking?   Accu-Chek Softclix Lancets lancets  Self Yes No   Sig: Use 1 each in the morning Use as instructed   Accu-Chek Softclix Lancets lancets  Self No No   Sig: Test once daily   Ascorbic Acid (VITAMIN C PO)  Self Yes No   Sig: Take by mouth   BD Pen Needle Dominga 2nd Gen 32G X 4 MM MISC  Self Yes No   Sig: USE 5 EACH IN THE MORNING   Insulin Glargine Solostar (Lantus SoloStar) 100 UNIT/ML SOPN   No No   Sig: Inject 0.3 mL (30 Units total) under the skin 2 (two) times a day   Insulin Pen Needle (Pen Needles) 33G X 4 MM MISC  Self No No   Sig: Use 5 each in the morning   Januvia 100 MG tablet  Self Yes No   OneTouch Delica Lancets 33G MISC  Self No No   Sig: Use 3 (three) times a week  onetouch delica lancets 33G test 3 times per week   aspirin 81 mg chewable tablet  Self Yes No   Sig: Chew 81 mg daily   atorvastatin (LIPITOR) 40 mg tablet   No No   Sig: TAKE 1 TABLET BY MOUTH EVERY DAY   ciclopirox (LOPROX) 0.77 % cream  Self No No   Sig: Apply topically 2 (two) times a day   clotrimazole-betamethasone (LOTRISONE) 1-0.05 % cream  Self No No   Sig: Apply topically 2 (two) times a day   fenofibrate (TRICOR) 145 mg tablet   No No   Sig: Take 1 tablet (145 mg total) by mouth daily   glipiZIDE (GLUCOTROL XL) 5 mg 24 hr tablet   Yes No   Sig: Take 5 mg by mouth daily   glucose blood (Accu-Chek Kate Plus) test strip  Self No No   Sig: Use 1 each in the morning Use as instructed   hydrOXYzine HCL (ATARAX) 25 mg tablet   No No   Sig: Take 1 tablet (25 mg total) by mouth every 6 (six) hours as needed for itching   insulin lispro (HumaLOG) 100 units/mL injection pen   No No   Sig: Inject 5 Units under the skin 3 (three) times a day with meals   levETIRAcetam (KEPPRA) 1000 MG tablet  Self No No   Sig: TAKE 1 TABLET(1000 MG) BY MOUTH EVERY 12 HOURS   lisinopril (ZESTRIL) 20 mg tablet   No No   Sig: Take 1 tablet (20 mg total) by mouth daily   metFORMIN (GLUCOPHAGE) 500 mg tablet   No No   Sig: TAKE 2 TABLETS TWICE A DAY WITH MEALS   omeprazole (PriLOSEC) 40 MG capsule  Self No No   Sig: Take 1 capsule (40 mg total) by mouth daily before breakfast      Facility-Administered Medications: None       Past Medical History:   Diagnosis Date    Diabetes mellitus (HCC)     ED (erectile dysfunction)     GERD (gastroesophageal reflux disease)     Hypercholesterolemia     Hypertension     Migraines        Past Surgical History:   Procedure Laterality Date    COLONOSCOPY  08/01/2016    FL LUMBAR PUNCTURE DIAGNOSTIC  2/12/2024    IR CEREBRAL ANGIOGRAPHY  2/15/2024    NO PAST SURGERIES         Family History   Problem Relation Age of Onset    Heart disease Mother     Asthma Father     Kidney disease Brother     Diabetes  Brother      I have reviewed and agree with the history as documented.    E-Cigarette/Vaping    E-Cigarette Use Never User      E-Cigarette/Vaping Substances    Nicotine No     THC No     CBD No     Flavoring No     Other No     Unknown No      Social History     Tobacco Use    Smoking status: Never     Passive exposure: Never    Smokeless tobacco: Never   Vaping Use    Vaping status: Never Used   Substance Use Topics    Alcohol use: Never    Drug use: Never       Review of Systems   Constitutional:  Negative for chills and fever.   HENT:  Negative for ear pain and sore throat.    Eyes:  Negative for pain and visual disturbance.   Respiratory:  Negative for cough and shortness of breath.    Cardiovascular:  Negative for chest pain and palpitations.   Gastrointestinal:  Negative for abdominal pain and vomiting.   Genitourinary:  Negative for dysuria and hematuria.   Musculoskeletal:  Negative for arthralgias and back pain.   Skin:  Positive for rash. Negative for color change, pallor and wound.   Neurological:  Negative for seizures and syncope.   All other systems reviewed and are negative.      Physical Exam  Physical Exam  Vitals and nursing note reviewed.   Constitutional:       General: He is not in acute distress.     Appearance: He is well-developed.   HENT:      Head: Normocephalic and atraumatic.      Right Ear: Ear canal normal.      Left Ear: Ear canal normal.   Eyes:      Conjunctiva/sclera: Conjunctivae normal.   Cardiovascular:      Rate and Rhythm: Normal rate and regular rhythm.      Heart sounds: No murmur heard.  Pulmonary:      Effort: Pulmonary effort is normal. No respiratory distress.      Breath sounds: Normal breath sounds.   Abdominal:      Palpations: Abdomen is soft.      Tenderness: There is no abdominal tenderness.   Musculoskeletal:         General: No swelling.      Cervical back: Neck supple.   Skin:     General: Skin is warm and dry.      Capillary Refill: Capillary refill takes less  than 2 seconds.      Coloration: Skin is not jaundiced or pale.      Findings: Rash present. No bruising, erythema or lesion.      Comments: Has extensive erythema of the trunk with extensive itching.  Has erythema of the extremities with itching.  There is no vesicles.  It is areas of large macules.  There is no hives.   Neurological:      Mental Status: He is alert.   Psychiatric:         Mood and Affect: Mood normal.         Vital Signs  ED Triage Vitals [09/05/24 1139]   Temperature Pulse Respirations Blood Pressure SpO2   98.1 °F (36.7 °C) (!) 117 15 (!) 177/79 96 %      Temp src Heart Rate Source Patient Position - Orthostatic VS BP Location FiO2 (%)   -- Monitor -- -- --      Pain Score       --           Vitals:    09/05/24 1139   BP: (!) 177/79   Pulse: (!) 117         Visual Acuity      ED Medications  Medications   diphenhydrAMINE (BENADRYL) tablet 50 mg (50 mg Oral Given 9/5/24 1237)       Diagnostic Studies  Results Reviewed       None                   No orders to display              Procedures  Procedures         ED Course                                 SBIRT 22yo+      Flowsheet Row Most Recent Value   Initial Alcohol Screen: US AUDIT-C     1. How often do you have a drink containing alcohol? 0 Filed at: 09/05/2024 1142   2. How many drinks containing alcohol do you have on a typical day you are drinking?  0 Filed at: 09/05/2024 1142   3a. Male UNDER 65: How often do you have five or more drinks on one occasion? 0 Filed at: 09/05/2024 1142   3b. FEMALE Any Age, or MALE 65+: How often do you have 4 or more drinks on one occassion? 0 Filed at: 09/05/2024 1142   Audit-C Score 0 Filed at: 09/05/2024 1142   SHELLIE: How many times in the past year have you...    Used an illegal drug or used a prescription medication for non-medical reasons? Never Filed at: 09/05/2024 1142                      Medical Decision Making  This 75 years old came for having a rash for 2 years and itching.  Has been followed by  allergist and dermatologist and has a skin biopsy.  According to the son the skin biopsy shows eczema and other things.  Patient was admitted to the [facility], about 1 year ago and at that time he started on prednisone 70 mg for brain edema.  Patient took the steroid for almost 8 months with tapering of the dose.  Was on steroid for the itching almost resolved, and when he stopped it it comes back.  On the exam; Has extensive erythema of the trunk with extensive itching.  Has erythema of the extremities with itching.  There is no vesicles.  It is areas of large macules.  There is no hives.  He is diabetic, and he was on prednisone for almost 8 months, and being followed by allergist and dermatologist, normal, start the patient on steroids.  Will get the patient calamine lotion and Benadryl and to follow-up with the dermatologist and allergist.    Risk  OTC drugs.                 Disposition  Final diagnoses:   Rash and nonspecific skin eruption     Time reflects when diagnosis was documented in both MDM as applicable and the Disposition within this note       Time User Action Codes Description Comment    9/5/2024 12:31 PM Lisa Randhawa Add [R21] Rash and nonspecific skin eruption           ED Disposition       ED Disposition   Discharge    Condition   Stable    Date/Time   Thu Sep 5, 2024 1231    Comment   Delphine Nelson discharge to home/self care.                   Follow-up Information       Follow up With Specialties Details Why Contact Info      In 3 days  FOLLOW UP WITH YOUR DERMATOLOGIST            Discharge Medication List as of 9/5/2024 12:33 PM        START taking these medications    Details   calamine lotion Apply topically as needed for itching, Starting Thu 9/5/2024, Normal      diphenhydrAMINE (BENADRYL) 25 mg tablet Take 2 tablets (50 mg total) by mouth every 6 (six) hours, Starting Thu 9/5/2024, Normal           CONTINUE these medications which have NOT CHANGED    Details   !! Accu-Chek Softclix  Lancets lancets Use 1 each in the morning Use as instructed, Historical Med      !! Accu-Chek Softclix Lancets lancets Test once daily, Normal      Ascorbic Acid (VITAMIN C PO) Take by mouth, Historical Med      aspirin 81 mg chewable tablet Chew 81 mg daily, Historical Med      atorvastatin (LIPITOR) 40 mg tablet TAKE 1 TABLET BY MOUTH EVERY DAY, Starting Mon 8/12/2024, Normal      !! BD Pen Needle Dominga 2nd Gen 32G X 4 MM MISC USE 5 EACH IN THE MORNING, Historical Med      ciclopirox (LOPROX) 0.77 % cream Apply topically 2 (two) times a day, Starting Thu 4/4/2024, Normal      clotrimazole-betamethasone (LOTRISONE) 1-0.05 % cream Apply topically 2 (two) times a day, Starting Wed 9/14/2022, Normal      fenofibrate (TRICOR) 145 mg tablet Take 1 tablet (145 mg total) by mouth daily, Starting Tue 8/6/2024, Normal      glipiZIDE (GLUCOTROL XL) 5 mg 24 hr tablet Take 5 mg by mouth daily, Starting Sun 5/5/2024, Historical Med      glucose blood (Accu-Chek Kate Plus) test strip Use 1 each in the morning Use as instructed, Starting Thu 5/30/2024, Normal      hydrOXYzine HCL (ATARAX) 25 mg tablet Take 1 tablet (25 mg total) by mouth every 6 (six) hours as needed for itching, Starting Fri 8/30/2024, Normal      Insulin Glargine Solostar (Lantus SoloStar) 100 UNIT/ML SOPN Inject 0.3 mL (30 Units total) under the skin 2 (two) times a day, Starting Fri 7/5/2024, Normal      insulin lispro (HumaLOG) 100 units/mL injection pen Inject 5 Units under the skin 3 (three) times a day with meals, Starting Fri 7/5/2024, Normal      !! Insulin Pen Needle (Pen Needles) 33G X 4 MM MISC Use 5 each in the morning, Starting Fri 2/23/2024, Normal      Januvia 100 MG tablet Historical Med      levETIRAcetam (KEPPRA) 1000 MG tablet TAKE 1 TABLET(1000 MG) BY MOUTH EVERY 12 HOURS, Normal      lisinopril (ZESTRIL) 20 mg tablet Take 1 tablet (20 mg total) by mouth daily, Starting Fri 8/30/2024, Normal      metFORMIN (GLUCOPHAGE) 500 mg tablet TAKE 2  TABLETS TWICE A DAY WITH MEALS, Starting Mon 9/2/2024, Normal      omeprazole (PriLOSEC) 40 MG capsule Take 1 capsule (40 mg total) by mouth daily before breakfast, Starting Mon 9/25/2023, Normal      !! OneTouch Delica Lancets 33G MISC Use 3 (three) times a week onetouch delica lancets 33G test 3 times per week, Starting Fri 5/10/2024, Normal       !! - Potential duplicate medications found. Please discuss with provider.          No discharge procedures on file.    PDMP Review       None            ED Provider  Electronically Signed by             Lisa Randhawa MD  09/06/24 0705

## 2024-09-17 DIAGNOSIS — E85.4 CEREBRAL AMYLOID ANGIOPATHY  (HCC): ICD-10-CM

## 2024-09-17 DIAGNOSIS — I68.0 CEREBRAL AMYLOID ANGIOPATHY  (HCC): ICD-10-CM

## 2024-09-18 ENCOUNTER — OFFICE VISIT (OUTPATIENT)
Age: 74
End: 2024-09-18
Payer: COMMERCIAL

## 2024-09-18 VITALS
BODY MASS INDEX: 34.25 KG/M2 | SYSTOLIC BLOOD PRESSURE: 160 MMHG | OXYGEN SATURATION: 98 % | HEIGHT: 68 IN | DIASTOLIC BLOOD PRESSURE: 80 MMHG | HEART RATE: 99 BPM | WEIGHT: 226 LBS

## 2024-09-18 DIAGNOSIS — E85.4 CEREBRAL AMYLOID ANGIOPATHY  (HCC): ICD-10-CM

## 2024-09-18 DIAGNOSIS — I68.0 CEREBRAL AMYLOID ANGIOPATHY  (HCC): ICD-10-CM

## 2024-09-18 PROCEDURE — G2211 COMPLEX E/M VISIT ADD ON: HCPCS | Performed by: PSYCHIATRY & NEUROLOGY

## 2024-09-18 PROCEDURE — 99215 OFFICE O/P EST HI 40 MIN: CPT | Performed by: PSYCHIATRY & NEUROLOGY

## 2024-09-18 RX ORDER — FENOFIBRIC ACID 135 MG/1
1 CAPSULE, DELAYED RELEASE ORAL DAILY
COMMUNITY
Start: 2024-08-28

## 2024-09-18 RX ORDER — LEVETIRACETAM 1000 MG/1
TABLET ORAL
Qty: 180 TABLET | Refills: 1 | Status: SHIPPED | OUTPATIENT
Start: 2024-09-18 | End: 2024-09-18 | Stop reason: SDUPTHER

## 2024-09-18 RX ORDER — LEVETIRACETAM 1000 MG/1
1000 TABLET ORAL 2 TIMES DAILY
Qty: 180 TABLET | Refills: 3 | Status: SHIPPED | OUTPATIENT
Start: 2024-09-18

## 2024-09-18 RX ORDER — CEPHALEXIN 500 MG/1
CAPSULE ORAL
COMMUNITY
Start: 2024-07-12

## 2024-09-18 RX ORDER — TRIAMCINOLONE ACETONIDE 1 MG/G
CREAM TOPICAL
COMMUNITY
Start: 2024-09-12

## 2024-09-18 NOTE — PATIENT INSTRUCTIONS
I would like Mr. Nelson to discuss with his spouse about whether they would like me to start him on Cellcept so as to prevent further inflammation. I am attaching the name and the side effects.       I would also recommend that you should Keppra 1000 mg one in the morning and one in evening, until I have seen you next time.

## 2024-09-18 NOTE — PROGRESS NOTES
NEUROLOGY OUTPATIENT - FOLLOW UP PATIENT VISIT NOTE        NAME: Delphine Nelson  MRN: [id number]  : 1950     TODAY'S DATE: 24         HISTORY OF PRESENT ILLNESS (HPI):    Mr. Nelson is a 74 y.o. 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 He stopped using the Prednisone last month. He did had a few side effects from the Prednisone including weight gain.     No new numbness (some days he would get sharp pain lasting for a few seconds in his legs likely from position change),Some leg heaviness but he is able to walk with out assistance, he feels that he is having knee and muscles issues from the Prednisone.  No vertigo or dizziness. He does have fatigue and some exhaustion. He cannot walk for extended duration or distance unless he sits down. No visual complaints.     No more seizures, no memory complaints. No bowel and bladder complaints. No speech complaints.     The patient denies having any new episodes of breakthrough seizures although he does mention that someone told him to stop his Keppra.  I told the patient that he should be compliant with his medication as medication noncompliance can trigger a seizure as well.  He is also bringing up form for the DMV to reinstate his driving license.       PRIOR NOTES:        2024-patient presented to [facility] with a witnessed seizure at [facility].  On arrival of the EMS noted that the patient was posturing and he was given 4 mg of Versed.  He was then taken to the ER where he was combative and postictal confusion.  Blood sugar was in the 200s.  Apparently the patient did not have any prior history of seizures he was started on 1 g of Keppra.  At that time he was also noticed to have pruritic, unknown painful erythematous rash on his extremities which have been happening intermittently for the last 7  years.  The patient underwent CT head which was showing a left hypodensity in the right parieto-occipital/left frontal region.  CTA head and neck showed focal areas of vasogenic edema concerning for possible amyloid angiopathy while the MRI of the brain was showing findings consistent with cerebral amyloid angiopathy with subacute cortical microbleed's in the left frontal region.  The patient also underwent transthoracic echo which was showing preserved EF with mild AV stenosis.  The routine EEG was showing intermittent left frontal/temporal delta activities with focal area of neuronal dysfunction.  The patient then underwent a spinal tap which was showing normal protein, meningeal encephalitis panel was negative.  The patient also underwent diagnostic cerebral angiogram which was showing areas of narrowing in the left MCA territory but were nonspecific and either could be coming from atherosclerotic disease versus inflammatory vasculopathy.   There was a possible plan of getting the brain biopsy but the patient declined to get the brain biopsy done.  The patient then underwent 5 days of IV Solu-Medrol and the plan was to start him on prednisone 60 mg which would be tapered down by 10 mg every month.    Since discharge patient reports, denies having any new symptoms. He is complaining of some swelling in the face and the leg. N new numbness or tingling, no weakness, no speech complaints, no memory complaints, no problem with walking. He does feel that he is tired as he has been walking for long distance as he is not able to drive. No new seizures. He has been using Keppra 1000 mg BID.         CURRENT OUTPATIENT MEDICATIONS:    Delphine Nelson has a current medication list which includes the following prescription(s): accu-chek softclix lancets, accu-chek softclix lancets, ascorbic acid, aspirin, atorvastatin, bd pen needle priscilla 2nd gen, calamine, cephalexin, fenofibric acid, ciclopirox, clotrimazole-betamethasone,  "diphenhydramine, fenofibrate, glipizide, accu-chek shirin plus, hydroxyzine hcl, pen needles, januvia, levetiracetam, lisinopril, metformin, omeprazole, onetouch delica lancets 33g, triamcinolone, insulin glargine solostar, and insulin lispro.       PHYSICAL EXAMINATION:   VITAL SIGNS:  height is 5' 8\" (1.727 m) and weight is 103 kg (226 lb). His blood pressure is 160/80 and his pulse is 99. His oxygen saturation is 98%.        NEUROLOGICAL EXAMINATION:    MENTAL STATUS EXAM: Alert, oriented to time place and person     CRANIAL NERVE EXAMINATION:  I Not tested   II Normal visual fields to finger counting.   II, Ill,  IV, VI Pupils were symmetric, briskly reactive. No afferent pupillary defect.  Eye movements are full without nystagmus. No ptosis.   V Facial sensation were intact   VII Facial movements were symmetrical    VIII Hearing was intact   IX, X Intact   XI Shoulder shrug and head turn was normal   XII Tongue protrusion is in the mid line.          MOTOR EXAM:        Arm Right  Left Leg Right  Left   Deltoid 5/5 5/5 lliopsoas 5/5 5/5   Biceps 5/5 5/5 Quads 5/5 5/5   Triceps 5/5 5/5 Hamstrings 5/5 5/5   Wrist Extension 5/5 5/5 Ankle Dorsi Flexion 5/5 5/5   Wrist Flexion 5/5 5/5 Ankle Plantar Flexion 5/5 5/5               DEEP TENDON REFLEXES:     Brachioradialis Biceps Triceps Patellar Achilles   Right 3+ 3+ 3+ 3+ 2+   Left 2+ 2+ 2+ 2+ 1+            SENSORY EXAMINATION:   Sensation to dull touch Intact  Sensation to temperature Intact    COORDINATION:   Normal finger to nose testing  Finger tapping test was normal    GAIT/STATION:   normal        DIAGNOSTIC STUDIES:   PERTINENT LABS:     Lab Results   Component Value Date    WBC 18.55 (H) 02/20/2024     Lab Results   Component Value Date    HGB 13.3 02/20/2024     Lab Results   Component Value Date     (H) 02/20/2024     Lab Results   Component Value Date    LYMPHSABS 0.75 02/10/2024     No results found for: \"LABLYMP\"  Lab Results   Component Value Date " "   EOSABS 1.26 (H) 02/17/2024     No components found for: \"NEUTROPHILS\"    No results found for: \"LABMONO\"         No results found for: \"LABGLUC\"  Lab Results   Component Value Date    CREATININE 1.17 06/27/2024     Lab Results   Component Value Date    AST 20 04/29/2024     Lab Results   Component Value Date    ALT 37 04/29/2024     Lab Results   Component Value Date    ALKPHOS 39 04/29/2024     Lab Results   Component Value Date    AST 20 04/29/2024        No results found for: \"FOLATE\"No components found for: \"GLUCOSECSF\"  No components found for: \"CSFINTERP\"  Lab Results   Component Value Date    RBCCSF 0 02/12/2024     Lab Results   Component Value Date    WBCCSF 3 02/12/2024     No results found for: \"IGGSYNRATE\"  No components found for: \"IGGINDEX\"  Lab Results   Component Value Date    PROTEINCSF 36 02/12/2024     C.NEOFORMANS/GATTII  Not Detected Not Detected   CYTOMEGALOVIRUS  Not Detected Not Detected   ENTEROVIRUS  Not Detected Not Detected   E.COLI K1  Not Detected Not Detected   H.INFLUENZAE  Not Detected Not Detected   H.SIMPLEX 1  Not Detected Not Detected   H.SIMPLEX 2  Not Detected Not Detected   HERPES VIRUS 6  Not Detected Not Detected   PARECHOVIRUS  Not Detected Not Detected   L.MONOCYTOGENES  Not Detected Not Detected   N.MENINGITIDIS  Not Detected Not Detected   S.AGALACTIAE  Not Detected Not Detected   S.PNEUMONIAE  Not Detected Not Detected   V.ZOSTER  Not Detected        NEUROIMAGING:  Results for orders placed during the hospital encounter of 07/13/24    MRI brain with and without contrast    Impression  1. No acute intracranial abnormality.    2. Findings of cerebral amyloid angiopathy (CAA), with near resolution of prior vasogenic edema that was most pronounced in the left frontal and right posterior temporal/occipital lobes. No new focal parenchymal edema.    Workstation performed: [id number]      Results for orders placed during the hospital encounter of 02/10/24    MRI brain w wo " contrast    Impression  Findings again seen most consistent with inflammatory amyloid angiopathy with vasogenic edema and petechial hemorrhages most pronounced in the left frontal and right posterior temporal/occipital regions.  1 or 2 new tiny foci of restricted diffusion in the left parietal lobe may be related to microhemorrhages versus tiny infarcts. Otherwise no change.  Stable mild to moderate stenosis in the left M1 segment.  Vessel wall imaging limited due to motion and venous contamination.    Workstation performed: [id number]      Results for orders placed during the hospital encounter of 02/08/24    MRI brain w wo contrast    Impression  1.  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 Recommend 6-8-week follow-up MRI after treatment to assess interval change.  2.  Mild chronic microangiopathy.            The study was marked in EPIC for immediate notification.    Workstation performed: [id number]        CTH revealed:   Hypodensity within the left frontal and right parietal occipital lobes, contrast-enhanced MRI of the brain recommended for further characterization to exclude underlying mass lesions with associated vasogenic edema.   CTA H/N:   No hemodynamically significant stenosis in the major arteries of the neck.  No irregularity of the M1 segment of the left middle cerebral artery possibly representing angiitis/vasculitis or other vasculopathy.  Focal areas of vasogenic edema better characterized on the recent brain MRI, ascribed to represent acute amyloid angiitis/acute inflammatory cerebral amyloid angiopathy. Other etiologies not excluded.     Echo: EF 60-65%, mild AV stenosis  CTA CAP w: No acute abnormality or suspicious mass Cholelithiasis. Hepatic steatosis.  Routine EEG: Intermittent left fronto-temporal delta activities suggest a focal area of neuronal dysfunction in that region. Background activities are  "overall too slow suggesting mild diffuse cerebral dysfunction of nonspecific etiology.     IR angiography 2/15/24:  \"Left MCA irregularity with areas of narrowing. Findings are nonspecific and may reflect either an atherosclerotic or inflammatory vasculopathy. \"                        ASSESSMENT AND PLAN:    Mr. Nelson is a 74 y.o.male  presenting for follow-up regarding cerebral amyloid angiopathy versus possible vasculitis leading to seizures for which she was started on Keppra and prednisone to help with the inflammation.  Repeat MRI of the brain also showed interval improvement.  Likely diagnosis seems to be cerebral amyloid angiopathy (inflammatory )versus vasculitis but again for both these conditions would typically start with steroids and afterwards we do typically recommend steroid sparing agents/immunosuppressants.  Patient had significant side effects from the steroids and since stopping the steroids last month he reports no new neurological symptoms.  I discussed with the patient that typically I would recommend that he should be on CellCept to prevent any future relapses but he would like to read more about it and would get back to us.  In regards to the seizure I would recommend that he should be on Keppra to prevent any further seizures from happening and if we do not see any more seizures in the coming months we can possibly wean it off but not at this moment. .  We will also be filling out the forms for the DMV in regards to reinstating his driving privileges.  We would send the forms to the DMV and would send 1 form to the patient as well      Cerebral amyloid angiopathy  (HCC)  Discussed about the role of CellCept.  The patient would like to discuss it with his significant other before making any decision    New onset of seizures  No new episodes of seizures.  The patient had stopped the Keppra for a few days and I have recommended that the patient should go back on Keppra 1000 mg twice a day.  " We will also fill out the form for the DMV      Return in about 4 months (around 1/18/2025), or 60 min follow up.       The management plan was discussed in detail with the patient and all questions were answered.     Mr. Nelson was encouraged to contact our office with any questions or concerns and to contact the clinic or go to the nearest emergency room if symptoms change or worsen.       I have spent a total of 45 min in reviewing and/or ordering tests, medications, or procedures, performing an examination or evaluation, reviewing pertinent history, counseling and educating the patient, referring and/or communicating with other health care professionals, documenting in the EMR and general coordination of care of Mr. Nelson  today.           Sixto Witt MD.   Staff Neurologist,   Neuroimmunology and Neuroinfectious disease  09/18/24     This report has been created through the use of voice recognition/text compilation software.  Typographical and content errors may occur with this process.  While efforts are made to detect and correct such errors, in some cases errors will persist.  For this reason, wording in this document should be considered in the proper context and not strictly verbatim.  If, when reviewing the document, an error is discovered, please let the office know at [phone]

## 2024-10-04 ENCOUNTER — OFFICE VISIT (OUTPATIENT)
Dept: FAMILY MEDICINE CLINIC | Facility: CLINIC | Age: 74
End: 2024-10-04
Payer: COMMERCIAL

## 2024-10-04 VITALS
HEART RATE: 100 BPM | WEIGHT: 214 LBS | OXYGEN SATURATION: 97 % | DIASTOLIC BLOOD PRESSURE: 70 MMHG | RESPIRATION RATE: 16 BRPM | BODY MASS INDEX: 32.43 KG/M2 | HEIGHT: 68 IN | SYSTOLIC BLOOD PRESSURE: 132 MMHG

## 2024-10-04 DIAGNOSIS — N18.31 TYPE 2 DIABETES MELLITUS WITH STAGE 3A CHRONIC KIDNEY DISEASE, WITHOUT LONG-TERM CURRENT USE OF INSULIN (HCC): ICD-10-CM

## 2024-10-04 DIAGNOSIS — Z00.00 MEDICARE ANNUAL WELLNESS VISIT, SUBSEQUENT: ICD-10-CM

## 2024-10-04 DIAGNOSIS — E85.4 CEREBRAL AMYLOID ANGIOPATHY  (HCC): ICD-10-CM

## 2024-10-04 DIAGNOSIS — I10 ESSENTIAL HYPERTENSION: Primary | ICD-10-CM

## 2024-10-04 DIAGNOSIS — Z23 ENCOUNTER FOR IMMUNIZATION: ICD-10-CM

## 2024-10-04 DIAGNOSIS — K63.5 POLYP OF COLON, UNSPECIFIED PART OF COLON, UNSPECIFIED TYPE: ICD-10-CM

## 2024-10-04 DIAGNOSIS — L23.9 ALLERGIC DERMATITIS: ICD-10-CM

## 2024-10-04 DIAGNOSIS — E11.22 TYPE 2 DIABETES MELLITUS WITH STAGE 3A CHRONIC KIDNEY DISEASE, WITHOUT LONG-TERM CURRENT USE OF INSULIN (HCC): ICD-10-CM

## 2024-10-04 DIAGNOSIS — I68.0 CEREBRAL AMYLOID ANGIOPATHY  (HCC): ICD-10-CM

## 2024-10-04 DIAGNOSIS — Z12.5 PROSTATE CANCER SCREENING: ICD-10-CM

## 2024-10-04 DIAGNOSIS — E78.00 HYPERCHOLESTEROLEMIA: ICD-10-CM

## 2024-10-04 PROCEDURE — G0439 PPPS, SUBSEQ VISIT: HCPCS | Performed by: FAMILY MEDICINE

## 2024-10-04 PROCEDURE — G0008 ADMIN INFLUENZA VIRUS VAC: HCPCS | Performed by: FAMILY MEDICINE

## 2024-10-04 PROCEDURE — 99214 OFFICE O/P EST MOD 30 MIN: CPT | Performed by: FAMILY MEDICINE

## 2024-10-04 PROCEDURE — 90662 IIV NO PRSV INCREASED AG IM: CPT | Performed by: FAMILY MEDICINE

## 2024-10-04 RX ORDER — CHLORTHALIDONE 25 MG/1
12.5 TABLET ORAL DAILY
Qty: 45 TABLET | Refills: 2 | Status: SHIPPED | OUTPATIENT
Start: 2024-10-04

## 2024-10-04 NOTE — ASSESSMENT & PLAN NOTE
LDL was 65 in April 2024. Continue atorvastatin 40 mg qhs. Advised pt to follow a low cholesterol diet and to exercise on a regular basis.     Orders:    Comprehensive metabolic panel; Future    Lipid panel; Future    Comprehensive metabolic panel    Lipid panel

## 2024-10-04 NOTE — ASSESSMENT & PLAN NOTE
Patient was doing better when on prednisone. Now that he is off prednisone, rash has come back and very itchy. Patient to see Dermatology next week.

## 2024-10-04 NOTE — PATIENT INSTRUCTIONS
Medicare Preventive Visit Patient Instructions  Thank you for completing your Welcome to Medicare Visit or Medicare Annual Wellness Visit today. Your next wellness visit will be due in one year (10/5/2025).  The screening/preventive services that you may require over the next 5-10 years are detailed below. Some tests may not apply to you based off risk factors and/or age. Screening tests ordered at today's visit but not completed yet may show as past due. Also, please note that scanned in results may not display below.  Preventive Screenings:  Service Recommendations Previous Testing/Comments   Colorectal Cancer Screening  Colonoscopy    Fecal Occult Blood Test (FOBT)/Fecal Immunochemical Test (FIT)  Fecal DNA/Cologuard Test  Flexible Sigmoidoscopy Age: 45-75 years old   Colonoscopy: every 10 years (May be performed more frequently if at higher risk)  OR  FOBT/FIT: every 1 year  OR  Cologuard: every 3 years  OR  Sigmoidoscopy: every 5 years  Screening may be recommended earlier than age 45 if at higher risk for colorectal cancer. Also, an individualized decision between you and your healthcare provider will decide whether screening between the ages of 76-85 would be appropriate. Colonoscopy: 02/21/2020  FOBT/FIT: Not on file  Cologuard: Not on file  Sigmoidoscopy: Not on file    Screening Current     Prostate Cancer Screening Individualized decision between patient and health care provider in men between ages of 55-69   Medicare will cover every 12 months beginning on the day after your 50th birthday PSA: 0.51 ng/mL           Hepatitis C Screening Once for adults born between 1945 and 1965  More frequently in patients at high risk for Hepatitis C Hep C Antibody: Not on file        Diabetes Screening 1-2 times per year if you're at risk for diabetes or have pre-diabetes Fasting glucose: 66 mg/dL (4/29/2024)  A1C: 6.0 (7/9/2024)  Screening Not Indicated  History Diabetes   Cholesterol Screening Once every 5 years if  you don't have a lipid disorder. May order more often based on risk factors. Lipid panel: 04/29/2024  Screening Not Indicated  History Lipid Disorder      Other Preventive Screenings Covered by Medicare:  Abdominal Aortic Aneurysm (AAA) Screening: covered once if your at risk. You're considered to be at risk if you have a family history of AAA or a male between the age of 65-75 who smoking at least 100 cigarettes in your lifetime.  Lung Cancer Screening: covers low dose CT scan once per year if you meet all of the following conditions: (1) Age 55-77; (2) No signs or symptoms of lung cancer; (3) Current smoker or have quit smoking within the last 15 years; (4) You have a tobacco smoking history of at least 20 pack years (packs per day x number of years you smoked); (5) You get a written order from a healthcare provider.  Glaucoma Screening: covered annually if you're considered high risk: (1) You have diabetes OR (2) Family history of glaucoma OR (3)  aged 50 and older OR (4)  American aged 65 and older  Osteoporosis Screening: covered every 2 years if you meet one of the following conditions: (1) Have a vertebral abnormality; (2) On glucocorticoid therapy for more than 3 months; (3) Have primary hyperparathyroidism; (4) On osteoporosis medications and need to assess response to drug therapy.  HIV Screening: covered annually if you're between the age of 15-65. Also covered annually if you are younger than 15 and older than 65 with risk factors for HIV infection. For pregnant patients, it is covered up to 3 times per pregnancy.    Immunizations:  Immunization Recommendations   Influenza Vaccine Annual influenza vaccination during flu season is recommended for all persons aged >= 6 months who do not have contraindications   Pneumococcal Vaccine   * Pneumococcal conjugate vaccine = PCV13 (Prevnar 13), PCV15 (Vaxneuvance), PCV20 (Prevnar 20)  * Pneumococcal polysaccharide vaccine = PPSV23  (Pneumovax) Adults 19-63 yo with certain risk factors or if 65+ yo  If never received any pneumonia vaccine: recommend Prevnar 20 (PCV20)  Give PCV20 if previously received 1 dose of PCV13 or PPSV23   Hepatitis B Vaccine 3 dose series if at intermediate or high risk (ex: diabetes, end stage renal disease, liver disease)   Respiratory syncytial virus (RSV) Vaccine - COVERED BY MEDICARE PART D  * RSVPreF3 (Arexvy) CDC recommends that adults 60 years of age and older may receive a single dose of RSV vaccine using shared clinical decision-making (SCDM)   Tetanus (Td) Vaccine - COST NOT COVERED BY MEDICARE PART B Following completion of primary series, a booster dose should be given every 10 years to maintain immunity against tetanus. Td may also be given as tetanus wound prophylaxis.   Tdap Vaccine - COST NOT COVERED BY MEDICARE PART B Recommended at least once for all adults. For pregnant patients, recommended with each pregnancy.   Shingles Vaccine (Shingrix) - COST NOT COVERED BY MEDICARE PART B  2 shot series recommended in those 19 years and older who have or will have weakened immune systems or those 50 years and older     Health Maintenance Due:      Topic Date Due   • Hepatitis C Screening  Never done   • Colorectal Cancer Screening  02/21/2023     Immunizations Due:      Topic Date Due   • Influenza Vaccine (1) 09/01/2024   • COVID-19 Vaccine (5 - 2023-24 season) 09/01/2024     Advance Directives   What are advance directives?  Advance directives are legal documents that state your wishes and plans for medical care. These plans are made ahead of time in case you lose your ability to make decisions for yourself. Advance directives can apply to any medical decision, such as the treatments you want, and if you want to donate organs.   What are the types of advance directives?  There are many types of advance directives, and each state has rules about how to use them. You may choose a combination of any of the  following:  Living will:  This is a written record of the treatment you want. You can also choose which treatments you do not want, which to limit, and which to stop at a certain time. This includes surgery, medicine, IV fluid, and tube feedings.   Durable power of  for healthcare (DPAHC):  This is a written record that states who you want to make healthcare choices for you when you are unable to make them for yourself. This person, called a proxy, is usually a family member or a friend. You may choose more than 1 proxy.  Do not resuscitate (DNR) order:  A DNR order is used in case your heart stops beating or you stop breathing. It is a request not to have certain forms of treatment, such as CPR. A DNR order may be included in other types of advance directives.  Medical directive:  This covers the care that you want if you are in a coma, near death, or unable to make decisions for yourself. You can list the treatments you want for each condition. Treatment may include pain medicine, surgery, blood transfusions, dialysis, IV or tube feedings, and a ventilator (breathing machine).  Values history:  This document has questions about your views, beliefs, and how you feel and think about life. This information can help others choose the care that you would choose.  Why are advance directives important?  An advance directive helps you control your care. Although spoken wishes may be used, it is better to have your wishes written down. Spoken wishes can be misunderstood, or not followed. Treatments may be given even if you do not want them. An advance directive may make it easier for your family to make difficult choices about your care.   Fall Prevention    Fall prevention  includes ways to make your home and other areas safer. It also includes ways you can move more carefully to prevent a fall. Health conditions that cause changes in your blood pressure, vision, or muscle strength and coordination may increase  your risk for falls. Medicines may also increase your risk for falls if they make you dizzy, weak, or sleepy.   Fall prevention tips:   Stand or sit up slowly.    Use assistive devices as directed.    Wear shoes that fit well and have soles that .    Wear a personal alarm.    Stay active.    Manage your medical conditions.    Home Safety Tips:  Add items to prevent falls in the bathroom.    Keep paths clear.    Install bright lights in your home.    Keep items you use often on shelves within reach.    Paint or place reflective tape on the edges of your stairs.    Weight Management   Why it is important to manage your weight:  Being overweight increases your risk of health conditions such as heart disease, high blood pressure, type 2 diabetes, and certain types of cancer. It can also increase your risk for osteoarthritis, sleep apnea, and other respiratory problems. Aim for a slow, steady weight loss. Even a small amount of weight loss can lower your risk of health problems.  How to lose weight safely:  A safe and healthy way to lose weight is to eat fewer calories and get regular exercise. You can lose up about 1 pound a week by decreasing the number of calories you eat by 500 calories each day.   Healthy meal plan for weight management:  A healthy meal plan includes a variety of foods, contains fewer calories, and helps you stay healthy. A healthy meal plan includes the following:  Eat whole-grain foods more often.  A healthy meal plan should contain fiber. Fiber is the part of grains, fruits, and vegetables that is not broken down by your body. Whole-grain foods are healthy and provide extra fiber in your diet. Some examples of whole-grain foods are whole-wheat breads and pastas, oatmeal, brown rice, and bulgur.  Eat a variety of vegetables every day.  Include dark, leafy greens such as spinach, kale, abmrosio greens, and mustard greens. Eat yellow and orange vegetables such as carrots, sweet potatoes, and  winter squash.   Eat a variety of fruits every day.  Choose fresh or canned fruit (canned in its own juice or light syrup) instead of juice. Fruit juice has very little or no fiber.  Eat low-fat dairy foods.  Drink fat-free (skim) milk or 1% milk. Eat fat-free yogurt and low-fat cottage cheese. Try low-fat cheeses such as mozzarella and other reduced-fat cheeses.  Choose meat and other protein foods that are low in fat.  Choose beans or other legumes such as split peas or lentils. Choose fish, skinless poultry (chicken or turkey), or lean cuts of red meat (beef or pork). Before you cook meat or poultry, cut off any visible fat.   Use less fat and oil.  Try baking foods instead of frying them. Add less fat, such as margarine, sour cream, regular salad dressing and mayonnaise to foods. Eat fewer high-fat foods. Some examples of high-fat foods include french fries, doughnuts, ice cream, and cakes.  Eat fewer sweets.  Limit foods and drinks that are high in sugar. This includes candy, cookies, regular soda, and sweetened drinks.  Exercise:  Exercise at least 30 minutes per day on most days of the week. Some examples of exercise include walking, biking, dancing, and swimming. You can also fit in more physical activity by taking the stairs instead of the elevator or parking farther away from stores. Ask your healthcare provider about the best exercise plan for you.      © Copyright [employer] 2018 Information is for End User's use only and may not be sold, redistributed or otherwise used for commercial purposes. All illustrations and images included in CareNotes® are the copyrighted property of A.D.A.M., Inc. or [employer]

## 2024-10-04 NOTE — ASSESSMENT & PLAN NOTE
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 Patient was seen by Neurology and started on prednisone taper. Patient done with prednisone now. Patient had MRI in July 2024 and improved. Patient saw Neurology in September 2024 for follow-up and considering starting CellCept.

## 2024-10-04 NOTE — ASSESSMENT & PLAN NOTE
Blood pressure up at times and patient has leg swelling. Will add chlorthalidone 12.5 mg qd. Continue lisinopril 20 mg qd. Pt advised to continue low Na diet and to exercise on a regular basis.     Orders:    Comprehensive metabolic panel; Future    Lipid panel; Future    CBC and differential; Future    chlorthalidone 25 mg tablet; Take 0.5 tablets (12.5 mg total) by mouth daily    Comprehensive metabolic panel    Lipid panel    CBC and differential

## 2024-10-04 NOTE — PROGRESS NOTES
Ambulatory Visit  Name: Delphine Nelson      : 1950      MRN: [id number]  Encounter Provider: Tristin Ling MD  Encounter Date: 10/4/2024   Encounter department: [facility]    Assessment & Plan  Medicare annual wellness visit, subsequent  Wellness exam done. Flu shot given. Last Tdap was in . Pt had Pneumovacc 23, Prevnar 13, Shingrix series, and COVID vaccines. Labs are up to date. Pt had colonoscopy in 2020 and next one is due now. Patient advised to schedule. No recent falls. Mood is good.          Encounter for immunization    Orders:    influenza vaccine, high-dose, PF 0.5 mL (Fluzone High Dose)    Essential hypertension  Blood pressure up at times and patient has leg swelling. Will add chlorthalidone 12.5 mg qd. Continue lisinopril 20 mg qd. Pt advised to continue low Na diet and to exercise on a regular basis.     Orders:    Comprehensive metabolic panel; Future    Lipid panel; Future    CBC and differential; Future    chlorthalidone 25 mg tablet; Take 0.5 tablets (12.5 mg total) by mouth daily    Comprehensive metabolic panel    Lipid panel    CBC and differential    Hypercholesterolemia  LDL was 65 in 2024. Continue atorvastatin 40 mg qhs. Advised pt to follow a low cholesterol diet and to exercise on a regular basis.     Orders:    Comprehensive metabolic panel; Future    Lipid panel; Future    Comprehensive metabolic panel    Lipid panel    Type 2 diabetes mellitus with stage 3a chronic kidney disease, without long-term current use of insulin (HCC)  A1C was 6.0 in 2024. Continue januvia 100 mg daily, glipizide ER 5 mg daily, and metformin 1000 mg twice a day. Advised pt to follow a low carb diet and to exercise on a regular basis. Had eye exam in 2023. Foot exam done in 2024. Urine albumin/creatinine ratio done in 2024..    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 Patient was seen by Neurology and started on prednisone taper. Patient done with prednisone now. Patient had MRI in July 2024 and improved. Patient saw Neurology in September 2024 for follow-up and considering starting CellCept.         Polyp of colon, unspecified part of colon, unspecified type  Had colonoscopy in February 2020 by Dr Pryor and pt had 9 polyps. Pt saw Gastroenterology and needs follow-up colonoscopy.         Allergic dermatitis  Patient was doing better when on prednisone. Now that he is off prednisone, rash has come back and very itchy. Patient to see Dermatology next week.          Prostate cancer screening    Orders:    PSA, Total Screen; Future    PSA, Total Screen      Depression Screening and Follow-up Plan: Patient was screened for depression during today's encounter. They screened negative with a PHQ-2 score of 0.      Preventive health issues were discussed with patient, and age appropriate screening tests were ordered as noted in patient's After Visit Summary. Personalized health advice and appropriate referrals for health education or preventive services given if needed, as noted in patient's After Visit Summary.    History of Present Illness     Patient here for wellness exam and follow-up Hypertension, Hyperlipidemia, DM, Cerebral Amyloid Angiopathy, Colon Polyps, Dermatitis. Patient doing ok. No chest pain or shortness of breath. No headaches. Blood pressure still a little high. Taking lisinopril 20 mg daily. Patient saw Neurology recently and is off prednisone and doing ok. Patient may start CellCept to prevent recurrence. Sugars have been good and off all insulin now. Still has rash and to  see Dermatology next week.        Patient Care Team:  Tristin Ling MD as PCP - General (Family Medicine)    Review of Systems   Constitutional:  Negative for fatigue and unexpected weight change.   Respiratory:  Negative for cough and shortness of breath.    Cardiovascular:  Positive for leg swelling. Negative for chest pain.   Gastrointestinal:  Negative for abdominal pain, constipation, diarrhea and vomiting.   Musculoskeletal:  Negative for arthralgias.   Skin:  Positive for rash.   Neurological:  Negative for dizziness and headaches.   Psychiatric/Behavioral:  Negative for dysphoric mood. The patient is not nervous/anxious.      Medical History Reviewed by provider this encounter:       Annual Wellness Visit Questionnaire   Delphine is here for his Subsequent Wellness visit.     Health Risk Assessment:   Patient rates overall health as fair. Patient feels that their physical health rating is slightly worse. Patient is satisfied with their life. Eyesight was rated as same. Hearing was rated as same. Patient feels that their emotional and mental health rating is same. Patients states they are never, rarely angry. Patient states they are always unusually tired/fatigued. Pain experienced in the last 7 days has been some. Patient's pain rating has been 5/10. Patient states that he has experienced no weight loss or gain in last 6 months. knee    Depression Screening:   PHQ-2 Score: 0      Fall Risk Screening:   In the past year, patient has experienced: history of falling in past year    Number of falls: 1  Injured during fall?: No    Feels unsteady when standing or walking?: No    Worried about falling?: No      Home Safety:  Patient does not have trouble with stairs inside or outside of their home. Patient has working smoke alarms and has working carbon monoxide detector. Home safety hazards include: none.     Nutrition:   Current diet is Regular.     Medications:   Patient is currently taking over-the-counter  supplements. OTC medications include: see medication list. Patient is able to manage medications.     Activities of Daily Living (ADLs)/Instrumental Activities of Daily Living (IADLs):   Walk and transfer into and out of bed and chair?: Yes  Dress and groom yourself?: Yes    Bathe or shower yourself?: Yes    Feed yourself? Yes  Do your laundry/housekeeping?: No  Manage your money, pay your bills and track your expenses?: Yes  Make your own meals?: No    Do your own shopping?: No    Previous Hospitalizations:   Any hospitalizations or ED visits within the last 12 months?: Yes    How many hospitalizations have you had in the last year?: 1-2    Hospitalization Comments: seizure    Advance Care Planning:   Living will: No    Durable POA for healthcare: No    Advanced directive: No    Advanced directive counseling given: Yes    ACP document given: Yes    Patient declined ACP directive: No      Cognitive Screening:   Provider or family/friend/caregiver concerned regarding cognition?: No    PREVENTIVE SCREENINGS      Cardiovascular Screening:    General: Screening Not Indicated and History Lipid Disorder      Diabetes Screening:     General: Screening Not Indicated and History Diabetes      Colorectal Cancer Screening:     General: Screening Current      Prostate Cancer Screening:      Due for: PSA      Osteoporosis Screening:    General: Screening Not Indicated      Abdominal Aortic Aneurysm (AAA) Screening:    Risk factors include: age between 65-74 yo        General: Screening Not Indicated      Lung Cancer Screening:     General: Screening Not Indicated    Screening, Brief Intervention, and Referral to Treatment (SBIRT)    Screening  Typical number of drinks in a day: 0  Typical number of drinks in a week: 0  Interpretation: Low risk drinking behavior.    AUDIT-C Screenin) How often did you have a drink containing alcohol in the past year? never  2) How many drinks did you have on a typical day when you were  "drinking in the past year? 0  3) How often did you have 6 or more drinks on one occasion in the past year? never    AUDIT-C Score: 0  Interpretation: Score 0-3 (male): Negative screen for alcohol misuse    Single Item Drug Screening:  How often have you used an illegal drug (including marijuana) or a prescription medication for non-medical reasons in the past year? never    Single Item Drug Screen Score: 0  Interpretation: Negative screen for possible drug use disorder    Brief Intervention  Alcohol & drug use screenings were reviewed. No concerns regarding substance use disorder identified.     Social Determinants of Health     Financial Resource Strain: Low Risk  (9/29/2023)    Overall Financial Resource Strain (CARDIA)     Difficulty of Paying Living Expenses: Not hard at all   Food Insecurity: No Food Insecurity (10/4/2024)    Hunger Vital Sign     Worried About Running Out of Food in the Last Year: Never true     Ran Out of Food in the Last Year: Never true   Transportation Needs: No Transportation Needs (10/4/2024)    PRAPARE - Transportation     Lack of Transportation (Medical): No     Lack of Transportation (Non-Medical): No   Housing Stability: Low Risk  (10/4/2024)    Housing Stability Vital Sign     Unable to Pay for Housing in the Last Year: No     Number of Times Moved in the Last Year: 1     Homeless in the Last Year: No   Utilities: Not At Risk (10/4/2024)    [facility] Utilities     Threatened with loss of utilities: No     No results found.    Objective     /70 (BP Location: Left arm, Patient Position: Sitting, Cuff Size: Standard)   Pulse 100   Resp 16   Ht 5' 8\" (1.727 m)   Wt 97.1 kg (214 lb)   SpO2 97%   BMI 32.54 kg/m²     Physical Exam  Vitals and nursing note reviewed.   Constitutional:       Appearance: Normal appearance. He is normal weight.   Neck:      Vascular: No carotid bruit.   Cardiovascular:      Rate and Rhythm: Normal rate and regular rhythm.      Heart sounds: Normal heart " sounds. No murmur heard.  Pulmonary:      Effort: Pulmonary effort is normal.      Breath sounds: Normal breath sounds. No wheezing.   Musculoskeletal:      Cervical back: Normal range of motion and neck supple. No muscular tenderness.      Right lower leg: Edema present.      Left lower leg: Edema present.   Lymphadenopathy:      Cervical: No cervical adenopathy.   Skin:     Comments: Erythematous blotchy rash on abdominal and back and legs, thickened skin on legs   Neurological:      Mental Status: He is alert.   Psychiatric:         Mood and Affect: Mood normal.         Behavior: Behavior normal.         Thought Content: Thought content normal.         Judgment: Judgment normal.

## 2024-10-04 NOTE — ASSESSMENT & PLAN NOTE
Wellness exam done. Flu shot given. Last Tdap was in 2017. Pt had Pneumovacc 23, Prevnar 13, Shingrix series, and COVID vaccines. Labs are up to date. Pt had colonoscopy in Feb 2020 and next one is due now. Patient advised to schedule. No recent falls. Mood is good.

## 2024-10-04 NOTE — ASSESSMENT & PLAN NOTE
Had colonoscopy in February 2020 by Dr Pryor and pt had 9 polyps. Pt saw Gastroenterology and needs follow-up colonoscopy.

## 2024-10-04 NOTE — ASSESSMENT & PLAN NOTE
A1C was 6.0 in July 2024. Continue januvia 100 mg daily, glipizide ER 5 mg daily, and metformin 1000 mg twice a day. Advised pt to follow a low carb diet and to exercise on a regular basis. Had eye exam in August 2023. Foot exam done in March 2024. Urine albumin/creatinine ratio done in March 2024..    Lab Results   Component Value Date    HGBA1C 6.0 07/09/2024       Orders:    Comprehensive metabolic panel; Future    Lipid panel; Future    Hemoglobin A1C; Future    Comprehensive metabolic panel    Lipid panel    Hemoglobin A1C

## 2024-10-09 ENCOUNTER — TELEPHONE (OUTPATIENT)
Age: 74
End: 2024-10-09

## 2024-10-09 ENCOUNTER — OFFICE VISIT (OUTPATIENT)
Age: 74
End: 2024-10-09
Payer: COMMERCIAL

## 2024-10-09 ENCOUNTER — TELEPHONE (OUTPATIENT)
Dept: FAMILY MEDICINE CLINIC | Facility: CLINIC | Age: 74
End: 2024-10-09

## 2024-10-09 VITALS — SYSTOLIC BLOOD PRESSURE: 142 MMHG | DIASTOLIC BLOOD PRESSURE: 82 MMHG

## 2024-10-09 DIAGNOSIS — L23.9 ALLERGIC DERMATITIS: ICD-10-CM

## 2024-10-09 PROCEDURE — 99214 OFFICE O/P EST MOD 30 MIN: CPT | Performed by: STUDENT IN AN ORGANIZED HEALTH CARE EDUCATION/TRAINING PROGRAM

## 2024-10-09 NOTE — PATIENT INSTRUCTIONS
RASH       Assessment and Plan:  Based on a thorough discussion of this condition and the management approach to it (including a comprehensive discussion of the known risks, side effects and potential benefits of treatment), the patient (family) agrees to implement the following specific plan:  Continue triamcinolone 0.1% cream apply topically twice as needed  Continue phototherapy 2-3 times weekly with Dr. Hathaway  Discussed risk and benefit of biologic injection DUPIXENT  Office to start prior authorize for biologic injection DUPIXENT

## 2024-10-09 NOTE — PROGRESS NOTES
"[facility] Note     Patient Name: Delphine Nelson  Encounter Date: 10/09/2024     Have you been cared for by a [facility] Dermatologist in the last 3 years and, if so, which description applies to you?    Yes.  I have been here within the last 3 years, and my medical history has NOT changed since that time.  I am MALE/not capable of bearing children.    REVIEW OF SYSTEMS:  Have you recently had or currently have any of the following? No changes in my recent health.   PAST MEDICAL HISTORY:  Have you personally ever had or currently have any of the following?  If \"YES,\" then please provide more detail. No changes in my medical history.   HISTORY OF IMMUNOSUPPRESSION: Do you have a history of any of the following:  Systemic Immunosuppression such as Diabetes, Biologic or Immunotherapy, Chemotherapy, Organ Transplantation, Bone Marrow Transplantation or Prednisone?  No     Answering \"YES\" requires the addition of the dotphrase \"IMMUNOSUPPRESSED\" as the first diagnosis of the patient's visit.   FAMILY HISTORY:  Any \"first degree relatives\" (parent, brother, sister, or child) with the following?    No changes in my family's known health.   PATIENT EXPERIENCE:    Do you want the Dermatologist to perform a COMPLETE skin exam today including a clinical examination under the \"bra and underwear\" areas?  NO  If necessary, do we have your permission to call and leave a detailed message on your Preferred Phone number that includes your specific medical information?  Yes      Allergies   Allergen Reactions    Compazine [Prochlorperazine] Tongue Swelling    Zithromax [Azithromycin] Other (See Comments)     .    Contrast [Iodinated Contrast Media] Hives     Possible hypersensitivity reaction      Current Outpatient Medications:     Accu-Chek Softclix Lancets lancets, Use 1 each in the morning Use as instructed, Disp: , Rfl:     Accu-Chek Softclix Lancets lancets, Test once daily, Disp: 100 each, Rfl: 3    Ascorbic " Acid (VITAMIN C PO), Take by mouth, Disp: , Rfl:     aspirin 81 mg chewable tablet, Chew 81 mg daily, Disp: , Rfl:     atorvastatin (LIPITOR) 40 mg tablet, TAKE 1 TABLET BY MOUTH EVERY DAY, Disp: 100 tablet, Rfl: 1    BD Pen Needle Dominga 2nd Gen 32G X 4 MM MISC, USE 5 EACH IN THE MORNING, Disp: , Rfl:     calamine lotion, Apply topically as needed for itching, Disp: 180 mL, Rfl: 0    cephalexin (KEFLEX) 500 mg capsule, , Disp: , Rfl:     chlorthalidone 25 mg tablet, Take 0.5 tablets (12.5 mg total) by mouth daily, Disp: 45 tablet, Rfl: 2    Choline Fenofibrate (Fenofibric Acid) 135 MG CPDR, Take 1 capsule by mouth daily, Disp: , Rfl:     ciclopirox (LOPROX) 0.77 % cream, Apply topically 2 (two) times a day, Disp: 90 g, Rfl: 2    clotrimazole-betamethasone (LOTRISONE) 1-0.05 % cream, Apply topically 2 (two) times a day, Disp: 45 g, Rfl: 5    diphenhydrAMINE (BENADRYL) 25 mg tablet, Take 2 tablets (50 mg total) by mouth every 6 (six) hours, Disp: 20 tablet, Rfl: 0    fenofibrate (TRICOR) 145 mg tablet, Take 1 tablet (145 mg total) by mouth daily, Disp: 90 tablet, Rfl: 2    glipiZIDE (GLUCOTROL XL) 5 mg 24 hr tablet, Take 5 mg by mouth daily, Disp: , Rfl:     glucose blood (Accu-Chek Kate Plus) test strip, Use 1 each in the morning Use as instructed, Disp: 100 strip, Rfl: 3    hydrOXYzine HCL (ATARAX) 25 mg tablet, Take 1 tablet (25 mg total) by mouth every 6 (six) hours as needed for itching, Disp: 60 tablet, Rfl: 1    Insulin Pen Needle (Pen Needles) 33G X 4 MM MISC, Use 5 each in the morning, Disp: 200 each, Rfl: 6    Januvia 100 MG tablet, , Disp: , Rfl:     levETIRAcetam (KEPPRA) 1000 MG tablet, Take 1 tablet (1,000 mg total) by mouth 2 (two) times a day, Disp: 180 tablet, Rfl: 3    lisinopril (ZESTRIL) 20 mg tablet, Take 1 tablet (20 mg total) by mouth daily, Disp: 90 tablet, Rfl: 2    metFORMIN (GLUCOPHAGE) 500 mg tablet, TAKE 2 TABLETS TWICE A DAY WITH MEALS, Disp: 360 tablet, Rfl: 1    omeprazole (PriLOSEC) 40  MG capsule, Take 1 capsule (40 mg total) by mouth daily before breakfast, Disp: 90 capsule, Rfl: 3    OneTouch Delica Lancets 33G MISC, Use 3 (three) times a week onetouch delica lancets 33G test 3 times per week, Disp: 100 each, Rfl: 3    triamcinolone (KENALOG) 0.1 % cream, , Disp: , Rfl:           Whom besides the patient is providing clinical information about today's encounter?   NO ADDITIONAL HISTORIAN (patient alone provided history)    Physical Exam and Assessment/Plan by Diagnosis:    ECZEMATOUS DERMATITIS   Physical Exam:  Anatomic Location Affected:  Bilateral upper and lower extremities, palms, soles, trunk  Morphological Description:    Pertinent Positives:  Pertinent Negatives:    Additional History of Present Condition:  patient is present for rash, patient has been seeing Dr. Hathaway for phototherpay 2-3 times weekly. Patient is currently using triamcinolone as needed.     Assessment and Plan:  Based on a thorough discussion of this condition and the management approach to it (including a comprehensive discussion of the known risks, side effects and potential benefits of treatment), the patient (family) agrees to implement the following specific plan:  Continue triamcinolone 0.1% cream apply topically twice as needed  Continue phototherapy 2-3 times weekly with Dr. Hathaway  Discussed risk and benefit of biologic injection DUPIXENT  Office to start prior authorize for biologic injection DUPIXENT    Scribe Attestation      I,:  Megan Devries am acting as a scribe while in the presence of the attending physician.:       I,:  Andrea Park DO personally performed the services described in this documentation    as scribed in my presence.:

## 2024-10-10 DIAGNOSIS — N52.9 ERECTILE DYSFUNCTION, UNSPECIFIED ERECTILE DYSFUNCTION TYPE: Primary | ICD-10-CM

## 2024-10-10 RX ORDER — TADALAFIL 20 MG/1
20 TABLET ORAL DAILY PRN
Qty: 30 TABLET | Refills: 3 | Status: SHIPPED | OUTPATIENT
Start: 2024-10-10

## 2024-10-15 ENCOUNTER — TELEPHONE (OUTPATIENT)
Dept: FAMILY MEDICINE CLINIC | Facility: CLINIC | Age: 74
End: 2024-10-15

## 2024-10-15 NOTE — TELEPHONE ENCOUNTER
Patient called in regards to medication tadalafil (CIALIS) 20 MG tablet stating that the pharmacy, [facility], indicated that there was no record of a script. Informed patient that script was sent to [facility] on 10/10/2024 at 1:37 pm and to contact that pharmacy to speak to someone live. Patient verbalized understanding.

## 2024-10-24 DIAGNOSIS — N18.31 TYPE 2 DIABETES MELLITUS WITH STAGE 3A CHRONIC KIDNEY DISEASE, WITHOUT LONG-TERM CURRENT USE OF INSULIN (HCC): Primary | ICD-10-CM

## 2024-10-24 DIAGNOSIS — E11.22 TYPE 2 DIABETES MELLITUS WITH STAGE 3A CHRONIC KIDNEY DISEASE, WITHOUT LONG-TERM CURRENT USE OF INSULIN (HCC): Primary | ICD-10-CM

## 2024-10-24 RX ORDER — GLIPIZIDE 5 MG/1
5 TABLET, FILM COATED, EXTENDED RELEASE ORAL DAILY
Qty: 90 TABLET | Refills: 3 | Status: SHIPPED | OUTPATIENT
Start: 2024-10-24

## 2024-10-31 DIAGNOSIS — L23.9 ALLERGIC DERMATITIS: Primary | ICD-10-CM

## 2024-10-31 RX ORDER — TRIAMCINOLONE ACETONIDE 1 MG/G
CREAM TOPICAL 2 TIMES DAILY
Qty: 454 G | Refills: 0 | Status: SHIPPED | OUTPATIENT
Start: 2024-10-31

## 2024-10-31 NOTE — TELEPHONE ENCOUNTER
Reason for call:   [x] Refill   [] Prior Auth  [] Other:     Office:   [x] PCP/Provider -   [] Specialty/Provider -     Medication: triamcinolone (KENALOG) 0.1 % cream     Dose/Frequency: Apply topically     Quantity: 454 g    Pharmacy: [id number] [place], [place] - [address]     Does the patient have enough for 3 days?   [] Yes   [x] No - Send as HP to POD

## 2024-11-03 PROBLEM — Z00.00 MEDICARE ANNUAL WELLNESS VISIT, SUBSEQUENT: Status: RESOLVED | Noted: 2021-05-09 | Resolved: 2024-11-03

## 2024-11-26 ENCOUNTER — VBI (OUTPATIENT)
Dept: ADMINISTRATIVE | Facility: OTHER | Age: 74
End: 2024-11-26

## 2024-11-26 NOTE — TELEPHONE ENCOUNTER
11/26/24 10:56 AM     Chart reviewed for blood pressure reading was/were submitted to the patient's insurance.     Yenifer Aragon   [facility]

## 2024-11-26 NOTE — TELEPHONE ENCOUNTER
11/26/24 10:37 AM     Chart reviewed for Hemoglobin A1c and Microalbumin Creatinine Urine Ratio OR Albumin Creatinine Urine Ratio  was/were submitted to the patient's insurance.     Yenifer Aragon   [facility]

## 2024-12-09 DIAGNOSIS — E78.00 HYPERCHOLESTEROLEMIA: Primary | ICD-10-CM

## 2024-12-11 RX ORDER — FENOFIBRIC ACID 135 MG/1
1 CAPSULE, DELAYED RELEASE ORAL DAILY
Qty: 90 CAPSULE | Refills: 3 | Status: SHIPPED | OUTPATIENT
Start: 2024-12-11

## 2024-12-11 NOTE — TELEPHONE ENCOUNTER
Refill must be reviewed and completed by the office or provider. The refill is unable to be approved or denied by the medication management team.    Patient needs to complete lipid and cmp bw  Script needs diagnosis code

## 2025-01-05 ENCOUNTER — TELEPHONE (OUTPATIENT)
Dept: OTHER | Facility: OTHER | Age: 75
End: 2025-01-05

## 2025-01-05 NOTE — TELEPHONE ENCOUNTER
Patient is calling regarding cancelling an appointment.     Date/Time: 1/6/25 230 pm     Patient was rescheduled: YES [ ] NO [ x]     Patient requesting call back to reschedule: YES [ x] NO [ ]    I was unable to reschedule pt through book it, first appointment available was in February. Pt would like an appointment on January 20th early in the morning if possible. Please return pt's call once the office reopens. Pt stated that if he does not , he will call back. Thank you!

## 2025-02-12 PROBLEM — E66.01 SEVERE OBESITY (BMI 35.0-39.9) WITH COMORBIDITY (HCC): Status: RESOLVED | Noted: 2024-08-30 | Resolved: 2025-02-12

## 2025-03-27 ENCOUNTER — RA CDI HCC (OUTPATIENT)
Dept: OTHER | Facility: HOSPITAL | Age: 75
End: 2025-03-27

## 2025-03-27 NOTE — PROGRESS NOTES
HCC coding opportunities          Chart Reviewed number of suggestions sent to Provider: 1  E11.36  I12.9-IB     Patients Insurance     Medicare Insurance: [employer]

## 2025-04-02 DIAGNOSIS — E78.00 PURE HYPERCHOLESTEROLEMIA: ICD-10-CM

## 2025-04-02 RX ORDER — ATORVASTATIN CALCIUM 40 MG/1
40 TABLET, FILM COATED ORAL DAILY
Qty: 90 TABLET | Refills: 1 | Status: SHIPPED | OUTPATIENT
Start: 2025-04-02

## 2025-04-03 ENCOUNTER — OFFICE VISIT (OUTPATIENT)
Dept: FAMILY MEDICINE CLINIC | Facility: CLINIC | Age: 75
End: 2025-04-03
Payer: COMMERCIAL

## 2025-04-03 VITALS
DIASTOLIC BLOOD PRESSURE: 60 MMHG | HEIGHT: 68 IN | HEART RATE: 88 BPM | RESPIRATION RATE: 16 BRPM | SYSTOLIC BLOOD PRESSURE: 122 MMHG | OXYGEN SATURATION: 97 % | WEIGHT: 201 LBS | BODY MASS INDEX: 30.46 KG/M2

## 2025-04-03 DIAGNOSIS — N18.31 TYPE 2 DIABETES MELLITUS WITH STAGE 3A CHRONIC KIDNEY DISEASE, WITHOUT LONG-TERM CURRENT USE OF INSULIN (HCC): ICD-10-CM

## 2025-04-03 DIAGNOSIS — E85.4 CEREBRAL AMYLOID ANGIOPATHY  (HCC): ICD-10-CM

## 2025-04-03 DIAGNOSIS — E11.22 TYPE 2 DIABETES MELLITUS WITH STAGE 3A CHRONIC KIDNEY DISEASE, WITHOUT LONG-TERM CURRENT USE OF INSULIN (HCC): ICD-10-CM

## 2025-04-03 DIAGNOSIS — L23.9 ALLERGIC DERMATITIS: ICD-10-CM

## 2025-04-03 DIAGNOSIS — Z12.5 PROSTATE CANCER SCREENING: ICD-10-CM

## 2025-04-03 DIAGNOSIS — M25.552 PAIN OF LEFT HIP: ICD-10-CM

## 2025-04-03 DIAGNOSIS — I10 ESSENTIAL HYPERTENSION: Primary | ICD-10-CM

## 2025-04-03 DIAGNOSIS — K63.5 POLYP OF COLON, UNSPECIFIED PART OF COLON, UNSPECIFIED TYPE: ICD-10-CM

## 2025-04-03 DIAGNOSIS — I68.0 CEREBRAL AMYLOID ANGIOPATHY  (HCC): ICD-10-CM

## 2025-04-03 DIAGNOSIS — E78.00 HYPERCHOLESTEROLEMIA: ICD-10-CM

## 2025-04-03 PROBLEM — R56.9 NEW ONSET SEIZURE (HCC): Status: RESOLVED | Noted: 2024-02-08 | Resolved: 2025-04-03

## 2025-04-03 LAB
CREAT UR-MCNC: 171.5 MG/DL
MICROALBUMIN UR-MCNC: 16.2 MG/L
MICROALBUMIN/CREAT 24H UR: 9 MG/G CREATININE (ref 0–30)
SL AMB POCT HEMOGLOBIN AIC: 7.4 (ref ?–6.5)

## 2025-04-03 PROCEDURE — 99214 OFFICE O/P EST MOD 30 MIN: CPT | Performed by: FAMILY MEDICINE

## 2025-04-03 PROCEDURE — 83036 HEMOGLOBIN GLYCOSYLATED A1C: CPT | Performed by: FAMILY MEDICINE

## 2025-04-03 PROCEDURE — 82043 UR ALBUMIN QUANTITATIVE: CPT | Performed by: FAMILY MEDICINE

## 2025-04-03 PROCEDURE — 82570 ASSAY OF URINE CREATININE: CPT | Performed by: FAMILY MEDICINE

## 2025-04-03 PROCEDURE — G2211 COMPLEX E/M VISIT ADD ON: HCPCS | Performed by: FAMILY MEDICINE

## 2025-04-03 RX ORDER — TRIAMCINOLONE ACETONIDE 1 MG/G
CREAM TOPICAL 2 TIMES DAILY
Qty: 454 G | Refills: 2 | Status: SHIPPED | OUTPATIENT
Start: 2025-04-03

## 2025-04-03 NOTE — ASSESSMENT & PLAN NOTE
Blood pressure ok. Continue chlorthalidone 12.5 mg daily and lisinopril 20 mg qd. Pt advised to continue low Na diet and to exercise on a regular basis.       Orders:  •  Lipid panel; Future  •  Comprehensive metabolic panel; Future  •  CBC and differential; Future

## 2025-04-03 NOTE — PROGRESS NOTES
Name: Delphine Nelson      : 1950      MRN: [id number]  Encounter Provider: Tristin Ling MD  Encounter Date: 4/3/2025   Encounter department: [facility] MEDICINE  :  Assessment & Plan  Essential hypertension  Blood pressure ok. Continue chlorthalidone 12.5 mg daily and lisinopril 20 mg qd. Pt advised to continue low Na diet and to exercise on a regular basis.       Orders:  •  Lipid panel; Future  •  Comprehensive metabolic panel; Future  •  CBC and differential; Future    Hypercholesterolemia  Recheck lipids and LFTs. Continue atorvastatin 40 mg qhs. Advised pt to follow a low cholesterol diet and to exercise on a regular basis.   Orders:  •  Lipid panel; Future  •  Comprehensive metabolic panel; Future    Type 2 diabetes mellitus with stage 3a chronic kidney disease, without long-term current use of insulin (HCC)  A1C done today and was 7.4. Continue januvia 100 mg daily, glipizide ER 5 mg daily, and metformin 1000 mg twice a day. Advised pt to follow a low carb diet and to exercise on a regular basis. Had eye exam in 2023. Foot exam done today.    Lab Results   Component Value Date    HGBA1C 7.4 (A) 2025     Orders:  •  POCT hemoglobin A1c  •  Lipid panel; Future  •  Comprehensive metabolic panel; Future  •  Albumin / creatinine urine ratio; Future    Cerebral amyloid angiopathy  (HCC)  MRI done in 2024 showed inflammatory cerebral amyloid angiopathy with a subacute cortical microbleed in the left frontal cortex. Patient was seen by Neurology and started on prednisone taper. Patient done with prednisone now. Patient had MRI in 2024 and improved. Patient saw Neurology in 2024 and patient advised to schedule appointment for follow-up.        Polyp of colon, unspecified part of colon, unspecified type  Had colonoscopy in 2020 by Dr Pryor and pt had 9 polyps. Pt saw Gastroenterology and needs follow-up colonoscopy.       Allergic  "dermatitis  Continue management per Dermatology.   Orders:  •  triamcinolone (KENALOG) 0.1 % cream; Apply topically 2 (two) times a day    Prostate cancer screening    Orders:  •  PSA, Total Screen; Future    Pain of left hip  Patient has had it for months and getting worse. Will refer to Orthopedics for evaluation.   Orders:  •  Ambulatory Referral to Orthopedic Surgery; Future          Depression Screening and Follow-up Plan: Patient was screened for depression during today's encounter. They screened negative with a PHQ-2 score of 0.        History of Present Illness   Patient here for follow-up Hypertension, Hyperlipidemia, DM, Cerebral Amyloid Angiopathy, Allergic Dermatitis. Blood pressure has been good. matitis. Patient doing ok. No chest pain or shortness of breath. No headaches. No abdominal pain. Patient has had left hip and leg pain off and on. No back pain. Hurts to stand and walk. Still has rash on legs and sees Dermatology.       Review of Systems   Constitutional:  Negative for fatigue and unexpected weight change.   Respiratory:  Negative for cough and shortness of breath.    Cardiovascular:  Positive for leg swelling. Negative for chest pain.   Gastrointestinal:  Negative for abdominal pain, constipation, diarrhea and vomiting.   Musculoskeletal:  Positive for arthralgias.   Skin:  Positive for rash.   Neurological:  Negative for dizziness and headaches.   Psychiatric/Behavioral:  Negative for dysphoric mood. The patient is not nervous/anxious.        Objective   /60 (BP Location: Left arm, Patient Position: Sitting, Cuff Size: Standard)   Pulse 88   Resp 16   Ht 5' 8\" (1.727 m)   Wt 91.2 kg (201 lb)   SpO2 97%   BMI 30.56 kg/m²      Physical Exam  Vitals and nursing note reviewed.   Constitutional:       Appearance: Normal appearance. He is normal weight.   Neck:      Vascular: No carotid bruit.   Cardiovascular:      Rate and Rhythm: Normal rate and regular rhythm.      Heart sounds: " Normal heart sounds. No murmur heard.  Pulmonary:      Effort: Pulmonary effort is normal.      Breath sounds: Normal breath sounds. No wheezing.   Musculoskeletal:      Cervical back: Normal range of motion and neck supple. No muscular tenderness.      Right lower leg: Edema present.      Left lower leg: Edema present.      Comments: Left anterolateral tenderness to palpation.    Lymphadenopathy:      Cervical: No cervical adenopathy.   Skin:     Comments: Erythematous papules bilateral legs    Neurological:      Mental Status: He is alert.   Psychiatric:         Mood and Affect: Mood normal.         Behavior: Behavior normal.         Thought Content: Thought content normal.         Judgment: Judgment normal.          Sarecycline Counseling: Patient advised regarding possible photosensitivity and discoloration of the teeth, skin, lips, tongue and gums.  Patient instructed to avoid sunlight, if possible.  When exposed to sunlight, patients should wear protective clothing, sunglasses, and sunscreen.  The patient was instructed to call the office immediately if the following severe adverse effects occur:  hearing changes, easy bruising/bleeding, severe headache, or vision changes.  The patient verbalized understanding of the proper use and possible adverse effects of sarecycline.  All of the patient's questions and concerns were addressed.

## 2025-04-03 NOTE — ASSESSMENT & PLAN NOTE
Continue management per Dermatology.   Orders:  •  triamcinolone (KENALOG) 0.1 % cream; Apply topically 2 (two) times a day

## 2025-04-03 NOTE — ASSESSMENT & PLAN NOTE
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 Patient was seen by Neurology and started on prednisone taper. Patient done with prednisone now. Patient had MRI in July 2024 and improved. Patient saw Neurology in September 2024 and patient advised to schedule appointment for follow-up.

## 2025-04-03 NOTE — ASSESSMENT & PLAN NOTE
A1C done today and was 7.4. Continue januvia 100 mg daily, glipizide ER 5 mg daily, and metformin 1000 mg twice a day. Advised pt to follow a low carb diet and to exercise on a regular basis. Had eye exam in August 2023. Foot exam done today.    Lab Results   Component Value Date    HGBA1C 7.4 (A) 04/03/2025     Orders:  •  POCT hemoglobin A1c  •  Lipid panel; Future  •  Comprehensive metabolic panel; Future  •  Albumin / creatinine urine ratio; Future

## 2025-04-03 NOTE — ASSESSMENT & PLAN NOTE
Recheck lipids and LFTs. Continue atorvastatin 40 mg qhs. Advised pt to follow a low cholesterol diet and to exercise on a regular basis.   Orders:  •  Lipid panel; Future  •  Comprehensive metabolic panel; Future

## 2025-04-03 NOTE — ASSESSMENT & PLAN NOTE
Patient has had it for months and getting worse. Will refer to Orthopedics for evaluation.   Orders:  •  Ambulatory Referral to Orthopedic Surgery; Future

## 2025-04-30 ENCOUNTER — CONSULT (OUTPATIENT)
Dept: OBGYN CLINIC | Facility: CLINIC | Age: 75
End: 2025-04-30
Attending: FAMILY MEDICINE
Payer: COMMERCIAL

## 2025-04-30 ENCOUNTER — HOSPITAL ENCOUNTER (OUTPATIENT)
Dept: RADIOLOGY | Facility: HOSPITAL | Age: 75
Discharge: HOME | End: 2025-04-30
Attending: ORTHOPAEDIC SURGERY
Payer: COMMERCIAL

## 2025-04-30 VITALS — HEIGHT: 68 IN | WEIGHT: 204 LBS | BODY MASS INDEX: 30.92 KG/M2

## 2025-04-30 DIAGNOSIS — M25.552 CHRONIC HIP PAIN, LEFT: ICD-10-CM

## 2025-04-30 DIAGNOSIS — M25.552 LEFT HIP PAIN: ICD-10-CM

## 2025-04-30 DIAGNOSIS — M25.552 PAIN OF LEFT HIP: ICD-10-CM

## 2025-04-30 DIAGNOSIS — G89.29 CHRONIC HIP PAIN, LEFT: ICD-10-CM

## 2025-04-30 DIAGNOSIS — M43.16 SPONDYLOLISTHESIS AT L4-L5 LEVEL: ICD-10-CM

## 2025-04-30 DIAGNOSIS — N18.31 TYPE 2 DIABETES MELLITUS WITH STAGE 3A CHRONIC KIDNEY DISEASE, WITHOUT LONG-TERM CURRENT USE OF INSULIN (HCC): ICD-10-CM

## 2025-04-30 DIAGNOSIS — E11.22 TYPE 2 DIABETES MELLITUS WITH STAGE 3A CHRONIC KIDNEY DISEASE, WITHOUT LONG-TERM CURRENT USE OF INSULIN (HCC): ICD-10-CM

## 2025-04-30 DIAGNOSIS — M16.12 ARTHRITIS OF LEFT HIP: Primary | ICD-10-CM

## 2025-04-30 PROCEDURE — 99204 OFFICE O/P NEW MOD 45 MIN: CPT | Performed by: ORTHOPAEDIC SURGERY

## 2025-04-30 PROCEDURE — 73502 X-RAY EXAM HIP UNI 2-3 VIEWS: CPT

## 2025-04-30 PROCEDURE — 72100 X-RAY EXAM L-S SPINE 2/3 VWS: CPT

## 2025-04-30 NOTE — PROGRESS NOTES
Name: Delphine Nelson      : 1950      MRN: [id number]  Encounter Provider: Mary Beth Rollins MD  Encounter Date: 2025   Encounter department: [facility]  :  Assessment & Plan  Left hip pain    Orders:    XR hip/pelv 2-3 vws left if performed; Future    XR spine lumbar 2 or 3 views injury; Future    Pain of left hip    Orders:    Ambulatory Referral to Orthopedic Surgery    Chronic hip pain, left         Arthritis of left hip  Weightbearing as tolerated left lower extremity  Recommend x-ray guided left hip intra-articular corticosteroid injection for therapeutic and diagnostic purposes  Range of motion stretching strengthening exercises  Follow-up in 3 months time repeat evaluation left hip       Spondylolisthesis at L4-L5 level  Weightbearing as tolerated bilateral lower extremities  Recommend core strengthening exercises  Advised patient should he develop any symptoms he is to follow-up with the spine pain specialist or spine specialist       Type 2 diabetes mellitus with stage 3a chronic kidney disease, without long-term current use of insulin ([facility])    Lab Results   Component Value Date    HGBA1C 7.4 (A) 2025                History of Present Illness   HPI  Delphine Nelson is a 74 y.o. male who presents to the office today regarding left hip and groin pain.  Patient has a past medical history of cerebral amyloid angiopathic and diabetes type 2.  patient states the pain has been increasing over the past years times and worse over the past few months.  States the pain is worse with weightbearing worse with going from sitting to standing localized the pain in his left hip groin region which did not radiate past his left knee.  States it does limit his activities of daily living such as walking.  Denies any numbness tingling or weakness in the left lower extremity.  Patient states that he is limited in medications he can take due to medications given to  "him by other physicians.  History obtained from: patient         Objective   Ht 5' 8\" (1.727 m)   Wt 92.5 kg (204 lb)   BMI 31.02 kg/m²                    Review Of Systems:   Skin: Normal  Neuro: See HPI  Musculoskeletal: See HPI  All other systems reviewed and are negative    Past Medical History:   Past Medical History:   Diagnosis Date    Diabetes mellitus (HCC)     ED (erectile dysfunction)     GERD (gastroesophageal reflux disease)     Hypercholesterolemia     Hypertension     Migraines        Past Surgical History:   Past Surgical History:   Procedure Laterality Date    COLONOSCOPY  08/01/2016    FL LUMBAR PUNCTURE DIAGNOSTIC  2/12/2024    IR CEREBRAL ANGIOGRAPHY  2/15/2024    NO PAST SURGERIES         Family History:  Family history reviewed and non-contributory  Family History   Problem Relation Age of Onset    Heart disease Mother     Asthma Father     Kidney disease Brother     Diabetes Brother          Social History:  Social History     Socioeconomic History    Marital status: /Civil Union     Spouse name: None    Number of children: None    Years of education: None    Highest education level: None   Occupational History    None   Tobacco Use    Smoking status: Never     Passive exposure: Never    Smokeless tobacco: Never   Vaping Use    Vaping status: Never Used   Substance and Sexual Activity    Alcohol use: Never    Drug use: Never    Sexual activity: Not Currently     Partners: Female   Other Topics Concern    None   Social History Narrative    None     Social Drivers of Health     Financial Resource Strain: Low Risk  (9/29/2023)    Overall Financial Resource Strain (CARDIA)     Difficulty of Paying Living Expenses: Not hard at all   Food Insecurity: No Food Insecurity (10/4/2024)    Nursing - Inadequate Food Risk Classification     Worried About Running Out of Food in the Last Year: Never true     Ran Out of Food in the Last Year: Never true     Ran Out of Food in the Last Year: Not on " file   Transportation Needs: No Transportation Needs (10/4/2024)    PRAPARE - Transportation     Lack of Transportation (Medical): No     Lack of Transportation (Non-Medical): No   Physical Activity: Not on file   Stress: Not on file   Social Connections: Not on file   Intimate Partner Violence: Not on file   Housing Stability: Unknown (10/4/2024)    Housing Stability Vital Sign     Unable to Pay for Housing in the Last Year: No     Number of Times Moved in the Last Year: Not on file     Homeless in the Last Year: No       Allergies:   Allergies   Allergen Reactions    Compazine [Prochlorperazine] Tongue Swelling    Zithromax [Azithromycin] Other (See Comments)     .    Contrast [Iodinated Contrast Media] Hives     Possible hypersensitivity reaction       Labs:  0   Lab Value Date/Time    HCT 41.1 02/20/2024 0651    HCT 40.0 02/18/2024 0548    HCT 41.8 02/17/2024 0900    HGB 13.3 02/20/2024 0651    HGB 12.9 02/18/2024 0548    HGB 13.5 02/17/2024 0900    INR 1.00 02/15/2024 0533    WBC 18.55 (H) 02/20/2024 0651    WBC 10.14 02/18/2024 0548    WBC 11.48 (H) 02/17/2024 0900    ESR 16 02/10/2024 0623    CRP 7.6 (H) 02/10/2024 0623       Meds:    Current Outpatient Medications:     Accu-Chek Softclix Lancets lancets, Use 1 each in the morning Use as instructed, Disp: , Rfl:     Accu-Chek Softclix Lancets lancets, Test once daily, Disp: 100 each, Rfl: 3    Ascorbic Acid (VITAMIN C PO), Take by mouth, Disp: , Rfl:     aspirin 81 mg chewable tablet, Chew 81 mg daily, Disp: , Rfl:     atorvastatin (LIPITOR) 40 mg tablet, TAKE 1 TABLET BY MOUTH EVERY DAY, Disp: 90 tablet, Rfl: 1    BD Pen Needle Dominga 2nd Gen 32G X 4 MM MISC, USE 5 EACH IN THE MORNING, Disp: , Rfl:     calamine lotion, Apply topically as needed for itching, Disp: 180 mL, Rfl: 0    chlorthalidone 25 mg tablet, Take 0.5 tablets (12.5 mg total) by mouth daily, Disp: 45 tablet, Rfl: 2    ciclopirox (LOPROX) 0.77 % cream, Apply topically 2 (two) times a day, Disp:  90 g, Rfl: 2    clotrimazole-betamethasone (LOTRISONE) 1-0.05 % cream, Apply topically 2 (two) times a day, Disp: 45 g, Rfl: 5    diphenhydrAMINE (BENADRYL) 25 mg tablet, Take 2 tablets (50 mg total) by mouth every 6 (six) hours, Disp: 20 tablet, Rfl: 0    fenofibrate (TRICOR) 145 mg tablet, Take 1 tablet (145 mg total) by mouth daily, Disp: 90 tablet, Rfl: 2    glipiZIDE (GLUCOTROL XL) 5 mg 24 hr tablet, TAKE 1 TABLET BY MOUTH EVERY DAY, Disp: 90 tablet, Rfl: 3    glucose blood (Accu-Chek Kate Plus) test strip, Use 1 each in the morning Use as instructed, Disp: 100 strip, Rfl: 3    hydrOXYzine HCL (ATARAX) 25 mg tablet, Take 1 tablet (25 mg total) by mouth every 6 (six) hours as needed for itching, Disp: 60 tablet, Rfl: 1    Insulin Pen Needle (Pen Needles) 33G X 4 MM MISC, Use 5 each in the morning, Disp: 200 each, Rfl: 6    Januvia 100 MG tablet, , Disp: , Rfl:     levETIRAcetam (KEPPRA) 1000 MG tablet, Take 1 tablet (1,000 mg total) by mouth 2 (two) times a day, Disp: 180 tablet, Rfl: 3    lisinopril (ZESTRIL) 20 mg tablet, Take 1 tablet (20 mg total) by mouth daily, Disp: 90 tablet, Rfl: 2    metFORMIN (GLUCOPHAGE) 500 mg tablet, TAKE 2 TABLETS TWICE A DAY WITH MEALS, Disp: 360 tablet, Rfl: 1    omeprazole (PriLOSEC) 40 MG capsule, Take 1 capsule (40 mg total) by mouth daily before breakfast, Disp: 90 capsule, Rfl: 3    OneTouch Delica Lancets 33G MISC, Use 3 (three) times a week onetouch delica lancets 33G test 3 times per week, Disp: 100 each, Rfl: 3    tadalafil (CIALIS) 20 MG tablet, Take 1 tablet (20 mg total) by mouth daily as needed for erectile dysfunction, Disp: 30 tablet, Rfl: 3    triamcinolone (KENALOG) 0.1 % cream, Apply topically 2 (two) times a day, Disp: 454 g, Rfl: 2    Body mass index is 31.02 kg/m².  Wt Readings from Last 3 Encounters:   04/30/25 92.5 kg (204 lb)   04/03/25 91.2 kg (201 lb)   10/04/24 97.1 kg (214 lb)     Physical Exam:   There were no vitals filed for this visit.    General  Appearance:    Alert, cooperative, no distress, appears stated age   Head:    Normocephalic, without obvious abnormality, atraumatic   Eyes:    conjunctiva/corneas clear, both eyes        Nose:   Nares normal, septum midline, no drainage    Throat:   Lips normal; teeth and gums normal   Neck:    symmetrical, trachea midline, ;     thyroid:  no enlargement/   Back:     Symmetric, no curvature, ROM normal   Lungs:   No audible wheezing or labored breathing   Chest Wall:    No tenderness or deformity    Heart:    Regular rate and rhythm               Pulses:   2+ and symmetric all extremities   Skin:   Skin color, texture, turgor normal, no rashes or lesions   Neurologic:   normal strength, sensation and reflexes     throughout       Musculoskeletal: bilateral lower extremity  Examination patient's left lower extremity active flexion to 90 degrees limited internal/external rotation due to left hip pain 10 degrees hip flexion adduction abduction strength 5 out of 5 positive Stinchfield negative modified straight leg raise no pain to palpation of the greater trochanteric region  Examination patient's right hip active flexion greater than 90 degrees internal/external rotation to 30 degrees without pain hip flexion adduction abduction strength 5 out of 5 negative Stinchfield negative modified straight leg raise  Examination patient is lumbar spine no pain about patient and patient's midline lumbar spine or bilateral SI joints no pain with flexion extension of the lumbar spine knee flexion extension strength 5/5 ankle dorsi plantarflexion strength 5 out of 5 sensation intact distal pulse present    Radiology:   I personally reviewed the films.  X-rays left hip reveal joint space narrowing subchondral sclerosis osteophyte formation with left hip arthritis.  X-rays lumbar spine reveal L4-L5 spondylolisthesis as well as degenerative changes noted throughout the lumbar spine                        .

## 2025-05-15 ENCOUNTER — TELEPHONE (OUTPATIENT)
Dept: OBGYN CLINIC | Facility: CLINIC | Age: 75
End: 2025-05-15

## 2025-05-15 NOTE — TELEPHONE ENCOUNTER
Pt came in and asked about phone calls he was getting. Pt also wanted to pay a balance.     looked at Pt's chart. Did not see any telephone encounters.     told Pt that they missed their injection appt on May 7th. Perhaps they called about that.     told Pt they had a balance from the Hospital - but could not say what it was for.  provided 800 number for billing for Pt to get clarification.    Pt was sent on their way.

## 2025-05-16 ENCOUNTER — TELEPHONE (OUTPATIENT)
Dept: OBGYN CLINIC | Facility: CLINIC | Age: 75
End: 2025-05-16

## 2025-05-16 NOTE — TELEPHONE ENCOUNTER
LMOM to advise patient that he can contact Central Scheduling to reschedule his hip injection.  No new order is needed at this time.  Central Scheduling's number provided to patient.

## 2025-05-16 NOTE — TELEPHONE ENCOUNTER
Pt came to office again. Wanted to pay balance. Pt gave  20.00 cash.  applied towards balance.  also provided a receipt to the Pt.    There was some confusion about his missed injection for his hip (5/7/25). Pt wants to reschedule.  was unsure if current referral is still valid and or a new referral is needed.    Told Pt that the Medical Team will have to get back to him to clarify if it can be rescheduled to another day/time.    Pt seemed confused, and  did its best in explaining someone would be in contact with him with more instruction.

## 2025-05-21 NOTE — TELEPHONE ENCOUNTER
Returned call to patient and warm transferred patient to central scheduling to schedule hip injection.

## 2025-05-25 DIAGNOSIS — I10 ESSENTIAL HYPERTENSION: ICD-10-CM

## 2025-05-25 RX ORDER — CHLORTHALIDONE 25 MG/1
12.5 TABLET ORAL DAILY
Qty: 15 TABLET | Refills: 0 | Status: SHIPPED | OUTPATIENT
Start: 2025-05-25

## 2025-05-27 ENCOUNTER — OFFICE VISIT (OUTPATIENT)
Dept: PODIATRY | Facility: CLINIC | Age: 75
End: 2025-05-27
Payer: COMMERCIAL

## 2025-05-27 VITALS — HEIGHT: 67 IN | BODY MASS INDEX: 32.02 KG/M2 | WEIGHT: 204 LBS

## 2025-05-27 DIAGNOSIS — B35.1 ONYCHOMYCOSIS: Primary | ICD-10-CM

## 2025-05-27 DIAGNOSIS — B35.3 TINEA PEDIS OF BOTH FEET: ICD-10-CM

## 2025-05-27 DIAGNOSIS — N18.31 TYPE 2 DIABETES MELLITUS WITH STAGE 3A CHRONIC KIDNEY DISEASE, WITHOUT LONG-TERM CURRENT USE OF INSULIN (HCC): ICD-10-CM

## 2025-05-27 DIAGNOSIS — E11.22 TYPE 2 DIABETES MELLITUS WITH STAGE 3A CHRONIC KIDNEY DISEASE, WITHOUT LONG-TERM CURRENT USE OF INSULIN (HCC): ICD-10-CM

## 2025-05-27 DIAGNOSIS — M79.674 PAIN IN TOES OF BOTH FEET: ICD-10-CM

## 2025-05-27 DIAGNOSIS — M79.675 PAIN IN TOES OF BOTH FEET: ICD-10-CM

## 2025-05-27 PROCEDURE — 11721 DEBRIDE NAIL 6 OR MORE: CPT | Performed by: PODIATRIST

## 2025-05-27 PROCEDURE — 99203 OFFICE O/P NEW LOW 30 MIN: CPT | Performed by: PODIATRIST

## 2025-05-27 RX ORDER — CLOTRIMAZOLE AND BETAMETHASONE DIPROPIONATE 10; .64 MG/G; MG/G
CREAM TOPICAL 2 TIMES DAILY
Qty: 15 G | Refills: 1 | Status: SHIPPED | OUTPATIENT
Start: 2025-05-27 | End: 2025-06-24

## 2025-05-27 NOTE — ASSESSMENT & PLAN NOTE
Lotrisone bilateral  Orders:  •  clotrimazole-betamethasone (LOTRISONE) 1-0.05 % cream; Apply topically 2 (two) times a day for 28 days Apply thin layer to affected area twice daily.

## 2025-05-27 NOTE — PROGRESS NOTES
"Name: Delphine Nelson      : 1950      MRN: [id number]  Encounter Provider: Trenton Pablo DPM  Encounter Date: 2025   Encounter department: [facility] [place]  :  Assessment & Plan  Onychomycosis  Nail debridement x 10 today.  Follow-up with Dr. Hays for routine footcare going forward.           Tinea pedis of both feet  Lotrisone bilateral  Orders:  •  clotrimazole-betamethasone (LOTRISONE) 1-0.05 % cream; Apply topically 2 (two) times a day for 28 days Apply thin layer to affected area twice daily.    Pain in toes of both feet         Type 2 diabetes mellitus with stage 3a chronic kidney disease, without long-term current use of insulin ([facility])    Lab Results   Component Value Date    HGBA1C 7.4 (A) 2025                History of Present Illness   HPI  Delphine Nelson is a 74 y.o. male who presents Patient has history of infection to the right lower leg. Here for diabetic foot check.  Patient relates thickened dystrophic nails with subungual debris.  Denies any treatments currently.  He does have history of diabetes.  Denies any numbness burning tingling to his feet.          Review of Systems       Objective   Ht 5' 7\" (1.702 m) Comment: verbal  Wt 92.5 kg (204 lb)   BMI 31.95 kg/m²      Physical Exam    Cardiovascular:      Pulses: no weak pulses.           Dorsalis pedis pulses are 2+ on the right side and 2+ on the left side.        Posterior tibial pulses are 2+ on the right side and 2+ on the left side.   Feet:      Right foot:      Skin integrity: Callus and dry skin present. No erythema.      Left foot:      Skin integrity: Callus and dry skin present. No erythema.         Diabetic Foot Exam    Patient's shoes and socks removed.    Right Foot/Ankle   Right Foot Inspection  Skin Exam: dry skin, callus and callus. No erythema.     Toe Exam: swelling.     Sensory   Monofilament testing: intact    Vascular  Capillary refills: < 3 seconds  The right DP pulse is 2+. " The right PT pulse is 2+.     Left Foot/Ankle  Left Foot Inspection  Skin Exam: dry skin and callus. No erythema.     Toe Exam: swelling.     Sensory   Monofilament testing: intact    Vascular  Capillary refills: < 3 seconds  The left DP pulse is 2+. The left PT pulse is 2+.     Assign Risk Category  No deformity present  No loss of protective sensation  No weak pulses  Risk: 0    Thickened dystrophic nails with subungual debris noted x 10.  There is some peeling and erythematous changes noted bilateral foot.  There is intact muscle strength noted with dorsiflexion plantarflexion inversion eversion.  Venous stasis changes noted bilateral lower extremities.

## 2025-05-30 ENCOUNTER — HOSPITAL ENCOUNTER (OUTPATIENT)
Dept: RADIOLOGY | Facility: HOSPITAL | Age: 75
Discharge: HOME | End: 2025-05-30
Attending: PHYSICIAN ASSISTANT
Payer: COMMERCIAL

## 2025-05-30 DIAGNOSIS — G89.29 CHRONIC HIP PAIN, LEFT: ICD-10-CM

## 2025-05-30 DIAGNOSIS — M25.552 CHRONIC HIP PAIN, LEFT: ICD-10-CM

## 2025-05-30 DIAGNOSIS — M16.12 ARTHRITIS OF LEFT HIP: ICD-10-CM

## 2025-05-30 PROCEDURE — 20610 DRAIN/INJ JOINT/BURSA W/O US: CPT

## 2025-05-30 PROCEDURE — 77002 NEEDLE LOCALIZATION BY XRAY: CPT

## 2025-05-30 RX ORDER — LIDOCAINE HYDROCHLORIDE 10 MG/ML
5 INJECTION, SOLUTION EPIDURAL; INFILTRATION; INTRACAUDAL; PERINEURAL
Status: COMPLETED | OUTPATIENT
Start: 2025-05-30 | End: 2025-05-30

## 2025-05-30 RX ORDER — ROPIVACAINE HYDROCHLORIDE 2 MG/ML
10 INJECTION, SOLUTION EPIDURAL; INFILTRATION; PERINEURAL ONCE
Status: COMPLETED | OUTPATIENT
Start: 2025-05-30 | End: 2025-05-30

## 2025-05-30 RX ORDER — METHYLPREDNISOLONE ACETATE 80 MG/ML
80 INJECTION, SUSPENSION INTRA-ARTICULAR; INTRALESIONAL; INTRAMUSCULAR; SOFT TISSUE
Status: COMPLETED | OUTPATIENT
Start: 2025-05-30 | End: 2025-05-30

## 2025-05-30 RX ADMIN — IOHEXOL 3 ML: 300 INJECTION, SOLUTION INTRAVENOUS at 15:21

## 2025-05-30 RX ADMIN — LIDOCAINE HYDROCHLORIDE 4 ML: 10 INJECTION, SOLUTION EPIDURAL; INFILTRATION; INTRACAUDAL at 15:21

## 2025-05-30 RX ADMIN — ROPIVACAINE HYDROCHLORIDE 10 ML/HR: 2 INJECTION, SOLUTION EPIDURAL; INFILTRATION at 15:21

## 2025-05-30 RX ADMIN — METHYLPREDNISOLONE ACETATE 80 MG: 80 INJECTION, SUSPENSION INTRA-ARTICULAR; INTRALESIONAL; INTRAMUSCULAR; SOFT TISSUE at 15:21

## 2025-06-03 ENCOUNTER — OFFICE VISIT (OUTPATIENT)
Dept: OBGYN CLINIC | Facility: CLINIC | Age: 75
End: 2025-06-03
Payer: COMMERCIAL

## 2025-06-03 VITALS — WEIGHT: 205 LBS | BODY MASS INDEX: 32.11 KG/M2

## 2025-06-03 DIAGNOSIS — G89.29 CHRONIC HIP PAIN, LEFT: ICD-10-CM

## 2025-06-03 DIAGNOSIS — M25.552 CHRONIC HIP PAIN, LEFT: ICD-10-CM

## 2025-06-03 DIAGNOSIS — M16.12 ARTHRITIS OF LEFT HIP: Primary | ICD-10-CM

## 2025-06-03 PROCEDURE — 99213 OFFICE O/P EST LOW 20 MIN: CPT | Performed by: ORTHOPAEDIC SURGERY

## 2025-06-03 NOTE — PROGRESS NOTES
Assessment:  Assessment & Plan  Chronic hip pain, left         Arthritis of left hip  On exam, he has less pain even being in a few days.   Since he continues to have good relief, we can continue with injections.   He has no pain in the bilateral lower extremity but has intermittent sharp pains in them. He has skin changes hardening. He has no loss in feeling in the feet or toes.   Since the injection is helping for the left hip pain, he can call in for another intra- articular hip injection for the pain.   Follow up as needed           To do next visit:  Return if symptoms worsen or fail to improve.    The above stated was discussed in layman's terms and the patient expressed understanding.  All questions were answered to the patient's satisfaction.       Scribe Attestation      I,:  Amanda Bolanos am acting as a scribe while in the presence of the attending physician.:       I,:  Mary Beth Rollins MD personally performed the services described in this documentation    as scribed in my presence.:               Subjective:   Delphine Nelson is a 74 y.o. male who presents today for a follow-up for his chronic left hip arthritis with spondylolisthesis at L4-L5 level.  Visit on 4/30/2025, patient was referred to an intra-articular left hip injection form 5/30/2025. He states that the pain decreased with giving him the ability to actively lift his leg to get in and out of a car. He has less pain with walking.   And is a type II diabetic last hemoglobin A1c of 7.4 on 4/3/2025.      Review of systems negative unless otherwise specified in HPI  Review of Systems   Constitutional:  Negative for chills, fever and unexpected weight change.   HENT:  Negative for hearing loss, nosebleeds and sore throat.    Eyes:  Negative for pain, redness and visual disturbance.   Respiratory:  Negative for cough, shortness of breath and wheezing.    Cardiovascular:  Negative for chest pain, palpitations and leg swelling.    Gastrointestinal:  Negative for abdominal pain, nausea and vomiting.   Endocrine: Negative for polydipsia and polyuria.   Genitourinary:  Negative for dysuria and hematuria.   Musculoskeletal:  Positive for arthralgias.   Skin:  Negative for rash and wound.   Neurological:  Negative for dizziness, light-headedness and headaches.   Psychiatric/Behavioral:  Negative for decreased concentration, dysphoric mood and suicidal ideas. The patient is not nervous/anxious.        Past Medical History[1]    Past Surgical History[2]    Family History[3]    Social History     Occupational History    Not on file   Tobacco Use    Smoking status: Never     Passive exposure: Never    Smokeless tobacco: Never   Vaping Use    Vaping status: Never Used   Substance and Sexual Activity    Alcohol use: Never    Drug use: Never    Sexual activity: Not Currently     Partners: Female       Current Medications[4]    Allergies[5]       There were no vitals filed for this visit.    Body mass index is 32.11 kg/m².  Wt Readings from Last 3 Encounters:   06/03/25 93 kg (205 lb)   05/27/25 92.5 kg (204 lb)   04/30/25 92.5 kg (204 lb)       Objective:                    Left Hip Exam     Tenderness   The patient is experiencing tenderness in the anterior (groin).    Range of Motion   Flexion:  60   External rotation:  30   Internal rotation: 5 (with pain)     Muscle Strength   Abduction: 5/5   Adduction: 5/5   Flexion: 4/5     Tests   PEYTON: negative    Other   Erythema: absent  Sensation: normal  Pulse: present    Comments:  Calf is taut with discoloration that is non painful, skin hardening changes  NVI            Diagnostics, reviewed and taken today if performed as documented:    None performed:    None performed but reviewed:      The attending physician has personally reviewed the pertinent films in PACS and interpretation is as follows:      Procedures, if performed today:    Procedures    None performed      Portions of the record may have  "been created with voice recognition software.  Occasional wrong word or \"sound a like\" substitutions may have occurred due to the inherent limitations of voice recognition software.  Read the chart carefully and recognize, using context, where substitutions have occurred.         [1]   Past Medical History:  Diagnosis Date    Diabetes mellitus (HCC)     ED (erectile dysfunction)     GERD (gastroesophageal reflux disease)     Hypercholesterolemia     Hypertension     Migraines    [2]   Past Surgical History:  Procedure Laterality Date    COLONOSCOPY  08/01/2016    FL INJECTION LEFT HIP (NON ARTHROGRAM)  5/30/2025    FL LUMBAR PUNCTURE DIAGNOSTIC  2/12/2024    IR CEREBRAL ANGIOGRAPHY  2/15/2024    NO PAST SURGERIES     [3]   Family History  Problem Relation Name Age of Onset    Heart disease Mother      Asthma Father      Kidney disease Brother      Diabetes Brother     [4]   Current Outpatient Medications:     Accu-Chek Softclix Lancets lancets, Use 1 each in the morning Use as instructed, Disp: , Rfl:     Accu-Chek Softclix Lancets lancets, Test once daily, Disp: 100 each, Rfl: 3    Ascorbic Acid (VITAMIN C PO), Take by mouth, Disp: , Rfl:     aspirin 81 mg chewable tablet, Chew 81 mg in the morning., Disp: , Rfl:     atorvastatin (LIPITOR) 40 mg tablet, TAKE 1 TABLET BY MOUTH EVERY DAY, Disp: 90 tablet, Rfl: 1    BD Pen Needle Dominga 2nd Gen 32G X 4 MM MISC, , Disp: , Rfl:     calamine lotion, Apply topically as needed for itching, Disp: 180 mL, Rfl: 0    chlorthalidone 25 mg tablet, TAKE 1/2 TABLET(12.5 MG) BY MOUTH DAILY, Disp: 15 tablet, Rfl: 0    ciclopirox (LOPROX) 0.77 % cream, Apply topically 2 (two) times a day, Disp: 90 g, Rfl: 2    clotrimazole-betamethasone (LOTRISONE) 1-0.05 % cream, Apply topically 2 (two) times a day for 28 days Apply thin layer to affected area twice daily., Disp: 15 g, Rfl: 1    diphenhydrAMINE (BENADRYL) 25 mg tablet, Take 2 tablets (50 mg total) by mouth every 6 (six) hours, Disp: " 20 tablet, Rfl: 0    fenofibrate (TRICOR) 145 mg tablet, Take 1 tablet (145 mg total) by mouth daily, Disp: 90 tablet, Rfl: 2    glipiZIDE (GLUCOTROL XL) 5 mg 24 hr tablet, TAKE 1 TABLET BY MOUTH EVERY DAY, Disp: 90 tablet, Rfl: 3    glucose blood (Accu-Chek Kate Plus) test strip, Use 1 each in the morning Use as instructed, Disp: 100 strip, Rfl: 3    hydrOXYzine HCL (ATARAX) 25 mg tablet, Take 1 tablet (25 mg total) by mouth every 6 (six) hours as needed for itching, Disp: 60 tablet, Rfl: 1    Insulin Pen Needle (Pen Needles) 33G X 4 MM MISC, Use 5 each in the morning, Disp: 200 each, Rfl: 6    Januvia 100 MG tablet, , Disp: , Rfl:     levETIRAcetam (KEPPRA) 1000 MG tablet, Take 1 tablet (1,000 mg total) by mouth 2 (two) times a day, Disp: 180 tablet, Rfl: 3    lisinopril (ZESTRIL) 20 mg tablet, Take 1 tablet (20 mg total) by mouth daily, Disp: 90 tablet, Rfl: 2    metFORMIN (GLUCOPHAGE) 500 mg tablet, TAKE 2 TABLETS TWICE A DAY WITH MEALS, Disp: 360 tablet, Rfl: 1    omeprazole (PriLOSEC) 40 MG capsule, Take 1 capsule (40 mg total) by mouth daily before breakfast, Disp: 90 capsule, Rfl: 3    OneTouch Delica Lancets 33G MISC, Use 3 (three) times a week onetouch delica lancets 33G test 3 times per week, Disp: 100 each, Rfl: 3    tadalafil (CIALIS) 20 MG tablet, Take 1 tablet (20 mg total) by mouth daily as needed for erectile dysfunction, Disp: 30 tablet, Rfl: 3    triamcinolone (KENALOG) 0.1 % cream, Apply topically 2 (two) times a day, Disp: 454 g, Rfl: 2  [5]   Allergies  Allergen Reactions    Compazine [Prochlorperazine] Tongue Swelling    Zithromax [Azithromycin] Other (See Comments)     .    Contrast [Iodinated Contrast Media] Hives     Possible hypersensitivity reaction

## 2025-06-03 NOTE — PATIENT INSTRUCTIONS
Since the injection is helping for the left hip pain, he can call in for another intra- articular hip injection for the pain.

## 2025-06-17 ENCOUNTER — OFFICE VISIT (OUTPATIENT)
Dept: PODIATRY | Facility: CLINIC | Age: 75
End: 2025-06-17
Payer: COMMERCIAL

## 2025-06-17 DIAGNOSIS — L60.0 INGROWN TOENAIL: Primary | ICD-10-CM

## 2025-06-17 DIAGNOSIS — B35.1 ONYCHOMYCOSIS: ICD-10-CM

## 2025-06-17 DIAGNOSIS — E78.00 HYPERCHOLESTEROLEMIA: ICD-10-CM

## 2025-06-17 DIAGNOSIS — E11.22 TYPE 2 DIABETES MELLITUS WITH STAGE 3A CHRONIC KIDNEY DISEASE, WITHOUT LONG-TERM CURRENT USE OF INSULIN (HCC): ICD-10-CM

## 2025-06-17 DIAGNOSIS — N18.31 TYPE 2 DIABETES MELLITUS WITH STAGE 3A CHRONIC KIDNEY DISEASE, WITHOUT LONG-TERM CURRENT USE OF INSULIN (HCC): ICD-10-CM

## 2025-06-17 PROCEDURE — 99212 OFFICE O/P EST SF 10 MIN: CPT | Performed by: PODIATRIST

## 2025-06-17 NOTE — PROGRESS NOTES
:  Assessment & Plan  Ingrown toenail         Onychomycosis         Type 2 diabetes mellitus with stage 3a chronic kidney disease, without long-term current use of insulin ([facility])    Lab Results   Component Value Date    HGBA1C 7.4 (A) 04/03/2025              The patient's clinical examination today is significant for a mildly tender, lytic right hallucal nail plate with incurvated borders.  There are no signs of infection.  There is no other acute pedal issues.  Does have chronic lymphedema of the bilateral extremities.  There are no open lesions.    Dr. Pablo's chart notes from May 2025 were reviewed and appreciated.  The patient is suffering from persistent ingrown nail in his right hallux today.  This was resected with a slant back procedure without any complication.  There was immediate relief of symptoms.  We discussed the potential benefits of a referral to lymphedema clinic at [facility].  He is concerned about location and distance from his home.  No other acute pedal issues noted today.    Follow-up in 2 to 3 months for continued at risk footcare.    History of Present Illness     Delphine Nelson is a 74 y.o. male   Patient presents today with a chief complaint of a painful right great toenail.  He was recently seen by Dr. Pablo and he notes that the nails were debrided.  However he is still having discomfort to his right great toenail.  He does have chronic swelling in his lower legs for which she takes water pills on an as-needed basis.      PAST MEDICAL HISTORY:  Past Medical History[1]    PAST SURGICAL HISTORY:  Past Surgical History[2]     ALLERGIES:  Compazine [prochlorperazine], Zithromax [azithromycin], and Contrast [iodinated contrast media]    MEDICATIONS:  Current Medications[3]    SOCIAL HISTORY:  Social History[4]   Review of Systems   Constitutional: Negative.    Respiratory: Negative.     Cardiovascular: Negative.    Musculoskeletal: Negative.    Psychiatric/Behavioral: Negative.        Objective   There were no vitals taken for this visit.     Physical Exam  Constitutional:       Appearance: Normal appearance.   HENT:      Head: Normocephalic and atraumatic.     Cardiovascular:      Pulses:           Dorsalis pedis pulses are 1+ on the right side.        Posterior tibial pulses are 1+ on the right side.   Pulmonary:      Effort: Pulmonary effort is normal.   Feet:      Right foot:      Toenail Condition: Right toenails are abnormally thick and ingrown.      Comments: The patient's clinical examination today is significant for a mildly tender, lytic right hallucal nail plate with incurvated borders.  There are no signs of infection.  There is no other acute pedal issues.  Does have chronic lymphedema of the bilateral extremities.  There are no open lesions.    Skin:     General: Skin is warm and dry.      Capillary Refill: Capillary refill takes 2 to 3 seconds.     Neurological:      General: No focal deficit present.      Mental Status: He is alert and oriented to person, place, and time.     Psychiatric:         Mood and Affect: Mood normal.                [1]   Past Medical History:  Diagnosis Date    Diabetes mellitus (HCC)     ED (erectile dysfunction)     GERD (gastroesophageal reflux disease)     Hypercholesterolemia     Hypertension     Migraines    [2]   Past Surgical History:  Procedure Laterality Date    COLONOSCOPY  08/01/2016    FL INJECTION LEFT HIP (NON ARTHROGRAM)  5/30/2025    FL LUMBAR PUNCTURE DIAGNOSTIC  2/12/2024    IR CEREBRAL ANGIOGRAPHY  2/15/2024    NO PAST SURGERIES     [3]   Current Outpatient Medications   Medication Sig Dispense Refill    Accu-Chek Softclix Lancets lancets Use 1 each in the morning Use as instructed      Accu-Chek Softclix Lancets lancets Test once daily 100 each 3    Ascorbic Acid (VITAMIN C PO) Take by mouth      aspirin 81 mg chewable tablet Chew 81 mg in the morning.      atorvastatin (LIPITOR) 40 mg tablet TAKE 1 TABLET BY MOUTH EVERY DAY 90  tablet 1    BD Pen Needle Dominga 2nd Gen 32G X 4 MM MISC       calamine lotion Apply topically as needed for itching 180 mL 0    chlorthalidone 25 mg tablet TAKE 1/2 TABLET(12.5 MG) BY MOUTH DAILY 15 tablet 0    ciclopirox (LOPROX) 0.77 % cream Apply topically 2 (two) times a day 90 g 2    clotrimazole-betamethasone (LOTRISONE) 1-0.05 % cream Apply topically 2 (two) times a day for 28 days Apply thin layer to affected area twice daily. 15 g 1    diphenhydrAMINE (BENADRYL) 25 mg tablet Take 2 tablets (50 mg total) by mouth every 6 (six) hours 20 tablet 0    fenofibrate (TRICOR) 145 mg tablet Take 1 tablet (145 mg total) by mouth daily 90 tablet 2    glipiZIDE (GLUCOTROL XL) 5 mg 24 hr tablet TAKE 1 TABLET BY MOUTH EVERY DAY 90 tablet 3    glucose blood (Accu-Chek Kate Plus) test strip Use 1 each in the morning Use as instructed 100 strip 3    hydrOXYzine HCL (ATARAX) 25 mg tablet Take 1 tablet (25 mg total) by mouth every 6 (six) hours as needed for itching 60 tablet 1    Insulin Pen Needle (Pen Needles) 33G X 4 MM MISC Use 5 each in the morning 200 each 6    Januvia 100 MG tablet       levETIRAcetam (KEPPRA) 1000 MG tablet Take 1 tablet (1,000 mg total) by mouth 2 (two) times a day 180 tablet 3    lisinopril (ZESTRIL) 20 mg tablet Take 1 tablet (20 mg total) by mouth daily 90 tablet 2    metFORMIN (GLUCOPHAGE) 500 mg tablet TAKE 2 TABLETS TWICE A DAY WITH MEALS 360 tablet 1    omeprazole (PriLOSEC) 40 MG capsule Take 1 capsule (40 mg total) by mouth daily before breakfast 90 capsule 3    OneTouch Delica Lancets 33G MISC Use 3 (three) times a week onetouch delica lancets 33G test 3 times per week 100 each 3    tadalafil (CIALIS) 20 MG tablet Take 1 tablet (20 mg total) by mouth daily as needed for erectile dysfunction 30 tablet 3    triamcinolone (KENALOG) 0.1 % cream Apply topically 2 (two) times a day 454 g 2     No current facility-administered medications for this visit.   [4]   Social History  Socioeconomic  History    Marital status: /Civil Union   Tobacco Use    Smoking status: Never     Passive exposure: Never    Smokeless tobacco: Never   Vaping Use    Vaping status: Never Used   Substance and Sexual Activity    Alcohol use: Never    Drug use: Never    Sexual activity: Not Currently     Partners: Female     Social Drivers of Health     Financial Resource Strain: Low Risk  (9/29/2023)    Overall Financial Resource Strain (CARDIA)     Difficulty of Paying Living Expenses: Not hard at all   Food Insecurity: No Food Insecurity (10/4/2024)    Nursing - Inadequate Food Risk Classification     Worried About Running Out of Food in the Last Year: Never true     Ran Out of Food in the Last Year: Never true   Transportation Needs: No Transportation Needs (10/4/2024)    PRAPARE - Transportation     Lack of Transportation (Medical): No     Lack of Transportation (Non-Medical): No   Housing Stability: Unknown (10/4/2024)    Housing Stability Vital Sign     Unable to Pay for Housing in the Last Year: No     Homeless in the Last Year: No

## 2025-06-18 RX ORDER — FENOFIBRATE 145 MG/1
145 TABLET, FILM COATED ORAL DAILY
Qty: 90 TABLET | Refills: 2 | Status: SHIPPED | OUTPATIENT
Start: 2025-06-18

## 2025-06-19 DIAGNOSIS — I10 ESSENTIAL HYPERTENSION: ICD-10-CM

## 2025-06-19 RX ORDER — LISINOPRIL 20 MG/1
20 TABLET ORAL DAILY
Qty: 30 TABLET | Refills: 0 | Status: SHIPPED | OUTPATIENT
Start: 2025-06-19

## 2025-06-27 DIAGNOSIS — I10 ESSENTIAL HYPERTENSION: ICD-10-CM

## 2025-06-28 RX ORDER — CHLORTHALIDONE 25 MG/1
12.5 TABLET ORAL DAILY
Qty: 15 TABLET | Refills: 0 | Status: SHIPPED | OUTPATIENT
Start: 2025-06-28

## 2025-07-08 ENCOUNTER — APPOINTMENT (OUTPATIENT)
Dept: LAB | Facility: CLINIC | Age: 75
End: 2025-07-08
Payer: COMMERCIAL

## 2025-07-08 DIAGNOSIS — Z12.5 PROSTATE CANCER SCREENING: ICD-10-CM

## 2025-07-08 DIAGNOSIS — N18.31 TYPE 2 DIABETES MELLITUS WITH STAGE 3A CHRONIC KIDNEY DISEASE, WITHOUT LONG-TERM CURRENT USE OF INSULIN (HCC): ICD-10-CM

## 2025-07-08 DIAGNOSIS — E78.00 HYPERCHOLESTEROLEMIA: ICD-10-CM

## 2025-07-08 DIAGNOSIS — E11.22 TYPE 2 DIABETES MELLITUS WITH STAGE 3A CHRONIC KIDNEY DISEASE, WITHOUT LONG-TERM CURRENT USE OF INSULIN (HCC): ICD-10-CM

## 2025-07-08 DIAGNOSIS — I10 ESSENTIAL HYPERTENSION: ICD-10-CM

## 2025-07-08 LAB
ALBUMIN SERPL BCG-MCNC: 4.5 G/DL (ref 3.5–5)
ALP SERPL-CCNC: 54 U/L (ref 34–104)
ALT SERPL W P-5'-P-CCNC: 23 U/L (ref 7–52)
ANION GAP SERPL CALCULATED.3IONS-SCNC: 7 MMOL/L (ref 4–13)
AST SERPL W P-5'-P-CCNC: 17 U/L (ref 13–39)
BASOPHILS # BLD AUTO: 0.08 THOUSANDS/ÂΜL (ref 0–0.1)
BASOPHILS NFR BLD AUTO: 1 % (ref 0–1)
BILIRUB SERPL-MCNC: 0.55 MG/DL (ref 0.2–1)
BUN SERPL-MCNC: 28 MG/DL (ref 5–25)
CALCIUM SERPL-MCNC: 9.5 MG/DL (ref 8.4–10.2)
CHLORIDE SERPL-SCNC: 105 MMOL/L (ref 96–108)
CHOLEST SERPL-MCNC: 149 MG/DL (ref ?–200)
CO2 SERPL-SCNC: 28 MMOL/L (ref 21–32)
CREAT SERPL-MCNC: 1.54 MG/DL (ref 0.6–1.3)
EOSINOPHIL # BLD AUTO: 0.37 THOUSAND/ÂΜL (ref 0–0.61)
EOSINOPHIL NFR BLD AUTO: 6 % (ref 0–6)
ERYTHROCYTE [DISTWIDTH] IN BLOOD BY AUTOMATED COUNT: 14 % (ref 11.6–15.1)
GFR SERPL CREATININE-BSD FRML MDRD: 44 ML/MIN/1.73SQ M
GLUCOSE P FAST SERPL-MCNC: 122 MG/DL (ref 65–99)
HCT VFR BLD AUTO: 38.5 % (ref 36.5–49.3)
HDLC SERPL-MCNC: 46 MG/DL
HGB BLD-MCNC: 12.5 G/DL (ref 12–17)
IMM GRANULOCYTES # BLD AUTO: 0.05 THOUSAND/UL (ref 0–0.2)
IMM GRANULOCYTES NFR BLD AUTO: 1 % (ref 0–2)
LDLC SERPL CALC-MCNC: 80 MG/DL (ref 0–100)
LYMPHOCYTES # BLD AUTO: 1.11 THOUSANDS/ÂΜL (ref 0.6–4.47)
LYMPHOCYTES NFR BLD AUTO: 17 % (ref 14–44)
MCH RBC QN AUTO: 27.9 PG (ref 26.8–34.3)
MCHC RBC AUTO-ENTMCNC: 32.5 G/DL (ref 31.4–37.4)
MCV RBC AUTO: 86 FL (ref 82–98)
MONOCYTES # BLD AUTO: 0.55 THOUSAND/ÂΜL (ref 0.17–1.22)
MONOCYTES NFR BLD AUTO: 9 % (ref 4–12)
NEUTROPHILS # BLD AUTO: 4.33 THOUSANDS/ÂΜL (ref 1.85–7.62)
NEUTS SEG NFR BLD AUTO: 66 % (ref 43–75)
NONHDLC SERPL-MCNC: 103 MG/DL
NRBC BLD AUTO-RTO: 0 /100 WBCS
PLATELET # BLD AUTO: 276 THOUSANDS/UL (ref 149–390)
PMV BLD AUTO: 9.2 FL (ref 8.9–12.7)
POTASSIUM SERPL-SCNC: 4.2 MMOL/L (ref 3.5–5.3)
PROT SERPL-MCNC: 7.3 G/DL (ref 6.4–8.4)
PSA SERPL-MCNC: 0.6 NG/ML (ref 0–4)
RBC # BLD AUTO: 4.48 MILLION/UL (ref 3.88–5.62)
SODIUM SERPL-SCNC: 140 MMOL/L (ref 135–147)
TRIGL SERPL-MCNC: 113 MG/DL (ref ?–150)
WBC # BLD AUTO: 6.49 THOUSAND/UL (ref 4.31–10.16)

## 2025-07-08 PROCEDURE — 85025 COMPLETE CBC W/AUTO DIFF WBC: CPT

## 2025-07-08 PROCEDURE — 80061 LIPID PANEL: CPT

## 2025-07-08 PROCEDURE — 36415 COLL VENOUS BLD VENIPUNCTURE: CPT

## 2025-07-08 PROCEDURE — 80053 COMPREHEN METABOLIC PANEL: CPT

## 2025-07-08 PROCEDURE — G0103 PSA SCREENING: HCPCS

## 2025-07-20 PROBLEM — R79.89 ELEVATED SERUM CREATININE: Status: ACTIVE | Noted: 2025-07-20

## 2025-07-28 ENCOUNTER — OFFICE VISIT (OUTPATIENT)
Dept: FAMILY MEDICINE CLINIC | Facility: CLINIC | Age: 75
End: 2025-07-28
Payer: COMMERCIAL

## 2025-07-28 VITALS
DIASTOLIC BLOOD PRESSURE: 60 MMHG | RESPIRATION RATE: 16 BRPM | WEIGHT: 203 LBS | BODY MASS INDEX: 31.86 KG/M2 | OXYGEN SATURATION: 98 % | SYSTOLIC BLOOD PRESSURE: 100 MMHG | HEIGHT: 67 IN | HEART RATE: 72 BPM

## 2025-07-28 DIAGNOSIS — E85.4 CEREBRAL AMYLOID ANGIOPATHY  (HCC): ICD-10-CM

## 2025-07-28 DIAGNOSIS — I10 ESSENTIAL HYPERTENSION: Primary | ICD-10-CM

## 2025-07-28 DIAGNOSIS — I68.0 CEREBRAL AMYLOID ANGIOPATHY  (HCC): ICD-10-CM

## 2025-07-28 DIAGNOSIS — R79.89 ELEVATED SERUM CREATININE: ICD-10-CM

## 2025-07-28 DIAGNOSIS — E78.00 HYPERCHOLESTEROLEMIA: ICD-10-CM

## 2025-07-28 DIAGNOSIS — I10 ESSENTIAL HYPERTENSION: ICD-10-CM

## 2025-07-28 DIAGNOSIS — E11.22 TYPE 2 DIABETES MELLITUS WITH STAGE 3A CHRONIC KIDNEY DISEASE, WITHOUT LONG-TERM CURRENT USE OF INSULIN (HCC): ICD-10-CM

## 2025-07-28 DIAGNOSIS — N18.31 TYPE 2 DIABETES MELLITUS WITH STAGE 3A CHRONIC KIDNEY DISEASE, WITHOUT LONG-TERM CURRENT USE OF INSULIN (HCC): ICD-10-CM

## 2025-07-28 DIAGNOSIS — E78.00 PURE HYPERCHOLESTEROLEMIA: ICD-10-CM

## 2025-07-28 DIAGNOSIS — K63.5 POLYP OF COLON, UNSPECIFIED PART OF COLON, UNSPECIFIED TYPE: ICD-10-CM

## 2025-07-28 LAB — SL AMB POCT HEMOGLOBIN AIC: 7.3 (ref ?–6.5)

## 2025-07-28 PROCEDURE — 83036 HEMOGLOBIN GLYCOSYLATED A1C: CPT | Performed by: FAMILY MEDICINE

## 2025-07-28 PROCEDURE — 99214 OFFICE O/P EST MOD 30 MIN: CPT | Performed by: FAMILY MEDICINE

## 2025-07-28 PROCEDURE — G2211 COMPLEX E/M VISIT ADD ON: HCPCS | Performed by: FAMILY MEDICINE

## 2025-07-28 RX ORDER — GLIPIZIDE 5 MG/1
5 TABLET, FILM COATED, EXTENDED RELEASE ORAL DAILY
Qty: 90 TABLET | Refills: 2 | Status: SHIPPED | OUTPATIENT
Start: 2025-07-28

## 2025-07-28 RX ORDER — LISINOPRIL 20 MG/1
20 TABLET ORAL DAILY
Qty: 30 TABLET | Refills: 2 | Status: SHIPPED | OUTPATIENT
Start: 2025-07-28 | End: 2025-07-29

## 2025-07-28 RX ORDER — CHLORTHALIDONE 25 MG/1
12.5 TABLET ORAL DAILY
Qty: 45 TABLET | Refills: 2 | Status: SHIPPED | OUTPATIENT
Start: 2025-07-28

## 2025-07-28 RX ORDER — ATORVASTATIN CALCIUM 40 MG/1
40 TABLET, FILM COATED ORAL DAILY
Qty: 90 TABLET | Refills: 2 | Status: SHIPPED | OUTPATIENT
Start: 2025-07-28

## 2025-07-28 RX ORDER — FENOFIBRATE 145 MG/1
145 TABLET, FILM COATED ORAL DAILY
Qty: 90 TABLET | Refills: 2 | Status: SHIPPED | OUTPATIENT
Start: 2025-07-28

## 2025-07-28 RX ORDER — LEVETIRACETAM 1000 MG/1
1000 TABLET ORAL 2 TIMES DAILY
Qty: 180 TABLET | Refills: 2 | Status: SHIPPED | OUTPATIENT
Start: 2025-07-28

## 2025-07-29 ENCOUNTER — TELEPHONE (OUTPATIENT)
Age: 75
End: 2025-07-29

## 2025-07-29 DIAGNOSIS — I10 ESSENTIAL HYPERTENSION: ICD-10-CM

## 2025-07-29 RX ORDER — LISINOPRIL 20 MG/1
20 TABLET ORAL DAILY
Qty: 90 TABLET | Refills: 0 | Status: SHIPPED | OUTPATIENT
Start: 2025-07-29

## 2025-07-30 RX ORDER — LISINOPRIL 20 MG/1
20 TABLET ORAL DAILY
Qty: 90 TABLET | OUTPATIENT
Start: 2025-07-30

## 2025-07-30 RX ORDER — CHLORTHALIDONE 25 MG/1
12.5 TABLET ORAL DAILY
Qty: 45 TABLET | Refills: 2 | OUTPATIENT
Start: 2025-07-30

## 2025-07-31 ENCOUNTER — TELEPHONE (OUTPATIENT)
Age: 75
End: 2025-07-31

## 2025-07-31 DIAGNOSIS — K21.9 GASTROESOPHAGEAL REFLUX DISEASE, UNSPECIFIED WHETHER ESOPHAGITIS PRESENT: Primary | ICD-10-CM

## 2025-07-31 RX ORDER — OMEPRAZOLE 40 MG/1
40 CAPSULE, DELAYED RELEASE ORAL
Qty: 90 CAPSULE | Refills: 2 | Status: SHIPPED | OUTPATIENT
Start: 2025-07-31

## 2025-08-15 ENCOUNTER — TRANSCRIBE ORDERS (OUTPATIENT)
Dept: LAB | Facility: CLINIC | Age: 75
End: 2025-08-15

## 2025-08-15 ENCOUNTER — APPOINTMENT (OUTPATIENT)
Dept: LAB | Facility: CLINIC | Age: 75
End: 2025-08-15
Payer: COMMERCIAL

## 2025-08-15 ENCOUNTER — OFFICE VISIT (OUTPATIENT)
Dept: FAMILY MEDICINE CLINIC | Facility: CLINIC | Age: 75
End: 2025-08-15
Payer: COMMERCIAL

## 2025-08-15 PROBLEM — H61.20 CERUMEN IMPACTION: Status: ACTIVE | Noted: 2025-08-15

## 2025-08-15 PROBLEM — K59.00 CONSTIPATION: Status: ACTIVE | Noted: 2025-08-15
